# Patient Record
Sex: MALE | Race: WHITE | Employment: OTHER | URBAN - METROPOLITAN AREA
[De-identification: names, ages, dates, MRNs, and addresses within clinical notes are randomized per-mention and may not be internally consistent; named-entity substitution may affect disease eponyms.]

---

## 2017-02-07 ENCOUNTER — OFFICE VISIT (OUTPATIENT)
Dept: FAMILY MEDICINE | Facility: CLINIC | Age: 62
End: 2017-02-07
Payer: COMMERCIAL

## 2017-02-07 VITALS
DIASTOLIC BLOOD PRESSURE: 98 MMHG | RESPIRATION RATE: 16 BRPM | HEART RATE: 83 BPM | BODY MASS INDEX: 32.86 KG/M2 | WEIGHT: 229 LBS | SYSTOLIC BLOOD PRESSURE: 134 MMHG | TEMPERATURE: 98.1 F

## 2017-02-07 DIAGNOSIS — R03.0 ELEVATED BLOOD PRESSURE READING WITHOUT DIAGNOSIS OF HYPERTENSION: ICD-10-CM

## 2017-02-07 DIAGNOSIS — J06.9 VIRAL URI: Primary | ICD-10-CM

## 2017-02-07 DIAGNOSIS — R07.0 THROAT PAIN: ICD-10-CM

## 2017-02-07 LAB
DEPRECATED S PYO AG THROAT QL EIA: NORMAL
MICRO REPORT STATUS: NORMAL
SPECIMEN SOURCE: NORMAL

## 2017-02-07 PROCEDURE — 87081 CULTURE SCREEN ONLY: CPT | Performed by: FAMILY MEDICINE

## 2017-02-07 PROCEDURE — 99213 OFFICE O/P EST LOW 20 MIN: CPT | Performed by: FAMILY MEDICINE

## 2017-02-07 PROCEDURE — 87880 STREP A ASSAY W/OPTIC: CPT | Performed by: FAMILY MEDICINE

## 2017-02-07 NOTE — Clinical Note
[facility]  [address] [place] [id number]  Phone: [phone]    February 9, 2017    Phong Cuevas  [address] [place] [id number]              Dear Mr. Cuevas,    The results of your 24 hour throat culture were negative. Please contact your clinic if you have any questions or concerns.                Sincerely,      [facility]

## 2017-02-07 NOTE — MR AVS SNAPSHOT
"              After Visit Summary   2/7/2017    Phong Cuevas    MRN: [id number]           Patient Information     Date Of Birth          1955        Visit Information        Provider Department      2/7/2017 1:40 PM Nelly Montalvo DO [facility]        Today's Diagnoses     Throat pain    -  1       Care Instructions    You likely have a viral respiratory infection, as your Strep Test was negative, but we have collected cultures which will take a few days if it grows anything. Viral respiratory illnesses usually persists for about 7-10 days. With increased congestion, you may benefit from an over the counter nasal spray such as Flonase, which you'd spray 2 sprays in each nostril, once a day.     If you develop fevers or your symptoms worsen, please don't hesitate to contact us.         Follow-ups after your visit        Who to contact     Normal or non-critical lab and imaging results will be communicated to you by [email or web address], letter or phone within 4 business days after the clinic has received the results. If you do not hear from us within 7 days, please contact the clinic through [email or web address] or phone. If you have a critical or abnormal lab result, we will notify you by phone as soon as possible.  Submit refill requests through Zeetl or call your pharmacy and they will forward the refill request to us. Please allow 3 business days for your refill to be completed.          If you need to speak with a  for additional information , please call: [phone]           Additional Information About Your Visit        Zeetl Information     Zeetl lets you send messages to your doctor, view your test results, renew your prescriptions, schedule appointments and more. To sign up, go to [email or web address] . Click on \"Log in\" on the left side of the screen, which will take you to the Welcome page. Then click on \"Sign up Now\" on the right side of the page.     You will be asked to " enter the access code listed below, as well as some personal information. Please follow the directions to create your username and password.     Your access code is: [email or web address]  Expires: 2017  2:11 PM     Your access code will  in 90 days. If you need help or a new code, please call your [place] clinic or [phone].        Care EveryWhere ID     This is your Care EveryWhere ID. This could be used by other organizations to access your [place] medical records  [id number]        Your Vitals Were     Pulse Temperature Respirations             83 98.1  F (36.7  C) (Tympanic) 16          Blood Pressure from Last 3 Encounters:   17 133/90   06/09/15 136/80   14 107/76    Weight from Last 3 Encounters:   17 229 lb (103.874 kg)   06/09/15 220 lb 12.8 oz (100.154 kg)   14 190 lb (86.183 kg)              We Performed the Following     Beta strep group A culture     Strep, Rapid Screen        Primary Care Provider Office Phone # Fax #    Grady Hammer [phone]       [place] [id number] [place] [id number]        Thank you!     Thank you for choosing [facility]  for your care. Our goal is always to provide you with excellent care. Hearing back from our patients is one way we can continue to improve our services. Please take a few minutes to complete the written survey that you may receive in the mail after your visit with us. Thank you!             Your Updated Medication List - Protect others around you: Learn how to safely use, store and throw away your medicines at www.disposemymeds.org.          This list is accurate as of: 17  2:12 PM.  Always use your most recent med list.                   Brand Name Dispense Instructions for use    doxylamine 25 MG Tabs tablet    UNISOM     Take 25 mg by mouth At Bedtime       HYDROcodone-acetaminophen 5-325 MG per tablet    NORCO    20 tablet    Take 1 tablet by mouth every  8 hours as needed for moderate to severe pain (take at bedtime if needed)       IBUPROFEN PO          MELATONIN PO          meloxicam 15 MG tablet    MOBIC    30 tablet    Take 1 tablet (15 mg) by mouth daily

## 2017-02-07 NOTE — PROGRESS NOTES
SUBJECTIVE:                                                    Phong Cuevas is a 61 year old male who presents to clinic today for the following health issues:      ENT Symptoms             Symptoms: cc Present Absent Comment   Fever/Chills   x    Fatigue   x    Muscle Aches   x    Eye Irritation   x    Sneezing   x    Nasal Keyshawn/Drg  x     Sinus Pressure/Pain  x     Loss of smell   x    Dental pain   x    Sore Throat  x     Swollen Glands   x    Ear Pain/Fullness   x    Cough  x     Wheeze   x    Chest Pain   x    Shortness of breath   x    Rash   x    Other  x  Head feels plugged, headache     Symptom duration:  4 days   Symptom severity:  moderate   Treatments tried:  cough drops, musinex   Contacts:  public at races     Pt has been reporting sore throat x 4-5 days that seems to be worsening slightly. It seems to be worse when he coughs. He said his cough has come about for a couple days now and his primary concern is the congestion when he's sleeping.   He spent a lot of time outside this past weekend and reports several people at work are sick with the cold.   He has been afebrile, reports no muscle aches or fatigue, no chest pain, shortness of breath.     He is otherwise feeling well.    Problem list and histories reviewed & adjusted, as indicated.  Additional history: none    Problem list, Medication list, Allergies, and Medical/Social/Surgical histories reviewed in EPIC and updated as appropriate.    ROS:  Constitutional, HEENT, cardiovascular, pulmonary, gi and gu systems are negative, except as otherwise noted.    OBJECTIVE:                                                    /98 mmHg  Pulse 83  Temp(Src) 98.1  F (36.7  C) (Tympanic)  Resp 16  Wt 229 lb (103.874 kg)  Body mass index is 32.86 kg/(m^2).  GENERAL: healthy, alert and no distress,   HEENT:  NC/AT, conjunctiva wnl, TM's wnl arleen pearly gray with good light reflex, oropharynx clear without exudate/erythema, nose congested with clear  rhinorrhea  NECK: no adenopathy, no asymmetry, masses, or scars and thyroid normal to palpation  RESP: lungs clear to auscultation - no rales, rhonchi or wheezes  CV: regular rate and rhythm, normal S1 S2, no S3 or S4, no murmur, click or rub, no peripheral edema and peripheral pulses strong  ABDOMEN: soft, nontender, no hepatosplenomegaly, no masses and bowel sounds normal  MS: no gross musculoskeletal defects noted, no edema    Diagnostic Test Results:  No results found for this or any previous visit (from the past 24 hour(s)).     Rapid strep negative     ASSESSMENT/PLAN:                                                        ICD-10-CM    1. Viral URI J06.9     B97.89    2. Throat pain R07.0 Strep, Rapid Screen     Beta strep group A culture   3. Elevated blood pressure reading without diagnosis of hypertension R03.0        1. Throat pain: likely viral. Rapid strep negative. Unlikely pneumonia as lungs clear to auscultation bilaterally, afebrile, reports no chest pain or shortness of breath.  --cultures pending    2. Elevated BP: First reading 133/90, then second reading 134/98.        -Advised to RTC for Pharmacy check of BP after pt's illness improves    Nelly Montalvo,   [facility]

## 2017-02-07 NOTE — PATIENT INSTRUCTIONS
You likely have a viral respiratory infection, as your Strep Test was negative, but we have collected cultures which will take a few days if it grows anything. Viral respiratory illnesses usually persists for about 7-10 days. With increased congestion, you may benefit from an over the counter nasal spray such as Flonase, which you'd spray 2 sprays in each nostril, once a day.     If you develop fevers or your symptoms worsen, please don't hesitate to contact us.

## 2017-02-07 NOTE — NURSING NOTE
"Chief Complaint   Patient presents with     Throat Pain       Initial /90 mmHg  Pulse 83  Temp(Src) 98.1  F (36.7  C) (Tympanic)  Resp 16  Wt 229 lb (103.874 kg) Estimated body mass index is 32.86 kg/(m^2) as calculated from the following:    Height as of 6/9/15: 5' 10\" (1.778 m).    Weight as of this encounter: 229 lb (103.874 kg).  Medication Reconciliation: complete    "

## 2017-02-09 LAB
BACTERIA SPEC CULT: NORMAL
MICRO REPORT STATUS: NORMAL
SPECIMEN SOURCE: NORMAL

## 2017-03-23 ENCOUNTER — TELEPHONE (OUTPATIENT)
Dept: OTHER | Facility: CLINIC | Age: 62
End: 2017-03-23

## 2017-07-05 ENCOUNTER — OFFICE VISIT (OUTPATIENT)
Dept: FAMILY MEDICINE | Facility: CLINIC | Age: 62
End: 2017-07-05
Payer: COMMERCIAL

## 2017-07-05 VITALS
SYSTOLIC BLOOD PRESSURE: 112 MMHG | WEIGHT: 226 LBS | HEIGHT: 69 IN | DIASTOLIC BLOOD PRESSURE: 76 MMHG | TEMPERATURE: 97 F | HEART RATE: 84 BPM | BODY MASS INDEX: 33.47 KG/M2

## 2017-07-05 DIAGNOSIS — Z12.5 SCREENING FOR PROSTATE CANCER: ICD-10-CM

## 2017-07-05 DIAGNOSIS — R10.32 LLQ ABDOMINAL PAIN: ICD-10-CM

## 2017-07-05 DIAGNOSIS — Z13.6 CARDIOVASCULAR SCREENING; LDL GOAL LESS THAN 160: Primary | ICD-10-CM

## 2017-07-05 LAB
ALBUMIN SERPL-MCNC: 3.8 G/DL (ref 3.4–5)
ALBUMIN UR-MCNC: NEGATIVE MG/DL
ALP SERPL-CCNC: 41 U/L (ref 40–150)
ALT SERPL W P-5'-P-CCNC: 42 U/L (ref 0–70)
AMYLASE SERPL-CCNC: 42 U/L (ref 30–110)
ANION GAP SERPL CALCULATED.3IONS-SCNC: 4 MMOL/L (ref 3–14)
APPEARANCE UR: CLEAR
AST SERPL W P-5'-P-CCNC: 31 U/L (ref 0–45)
BASOPHILS # BLD AUTO: 0 10E9/L (ref 0–0.2)
BASOPHILS NFR BLD AUTO: 0.7 %
BILIRUB SERPL-MCNC: 0.5 MG/DL (ref 0.2–1.3)
BILIRUB UR QL STRIP: NEGATIVE
BUN SERPL-MCNC: 17 MG/DL (ref 7–30)
CALCIUM SERPL-MCNC: 8.5 MG/DL (ref 8.5–10.1)
CHLORIDE SERPL-SCNC: 101 MMOL/L (ref 94–109)
CHOLEST SERPL-MCNC: 241 MG/DL
CO2 SERPL-SCNC: 28 MMOL/L (ref 20–32)
COLOR UR AUTO: YELLOW
CREAT SERPL-MCNC: 0.84 MG/DL (ref 0.66–1.25)
DIFFERENTIAL METHOD BLD: NORMAL
DIFFERENTIAL METHOD BLD: NORMAL
EOSINOPHIL # BLD AUTO: 0.2 10E9/L (ref 0–0.7)
EOSINOPHIL NFR BLD AUTO: 4 %
ERYTHROCYTE [DISTWIDTH] IN BLOOD BY AUTOMATED COUNT: 12.4 % (ref 10–15)
GFR SERPL CREATININE-BSD FRML MDRD: ABNORMAL ML/MIN/1.7M2
GLUCOSE SERPL-MCNC: 110 MG/DL (ref 70–99)
GLUCOSE UR STRIP-MCNC: NEGATIVE MG/DL
HCT VFR BLD AUTO: 47.5 % (ref 40–53)
HDLC SERPL-MCNC: 68 MG/DL
HGB BLD-MCNC: 16 G/DL (ref 13.3–17.7)
HGB UR QL STRIP: NEGATIVE
KETONES UR STRIP-MCNC: NEGATIVE MG/DL
LDLC SERPL CALC-MCNC: 156 MG/DL
LEUKOCYTE ESTERASE UR QL STRIP: NEGATIVE
LIPASE SERPL-CCNC: 201 U/L (ref 73–393)
LYMPHOCYTES # BLD AUTO: 1.9 10E9/L (ref 0.8–5.3)
LYMPHOCYTES NFR BLD AUTO: 41.9 %
MCH RBC QN AUTO: 31.3 PG (ref 26.5–33)
MCHC RBC AUTO-ENTMCNC: 33.7 G/DL (ref 31.5–36.5)
MCV RBC AUTO: 93 FL (ref 78–100)
MONOCYTES # BLD AUTO: 0.5 10E9/L (ref 0–1.3)
MONOCYTES NFR BLD AUTO: 11.3 %
NEUTROPHILS # BLD AUTO: 1.9 10E9/L (ref 1.6–8.3)
NEUTROPHILS NFR BLD AUTO: 42.1 %
NITRATE UR QL: NEGATIVE
NONHDLC SERPL-MCNC: 173 MG/DL
PH UR STRIP: 5.5 PH (ref 5–7)
PLATELET # BLD AUTO: 188 10E9/L (ref 150–450)
POTASSIUM SERPL-SCNC: 4.7 MMOL/L (ref 3.4–5.3)
PROT SERPL-MCNC: 7.6 G/DL (ref 6.8–8.8)
PSA SERPL-ACNC: 1.28 UG/L (ref 0–4)
RBC # BLD AUTO: 5.11 10E12/L (ref 4.4–5.9)
SODIUM SERPL-SCNC: 133 MMOL/L (ref 133–144)
SP GR UR STRIP: 1.01 (ref 1–1.03)
TRIGL SERPL-MCNC: 83 MG/DL
TSH SERPL DL<=0.005 MIU/L-ACNC: 0.87 MU/L (ref 0.4–4)
URN SPEC COLLECT METH UR: NORMAL
UROBILINOGEN UR STRIP-ACNC: 0.2 EU/DL (ref 0.2–1)
WBC # BLD AUTO: 4.6 10E9/L (ref 4–11)

## 2017-07-05 PROCEDURE — 85004 AUTOMATED DIFF WBC COUNT: CPT | Performed by: NURSE PRACTITIONER

## 2017-07-05 PROCEDURE — 99214 OFFICE O/P EST MOD 30 MIN: CPT | Performed by: NURSE PRACTITIONER

## 2017-07-05 PROCEDURE — 82150 ASSAY OF AMYLASE: CPT | Performed by: NURSE PRACTITIONER

## 2017-07-05 PROCEDURE — 36415 COLL VENOUS BLD VENIPUNCTURE: CPT | Performed by: NURSE PRACTITIONER

## 2017-07-05 PROCEDURE — G0103 PSA SCREENING: HCPCS | Performed by: NURSE PRACTITIONER

## 2017-07-05 PROCEDURE — 80061 LIPID PANEL: CPT | Performed by: NURSE PRACTITIONER

## 2017-07-05 PROCEDURE — 83690 ASSAY OF LIPASE: CPT | Performed by: NURSE PRACTITIONER

## 2017-07-05 PROCEDURE — 80050 GENERAL HEALTH PANEL: CPT | Performed by: NURSE PRACTITIONER

## 2017-07-05 PROCEDURE — 81003 URINALYSIS AUTO W/O SCOPE: CPT | Performed by: NURSE PRACTITIONER

## 2017-07-05 ASSESSMENT — ENCOUNTER SYMPTOMS
JOINT SWELLING: 0
PALPITATIONS: 0
DIAPHORESIS: 0
SINUS PRESSURE: 0
HEADACHES: 0
CONSTIPATION: 0
NUMBNESS: 1
EYE DISCHARGE: 0
COUGH: 0
ABDOMINAL PAIN: 1
WHEEZING: 0
DIZZINESS: 0
DIARRHEA: 0
VOMITING: 0
RHINORRHEA: 0
NAUSEA: 0
ARTHRALGIAS: 1
DYSURIA: 0
SHORTNESS OF BREATH: 0
SORE THROAT: 0
FEVER: 0
LIGHT-HEADEDNESS: 0
FATIGUE: 0
FREQUENCY: 0

## 2017-07-05 NOTE — NURSING NOTE
"Chief Complaint   Patient presents with     Abdominal Pain     pt is fasting       Initial /76  Pulse 84  Temp 97  F (36.1  C) (Tympanic)  Ht 5' 9\" (1.753 m)  Wt 226 lb (102.5 kg)  BMI 33.37 kg/m2 Estimated body mass index is 33.37 kg/(m^2) as calculated from the following:    Height as of this encounter: 5' 9\" (1.753 m).    Weight as of this encounter: 226 lb (102.5 kg).  Medication Reconciliation: complete     April ALTON Dsouza      "

## 2017-07-05 NOTE — MR AVS SNAPSHOT
"              After Visit Summary   2017    Phong Cuevas    MRN: [id number]           Patient Information     Date Of Birth          1955        Visit Information        Provider Department      2017 9:00 AM Indigo Alvarado APRN [facility]        Today's Diagnoses     CARDIOVASCULAR SCREENING; LDL GOAL LESS THAN 160    -  1    Screening for prostate cancer        LLQ abdominal pain           Follow-ups after your visit        Who to contact     Normal or non-critical lab and imaging results will be communicated to you by theBenchhart, letter or phone within 4 business days after the clinic has received the results. If you do not hear from us within 7 days, please contact the clinic through Autospritet or phone. If you have a critical or abnormal lab result, we will notify you by phone as soon as possible.  Submit refill requests through SemiLev or call your pharmacy and they will forward the refill request to us. Please allow 3 business days for your refill to be completed.          If you need to speak with a  for additional information , please call: [phone]           Additional Information About Your Visit        theBenchharRenthackr Information     SemiLev lets you send messages to your doctor, view your test results, renew your prescriptions, schedule appointments and more. To sign up, go to [email or web address] . Click on \"Log in\" on the left side of the screen, which will take you to the Welcome page. Then click on \"Sign up Now\" on the right side of the page.     You will be asked to enter the access code listed below, as well as some personal information. Please follow the directions to create your username and password.     Your access code is: [email or web address]  Expires: 10/3/2017  9:29 AM     Your access code will  in 90 days. If you need help or a new code, please call your [facility] or [phone].        Care EveryWhere ID     This is your Care " "EveryWhere ID. This could be used by other organizations to access your [place] medical records  [id number]        Your Vitals Were     Pulse Temperature Height BMI (Body Mass Index)          84 97  F (36.1  C) (Tympanic) 5' 9\" (1.753 m) 33.37 kg/m2         Blood Pressure from Last 3 Encounters:   07/05/17 (!) 122/98   02/07/17 (!) 134/98   06/09/15 136/80    Weight from Last 3 Encounters:   07/05/17 226 lb (102.5 kg)   02/07/17 229 lb (103.9 kg)   06/09/15 220 lb 12.8 oz (100.2 kg)              We Performed the Following     Amylase     CBC with platelets differential     Comprehensive metabolic panel     Lipase     Lipid panel reflex to direct LDL     Prostate spec antigen screen     TSH with free T4 reflex     UA reflex to Microscopic and Culture        Primary Care Provider Office Phone # Fax #    Grady Hammer [phone]       [place] [id number] [place] [address]        Equal Access to Services     BISI VALLES : Hadii briana ku hadasho Soomaali, waaxda luqadaha, qaybta kaalmada adeegyadeni, aj farris . So [facility] [phone].    ATENCIÓN: Si habla español, tiene a fitch disposición servicios gratuitos de asistencia lingüística. [facility] [phone].    We comply with applicable federal civil rights laws and [place] laws. We do not discriminate on the basis of race, color, national origin, age, disability sex, sexual orientation or gender identity.            Thank you!     Thank you for choosing [facility]  for your care. Our goal is always to provide you with excellent care. Hearing back from our patients is one way we can continue to improve our services. Please take a few minutes to complete the written survey that you may receive in the mail after your visit with us. Thank you!             Your Updated Medication List - Protect others around you: Learn how to safely use, store and throw away your medicines at " www.disposemymeds.org.          This list is accurate as of: 7/5/17  9:29 AM.  Always use your most recent med list.                   Brand Name Dispense Instructions for use Diagnosis    doxylamine 25 MG Tabs tablet    UNISOM     Take 25 mg by mouth At Bedtime        IBUPROFEN PO           MELATONIN PO

## 2017-07-05 NOTE — PROGRESS NOTES
SUBJECTIVE:                                                    Phong Cuevas is a 62 year old male who presents to clinic today for the following health issues:      Abdominal Pain      Duration: 2 months, at first thought he pulled a muscle because he has been working out. Pain increases when he sits down.    Description (location/character/radiation): lower left abdomen       Associated flank pain: None    Intensity:  mild    Accompanying signs and symptoms:        Fever/Chills: no        Gas/Bloating: no        Nausea/vomitting: no        Diarrhea: no        Dysuria or Hematuria: no     History (previous similar pain/trauma/previous testing): none    Precipitating or alleviating factors:       Pain worse with eating/BM/urination: none       Pain relieved by BM: no     Therapies tried and outcome: None    LMP:  not applicable    Pain to left lower abdomen. Has been working out and doing a lot of heavy lifting. Has been there a couple of months. Pain will be there and then will be gone. Dull aching pain. No nausea. No change in bowels. No pain with urination. No fevers or chills or sweats. Colonoscopy in 2014-normal. No blood in urine or stool. Wife made come in. Is fasting today. Drinks a 12 pk of beer per week, sometimes more, sometimes less.     Problem list and histories reviewed & adjusted, as indicated.  Additional history: as documented    Current Outpatient Prescriptions   Medication Sig Dispense Refill     doxylamine (UNISOM) 25 MG TABS Take 25 mg by mouth At Bedtime       IBUPROFEN PO        MELATONIN PO        Allergies   Allergen Reactions     Nka [No Known Allergies]        Reviewed and updated as needed this visit by clinical staff  Tobacco  Allergies  Meds  Med Hx  Surg Hx  Fam Hx  Soc Hx      Reviewed and updated as needed this visit by Provider         ROS:  Review of Systems   Constitutional: Negative for diaphoresis, fatigue and fever.   HENT: Negative for congestion, ear pain,  "rhinorrhea, sinus pressure and sore throat.    Eyes: Negative for discharge.   Respiratory: Negative for cough, shortness of breath and wheezing.    Cardiovascular: Negative for chest pain, palpitations and leg swelling.   Gastrointestinal: Positive for abdominal pain (left side that comes and goes). Negative for constipation, diarrhea, nausea and vomiting.   Genitourinary: Negative for dysuria and frequency.   Musculoskeletal: Positive for arthralgias (bilat knees for some time. ). Negative for joint swelling.   Skin: Negative for rash.   Neurological: Positive for numbness (numbness to hands at times that gets better when shakes them out. ). Negative for dizziness, light-headedness and headaches.         OBJECTIVE:     BP (!) 122/98 (BP Location: Right arm, Patient Position: Sitting, Cuff Size: Adult Regular)  Pulse 84  Temp 97  F (36.1  C) (Tympanic)  Ht 5' 9\" (1.753 m)  Wt 226 lb (102.5 kg)  BMI 33.37 kg/m2  Body mass index is 33.37 kg/(m^2).  Physical Exam   Constitutional: He appears well-developed and well-nourished.   HENT:   Right Ear: Tympanic membrane and external ear normal.   Left Ear: Tympanic membrane and external ear normal.   Mouth/Throat: Uvula is midline, oropharynx is clear and moist and mucous membranes are normal.   Neck: Carotid bruit is not present. No thyromegaly present.   Cardiovascular: Normal rate, regular rhythm and normal heart sounds.    Pulmonary/Chest: Effort normal and breath sounds normal.   Abdominal: Soft. Bowel sounds are normal.       Neurological: He is alert.   Skin: Skin is warm and dry.       ASSESSMENT/PLAN:   1. CARDIOVASCULAR SCREENING; LDL GOAL LESS THAN 160  Check fasting labs today   - Lipid panel reflex to direct LDL    2. Screening for prostate cancer  - Prostate spec antigen screen    3. LLQ abdominal pain  Will obtain labs  If labs are negative plan to set up for CT of abdomen/pelvis   - UA reflex to Microscopic and Culture  - CBC with platelets " differential  - Comprehensive metabolic panel  - TSH with free T4 reflex  - Amylase  - Lipase    CULLEN Talbot [facility]

## 2017-07-06 DIAGNOSIS — R10.32 ABDOMINAL PAIN, LEFT LOWER QUADRANT: Primary | ICD-10-CM

## 2017-07-11 ENCOUNTER — MYC MEDICAL ADVICE (OUTPATIENT)
Dept: FAMILY MEDICINE | Facility: CLINIC | Age: 62
End: 2017-07-11

## 2017-07-11 NOTE — TELEPHONE ENCOUNTER
Patient requesting cost of CT.  I'd suggest doing it at [facility] (not hospital based).     Insurance probably covers it though.  Should check with insurance first.

## 2017-07-20 ENCOUNTER — NURSE TRIAGE (OUTPATIENT)
Dept: NURSING | Facility: CLINIC | Age: 62
End: 2017-07-20

## 2017-07-20 NOTE — TELEPHONE ENCOUNTER
Caller looking to schedule [place] at [facility] and looking for estimated cost of CT scan.  Did provide with [facility] priceline number, and scheduling number.    Additional Information    General information question, no triage required and triager able to answer question    Protocols used: INFORMATION ONLY CALL-ADULT-

## 2017-07-24 ENCOUNTER — RADIANT APPOINTMENT (OUTPATIENT)
Dept: CT IMAGING | Facility: CLINIC | Age: 62
End: 2017-07-24
Attending: NURSE PRACTITIONER
Payer: COMMERCIAL

## 2017-07-24 DIAGNOSIS — R10.32 ABDOMINAL PAIN, LEFT LOWER QUADRANT: ICD-10-CM

## 2017-07-24 PROCEDURE — 74177 CT ABD & PELVIS W/CONTRAST: CPT | Mod: TC

## 2017-07-24 RX ORDER — IOPAMIDOL 755 MG/ML
100 INJECTION, SOLUTION INTRAVASCULAR ONCE
Status: COMPLETED | OUTPATIENT
Start: 2017-07-24 | End: 2017-07-24

## 2017-07-24 RX ADMIN — IOPAMIDOL 100 ML: 755 INJECTION, SOLUTION INTRAVASCULAR at 11:14

## 2018-01-11 ENCOUNTER — RADIANT APPOINTMENT (OUTPATIENT)
Dept: GENERAL RADIOLOGY | Facility: CLINIC | Age: 63
End: 2018-01-11
Attending: NURSE PRACTITIONER
Payer: COMMERCIAL

## 2018-01-11 ENCOUNTER — OFFICE VISIT (OUTPATIENT)
Dept: FAMILY MEDICINE | Facility: CLINIC | Age: 63
End: 2018-01-11
Payer: COMMERCIAL

## 2018-01-11 VITALS
TEMPERATURE: 96.8 F | HEIGHT: 70 IN | HEART RATE: 92 BPM | WEIGHT: 224.2 LBS | SYSTOLIC BLOOD PRESSURE: 122 MMHG | DIASTOLIC BLOOD PRESSURE: 82 MMHG | BODY MASS INDEX: 32.1 KG/M2

## 2018-01-11 DIAGNOSIS — G89.29 CHRONIC PAIN OF LEFT KNEE: Primary | ICD-10-CM

## 2018-01-11 DIAGNOSIS — M79.10 MYALGIA: ICD-10-CM

## 2018-01-11 DIAGNOSIS — M25.562 CHRONIC PAIN OF LEFT KNEE: Primary | ICD-10-CM

## 2018-01-11 DIAGNOSIS — M25.562 CHRONIC PAIN OF LEFT KNEE: ICD-10-CM

## 2018-01-11 DIAGNOSIS — G89.29 CHRONIC PAIN OF LEFT KNEE: ICD-10-CM

## 2018-01-11 DIAGNOSIS — E78.5 HYPERLIPIDEMIA LDL GOAL <100: ICD-10-CM

## 2018-01-11 LAB
ALBUMIN SERPL-MCNC: 4.1 G/DL (ref 3.4–5)
ALP SERPL-CCNC: 47 U/L (ref 40–150)
ALT SERPL W P-5'-P-CCNC: 51 U/L (ref 0–70)
ANION GAP SERPL CALCULATED.3IONS-SCNC: 5 MMOL/L (ref 3–14)
AST SERPL W P-5'-P-CCNC: 31 U/L (ref 0–45)
BILIRUB SERPL-MCNC: 0.6 MG/DL (ref 0.2–1.3)
BUN SERPL-MCNC: 22 MG/DL (ref 7–30)
CALCIUM SERPL-MCNC: 8.8 MG/DL (ref 8.5–10.1)
CHLORIDE SERPL-SCNC: 97 MMOL/L (ref 94–109)
CHOLEST SERPL-MCNC: 258 MG/DL
CO2 SERPL-SCNC: 30 MMOL/L (ref 20–32)
CREAT SERPL-MCNC: 0.96 MG/DL (ref 0.66–1.25)
CRP SERPL-MCNC: 4.5 MG/L (ref 0–8)
DEPRECATED CALCIDIOL+CALCIFEROL SERPL-MC: 50 UG/L (ref 20–75)
ERYTHROCYTE [SEDIMENTATION RATE] IN BLOOD BY WESTERGREN METHOD: 4 MM/H (ref 0–20)
FERRITIN SERPL-MCNC: 289 NG/ML (ref 26–388)
GFR SERPL CREATININE-BSD FRML MDRD: 79 ML/MIN/1.7M2
GLUCOSE SERPL-MCNC: 106 MG/DL (ref 70–99)
HDLC SERPL-MCNC: 70 MG/DL
LDLC SERPL CALC-MCNC: 163 MG/DL
NONHDLC SERPL-MCNC: 188 MG/DL
POTASSIUM SERPL-SCNC: 4.4 MMOL/L (ref 3.4–5.3)
PROT SERPL-MCNC: 8.1 G/DL (ref 6.8–8.8)
SODIUM SERPL-SCNC: 132 MMOL/L (ref 133–144)
TRIGL SERPL-MCNC: 125 MG/DL
VIT B12 SERPL-MCNC: 609 PG/ML (ref 193–986)

## 2018-01-11 PROCEDURE — 82607 VITAMIN B-12: CPT | Performed by: NURSE PRACTITIONER

## 2018-01-11 PROCEDURE — 86038 ANTINUCLEAR ANTIBODIES: CPT | Performed by: NURSE PRACTITIONER

## 2018-01-11 PROCEDURE — 36415 COLL VENOUS BLD VENIPUNCTURE: CPT | Performed by: NURSE PRACTITIONER

## 2018-01-11 PROCEDURE — 73562 X-RAY EXAM OF KNEE 3: CPT | Mod: LT

## 2018-01-11 PROCEDURE — 80061 LIPID PANEL: CPT | Performed by: NURSE PRACTITIONER

## 2018-01-11 PROCEDURE — 86431 RHEUMATOID FACTOR QUANT: CPT | Performed by: NURSE PRACTITIONER

## 2018-01-11 PROCEDURE — 82728 ASSAY OF FERRITIN: CPT | Performed by: NURSE PRACTITIONER

## 2018-01-11 PROCEDURE — 86140 C-REACTIVE PROTEIN: CPT | Performed by: NURSE PRACTITIONER

## 2018-01-11 PROCEDURE — 86200 CCP ANTIBODY: CPT | Performed by: NURSE PRACTITIONER

## 2018-01-11 PROCEDURE — 99213 OFFICE O/P EST LOW 20 MIN: CPT | Performed by: NURSE PRACTITIONER

## 2018-01-11 PROCEDURE — 80053 COMPREHEN METABOLIC PANEL: CPT | Performed by: NURSE PRACTITIONER

## 2018-01-11 PROCEDURE — 85652 RBC SED RATE AUTOMATED: CPT | Performed by: NURSE PRACTITIONER

## 2018-01-11 PROCEDURE — 82306 VITAMIN D 25 HYDROXY: CPT | Performed by: NURSE PRACTITIONER

## 2018-01-11 RX ORDER — NAPROXEN 500 MG/1
TABLET ORAL
Qty: 60 TABLET | Refills: 0 | Status: SHIPPED | OUTPATIENT
Start: 2018-01-11 | End: 2018-02-09

## 2018-01-11 ASSESSMENT — ENCOUNTER SYMPTOMS
NUMBNESS: 0
DIARRHEA: 0
SINUS PRESSURE: 0
VOMITING: 0
SHORTNESS OF BREATH: 0
COUGH: 0
DIAPHORESIS: 0
MYALGIAS: 1
FEVER: 0
DIZZINESS: 0
RHINORRHEA: 0
HEADACHES: 0
SORE THROAT: 0
NAUSEA: 0
WHEEZING: 0
FATIGUE: 0
EYE DISCHARGE: 0
ARTHRALGIAS: 1

## 2018-01-11 NOTE — NURSING NOTE
"Chief Complaint   Patient presents with     Knee Pain       Initial /82 (BP Location: Left arm, Patient Position: Sitting, Cuff Size: Adult Large)  Pulse 92  Temp 96.8  F (36  C) (Tympanic)  Ht 5' 9.5\" (1.765 m)  Wt 224 lb 3.2 oz (101.7 kg)  BMI 32.63 kg/m2 Estimated body mass index is 32.63 kg/(m^2) as calculated from the following:    Height as of this encounter: 5' 9.5\" (1.765 m).    Weight as of this encounter: 224 lb 3.2 oz (101.7 kg).  Medication Reconciliation: complete     April ALTON Dsouza      "

## 2018-01-11 NOTE — MR AVS SNAPSHOT
"              After Visit Summary   1/11/2018    Phong Cuevas    MRN: [id number]           Patient Information     Date Of Birth          1955        Visit Information        Provider Department      1/11/2018 8:00 AM Indigo Alvarado APRN [facility]        Today's Diagnoses     Chronic pain of left knee    -  1    Myalgia           Follow-ups after your visit        Future tests that were ordered for you today     Open Future Orders        Priority Expected Expires Ordered    XR Knee Left 3 Views Routine 1/11/2018 1/11/2019 1/11/2018            Who to contact     Normal or non-critical lab and imaging results will be communicated to you by Metal Powder & Processhart, letter or phone within 4 business days after the clinic has received the results. If you do not hear from us within 7 days, please contact the clinic through Metal Powder & Processhart or phone. If you have a critical or abnormal lab result, we will notify you by phone as soon as possible.  Submit refill requests through Buggl or call your pharmacy and they will forward the refill request to us. Please allow 3 business days for your refill to be completed.          If you need to speak with a  for additional information , please call: [phone]           Additional Information About Your Visit        Metal Powder & ProcessharRentMineOnline Information     Buggl gives you secure access to your electronic health record. If you see a primary care provider, you can also send messages to your care team and make appointments. If you have questions, please call your primary care clinic.  If you do not have a primary care provider, please call [phone] and they will assist you.        Care EveryWhere ID     This is your Care EveryWhere ID. This could be used by other organizations to access your [place] medical records  [id number]        Your Vitals Were     Pulse Temperature Height BMI (Body Mass Index)          92 96.8  F (36  C) (Tympanic) 5' 9.5\" (1.765 m) " 32.63 kg/m2         Blood Pressure from Last 3 Encounters:   01/11/18 122/82   07/05/17 112/76   02/07/17 (!) 134/98    Weight from Last 3 Encounters:   01/11/18 224 lb 3.2 oz (101.7 kg)   07/05/17 226 lb (102.5 kg)   02/07/17 229 lb (103.9 kg)              We Performed the Following     Anti Nuclear Jahaira IgG by IFA with Reflex     Comprehensive metabolic panel     CRP inflammation     Cyclic Citrullinated Peptide Antibody IgG     Erythrocyte sedimentation rate auto     Ferritin     Lipid panel reflex to direct LDL Fasting     Rheumatoid factor     Vitamin B12     Vitamin D Deficiency          Today's Medication Changes          These changes are accurate as of: 1/11/18  8:37 AM.  If you have any questions, ask your nurse or doctor.               Start taking these medicines.        Dose/Directions    naproxen 500 MG tablet   Commonly known as:  NAPROSYN   Used for:  Myalgia   Started by:  Indigo Alvarado APRN CNP        Take twice per day with food for 2 weeks then every 12 hours as needed   Quantity:  60 tablet   Refills:  0            Where to get your medicines      These medications were sent to [facility] [address] [address]     Phone:  [phone]     naproxen 500 MG tablet                Primary Care Provider Office Phone # Fax #    Grady Hammer [phone]       [address] [address]        Equal Access to Services     PHYLLIS VALLES AH: Hadii briana maxwello Sojayme, waaxda luqadaha, qaybta kaalmada adeegyada, aj dial. So [facility] [phone].    ATENCIÓN: Si habla español, tiene a fitch disposición servicios gratuitos de asistencia lingüística. Llame al [phone].    We comply with applicable federal civil rights laws and [place] laws. We do not discriminate on the basis of race, color, national origin, age, disability, sex,  sexual orientation, or gender identity.            Thank you!     Thank you for choosing [facility]  for your care. Our goal is always to provide you with excellent care. Hearing back from our patients is one way we can continue to improve our services. Please take a few minutes to complete the written survey that you may receive in the mail after your visit with us. Thank you!             Your Updated Medication List - Protect others around you: Learn how to safely use, store and throw away your medicines at www.disposemymeds.org.          This list is accurate as of: 1/11/18  8:37 AM.  Always use your most recent med list.                   Brand Name Dispense Instructions for use Diagnosis    doxylamine 25 MG Tabs tablet    UNISOM     Take 25 mg by mouth At Bedtime        IBUPROFEN PO           MELATONIN PO           naproxen 500 MG tablet    NAPROSYN    60 tablet    Take twice per day with food for 2 weeks then every 12 hours as needed    Myalgia

## 2018-01-11 NOTE — PROGRESS NOTES
SUBJECTIVE:   Phong Cuevas is a 62 year old male who presents to clinic today for the following health issues:      Musculoskeletal problem/pain      Duration: 4-5 months    Description  Location: left kneee    Intensity:  Severe pain by the end of the day    Accompanying signs and symptoms: swelling, stiffness    History  Previous similar problem: no   Previous evaluation:  none    Precipitating or alleviating factors:  Trauma or overuse: no   Aggravating factors include: kneeling, walking    Therapies tried and outcome: nothing      Needs to discuss labs from 7/5/17. He didn't understand the results.    Problem list and histories reviewed & adjusted, as indicated.  Additional history: as documented    Current Outpatient Prescriptions   Medication Sig Dispense Refill     naproxen (NAPROSYN) 500 MG tablet Take twice per day with food for 2 weeks then every 12 hours as needed 60 tablet 0     doxylamine (UNISOM) 25 MG TABS Take 25 mg by mouth At Bedtime       IBUPROFEN PO        MELATONIN PO        Allergies   Allergen Reactions     Nka [No Known Allergies]        Reviewed and updated as needed this visit by clinical staffTobacco  Allergies  Meds  Med Hx  Surg Hx  Fam Hx  Soc Hx      Reviewed and updated as needed this visit by Provider         Has been having left knee pain. Did fall in shop tripped over tool box about 4-5 months ago. Muscles always feel sore. If goes to stand will have some aching and then once gets going seems be okay. Left knee is swollen but unsure if has been. Knee gets very stiff. Does take ibuprofen and that does help. No previous that is aware of.       ROS:  Review of Systems   Constitutional: Negative for diaphoresis, fatigue and fever.   HENT: Negative for congestion, ear pain, rhinorrhea, sinus pressure and sore throat.    Eyes: Negative for discharge.   Respiratory: Negative for cough, shortness of breath and wheezing.    Cardiovascular: Negative for chest pain.  "  Gastrointestinal: Negative for diarrhea, nausea and vomiting.   Musculoskeletal: Positive for arthralgias (left knee) and myalgias (all over).   Neurological: Negative for dizziness, numbness and headaches.         OBJECTIVE:     /82 (BP Location: Left arm, Patient Position: Sitting, Cuff Size: Adult Large)  Pulse 92  Temp 96.8  F (36  C) (Tympanic)  Ht 5' 9.5\" (1.765 m)  Wt 224 lb 3.2 oz (101.7 kg)  BMI 32.63 kg/m2  Body mass index is 32.63 kg/(m^2).  Physical Exam   Constitutional: He appears well-developed and well-nourished.   Cardiovascular: Normal rate, regular rhythm and normal heart sounds.    Pulmonary/Chest: Effort normal and breath sounds normal.   Musculoskeletal:        Left knee: He exhibits swelling and effusion. He exhibits normal range of motion, no ecchymosis, no deformity, no erythema and no bony tenderness. No tenderness found. No medial joint line, no lateral joint line, no MCL, no LCL and no patellar tendon tenderness noted.   Neurological: He is alert.   Skin: Skin is warm and dry.       ASSESSMENT/PLAN:   1. Chronic pain of left knee  Full plan will depend on outcome of imaging   - XR Knee Left 3 Views; Future  - naproxen (NAPROSYN) 500 MG tablet; Take twice per day with food for 2 weeks then every 12 hours as needed  Dispense: 60 tablet; Refill: 0    2. Myalgia  Encouraged to remain active  Will check labs today  - Vitamin D Deficiency  - Vitamin B12  - Ferritin  - Comprehensive metabolic panel  - naproxen (NAPROSYN) 500 MG tablet; Take twice per day with food for 2 weeks then every 12 hours as needed  Dispense: 60 tablet; Refill: 0  - Anti Nuclear Jahaira IgG by IFA with Reflex  - Cyclic Citrullinated Peptide Antibody IgG  - Rheumatoid factor  - CRP inflammation  - Erythrocyte sedimentation rate auto    3. Hyperlipidemia LDL goal <100  Previous labs reviewed  Plan to recheck today  May need low dose statin  - Lipid panel reflex to direct LDL Fasting      Indigo Alvarado, APRN " [facility]

## 2018-01-12 DIAGNOSIS — E78.5 HYPERLIPIDEMIA LDL GOAL <100: Primary | ICD-10-CM

## 2018-01-12 LAB
ANA SER QL IF: NEGATIVE
CCP AB SER IA-ACNC: <1 U/ML
RHEUMATOID FACT SER NEPH-ACNC: <20 IU/ML (ref 0–20)

## 2018-01-12 RX ORDER — SIMVASTATIN 20 MG
20 TABLET ORAL AT BEDTIME
Qty: 90 TABLET | Refills: 0 | Status: SHIPPED | OUTPATIENT
Start: 2018-01-12 | End: 2018-04-05

## 2018-01-12 NOTE — PROGRESS NOTES
Normal/Negative    Please call or email with any additional questions or concerns  CULLEN Dutton CNP

## 2018-02-09 DIAGNOSIS — M79.10 MYALGIA: ICD-10-CM

## 2018-02-09 DIAGNOSIS — G89.29 CHRONIC PAIN OF LEFT KNEE: ICD-10-CM

## 2018-02-09 DIAGNOSIS — M25.562 CHRONIC PAIN OF LEFT KNEE: ICD-10-CM

## 2018-02-09 NOTE — TELEPHONE ENCOUNTER
"Requested Prescriptions   Pending Prescriptions Disp Refills     naproxen (NAPROSYN) 500 MG tablet 60 tablet 0    Last Written Prescription Date:  01/11/2018  #60 x 0  Last filled - not provided  Last office visit: 1/11/2018 with prescribing provider:  NOHEMY Alvarado  Future Office Visit:  none   Sig: Take twice per day with food for 2 weeks then every 12 hours as needed    NSAID Medications Passed    2/9/2018 12:39 PM       Passed - Blood pressure under 140/90    BP Readings from Last 3 Encounters:   01/11/18 122/82   07/05/17 112/76   02/07/17 (!) 134/98                Passed - Normal ALT on file in past 12 months    Recent Labs   Lab Test  01/11/18   0841   ALT  51            Passed - Normal AST on file in past 12 months    Recent Labs   Lab Test  01/11/18   0841   AST  31            Passed - Recent or future visit with authorizing provider's specialty    Patient had office visit in the last year or has a visit in the next 30 days with authorizing provider.  See \"Patient Info\" tab in inbasket, or \"Choose Columns\" in Meds & Orders section of the refill encounter.            Passed - Patient is age 6-64 years       Passed - Normal CBC on file in past 12 months    Recent Labs   Lab Test  07/05/17   0932   WBC  4.6   RBC  5.11   HGB  16.0   HCT  47.5   PLT  188            Passed - Normal serum creatinine on file in past 12 months    Recent Labs   Lab Test  01/11/18   0841   CR  0.96               "

## 2018-02-12 RX ORDER — NAPROXEN 500 MG/1
TABLET ORAL
Qty: 60 TABLET | Refills: 0 | Status: SHIPPED | OUTPATIENT
Start: 2018-02-12 | End: 2018-04-05

## 2018-04-05 DIAGNOSIS — M79.10 MYALGIA: ICD-10-CM

## 2018-04-05 DIAGNOSIS — E78.5 HYPERLIPIDEMIA LDL GOAL <100: ICD-10-CM

## 2018-04-05 DIAGNOSIS — M25.562 CHRONIC PAIN OF LEFT KNEE: ICD-10-CM

## 2018-04-05 DIAGNOSIS — G89.29 CHRONIC PAIN OF LEFT KNEE: ICD-10-CM

## 2018-04-06 RX ORDER — NAPROXEN 500 MG/1
TABLET ORAL
Qty: 60 TABLET | Refills: 0 | Status: SHIPPED | OUTPATIENT
Start: 2018-04-06 | End: 2018-09-07

## 2018-04-06 RX ORDER — SIMVASTATIN 20 MG
20 TABLET ORAL AT BEDTIME
Qty: 90 TABLET | Refills: 0 | Status: SHIPPED | OUTPATIENT
Start: 2018-04-06 | End: 2018-07-24

## 2018-04-06 NOTE — TELEPHONE ENCOUNTER
"Requested Prescriptions   Pending Prescriptions Disp Refills     naproxen (NAPROSYN) 500 MG tablet 60 tablet 0    Last Written Prescription Date:  02/12/2018 #60 x 0  Last filled - not provided  Last office visit: 1/11/2018 [facility]   Future Office Visit:  None   Sig: Take twice per day with food for 2 weeks then every 12 hours as needed    NSAID Medications Passed    4/5/2018  1:38 PM       Passed - Blood pressure under 140/90 in past 12 months    BP Readings from Last 3 Encounters:   01/11/18 122/82   07/05/17 112/76   02/07/17 (!) 134/98                Passed - Normal ALT on file in past 12 months    Recent Labs   Lab Test  01/11/18   0841   ALT  51            Passed - Normal AST on file in past 12 months    Recent Labs   Lab Test  01/11/18   0841   AST  31            Passed - Recent (12 mo) or future (30 days) visit within the authorizing provider's specialty    Patient had office visit in the last 12 months or has a visit in the next 30 days with authorizing provider or within the authorizing provider's specialty.  See \"Patient Info\" tab in inbasket, or \"Choose Columns\" in Meds & Orders section of the refill encounter.           Passed - Patient is age 6-64 years       Passed - Normal CBC on file in past 12 months    Recent Labs   Lab Test  07/05/17   0932   WBC  4.6   RBC  5.11   HGB  16.0   HCT  47.5   PLT  188            Passed - Normal serum creatinine on file in past 12 months    Recent Labs   Lab Test  01/11/18   0841   CR  0.96             simvastatin (ZOCOR) 20 MG tablet 90 tablet 0    Last Written Prescription Date:  01/12/2018 #90 x 0  Last filled - not provided  Last office visit: 1/11/2018 [facility]   Future Office Visit:  None   Sig: Take 1 tablet (20 mg) by mouth At Bedtime    Statins Protocol Passed    4/5/2018  1:38 PM       Passed - LDL on file in past 12 months    Recent Labs   Lab Test  01/11/18   0841   LDL  163*            Passed - No abnormal creatine kinase in past 12 months    No " "lab results found.            Passed - Recent (12 mo) or future (30 days) visit within the authorizing provider's specialty    Patient had office visit in the last 12 months or has a visit in the next 30 days with authorizing provider or within the authorizing provider's specialty.  See \"Patient Info\" tab in inbasket, or \"Choose Columns\" in Meds & Orders section of the refill encounter.           Passed - Patient is age 18 or older          "

## 2018-07-24 DIAGNOSIS — E78.5 HYPERLIPIDEMIA LDL GOAL <100: ICD-10-CM

## 2018-07-24 NOTE — TELEPHONE ENCOUNTER
"Requested Prescriptions   Pending Prescriptions Disp Refills     simvastatin (ZOCOR) 20 MG tablet 90 tablet 0    Last Written Prescription Date:  04/06/2018 #90 x 0  Last filled - not provided  Last office visit: 1/11/2018 NOHEMY Alvarado   Future Office Visit:  None   Sig: Take 1 tablet (20 mg) by mouth At Bedtime    Statins Protocol Passed    7/24/2018  2:03 PM       Passed - LDL on file in past 12 months    Recent Labs   Lab Test  01/11/18   0841   LDL  163*            Passed - No abnormal creatine kinase in past 12 months    No lab results found.            Passed - Recent (12 mo) or future (30 days) visit within the authorizing provider's specialty    Patient had office visit in the last 12 months or has a visit in the next 30 days with authorizing provider or within the authorizing provider's specialty.  See \"Patient Info\" tab in inbasket, or \"Choose Columns\" in Meds & Orders section of the refill encounter.           Passed - Patient is age 18 or older          "

## 2018-07-25 RX ORDER — SIMVASTATIN 20 MG
20 TABLET ORAL AT BEDTIME
Qty: 90 TABLET | Refills: 1 | Status: SHIPPED | OUTPATIENT
Start: 2018-07-25 | End: 2019-01-24

## 2018-09-07 ENCOUNTER — OFFICE VISIT (OUTPATIENT)
Dept: FAMILY MEDICINE | Facility: CLINIC | Age: 63
End: 2018-09-07
Payer: COMMERCIAL

## 2018-09-07 VITALS
DIASTOLIC BLOOD PRESSURE: 80 MMHG | WEIGHT: 225.6 LBS | SYSTOLIC BLOOD PRESSURE: 130 MMHG | TEMPERATURE: 97.3 F | BODY MASS INDEX: 32.84 KG/M2 | RESPIRATION RATE: 12 BRPM | HEART RATE: 72 BPM

## 2018-09-07 DIAGNOSIS — S86.912A MUSCLE STRAIN OF LEFT LOWER LEG, INITIAL ENCOUNTER: Primary | ICD-10-CM

## 2018-09-07 PROCEDURE — 99213 OFFICE O/P EST LOW 20 MIN: CPT | Performed by: NURSE PRACTITIONER

## 2018-09-07 RX ORDER — NAPROXEN 500 MG/1
TABLET ORAL
Qty: 40 TABLET | Refills: 0 | Status: SHIPPED | OUTPATIENT
Start: 2018-09-07 | End: 2019-01-24

## 2018-09-07 ASSESSMENT — ENCOUNTER SYMPTOMS
SHORTNESS OF BREATH: 0
DIAPHORESIS: 0
SORE THROAT: 0
WHEEZING: 0
HEADACHES: 0
NAUSEA: 0
FATIGUE: 0
NUMBNESS: 0
ARTHRALGIAS: 1
COUGH: 0
VOMITING: 0
EYE DISCHARGE: 0
SINUS PRESSURE: 0
DIARRHEA: 0
FEVER: 0
RHINORRHEA: 0

## 2018-09-07 ASSESSMENT — PAIN SCALES - GENERAL: PAINLEVEL: MILD PAIN (2)

## 2018-09-07 NOTE — MR AVS SNAPSHOT
After Visit Summary   9/7/2018    Phong Cuevas    MRN: [id number]           Patient Information     Date Of Birth          1955        Visit Information        Provider Department      9/7/2018 8:00 AM Indigo Alvarado APRN [facility]        Today's Diagnoses     Muscle strain of left lower leg, initial encounter    -  1    Myalgia        Chronic pain of left knee          Care Instructions    Notify if no improvement over the next 2 weeks and then will set up for PHYSICAL THERAPY .           Follow-ups after your visit        Who to contact     Normal or non-critical lab and imaging results will be communicated to you by [email or web address], letter or phone within 4 business days after the clinic has received the results. If you do not hear from us within 7 days, please contact the clinic through [employer] or phone. If you have a critical or abnormal lab result, we will notify you by phone as soon as possible.  Submit refill requests through [employer] or call your pharmacy and they will forward the refill request to us. Please allow 3 business days for your refill to be completed.          If you need to speak with a  for additional information , please call: [phone]           Additional Information About Your Visit        [employer] Information     [employer] gives you secure access to your electronic health record. If you see a primary care provider, you can also send messages to your care team and make appointments. If you have questions, please call your primary care clinic.  If you do not have a primary care provider, please call [phone] and they will assist you.        Care EveryWhere ID     This is your Care EveryWhere ID. This could be used by other organizations to access your [place] medical records  [id number]        Your Vitals Were     Pulse Temperature Respirations BMI (Body Mass Index)          72 97.3  F (36.3  C) (Tympanic) 12 32.84 kg/m2          Blood Pressure from Last 3 Encounters:   09/07/18 130/80   01/11/18 122/82   07/05/17 112/76    Weight from Last 3 Encounters:   09/07/18 225 lb 9.6 oz (102.3 kg)   01/11/18 224 lb 3.2 oz (101.7 kg)   07/05/17 226 lb (102.5 kg)              Today, you had the following     No orders found for display         Where to get your medicines      These medications were sent to [facility] [place] [facility] [address]  [address] [address]     Phone:  [phone]     naproxen 500 MG tablet          Primary Care Provider Office Phone # Fax #    [facility] [phone] [address] [address]        Equal Access to Services     PHYLLIS VALLES : Hadii briana maxewllo Sojayme, waaxda luqadaha, qaybta kaalmada adedarrellyada, aj dial. So [facility] [phone].    ATENCIÓN: Si habla español, tiene a fitch disposición servicios gratuitos de asistencia lingüística. [facility] [phone].    We comply with applicable federal civil rights laws and [place] laws. We do not discriminate on the basis of race, color, national origin, age, disability, sex, sexual orientation, or gender identity.            Thank you!     Thank you for choosing [facility]  for your care. Our goal is always to provide you with excellent care. Hearing back from our patients is one way we can continue to improve our services. Please take a few minutes to complete the written survey that you may receive in the mail after your visit with us. Thank you!             Your Updated Medication List - Protect others around you: Learn how to safely use, store and throw away your medicines at www.disposemymeds.org.          This list is accurate as of 9/7/18  8:29 AM.  Always use your most recent med list.                   Brand Name Dispense Instructions for use Diagnosis    doxylamine 25 MG Tabs tablet    UNISOM     Take 25 mg by  mouth At Bedtime        IBUPROFEN PO           MELATONIN PO           naproxen 500 MG tablet    NAPROSYN    40 tablet    Take twice per day with food for 2 weeks then every 12 hours as needed    Muscle strain of left lower leg, initial encounter       simvastatin 20 MG tablet    ZOCOR    90 tablet    Take 1 tablet (20 mg) by mouth At Bedtime    Hyperlipidemia LDL goal <100

## 2018-09-07 NOTE — PROGRESS NOTES
SUBJECTIVE:   Phong Cuevas is a 63 year old male who presents to clinic today for the following health issues:      Musculoskeletal problem/pain      Duration: 1 month    Description  Location: shooting left leg starting in buttocks    Intensity:  Severe-sleeping is difficult    Accompanying signs and symptoms: radiation of pain to left foot    History  Previous similar problem: no   Previous evaluation:  none    Precipitating or alleviating factors:  Trauma or overuse: no   Aggravating factors include: certain positions, sitting, laughing, moving wrong    Therapies tried and outcome:Naproxen helps him get through the day      Problem list and histories reviewed & adjusted, as indicated.  Additional history: as documented    Current Outpatient Prescriptions   Medication Sig Dispense Refill     doxylamine (UNISOM) 25 MG TABS Take 25 mg by mouth At Bedtime       MELATONIN PO        naproxen (NAPROSYN) 500 MG tablet Take twice per day with food for 2 weeks then every 12 hours as needed 40 tablet 0     simvastatin (ZOCOR) 20 MG tablet Take 1 tablet (20 mg) by mouth At Bedtime 90 tablet 1     IBUPROFEN PO        Allergies   Allergen Reactions     Nka [No Known Allergies]        Reviewed and updated as needed this visit by clinical staff  Tobacco  Allergies  Meds  Med Hx  Surg Hx  Fam Hx  Soc Hx      Reviewed and updated as needed this visit by Provider           Left pain to back to back of leg. If sits wrong will get a shooting pain. Seems like is a muscle cramp. Is not able to lay on that side at night. No numbness or tingling. If is carrying some thing and puts a lot of weight on it, it will hurt. No injury that is aware of. Thought is was getting better, but now it is back. Has been taking 2 aleve in the AM and that does help some. No pain to back.     ROS:  Review of Systems   Constitutional: Negative for diaphoresis, fatigue and fever.   HENT: Negative for congestion, ear pain, rhinorrhea, sinus pressure  and sore throat.    Eyes: Negative for discharge.   Respiratory: Negative for cough, shortness of breath and wheezing.    Cardiovascular: Negative for chest pain.   Gastrointestinal: Negative for diarrhea, nausea and vomiting.   Musculoskeletal: Positive for arthralgias (back of left leg).   Neurological: Negative for numbness and headaches.         OBJECTIVE:     /80 (BP Location: Right arm, Patient Position: Sitting, Cuff Size: Adult Large)  Pulse 72  Temp 97.3  F (36.3  C) (Tympanic)  Resp 12  Wt 225 lb 9.6 oz (102.3 kg)  BMI 32.84 kg/m2  Body mass index is 32.84 kg/(m^2).  Physical Exam   Constitutional: He appears well-developed and well-nourished.   Cardiovascular: Normal rate, regular rhythm and normal heart sounds.    Pulmonary/Chest: Effort normal and breath sounds normal.   Musculoskeletal:        Lumbar back: He exhibits normal range of motion, no tenderness, no bony tenderness, no pain and no spasm.        Legs:  Neurological: He is alert.   Skin: Skin is warm and dry.       ASSESSMENT/PLAN:   1. Muscle strain of left lower leg, initial encounter  He did on use of medication.  No additional over-the-counter aspirin, Aleve, naproxen, Advil, ibuprofen.  Alternate ice and heat.  Gentle stretching.  Notify if no improvement over the next 2 weeks and will refer to physical therapy.  - naproxen (NAPROSYN) 500 MG tablet; Take twice per day with food for 2 weeks then every 12 hours as needed  Dispense: 40 tablet; Refill: 0        CULLEN Talbot CNP  [facility]

## 2019-01-24 ENCOUNTER — OFFICE VISIT (OUTPATIENT)
Dept: FAMILY MEDICINE | Facility: CLINIC | Age: 64
End: 2019-01-24
Payer: COMMERCIAL

## 2019-01-24 VITALS
WEIGHT: 233.6 LBS | BODY MASS INDEX: 34.6 KG/M2 | SYSTOLIC BLOOD PRESSURE: 110 MMHG | HEIGHT: 69 IN | HEART RATE: 72 BPM | DIASTOLIC BLOOD PRESSURE: 72 MMHG | TEMPERATURE: 96.8 F | RESPIRATION RATE: 20 BRPM

## 2019-01-24 DIAGNOSIS — Z12.5 SCREENING FOR PROSTATE CANCER: ICD-10-CM

## 2019-01-24 DIAGNOSIS — E78.5 HYPERLIPIDEMIA LDL GOAL <100: ICD-10-CM

## 2019-01-24 DIAGNOSIS — Z11.59 NEED FOR HEPATITIS C SCREENING TEST: Primary | ICD-10-CM

## 2019-01-24 DIAGNOSIS — R73.09 ABNORMAL GLUCOSE: ICD-10-CM

## 2019-01-24 LAB
ALBUMIN SERPL-MCNC: 4.2 G/DL (ref 3.4–5)
ALP SERPL-CCNC: 55 U/L (ref 40–150)
ALT SERPL W P-5'-P-CCNC: 42 U/L (ref 0–70)
ANION GAP SERPL CALCULATED.3IONS-SCNC: 1 MMOL/L (ref 3–14)
AST SERPL W P-5'-P-CCNC: 27 U/L (ref 0–45)
BILIRUB SERPL-MCNC: 0.7 MG/DL (ref 0.2–1.3)
BUN SERPL-MCNC: 28 MG/DL (ref 7–30)
CALCIUM SERPL-MCNC: 8.9 MG/DL (ref 8.5–10.1)
CHLORIDE SERPL-SCNC: 99 MMOL/L (ref 94–109)
CHOLEST SERPL-MCNC: 202 MG/DL
CO2 SERPL-SCNC: 31 MMOL/L (ref 20–32)
CREAT SERPL-MCNC: 0.99 MG/DL (ref 0.66–1.25)
GFR SERPL CREATININE-BSD FRML MDRD: 80 ML/MIN/{1.73_M2}
GLUCOSE SERPL-MCNC: 108 MG/DL (ref 70–99)
HCV AB SERPL QL IA: NONREACTIVE
HDLC SERPL-MCNC: 54 MG/DL
LDLC SERPL CALC-MCNC: 124 MG/DL
NONHDLC SERPL-MCNC: 148 MG/DL
POTASSIUM SERPL-SCNC: 4.6 MMOL/L (ref 3.4–5.3)
PROT SERPL-MCNC: 8.1 G/DL (ref 6.8–8.8)
PSA SERPL-ACNC: 1.27 UG/L (ref 0–4)
SODIUM SERPL-SCNC: 131 MMOL/L (ref 133–144)
TRIGL SERPL-MCNC: 120 MG/DL

## 2019-01-24 PROCEDURE — 36415 COLL VENOUS BLD VENIPUNCTURE: CPT | Performed by: NURSE PRACTITIONER

## 2019-01-24 PROCEDURE — 80061 LIPID PANEL: CPT | Performed by: NURSE PRACTITIONER

## 2019-01-24 PROCEDURE — 86803 HEPATITIS C AB TEST: CPT | Performed by: NURSE PRACTITIONER

## 2019-01-24 PROCEDURE — G0103 PSA SCREENING: HCPCS | Performed by: NURSE PRACTITIONER

## 2019-01-24 PROCEDURE — 80053 COMPREHEN METABOLIC PANEL: CPT | Performed by: NURSE PRACTITIONER

## 2019-01-24 PROCEDURE — 99214 OFFICE O/P EST MOD 30 MIN: CPT | Performed by: NURSE PRACTITIONER

## 2019-01-24 RX ORDER — SIMVASTATIN 20 MG
20 TABLET ORAL AT BEDTIME
Qty: 90 TABLET | Refills: 3 | Status: SHIPPED | OUTPATIENT
Start: 2019-01-24 | End: 2019-01-24

## 2019-01-24 RX ORDER — SIMVASTATIN 20 MG
20 TABLET ORAL AT BEDTIME
Qty: 90 TABLET | Refills: 3 | Status: SHIPPED | OUTPATIENT
Start: 2019-01-24 | End: 2020-04-01

## 2019-01-24 RX ORDER — SIMVASTATIN 40 MG
40 TABLET ORAL AT BEDTIME
Qty: 90 TABLET | Refills: 3 | Status: SHIPPED | OUTPATIENT
Start: 2019-01-24 | End: 2019-01-24

## 2019-01-24 ASSESSMENT — PATIENT HEALTH QUESTIONNAIRE - PHQ9
5. POOR APPETITE OR OVEREATING: MORE THAN HALF THE DAYS
SUM OF ALL RESPONSES TO PHQ QUESTIONS 1-9: 2

## 2019-01-24 ASSESSMENT — ENCOUNTER SYMPTOMS
RHINORRHEA: 0
SINUS PRESSURE: 0
FATIGUE: 0
ABDOMINAL PAIN: 0
JOINT SWELLING: 0
COUGH: 0
DYSPHORIC MOOD: 0
HEADACHES: 0
WHEEZING: 0
CONSTIPATION: 0
NERVOUS/ANXIOUS: 0
NUMBNESS: 0
MYALGIAS: 1
SHORTNESS OF BREATH: 0
ARTHRALGIAS: 1
PALPITATIONS: 0
SORE THROAT: 0
DIZZINESS: 0
DYSURIA: 0
SLEEP DISTURBANCE: 0
DIARRHEA: 0
FREQUENCY: 0
LIGHT-HEADEDNESS: 0

## 2019-01-24 ASSESSMENT — ANXIETY QUESTIONNAIRES
3. WORRYING TOO MUCH ABOUT DIFFERENT THINGS: SEVERAL DAYS
7. FEELING AFRAID AS IF SOMETHING AWFUL MIGHT HAPPEN: NOT AT ALL
GAD7 TOTAL SCORE: 7
2. NOT BEING ABLE TO STOP OR CONTROL WORRYING: SEVERAL DAYS
6. BECOMING EASILY ANNOYED OR IRRITABLE: SEVERAL DAYS
1. FEELING NERVOUS, ANXIOUS, OR ON EDGE: NOT AT ALL
5. BEING SO RESTLESS THAT IT IS HARD TO SIT STILL: MORE THAN HALF THE DAYS

## 2019-01-24 ASSESSMENT — PAIN SCALES - GENERAL: PAINLEVEL: NO PAIN (0)

## 2019-01-24 ASSESSMENT — MIFFLIN-ST. JEOR: SCORE: 1841.01

## 2019-01-24 NOTE — PROGRESS NOTES
SUBJECTIVE:   Phong Cuevas is a 63 year old male who presents to clinic today for the following health issues:    Chief Complaint   Patient presents with     Recheck Medication     pt is fasting       Hyperlipidemia Follow-Up      Rate your low fat/cholesterol diet?: good    Taking statin?  Yes, no muscle aches from statin    Other lipid medications/supplements?:  none      Amount of exercise or physical activity: 6-7 days/week for an average of greater than 60 minutes    Problems taking medications regularly: Yes,  problems remembering to take    Medication side effects: none    Diet: low fat/cholesterol        Problem list and histories reviewed & adjusted, as indicated.  Additional history: as documented    Current Outpatient Medications   Medication Sig Dispense Refill     doxylamine (UNISOM) 25 MG TABS Take 25 mg by mouth At Bedtime       simvastatin (ZOCOR) 20 MG tablet Take 1 tablet (20 mg) by mouth At Bedtime 90 tablet 3     Allergies   Allergen Reactions     Nka [No Known Allergies]        Reviewed and updated as needed this visit by clinical staff  Tobacco  Allergies  Meds  Med Hx  Surg Hx  Fam Hx  Soc Hx      Reviewed and updated as needed this visit by Provider        Here today for medication check.  Is fasting for labs.  Has been taking simvastatin.  Has muscle and joint aches but they are not increased from previous.  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  Forgets to take simvastatin 2-3 times per week.  Needs to have refills today.    Last PSA: Never   Last Colonoscopy: 2014-normal, no family history of colon cancer   Last tetanus vaccine: 6/15  Last influenza vaccine: Declines   Last shingles vaccine: Plans to get at pharmacy   Last pneumonia vaccine: N/A  Hep C screen (born [birth year]): Will check today   HIV screen: Declines wanting to have done   AAA screen (age 65-78 with smoking hx): N/A  IVD (HTN, Hyperlipid, Smoking): N/A  Lung CA screening (55-80, 30 pk smoking  "hx):N/A    ROS:  Review of Systems   Constitutional: Negative for fatigue.   HENT: Negative for congestion, ear pain, rhinorrhea, sinus pressure and sore throat.    Respiratory: Negative for cough, shortness of breath and wheezing.    Cardiovascular: Negative for chest pain, palpitations and leg swelling.   Gastrointestinal: Negative for abdominal pain, constipation and diarrhea.   Genitourinary: Negative for dysuria and frequency.   Musculoskeletal: Positive for arthralgias and myalgias. Negative for joint swelling.   Skin: Negative for rash.   Neurological: Negative for dizziness, light-headedness, numbness and headaches.   Psychiatric/Behavioral: Negative for dysphoric mood and sleep disturbance. The patient is not nervous/anxious.          OBJECTIVE:     /72 (BP Location: Left arm, Patient Position: Sitting, Cuff Size: Adult Large)   Pulse 72   Temp 96.8  F (36  C) (Tympanic)   Resp 20   Ht 1.746 m (5' 8.75\")   Wt 106 kg (233 lb 9.6 oz)   BMI 34.75 kg/m    Body mass index is 34.75 kg/m .  Physical Exam   Constitutional: He appears well-developed and well-nourished.   HENT:   Right Ear: Tympanic membrane and external ear normal.   Left Ear: Tympanic membrane and external ear normal.   Mouth/Throat: Uvula is midline, oropharynx is clear and moist and mucous membranes are normal.   Neck: Carotid bruit is not present. No thyromegaly present.   Cardiovascular: Normal rate, regular rhythm and normal heart sounds.   Pulmonary/Chest: Effort normal and breath sounds normal.   Abdominal: Soft. Bowel sounds are normal.   Musculoskeletal: He exhibits no edema.   Neurological: He is alert.   Skin: Skin is warm and dry.   Psychiatric: He has a normal mood and affect.       ASSESSMENT/PLAN:   1. Hyperlipidemia LDL goal <100  Risks and potential complications of hyperlipemia were reviewed with the patient  The patient understands that her hyperlipidemia is under poor control  Lifestyle modification was encouraged "   We reviewed the importance of medication compliance regular follow up  Encouraged to call or return:  With any chest pain or discomfort  Any muscle or joint aches  Any shortness of breath or swelling to legs  Any new or unexplained symptoms   Plan to follow up in 12 months   - Comprehensive metabolic panel  - Lipid panel reflex to direct LDL Fasting  - simvastatin (ZOCOR) 20 MG tablet; Take 1 tablet (20 mg) by mouth At Bedtime  Dispense: 90 tablet; Refill: 3    2. Need for hepatitis C screening test  Will check today   - Hepatitis C Screen Reflex to HCV RNA Quant and Genotype    3. Screening for prostate cancer  Discussion held with Cory  Will check PSA  - Prostate spec antigen screen        CULLEN Talbot [facility]

## 2019-01-25 ASSESSMENT — ANXIETY QUESTIONNAIRES: GAD7 TOTAL SCORE: 7

## 2019-03-21 ENCOUNTER — ANCILLARY PROCEDURE (OUTPATIENT)
Dept: GENERAL RADIOLOGY | Facility: CLINIC | Age: 64
End: 2019-03-21
Attending: NURSE PRACTITIONER
Payer: COMMERCIAL

## 2019-03-21 ENCOUNTER — OFFICE VISIT (OUTPATIENT)
Dept: FAMILY MEDICINE | Facility: CLINIC | Age: 64
End: 2019-03-21
Payer: COMMERCIAL

## 2019-03-21 VITALS
HEART RATE: 126 BPM | TEMPERATURE: 102.5 F | HEIGHT: 69 IN | WEIGHT: 233 LBS | OXYGEN SATURATION: 93 % | DIASTOLIC BLOOD PRESSURE: 80 MMHG | RESPIRATION RATE: 20 BRPM | BODY MASS INDEX: 34.51 KG/M2 | SYSTOLIC BLOOD PRESSURE: 130 MMHG

## 2019-03-21 DIAGNOSIS — R05.9 COUGH: ICD-10-CM

## 2019-03-21 DIAGNOSIS — R50.9 FEVER AND CHILLS: Primary | ICD-10-CM

## 2019-03-21 DIAGNOSIS — R50.9 FEVER AND CHILLS: ICD-10-CM

## 2019-03-21 LAB
FLUAV+FLUBV AG SPEC QL: NEGATIVE
FLUAV+FLUBV AG SPEC QL: NEGATIVE
SPECIMEN SOURCE: NORMAL

## 2019-03-21 PROCEDURE — 71046 X-RAY EXAM CHEST 2 VIEWS: CPT

## 2019-03-21 PROCEDURE — 87804 INFLUENZA ASSAY W/OPTIC: CPT | Performed by: NURSE PRACTITIONER

## 2019-03-21 PROCEDURE — 99213 OFFICE O/P EST LOW 20 MIN: CPT | Performed by: NURSE PRACTITIONER

## 2019-03-21 RX ORDER — CODEINE PHOSPHATE/GUAIFENESIN 10-100MG/5
LIQUID (ML) ORAL
Qty: 180 ML | Refills: 1 | Status: SHIPPED | OUTPATIENT
Start: 2019-03-21 | End: 2020-04-02

## 2019-03-21 RX ORDER — AZITHROMYCIN 250 MG/1
TABLET, FILM COATED ORAL
Qty: 6 TABLET | Refills: 0 | Status: SHIPPED | OUTPATIENT
Start: 2019-03-21 | End: 2020-04-02

## 2019-03-21 ASSESSMENT — MIFFLIN-ST. JEOR: SCORE: 1833.29

## 2019-03-21 NOTE — PROGRESS NOTES
"  SUBJECTIVE:   Phong Cuevas is a 64 year old male who presents to clinic today for the following health issues:      ENT Symptoms             Symptoms: cc Present Absent Comment   Fever/Chills x x  Night sweats. Low grade fever yesterday.  102 today   Fatigue  x     Muscle Aches   x Not more than normal   Eye Irritation       Sneezing       Nasal Keyshawn/Drg  x     Sinus Pressure/Pain  x     Loss of smell       Dental pain       Sore Throat   x    Swollen Glands       Ear Pain/Fullness   x    Cough x x  Not productive   Wheeze   x    Chest Pain   x    Shortness of breath   x    Rash       Other  x  Watery Diarrhea - started Monday;  A little better today;              and headache     Symptom duration:  4 days;     Symptom severity:  moderate to severe   Treatments tried:  nyquil   Contacts:  unknown     Didn't get flu shot this year        Problem list and histories reviewed & adjusted, as indicated.  Additional history: as documented    Reviewed and updated as needed this visit by clinical staff  Tobacco  Allergies  Meds       Reviewed and updated as needed this visit by Provider         ROS:  Constitutional, HEENT, cardiovascular, pulmonary, gi and gu systems are negative, except as otherwise noted.    OBJECTIVE:     /80 (BP Location: Right arm)   Pulse 126   Temp 102.5  F (39.2  C) (Tympanic)   Resp 20   Ht 1.746 m (5' 8.75\")   Wt 105.7 kg (233 lb)   SpO2 93%   BMI 34.66 kg/m    Body mass index is 34.66 kg/m .  GENERAL: ill appearing.  HENT: ear canals and TM's normal, nose and mouth without ulcers or lesions  NECK: no adenopathy, no asymmetry, masses, or scars and thyroid normal to palpation  RESP: lungs clear to auscultation - no rales, rhonchi or wheezes  CV: tachycardia, normal S1 S2, no S3 or S4, no murmur, click or rub, peripheral pulses strong and no peripheral edema    Diagnostic Test Results:  Results for orders placed or performed in visit on 03/21/19 (from the past 24 hour(s)) "   Influenza A/B antigen   Result Value Ref Range    Influenza A/B Agn Specimen Nasal     Influenza A Negative NEG^Negative    Influenza B Negative NEG^Negative     CXR negative.    ASSESSMENT/PLAN:       ICD-10-CM    1. Fever and chills R50.9 Influenza A/B antigen     XR Chest 2 Views     azithromycin (ZITHROMAX) 250 MG tablet     guaiFENesin-codeine (GUAIFENESIN AC) 100-10 MG/5ML syrup   2. Cough R05 Influenza A/B antigen     XR Chest 2 Views     azithromycin (ZITHROMAX) 250 MG tablet     guaiFENesin-codeine (GUAIFENESIN AC) 100-10 MG/5ML syrup     Given fever and symptoms, opted to treat with antibiotics.  If radiology agrees with negative CXR, then we could stop the antibiotics if he is feeling better by tomorrow.  Follow up Monday if no improvement.    The risks, benefits and treatment options of prescribed medications or other treatments have been discussed with the patient. The patient verbalized their understanding and should call or follow up if no improvement or if they develop further problems.    [place] [facility] [place]

## 2019-03-21 NOTE — PATIENT INSTRUCTIONS
Thank you for choosing [facility].  You may be receiving an email and/or telephone survey request from [facility] [employer] regarding your visit today.  Please take a few minutes to respond to the survey to let us know how we are doing.      If you have questions or concerns, please contact us via [employer] or you can contact your care team at [phone].    Our Clinic hours are:  Monday 6:40 am  to 7:00 pm  Tuesday -Friday 6:40 am to 5:00 pm    The [place] outpatient lab hours are:  Monday - Friday 6:10 am to 4:45 pm  Saturdays 7:00 am to 11:00 am  Appointments are required, call [phone]    If you have clinical questions after hours or would like to schedule an appointment,  call the clinic at [phone].

## 2019-03-22 ENCOUNTER — TELEPHONE (OUTPATIENT)
Dept: FAMILY MEDICINE | Facility: CLINIC | Age: 64
End: 2019-03-22

## 2019-03-22 NOTE — TELEPHONE ENCOUNTER
Patient informed of message below from provider.  Advised if new or worsening symptoms to seek emergency care.   Patient verbalizes understanding, agrees to plan of care, and has no further questions.

## 2019-03-22 NOTE — TELEPHONE ENCOUNTER
"Phong Cuevas is a 64 year old male who calls with a rash.    NURSING ASSESSMENT:   The rash began  1 day ago.   Patient's rash is located on abdomen, groin, torso and chest.  Rash is described as hives and raised, with a color of red with No drainage, some \"blotchy\" areas, some solid red and raised areas.   Rash is not itchy, hot or painful.   Temp: 99.5  Has taken 2 doses of the Azithromycin, one last night and again this morning. took 1 dose of the Cough medication and reported it didn't help at all so stopped taking it.   Patient reports is feeling better.  Associated symptoms: Recent cold symptoms: YES    Allergies:   Allergies   Allergen Reactions     Nka [No Known Allergies]        Patient informed of the following home remedies  Benedryl OTC    NURSING PLAN: Routed to provider Yes    RECOMMENDED DISPOSITION:    Stop taking Azithromycin now.    Will comply with recommendation: Yes  If further questions/concerns or if symptoms do not improve, worsen or new symptoms develop, call your PCP or [facility] as soon as possible.    Guideline used:  Telephone Triage Protocols for Nurses, Fifth Edition, Heather Salazar RN    "

## 2019-03-22 NOTE — TELEPHONE ENCOUNTER
Reason for call:  Patient reporting a symptom    Symptom or request: blotchy rash on sides and chest. Pt wonders if its because he has been sweating a lot while sleeping or if it due to the new medication from 3/21    Duration (how long have symptoms been present): today    Have you been treated for this before? No    Additional comments:     Phone Number patient can be reached at:  Home number on file [phone] (home)    Best Time:  any    Can we leave a detailed message on this number:  YES    Call taken on 3/22/2019 at 11:51 AM by Heather Moran

## 2019-03-25 ENCOUNTER — TELEPHONE (OUTPATIENT)
Dept: FAMILY MEDICINE | Facility: CLINIC | Age: 64
End: 2019-03-25

## 2019-03-25 DIAGNOSIS — J40 BRONCHITIS: Primary | ICD-10-CM

## 2019-03-25 RX ORDER — CODEINE PHOSPHATE AND GUAIFENESIN 10; 100 MG/5ML; MG/5ML
1-2 SOLUTION ORAL EVERY 4 HOURS PRN
Qty: 120 ML | Refills: 0 | Status: SHIPPED | OUTPATIENT
Start: 2019-03-25 | End: 2020-04-02

## 2019-03-25 NOTE — TELEPHONE ENCOUNTER
Reason for call:  Patient reporting a symptom    Symptom or request: Pt was put on antibiotics for a fever and cough, by VALENTINA Pozo 3/21 and then 3/22 he developed a rash from the antibiotics and was told to stop taking med.  He continues to have a bad cough and wants to know if he should start a new antibiotic?  Please call patient and advise.    [employer]    Duration (how long have symptoms been present): ongoing    Have you been treated for this before? Yes    Additional comments:     Phone Number patient can be reached at:  Home number on file [phone] (home)    Best Time:  any    Can we leave a detailed message on this number:  YES    Call taken on 3/25/2019 at 8:38 AM by Susan Henriquez

## 2019-03-25 NOTE — TELEPHONE ENCOUNTER
He stopped the zpack due to rash 3/22. Fatigue sat his temp was 102, Sunday he was at 100. Today normal temp. He still has cough and difficult sleep. He has been ill for one week. Still has occasional diarrhea.  Please send in new antibiotic. He is coughing quite a bit on the phone. Mucinex and ibuprofen are helping. Sounds like he had hives form zithromax, should we add it to allergy list? Emilee Borges RN

## 2019-03-25 NOTE — TELEPHONE ENCOUNTER
This is likely viral. I reviewed Shanda's notes. The plan was to stop antibiotics if the x-rays were negative which they were. Maybe some cough syrup? If symptoms persist I will recommend him being seen again. I will fax some cough syrup to the pharmacy.

## 2019-11-03 ENCOUNTER — HEALTH MAINTENANCE LETTER (OUTPATIENT)
Age: 64
End: 2019-11-03

## 2020-03-31 DIAGNOSIS — E78.5 HYPERLIPIDEMIA LDL GOAL <100: ICD-10-CM

## 2020-04-01 RX ORDER — SIMVASTATIN 20 MG
20 TABLET ORAL AT BEDTIME
Qty: 30 TABLET | Refills: 0 | Status: SHIPPED | OUTPATIENT
Start: 2020-04-01 | End: 2020-04-02

## 2020-04-02 ENCOUNTER — VIRTUAL VISIT (OUTPATIENT)
Dept: FAMILY MEDICINE | Facility: CLINIC | Age: 65
End: 2020-04-02
Payer: COMMERCIAL

## 2020-04-02 ENCOUNTER — TELEPHONE (OUTPATIENT)
Dept: FAMILY MEDICINE | Facility: CLINIC | Age: 65
End: 2020-04-02

## 2020-04-02 DIAGNOSIS — R73.09 ABNORMAL GLUCOSE: ICD-10-CM

## 2020-04-02 DIAGNOSIS — E78.5 HYPERLIPIDEMIA LDL GOAL <100: ICD-10-CM

## 2020-04-02 DIAGNOSIS — R15.9 INCONTINENCE OF FECES, UNSPECIFIED FECAL INCONTINENCE TYPE: Primary | ICD-10-CM

## 2020-04-02 PROCEDURE — 99214 OFFICE O/P EST MOD 30 MIN: CPT | Mod: TEL | Performed by: NURSE PRACTITIONER

## 2020-04-02 RX ORDER — SIMVASTATIN 20 MG
20 TABLET ORAL AT BEDTIME
Qty: 90 TABLET | Refills: 1 | Status: SHIPPED | OUTPATIENT
Start: 2020-04-02 | End: 2020-05-04

## 2020-04-02 ASSESSMENT — ENCOUNTER SYMPTOMS
DIARRHEA: 0
POLYPHAGIA: 0
SHORTNESS OF BREATH: 0
BLOOD IN STOOL: 0
CONSTIPATION: 0
RECTAL PAIN: 0
POLYDIPSIA: 0

## 2020-04-02 NOTE — PROGRESS NOTES
"Subjective     Phong Cuevas is a 65 year old male who is being evaluated via a billable telephone visit.      The patient has been notified of following:     \"This telephone visit will be conducted via a call between you and your physician/provider. We have found that certain health care needs can be provided without the need for a physical exam.  This service lets us provide the care you need with a short phone conversation.  If a prescription is necessary we can send it directly to your pharmacy.  If lab work is needed we can place an order for that and you can then stop by our lab to have the test done at a later time.    If during the course of the call the physician/provider feels a telephone visit is not appropriate, you will not be charged for this service.\"     Patient has given verbal consent for Telephone visit?  Yes    Phong Cuevas complains of   Chief Complaint   Patient presents with     Recheck Medication     Medication Followup of simvastatin 20 mg    Taking Medication as prescribed: yes    Side Effects:  None    Medication Helping Symptoms:  yes       ALLERGIES  Nka [no known allergies]       Current Outpatient Medications   Medication Sig Dispense Refill     doxylamine (UNISOM) 25 MG TABS Take 25 mg by mouth At Bedtime       simvastatin (ZOCOR) 20 MG tablet Take 1 tablet (20 mg) by mouth At Bedtime (Needs a telephone visit) 90 tablet 1     Allergies   Allergen Reactions     Nka [No Known Allergies]        Reviewed and updated as needed this visit by Provider         Phone call visit today for recheck of high cholesterol.  Has been taking simvastatin for some time and tolerating it well.  No side effects that is aware of.  Last lipids were controlled in January 2019.  Needs to have refill.  Plans to come in for physical with fasting lab work this fall when Covid-19 pandemic is declining.    With last labs in January 2019 did have slightly elevated blood sugar.  Reports, has not changed diet over " the last year.  Tries not to drink pop but does drink red bull.  Does not eat a lot of sweets.  Likes rice and chicken so eats a fair amount of that.  Does not feel overly hungry, thirsty.  No urinary frequency.    In the a.m. will have a bowel movement and feels like empties completely.  A short time later will have some moisture to rectal area, when goes to the bathroom and wipes notices that some stool is on tissue.  Will happen every day. Some days are worse than others. Does drink a lot of water. Did have colonoscopy in 2014 that was normal. Repeat in 2024. Does dry to eat a good amount of fiber per day. No family history of colon cancer or inflammatory bowel disease. This has been going for the last 5 mo. No blood in stool.       Review of Systems   Respiratory: Negative for shortness of breath.    Cardiovascular: Negative for chest pain.   Gastrointestinal: Negative for blood in stool, constipation, diarrhea and rectal pain.        Stool leakage      Endocrine: Negative for polydipsia, polyphagia and polyuria.                  Assessment/Plan:  1. Hyperlipidemia LDL goal <100  Well-controlled on current.  We will plan to refill.  We will plan to check fasting labs later this fall during physical  - simvastatin (ZOCOR) 20 MG tablet; Take 1 tablet (20 mg) by mouth At Bedtime (Needs a telephone visit)  Dispense: 90 tablet; Refill: 1    2. Incontinence of feces, unspecified fecal incontinence type  Order placed, plans to call per self and set up  - GASTROENTEROLOGY ADULT REF CONSULT ONLY    3. Abnormal glucose  Will plan to recheck fasting Leukos this fall and A1c.    No follow-ups on file.      Phone call duration:  11 minutes    Indigo SAUCEDO

## 2020-04-02 NOTE — TELEPHONE ENCOUNTER
Reason for call:  Other   Patient called regarding (reason for call): call back  Additional comments: Patient states that there was a note on his prescription to call Indigo Alvarado back.     Phone number to reach patient:  Cell number on file:    Telephone Information:   Mobile [phone]       Best Time:  anytijme    Can we leave a detailed message on this number?  YES    Travel screening: Not Applicable

## 2020-04-02 NOTE — TELEPHONE ENCOUNTER
Patient advised he needs a telephone visit as it has been over a year since last seen. Scheduled him with Indigo Alvarado for this morning.    Ninoska Dixon RN

## 2020-05-01 ENCOUNTER — TELEPHONE (OUTPATIENT)
Dept: FAMILY MEDICINE | Facility: CLINIC | Age: 65
End: 2020-05-01

## 2020-05-01 NOTE — TELEPHONE ENCOUNTER
Pt due for Medicare Wellness visit. If interested and has video capability please assist in scheduling video Medicare Wellness visit.   Jaquelin Dsouza CMA

## 2020-05-04 ENCOUNTER — VIRTUAL VISIT (OUTPATIENT)
Dept: FAMILY MEDICINE | Facility: CLINIC | Age: 65
End: 2020-05-04
Payer: COMMERCIAL

## 2020-05-04 DIAGNOSIS — Z12.5 SCREENING FOR PROSTATE CANCER: ICD-10-CM

## 2020-05-04 DIAGNOSIS — Z00.00 MEDICARE ANNUAL WELLNESS VISIT, SUBSEQUENT: ICD-10-CM

## 2020-05-04 DIAGNOSIS — E78.5 HYPERLIPIDEMIA LDL GOAL <100: ICD-10-CM

## 2020-05-04 DIAGNOSIS — R15.9 INCONTINENCE OF FECES, UNSPECIFIED FECAL INCONTINENCE TYPE: ICD-10-CM

## 2020-05-04 DIAGNOSIS — R73.09 ABNORMAL GLUCOSE: ICD-10-CM

## 2020-05-04 DIAGNOSIS — Z71.89 ADVANCED DIRECTIVES, COUNSELING/DISCUSSION: Primary | ICD-10-CM

## 2020-05-04 PROCEDURE — 99214 OFFICE O/P EST MOD 30 MIN: CPT | Mod: 95 | Performed by: NURSE PRACTITIONER

## 2020-05-04 RX ORDER — SIMVASTATIN 20 MG
20 TABLET ORAL AT BEDTIME
Qty: 90 TABLET | Refills: 3 | Status: SHIPPED | OUTPATIENT
Start: 2020-05-04 | End: 2021-07-06

## 2020-05-04 ASSESSMENT — ENCOUNTER SYMPTOMS
VOMITING: 0
CHEST TIGHTNESS: 0
FEVER: 0
PALPITATIONS: 0
SHORTNESS OF BREATH: 0
LIGHT-HEADEDNESS: 0
DIZZINESS: 0
DYSURIA: 0
COUGH: 0
ROS GI COMMENTS: FECAL INCONTINENCE
DIARRHEA: 0
SLEEP DISTURBANCE: 0

## 2020-05-04 NOTE — PROGRESS NOTES
"Phong Cuevas is a 65 year old male who is being evaluated via a billable video visit.      The patient has been notified of following:     \"This video visit will be conducted via a call between you and your physician/provider. We have found that certain health care needs can be provided without the need for an in-person physical exam.  This service lets us provide the care you need with a video conversation.  If a prescription is necessary we can send it directly to your pharmacy.  If lab work is needed we can place an order for that and you can then stop by our lab to have the test done at a later time.    Video visits are billed at different rates depending on your insurance coverage.  Please reach out to your insurance provider with any questions.    If during the course of the call the physician/provider feels a video visit is not appropriate, you will not be charged for this service.\"    Patient has given verbal consent for Video visit? Yes    How would you like to obtain your AVS? [employer] [place]    Patient would like the video invitation sent by: Text to cell phone: [phone]    Will anyone else be joining your video visit? No      Subjective     Phong Cuevas is a 65 year old male who presents to clinic today for the following health issues:    HPI  Annual Wellness Visit    Are you in the first 12 months of your Medicare Part B coverage?  No    Physical Health:    In general, how would you rate your overall physical health? excellent    Outside of work, how many days during the week do you exercise?none    Outside of work, approximately how many minutes a day do you exercise?not applicable    If you drink alcohol do you typically have >3 drinks per day or >7 drinks per week? No    Do you usually eat at least 4 servings of fruit and vegetables a day, include whole grains & fiber and avoid regularly eating high fat or \"junk\" foods? Yes    Do you have any problems taking medications regularly? No    Do you have " any side effects from medications? none    Needs assistance for the following daily activities: no assistance needed    Which of the following safety concerns are present in your home?  none identified     Hearing impairment: No    In the past 6 months, have you been bothered by leaking of urine? no    There were no vitals taken for this visit.  Weight: Unable to obtain due to video visit  Height: Unable to obtain due to video visit  BMI: Unable to obtain due to video visit  Blood Pressure: Unable to obtain due to video visit    Mental Health:    In general, how would you rate your overall mental or emotional health? excellent  PHQ-2 Score:      Do you feel safe in your environment? Yes    Have you ever done Advance Care Planning? (For example, a Health Directive, POLST, or a discussion with a medical provider or your loved ones about your wishes)? No, advance care planning information given to patient to review.  Advanced care planning was discussed at today's visit.    Fall risk:  Fallen 2 or more times in the past year?: No  Any fall with injury in the past year?: No    Cognitive Screening:     Do you have sleep apnea, excessive snoring or daytime drowsiness?: yes    Current providers sharing in care for this patient include:   Patient Care Team:  Indigo Alvarado APRN CNP as PCP - General (Nurse Practitioner - Family)  Indigo Alvarado APRN CNP as Assigned PCP      Medicare wellness exam completed via video due to COVID-19 outbreak.  Is continuing to work.     Fecal incontinence continues.  Thought was because was drinking a lot of water so decreased water intake.  Has previously been referred to GI but never made appointment.  Plans to follow-up with them.    Needs refill of choesterol med.  Has been taking it and tolerating it well.  No side effects that is aware of.    Last PSA: 2019, no family history of prostate cancer.   Last Colonoscopy: Feb 2014. Repeat in 10 years. No family history of  "colon cancer   Last eye exam: Had lasik eye surgery about 6-7 years, plans to make appointment    Last dental exam: Yearly   Last tetanus vaccine: 2015  Last influenza vaccine: Declines   Last shingles vaccine: Zoster, 2005  Last pneumonia vaccine: Never   Hep C screen (born [birth year]): 2019-negative   HIV screen: Declines   AAA screen (age 65-78 with smoking hx): N/A  IVD (HTN, Hyperlipid, Smoking): N/A  Lung CA screening (55-80, 30 pk smoking hx): N/A       Video Start Time: 11:11 AM    Current Outpatient Medications   Medication Sig Dispense Refill     doxylamine (UNISOM) 25 MG TABS Take 25 mg by mouth At Bedtime       Multiple Vitamins-Minerals (MULTIVITAMIN ADULT PO)        simvastatin (ZOCOR) 20 MG tablet Take 1 tablet (20 mg) by mouth At Bedtime (Needs a telephone visit) 90 tablet 1     Allergies   Allergen Reactions     Nka [No Known Allergies]        Reviewed and updated as needed this visit by Provider         Review of Systems   Constitutional: Negative for fever.   Respiratory: Negative for cough, chest tightness and shortness of breath.    Cardiovascular: Negative for chest pain and palpitations.   Gastrointestinal: Negative for diarrhea and vomiting.        Fecal incontinence    Genitourinary: Negative for dysuria.   Skin: Negative for rash.   Neurological: Negative for dizziness and light-headedness.   Psychiatric/Behavioral: Negative for sleep disturbance.           Objective    There were no vitals taken for this visit.  Estimated body mass index is 34.66 kg/m  as calculated from the following:    Height as of 3/21/19: 1.746 m (5' 8.75\").    Weight as of 3/21/19: 105.7 kg (233 lb).  Physical Exam  Constitutional:       General: He is not in acute distress.     Appearance: Normal appearance. He is not toxic-appearing.   HENT:      Nose: No congestion.   Eyes:      General: Lids are normal.   Pulmonary:      Effort: Pulmonary effort is normal. No tachypnea, bradypnea or respiratory distress.   Skin:   "   Coloration: Skin is not ashen, cyanotic, jaundiced or pale.   Neurological:      Mental Status: He is alert.   Psychiatric:         Mood and Affect: Mood normal.         Speech: Speech normal.         Behavior: Behavior normal. Behavior is cooperative.              Assessment & Plan   1. Advanced directives, counseling/discussion  Will send healthcare directive and POLST forms to complete  - HONORING CHOICES REFERRAL    2. Hyperlipidemia LDL goal <100  Stable  We will set up for fasting lab appointment.  Tolerating simvastatin currently.  Refills given.  - Lipid panel reflex to direct LDL Fasting; Future  - Comprehensive metabolic panel; Future  - simvastatin (ZOCOR) 20 MG tablet; Take 1 tablet (20 mg) by mouth At Bedtime  Dispense: 90 tablet; Refill: 3    3. Abnormal glucose  - Hemoglobin A1c; Future    4. Screening for prostate cancer  Shared decision making regarding use of PSA testing.  Not currently having any symptoms, no decreased urine stream no difficulty emptying bladder, no nocturia.  - Prostate spec antigen screen; Future    5. Medicare annual wellness visit, subsequent  Screening guidelines reviewed.   - Comprehensive metabolic panel; Future    6. Incontinence of feces, unspecified fecal incontinence type    Encouraged to follow-up with GI as previously recommended.      No follow-ups on file.    CULLEN Talbot CNP  [facility]      Video-Visit Details    Type of service:  Video Visit    Video End Time: 1130    Originating Location (pt. Location): Home    Distant [facility]     Platform used for Video Visit: Doximity    No follow-ups on file.       CULLEN Talbot CNP

## 2020-05-04 NOTE — PATIENT INSTRUCTIONS
Go to the pharmacy for shingles and pneumonia vaccine when able.     We will set you up for a fasting lab appointment.    We will send you with a healthcare directive and POLST papers in the mail.

## 2020-05-08 DIAGNOSIS — Z00.00 MEDICARE ANNUAL WELLNESS VISIT, SUBSEQUENT: ICD-10-CM

## 2020-05-08 DIAGNOSIS — R73.09 ABNORMAL GLUCOSE: ICD-10-CM

## 2020-05-08 DIAGNOSIS — E78.5 HYPERLIPIDEMIA LDL GOAL <100: ICD-10-CM

## 2020-05-08 DIAGNOSIS — Z12.5 SCREENING FOR PROSTATE CANCER: ICD-10-CM

## 2020-05-08 LAB
ALBUMIN SERPL-MCNC: 3.9 G/DL (ref 3.4–5)
ALP SERPL-CCNC: 59 U/L (ref 40–150)
ALT SERPL W P-5'-P-CCNC: 39 U/L (ref 0–70)
ANION GAP SERPL CALCULATED.3IONS-SCNC: 5 MMOL/L (ref 3–14)
AST SERPL W P-5'-P-CCNC: 24 U/L (ref 0–45)
BILIRUB SERPL-MCNC: 0.6 MG/DL (ref 0.2–1.3)
BUN SERPL-MCNC: 25 MG/DL (ref 7–30)
CALCIUM SERPL-MCNC: 8.9 MG/DL (ref 8.5–10.1)
CHLORIDE SERPL-SCNC: 100 MMOL/L (ref 94–109)
CHOLEST SERPL-MCNC: 210 MG/DL
CO2 SERPL-SCNC: 30 MMOL/L (ref 20–32)
CREAT SERPL-MCNC: 0.87 MG/DL (ref 0.66–1.25)
GFR SERPL CREATININE-BSD FRML MDRD: >90 ML/MIN/{1.73_M2}
GLUCOSE SERPL-MCNC: 95 MG/DL (ref 70–99)
HBA1C MFR BLD: 5.6 % (ref 0–5.6)
HDLC SERPL-MCNC: 55 MG/DL
LDLC SERPL CALC-MCNC: 128 MG/DL
NONHDLC SERPL-MCNC: 155 MG/DL
POTASSIUM SERPL-SCNC: 4.5 MMOL/L (ref 3.4–5.3)
PROT SERPL-MCNC: 7.7 G/DL (ref 6.8–8.8)
PSA SERPL-ACNC: 1.71 UG/L (ref 0–4)
SODIUM SERPL-SCNC: 135 MMOL/L (ref 133–144)
TRIGL SERPL-MCNC: 137 MG/DL

## 2020-05-08 PROCEDURE — 36415 COLL VENOUS BLD VENIPUNCTURE: CPT | Performed by: NURSE PRACTITIONER

## 2020-05-08 PROCEDURE — 80053 COMPREHEN METABOLIC PANEL: CPT | Performed by: NURSE PRACTITIONER

## 2020-05-08 PROCEDURE — G0103 PSA SCREENING: HCPCS | Performed by: NURSE PRACTITIONER

## 2020-05-08 PROCEDURE — 83036 HEMOGLOBIN GLYCOSYLATED A1C: CPT | Performed by: NURSE PRACTITIONER

## 2020-05-08 PROCEDURE — 80061 LIPID PANEL: CPT | Performed by: NURSE PRACTITIONER

## 2020-06-17 ENCOUNTER — TRANSFERRED RECORDS (OUTPATIENT)
Dept: HEALTH INFORMATION MANAGEMENT | Facility: CLINIC | Age: 65
End: 2020-06-17

## 2020-07-07 ENCOUNTER — TRANSFERRED RECORDS (OUTPATIENT)
Dept: HEALTH INFORMATION MANAGEMENT | Facility: CLINIC | Age: 65
End: 2020-07-07

## 2020-08-21 ENCOUNTER — OFFICE VISIT (OUTPATIENT)
Dept: FAMILY MEDICINE | Facility: CLINIC | Age: 65
End: 2020-08-21
Payer: COMMERCIAL

## 2020-08-21 VITALS
SYSTOLIC BLOOD PRESSURE: 138 MMHG | HEART RATE: 87 BPM | DIASTOLIC BLOOD PRESSURE: 84 MMHG | WEIGHT: 243.8 LBS | HEIGHT: 69 IN | TEMPERATURE: 97.5 F | BODY MASS INDEX: 36.11 KG/M2

## 2020-08-21 DIAGNOSIS — H25.9 AGE-RELATED CATARACT OF BOTH EYES, UNSPECIFIED AGE-RELATED CATARACT TYPE: ICD-10-CM

## 2020-08-21 DIAGNOSIS — Z01.818 PREOP GENERAL PHYSICAL EXAM: Primary | ICD-10-CM

## 2020-08-21 PROCEDURE — 99214 OFFICE O/P EST MOD 30 MIN: CPT | Performed by: NURSE PRACTITIONER

## 2020-08-21 ASSESSMENT — ENCOUNTER SYMPTOMS
ARTHRALGIAS: 0
FREQUENCY: 0
SINUS PRESSURE: 0
EYE DISCHARGE: 0
MYALGIAS: 0
CONFUSION: 0
FATIGUE: 0
DIZZINESS: 0
SHORTNESS OF BREATH: 0
DIAPHORESIS: 0
SORE THROAT: 0
NUMBNESS: 0
FEVER: 0
VOMITING: 0
RHINORRHEA: 0
DYSURIA: 0
CHEST TIGHTNESS: 0
LIGHT-HEADEDNESS: 0
PALPITATIONS: 0
COUGH: 0
BRUISES/BLEEDS EASILY: 0
HEADACHES: 0
NAUSEA: 0
BLOOD IN STOOL: 0
WHEEZING: 0
DIARRHEA: 0

## 2020-08-21 ASSESSMENT — MIFFLIN-ST. JEOR: SCORE: 1877.28

## 2020-08-21 NOTE — NURSING NOTE
"Initial /84 (BP Location: Left arm, Patient Position: Chair, Cuff Size: Adult Large)   Pulse 87   Temp 97.5  F (36.4  C) (Tympanic)   Ht 1.746 m (5' 8.75\")   Wt 110.6 kg (243 lb 12.8 oz)   BMI 36.27 kg/m   Estimated body mass index is 36.27 kg/m  as calculated from the following:    Height as of this encounter: 1.746 m (5' 8.75\").    Weight as of this encounter: 110.6 kg (243 lb 12.8 oz). .    Melany Angeles CMA ([facility])    "

## 2020-08-21 NOTE — PROGRESS NOTES
[facility]  [address]  [place] [address]  Phone: [phone]  Primary Provider: Indigo Alvarado      PREOPERATIVE EVALUATION:  Today's date: 8/21/2020    Phong Cuevas is a 65 year old male who presents for a preoperative evaluation.    Surgical Information:  Surgery Details 8/21/2020   Surgery/Procedure: Cataract surgery   Surgery Location: [facility]   Surgeon: Dr. Urbina   Surgery Date: 08/29/2020   Time of Surgery: 0800   Where patient plans to recover: At home with family   Additional recovery plan details: N/A     Fax number for surgical facility:   Type of Anesthesia Anticipated: to be determined    Subjective     HPI related to upcoming procedure: Bilat cataracts   Preop Questions 8/21/2020   Have you ever had a heart attack or stroke? No   Have you ever had surgery on your heart or blood vessels, such as a stent placement, a coronary artery bypass, or surgery on an artery in your head, neck, heart, or legs? No   Do you have chest pain with activity? No   Do you have a history of  heart failure? No   Do you currently have a cold, bronchitis or symptoms of other infection? No   Do you have a cough, shortness of breath, or wheezing? No   Do you or anyone in your family have previous history of blood clots? No   Do you or does anyone in your family have a serious bleeding problem such as prolonged bleeding following surgeries or cuts? No   Have you ever had problems with anemia or been told to take iron pills? No   Have you had any abnormal blood loss such as black, tarry or bloody stools? No   Have you ever had a blood transfusion? No   Are you willing to have a blood transfusion if it is medically needed before, during, or after your surgery? Yes   Have you or any of your relatives ever had problems with anesthesia? No   Do you have sleep apnea, excessive snoring or daytime drowsiness? No   Do you have any artifical heart valves or other implanted  medical devices like a pacemaker, defibrillator, or continuous glucose monitor? No   Do you have artificial joints? No   Are you allergic to latex? No     Patient does not have a Health Care Directive or Living Will: Patient states has Advance Directive and will bring in a copy to clinic.    [place] ([place] reviewed:  not reviewed-will not need pain medications   See problem list for active medical problems.  Problems all longstanding and stable, except as noted/documented.  See ROS for pertinent symptoms related to these conditions.      Is going to be having cataract surgery, thinks left will be first. Then will be having right eye. Thinks it will be a total of 31 days between surgeries.     Review of Systems   Constitutional: Negative for diaphoresis, fatigue and fever.   HENT: Negative for congestion, ear pain, postnasal drip, rhinorrhea, sinus pressure and sore throat.    Eyes: Negative for discharge.   Respiratory: Negative for cough, chest tightness, shortness of breath and wheezing.    Cardiovascular: Negative for chest pain and palpitations.   Gastrointestinal: Negative for blood in stool, diarrhea, nausea and vomiting.   Genitourinary: Negative for dysuria, frequency and urgency.   Musculoskeletal: Negative for arthralgias and myalgias.   Skin: Negative for rash.   Neurological: Negative for dizziness, light-headedness, numbness and headaches.   Hematological: Does not bruise/bleed easily.   Psychiatric/Behavioral: Negative for confusion.     .ros    Patient Active Problem List    Diagnosis Date Noted     Abnormal glucose 01/24/2019     Priority: Medium     Hyperlipidemia LDL goal <100 01/11/2018     Priority: Medium      History reviewed. No pertinent past medical history.  Past Surgical History:   Procedure Laterality Date     COLONOSCOPY  2/20/2014    Procedure: COLONOSCOPY;  Colonoscopy  ;  Surgeon: Murali Gill MD;  Location: [facility]     Current Outpatient Medications   Medication  "Sig Dispense Refill     doxylamine (UNISOM) 25 MG TABS Take 25 mg by mouth At Bedtime       Multiple Vitamins-Minerals (MULTIVITAMIN ADULT PO)        simvastatin (ZOCOR) 20 MG tablet Take 1 tablet (20 mg) by mouth At Bedtime 90 tablet 3       Allergies   Allergen Reactions     Nka [No Known Allergies]         Social History     Tobacco Use     Smoking status: Never Smoker     Smokeless tobacco: Never Used   Substance Use Topics     Alcohol use: Yes     Comment: 12 pack per week     Family History   Problem Relation Age of Onset     Cancer Father         oral cancer     History   Drug Use No            Objective   /84 (BP Location: Left arm, Patient Position: Chair, Cuff Size: Adult Large)   Pulse 87   Temp 97.5  F (36.4  C) (Tympanic)   Ht 1.746 m (5' 8.75\")   Wt 110.6 kg (243 lb 12.8 oz)   BMI 36.27 kg/m    Physical Exam  Constitutional:       Appearance: He is well-developed.   HENT:      Right Ear: Tympanic membrane and external ear normal. No middle ear effusion. Tympanic membrane is not erythematous.      Left Ear: Tympanic membrane and external ear normal.  No middle ear effusion. Tympanic membrane is not erythematous.      Mouth/Throat:      Pharynx: Uvula midline.   Eyes:      Pupils: Pupils are equal, round, and reactive to light.   Neck:      Thyroid: No thyromegaly.      Vascular: No carotid bruit.   Cardiovascular:      Rate and Rhythm: Normal rate and regular rhythm.      Pulses:           Femoral pulses are 2+ on the right side and 2+ on the left side.     Heart sounds: Normal heart sounds.   Pulmonary:      Effort: Pulmonary effort is normal.      Breath sounds: Normal breath sounds.   Abdominal:      General: Bowel sounds are normal. There is no distension.      Palpations: Abdomen is soft.      Tenderness: There is no abdominal tenderness.   Musculoskeletal: Normal range of motion.   Skin:     General: Skin is warm and dry.   Neurological:      Mental Status: He is alert.         Recent " Labs   Lab Test 05/08/20  1005 01/24/19  0825    131*   POTASSIUM 4.5 4.6   CR 0.87 0.99   A1C 5.6  --         PRE-OP Diagnostics:  No labs were ordered during this visit.  No EKG required for low risk surgery (cataract, skin procedure, breast biopsy, etc).         Assessment & Plan   The proposed surgical procedure is considered LOW risk.    REVISED CARDIAC RISK INDEX  The patient has the following serious cardiovascular risks for perioperative complications:  No serious cardiac risks = 0 points    INTERPRETATION: 0 points: Class I (very low risk - 0.4% complication rate)       Phong was seen today for pre-op exam.    Diagnoses and all orders for this visit:    Preop general physical exam    Age-related cataract of both eyes, unspecified age-related cataract type        The patient has the following additional risks and recommendations for perioperative complications:     - No identified additional risk factors other than previously addressed     MEDICATION INSTRUCTIONS:  Patient is to take all scheduled medications on the day of surgery    RECOMMENDATION:  APPROVAL GIVEN to proceed with proposed procedure, without further diagnostic evaluation.    No follow-ups on file.    Signed Electronically by: CULLEN Talbot CNP    Copy of this evaluation report is provided to requesting physician.    Preop [facility] Preop Guidelines    Revised Cardiac Risk Index

## 2020-08-21 NOTE — PATIENT INSTRUCTIONS

## 2020-11-16 ENCOUNTER — HEALTH MAINTENANCE LETTER (OUTPATIENT)
Age: 65
End: 2020-11-16

## 2021-05-19 NOTE — PROGRESS NOTES
ASSESSMENT & PLAN    Phong was seen today for pain and pain.    Diagnoses and all orders for this visit:    Chronic pain of both knees  -     XR Knee Bilateral 3 Views; Future    Primary osteoarthritis of left knee  -     Large Joint Injection/Arthocentesis: L knee joint    Other orders  -     Cancel: Large Joint Injection/Arthocentesis      This issue is chronic and Worsening.  Left knee OA.  Steroid injection done today.      See Patient Instructions  Patient Instructions   Discussed nature of degenerative arthrosis of the knee.   Discussed symptom treatment with over-the-counter medications, ice or heat, topical treatments, and rest if needed.   Discussed use of compression or bracing for comfort.  This could include trial of a medial  brace, to physical therapy  Discussed potential benefits of rehabilitation, to maintain or improve function at the knee.  Contact clinic if interested in physical therapy.  Discussed benefits of exercise and remaining active.  Discussed injection therapy.  This includes steroid injection (done today), also consider Visco supplement injection in the future.  Also briefly discussed future consideration of referral to orthopedic surgery for further evaluation and discussion of additional treatment options.  See the link below for more information if interested.    https://orthoinfo.aaos.org/en/treatment/total-knee-replacement/    Follow up is as needed.    If you have any further questions for your physician or physician s care team you can call [phone] and use option 3 to leave a voice message. Calls received during business hours will be returned same day.        Injection Discharge Instructions      You may shower, however avoid swimming, tub baths or hot tubs for 24 hours following your procedure    You may have a mild to moderate increase in pain for several days following the injection.    It may take up to 14 days for the steroid medication to start working  although you may feel the effect as early as a few days after the procedure.    You may use ice packs for 10-15 minutes, 3 to 4 times a day at the injection site for comfort    You may use anti-inflammatory medications (such as Ibuprofen or Aleve or Advil) if you are able to take safely, or acetaminophen (Tylenol) for pain control if necessary    If you were fasting, you may resume your normal diet and medications after the procedure    If you have diabetes, check your blood sugar more frequently than usual as your blood sugar may be higher than normal for 10-14 days following a steroid injection. Contact your doctor who manages your diabetes if your blood sugar is higher than usual      If you experience any of the following, call the clinic @ [phone]  - Fever over 100 degree F  - Swelling, bleeding, redness, drainage, warmth at the injection site  - New or worsening pain          Von Thorne [facility] [place]    -----  Chief Complaint   Patient presents with     Left Knee - Pain     Right Knee - Pain       SUBJECTIVE  Phong Cuevas is a/an 66 year old male who is seen as a self referral for evaluation of bilateral knee pain.     The patient is seen by themselves.  The patient is Right handed    Onset: 3+ years(s) ago. Reports insidious onset without acute precipitating event.  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     **  Has left shoe insert due to LLD.  Pain diffuse. Notes knee noise.      REVIEW OF SYSTEMS:  Review of  "Systems   All other systems reviewed and are negative.        OBJECTIVE:  /84   Pulse 92   Ht 1.803 m (5' 11\")   Wt 104.3 kg (230 lb)   BMI 32.08 kg/m     General: healthy, alert and in no distress  HEENT: no scleral icterus or conjunctival erythema  Skin: no suspicious lesions or rash. No jaundice.  CV: distal perfusion intact   Resp: normal respiratory effort without conversational dyspnea   Psych: normal mood and affect  Gait: ambulates independently, some pain with position changes  Neuro: Normal light sensory exam of lower extremity       Bilateral Knee exam    Inspection:   Mild left effusion   no ecchymosis    ROM: grossly symmetric     Patellar Motion:      Mild crepitus noted in the patellofemoral joint    Tender:      medial joint line mild left    Non Tender:       remainder of knee area    Special Tests:      neg (-) Forest       neg (-) Lachman       neg (-) anterior drawer       neg (-) posterior drawer       neg (-) varus       neg (-) valgus       no pain with forced extension          RADIOLOGY:  I independently ordered, visualized and reviewed these images with the patient  Left knee DJD.      Recent Results (from the past 24 hour(s))   XR Knee Bilateral 3 Views    Narrative    KNEE BILATERAL THREE VIEWS  May 21, 2021 8:40 AM     HISTORY: Chronic pain of left knee. Chronic pain of right knee.    COMPARISON: Left knee 1/11/2018.      Impression    IMPRESSION:   1. Right: Chronic mild scattered elongated heterotopic ossification  within the leg. Unremarkable knee. No fracture identified.  2. Left: Medial compartment osteoarthritis with fairly prominent joint  space narrowing. There appears to be a loose body posteriorly.  Patellar subchondral radiolucency, presumably related to overlying  chondromalacia. There is slight lateral patellar subluxation and  tilting.    ANNA CHASE MD           Large Joint Injection/Arthocentesis: L knee joint    Date/Time: 5/21/2021 9:13 AM  Performed by: " Von Thorne DO  Authorized by: Von Thorne,      Indications:  Pain and osteoarthritis  Needle Size:  25 G  Guidance: landmark guided    Approach:  Anteromedial  Location:  Knee      Medications:  2 mL lidocaine 1 %; 40 mg triamcinolone 40 MG/ML  Outcome:  Tolerated well, no immediate complications  Procedure discussed: discussed risks, benefits, and alternatives    Consent Given by:  Patient  Timeout: timeout called immediately prior to procedure    Prep: patient was prepped and draped in usual sterile fashion

## 2021-05-21 ENCOUNTER — OFFICE VISIT (OUTPATIENT)
Dept: ORTHOPEDICS | Facility: CLINIC | Age: 66
End: 2021-05-21
Payer: COMMERCIAL

## 2021-05-21 ENCOUNTER — ANCILLARY PROCEDURE (OUTPATIENT)
Dept: GENERAL RADIOLOGY | Facility: CLINIC | Age: 66
End: 2021-05-21
Attending: PEDIATRICS
Payer: COMMERCIAL

## 2021-05-21 VITALS
WEIGHT: 230 LBS | HEIGHT: 71 IN | SYSTOLIC BLOOD PRESSURE: 132 MMHG | BODY MASS INDEX: 32.2 KG/M2 | HEART RATE: 92 BPM | DIASTOLIC BLOOD PRESSURE: 84 MMHG

## 2021-05-21 DIAGNOSIS — M25.561 CHRONIC PAIN OF BOTH KNEES: Primary | ICD-10-CM

## 2021-05-21 DIAGNOSIS — M25.561 CHRONIC PAIN OF RIGHT KNEE: ICD-10-CM

## 2021-05-21 DIAGNOSIS — M25.562 CHRONIC PAIN OF LEFT KNEE: ICD-10-CM

## 2021-05-21 DIAGNOSIS — G89.29 CHRONIC PAIN OF RIGHT KNEE: ICD-10-CM

## 2021-05-21 DIAGNOSIS — M25.562 CHRONIC PAIN OF BOTH KNEES: Primary | ICD-10-CM

## 2021-05-21 DIAGNOSIS — G89.29 CHRONIC PAIN OF LEFT KNEE: ICD-10-CM

## 2021-05-21 DIAGNOSIS — M17.12 PRIMARY OSTEOARTHRITIS OF LEFT KNEE: ICD-10-CM

## 2021-05-21 DIAGNOSIS — G89.29 CHRONIC PAIN OF BOTH KNEES: Primary | ICD-10-CM

## 2021-05-21 PROCEDURE — 99203 OFFICE O/P NEW LOW 30 MIN: CPT | Mod: 25 | Performed by: PEDIATRICS

## 2021-05-21 PROCEDURE — 20610 DRAIN/INJ JOINT/BURSA W/O US: CPT | Mod: LT | Performed by: PEDIATRICS

## 2021-05-21 PROCEDURE — 73562 X-RAY EXAM OF KNEE 3: CPT | Mod: RT | Performed by: RADIOLOGY

## 2021-05-21 RX ORDER — LIDOCAINE HYDROCHLORIDE 10 MG/ML
2 INJECTION, SOLUTION INFILTRATION; PERINEURAL
Status: DISCONTINUED | OUTPATIENT
Start: 2021-05-21 | End: 2022-03-24

## 2021-05-21 RX ORDER — TRIAMCINOLONE ACETONIDE 40 MG/ML
40 INJECTION, SUSPENSION INTRA-ARTICULAR; INTRAMUSCULAR
Status: DISCONTINUED | OUTPATIENT
Start: 2021-05-21 | End: 2022-03-24

## 2021-05-21 RX ADMIN — TRIAMCINOLONE ACETONIDE 40 MG: 40 INJECTION, SUSPENSION INTRA-ARTICULAR; INTRAMUSCULAR at 09:13

## 2021-05-21 RX ADMIN — LIDOCAINE HYDROCHLORIDE 2 ML: 10 INJECTION, SOLUTION INFILTRATION; PERINEURAL at 09:13

## 2021-05-21 ASSESSMENT — MIFFLIN-ST. JEOR: SCORE: 1845.4

## 2021-05-21 ASSESSMENT — PAIN SCALES - GENERAL: PAINLEVEL: MILD PAIN (3)

## 2021-05-21 NOTE — PATIENT INSTRUCTIONS
Discussed nature of degenerative arthrosis of the knee.   Discussed symptom treatment with over-the-counter medications, ice or heat, topical treatments, and rest if needed.   Discussed use of compression or bracing for comfort.  This could include trial of a medial  brace, to physical therapy  Discussed potential benefits of rehabilitation, to maintain or improve function at the knee.  Contact clinic if interested in physical therapy.  Discussed benefits of exercise and remaining active.  Discussed injection therapy.  This includes steroid injection (done today), also consider Visco supplement injection in the future.  Also briefly discussed future consideration of referral to orthopedic surgery for further evaluation and discussion of additional treatment options.  See the link below for more information if interested.    https://orthoinfo.aaos.org/en/treatment/total-knee-replacement/    Follow up is as needed.    If you have any further questions for your physician or physician s care team you can call [phone] and use option 3 to leave a voice message. Calls received during business hours will be returned same day.        Injection Discharge Instructions      You may shower, however avoid swimming, tub baths or hot tubs for 24 hours following your procedure    You may have a mild to moderate increase in pain for several days following the injection.    It may take up to 14 days for the steroid medication to start working although you may feel the effect as early as a few days after the procedure.    You may use ice packs for 10-15 minutes, 3 to 4 times a day at the injection site for comfort    You may use anti-inflammatory medications (such as Ibuprofen or Aleve or Advil) if you are able to take safely, or acetaminophen (Tylenol) for pain control if necessary    If you were fasting, you may resume your normal diet and medications after the procedure    If you have diabetes, check your blood sugar  more frequently than usual as your blood sugar may be higher than normal for 10-14 days following a steroid injection. Contact your doctor who manages your diabetes if your blood sugar is higher than usual      If you experience any of the following, call the clinic @ [phone]  - Fever over 100 degree F  - Swelling, bleeding, redness, drainage, warmth at the injection site  - New or worsening pain

## 2021-05-21 NOTE — LETTER
5/21/2021         RE: Phong Cuevas  [address]  [place] [address]        Dear Colleague,    Thank you for referring your patient, Phong Cuevas, to the [facility] [place]. Please see a copy of my visit note below.    ASSESSMENT & PLAN    Phong was seen today for pain and pain.    Diagnoses and all orders for this visit:    Chronic pain of both knees  -     XR Knee Bilateral 3 Views; Future    Primary osteoarthritis of left knee  -     Large Joint Injection/Arthocentesis: L knee joint    Other orders  -     Cancel: Large Joint Injection/Arthocentesis      This issue is chronic and Worsening.  Left knee OA.  Steroid injection done today.      See Patient Instructions  Patient Instructions   Discussed nature of degenerative arthrosis of the knee.   Discussed symptom treatment with over-the-counter medications, ice or heat, topical treatments, and rest if needed.   Discussed use of compression or bracing for comfort.  This could include trial of a medial  brace, to physical therapy  Discussed potential benefits of rehabilitation, to maintain or improve function at the knee.  Contact clinic if interested in physical therapy.  Discussed benefits of exercise and remaining active.  Discussed injection therapy.  This includes steroid injection (done today), also consider Visco supplement injection in the future.  Also briefly discussed future consideration of referral to orthopedic surgery for further evaluation and discussion of additional treatment options.  See the link below for more information if interested.    https://orthoinfo.aaos.org/en/treatment/total-knee-replacement/    Follow up is as needed.    If you have any further questions for your physician or physician s care team you can call [phone] and use option 3 to leave a voice message. Calls received during business hours will be returned same day.        Injection Discharge  Instructions      You may shower, however avoid swimming, tub baths or hot tubs for 24 hours following your procedure    You may have a mild to moderate increase in pain for several days following the injection.    It may take up to 14 days for the steroid medication to start working although you may feel the effect as early as a few days after the procedure.    You may use ice packs for 10-15 minutes, 3 to 4 times a day at the injection site for comfort    You may use anti-inflammatory medications (such as Ibuprofen or Aleve or Advil) if you are able to take safely, or acetaminophen (Tylenol) for pain control if necessary    If you were fasting, you may resume your normal diet and medications after the procedure    If you have diabetes, check your blood sugar more frequently than usual as your blood sugar may be higher than normal for 10-14 days following a steroid injection. Contact your doctor who manages your diabetes if your blood sugar is higher than usual      If you experience any of the following, call the clinic @ [phone]  - Fever over 100 degree F  - Swelling, bleeding, redness, drainage, warmth at the injection site  - New or worsening pain          [facility] [place]    -----  Chief Complaint   Patient presents with     Left Knee - Pain     Right Knee - Pain       SUBJECTIVE  Phong Cuevas is a/an 66 year old male who is seen as a self referral for evaluation of bilateral knee pain.     The patient is seen by themselves.  The patient is Right handed    Onset: 3+ years(s) ago. Reports insidious onset without acute precipitating event.  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     **  Has left shoe insert due to LLD.  Pain diffuse. Notes knee noise.      REVIEW OF SYSTEMS:  Review of Systems   All other systems reviewed and are negative.        OBJECTIVE:  /84   Pulse 92   Ht 1.803 m (5' 11\")   Wt 104.3 kg (230 lb)   BMI 32.08 kg/m     General: healthy, alert and in no distress  HEENT: no scleral icterus or conjunctival erythema  Skin: no suspicious lesions or rash. No jaundice.  CV: distal perfusion intact   Resp: normal respiratory effort without conversational dyspnea   Psych: normal mood and affect  Gait: ambulates independently, some pain with position changes  Neuro: Normal light sensory exam of lower extremity       Bilateral Knee exam    Inspection:   Mild left effusion   no ecchymosis    ROM: grossly symmetric     Patellar Motion:      Mild crepitus noted in the patellofemoral joint    Tender:      medial joint line mild left    Non Tender:       remainder of knee area    Special Tests:      neg (-) Forest       neg (-) Lachman       neg (-) anterior drawer       neg (-) posterior drawer       neg (-) varus       neg (-) valgus       no pain with forced extension          RADIOLOGY:  I independently ordered, visualized and reviewed these images with the patient  Left knee DJD.      Recent Results (from the past 24 hour(s))   XR Knee Bilateral 3 Views    Narrative    KNEE BILATERAL THREE VIEWS  May 21, 2021 8:40 AM     HISTORY: Chronic pain of left knee. Chronic pain of right knee.    COMPARISON: Left knee 1/11/2018.      Impression    IMPRESSION:   1. Right: Chronic mild scattered elongated heterotopic ossification  within the leg. Unremarkable knee. No fracture identified.  2. Left: Medial compartment osteoarthritis with fairly prominent joint  space narrowing. There appears to be a loose " body posteriorly.  Patellar subchondral radiolucency, presumably related to overlying  chondromalacia. There is slight lateral patellar subluxation and  tilting.    ANNA CHASE MD           Large Joint Injection/Arthocentesis: L knee joint    Date/Time: 5/21/2021 9:13 AM  Performed by: Von Thorne DO  Authorized by: Von Thorne DO     Indications:  Pain and osteoarthritis  Needle Size:  25 G  Guidance: landmark guided    Approach:  Anteromedial  Location:  Knee      Medications:  2 mL lidocaine 1 %; 40 mg triamcinolone 40 MG/ML  Outcome:  Tolerated well, no immediate complications  Procedure discussed: discussed risks, benefits, and alternatives    Consent Given by:  Patient  Timeout: timeout called immediately prior to procedure    Prep: patient was prepped and draped in usual sterile fashion                                 Again, thank you for allowing me to participate in the care of your patient.        Sincerely,        Von Thorne DO

## 2021-06-11 ENCOUNTER — MYC MEDICAL ADVICE (OUTPATIENT)
Dept: ORTHOPEDICS | Facility: CLINIC | Age: 66
End: 2021-06-11
Payer: COMMERCIAL

## 2021-06-11 DIAGNOSIS — M17.12 PRIMARY OSTEOARTHRITIS OF LEFT KNEE: Primary | ICD-10-CM

## 2021-06-11 NOTE — TELEPHONE ENCOUNTER
Patient scheduled for appointment on TBD for discussion of viscosupplementation injection vs steroid injection of left knee.      Steroid  injection last completed 5/21/21.       Prior authorization referral for Durolane injection pended.     Please advise.    Rosie Martinez ATC

## 2021-06-14 NOTE — TELEPHONE ENCOUNTER
PA approved for Durolane for left knee 6/11/21 - 12/31/21.    Notified patient through [email or web address].    Char Rivas MBA, ATC

## 2021-06-28 ENCOUNTER — OFFICE VISIT (OUTPATIENT)
Dept: ORTHOPEDICS | Facility: CLINIC | Age: 66
End: 2021-06-28
Payer: COMMERCIAL

## 2021-06-28 DIAGNOSIS — M17.12 PRIMARY OSTEOARTHRITIS OF LEFT KNEE: Primary | ICD-10-CM

## 2021-06-28 PROCEDURE — 20611 DRAIN/INJ JOINT/BURSA W/US: CPT | Mod: LT | Performed by: FAMILY MEDICINE

## 2021-06-28 NOTE — PATIENT INSTRUCTIONS
[facility] [place] [facility] Injection Discharge Instructions    Procedure: Left knee hyaluronic acid injection       You may shower, however avoid swimming, tub baths or hot tubs for 24 hours following your procedure    You may have a mild to moderate increase in pain for several days following the injection.    It may take up to 30 days for the medication to start working although you may feel the effect as early as a few days after the procedure.    You may use ice packs for 10-15 minutes, 3 to 4 times a day at the injection site for comfort    You may use anti-inflammatory medications (such as Ibuprofen or Aleve or Advil) or Tylenol for pain control if necessary    If you were fasting, you may resume your normal diet and medications after the procedure      If you experience any of the following, call [facility] @ [phone] or [phone]  -Fever over 100 degree F  -Swelling, bleeding, redness, drainage, warmth at the injection site  - New or worsening pain      It was great seeing you today!    Kar Polk

## 2021-06-28 NOTE — LETTER
6/28/2021         RE: Phong Cuevas  [address]  [place] [facility] [address]        Dear Colleague,    Thank you for referring your patient, Phong Cuevas, to the [facility] [place]. Please see a copy of my visit note below.    Large Joint Injection/Arthocentesis: L knee joint    Date/Time: 6/28/2021 7:51 AM  Performed by: Kar Polk MD  Authorized by: Kar Polk MD     Indications:  Pain and osteoarthritis  Needle Size:  21 G  Guidance: ultrasound    Approach:  Superolateral  Location:  Knee      Medications:  60 mg sodium hyaluronate 60 MG/3ML  Outcome:  Tolerated well, no immediate complications  Procedure discussed: discussed risks, benefits, and alternatives    Consent Given by:  Patient  Timeout: timeout called immediately prior to procedure    Prep: patient was prepped and draped in usual sterile fashion     Referred by Dr. Thorne     Patient tolerated hyaluronic acid injection in left kneetoday.  Aftercare instructions given to patient.  Plan to follow-up as needed for repeat injections in 6 months.      Kar Polk MD [facility]                Again, thank you for allowing me to participate in the care of your patient.        Sincerely,        Kar Polk MD

## 2021-06-28 NOTE — PROGRESS NOTES
Large Joint Injection/Arthocentesis: L knee joint    Date/Time: 6/28/2021 7:51 AM  Performed by: Kar Polk MD  Authorized by: Kar Polk MD     Indications:  Pain and osteoarthritis  Needle Size:  21 G  Guidance: ultrasound    Approach:  Superolateral  Location:  Knee      Medications:  60 mg sodium hyaluronate 60 MG/3ML  Outcome:  Tolerated well, no immediate complications  Procedure discussed: discussed risks, benefits, and alternatives    Consent Given by:  Patient  Timeout: timeout called immediately prior to procedure    Prep: patient was prepped and draped in usual sterile fashion     Referred by Dr. Thorne     Patient tolerated hyaluronic acid injection in left kneetoday.  Aftercare instructions given to patient.  Plan to follow-up as needed for repeat injections in 6 months.      Kar Polk MD [facility]

## 2021-07-05 ENCOUNTER — TELEPHONE (OUTPATIENT)
Dept: FAMILY MEDICINE | Facility: CLINIC | Age: 66
End: 2021-07-05

## 2021-07-05 DIAGNOSIS — R73.09 ABNORMAL GLUCOSE: Primary | ICD-10-CM

## 2021-07-05 DIAGNOSIS — E78.5 HYPERLIPIDEMIA LDL GOAL <100: ICD-10-CM

## 2021-07-05 DIAGNOSIS — Z12.5 SCREENING FOR PROSTATE CANCER: ICD-10-CM

## 2021-07-05 NOTE — TELEPHONE ENCOUNTER
Orders placed.  Please call patient and schedule a fasting lab appt and an annual physical a few days later with Indigo to review labs    Shanda Magallanes PA-C

## 2021-07-05 NOTE — TELEPHONE ENCOUNTER
Patient had pre-op with PCP Indigo Roche 8/21/20.  Cataracts.    Assume due to be seen again this August.    No annual or lab only visit yet scheduled.    Last fasting labs were 5/8/2020.      Routed to PCP to place future orders for desired labs, team can call to schedule lab and annual visit after orders in .    María Ugarte RN  [facility]

## 2021-07-05 NOTE — TELEPHONE ENCOUNTER
Reason for Call:  Other     Detailed comments: Patient said that he is due for his yearly labs. Please place orders.    Phone Number Patient can be reached at: Home number on file [phone] (home)    Best Time:     Can we leave a detailed message on this number? YES    Call taken on 7/5/2021 at 10:33 AM by Marcia Wallis

## 2021-07-06 RX ORDER — SIMVASTATIN 20 MG
20 TABLET ORAL AT BEDTIME
Qty: 90 TABLET | Refills: 0 | Status: SHIPPED | OUTPATIENT
Start: 2021-07-06 | End: 2021-07-15

## 2021-07-14 NOTE — PROGRESS NOTES
"SUBJECTIVE:   Phong Cuevas is a 66 year old male who presents for Preventive Visit.    Chief Complaint   Patient presents with     Physical     pt is fasting         Patient has been advised of split billing requirements and indicates understanding: Yes     Are you in the first 12 months of your Medicare coverage?  No    Healthy Habits:    In general, how would you rate your overall health?  Excellent    Frequency of exercise:  1 day/week    Duration of exercise:  45-60 minutes    Do you usually eat at least 4 servings of fruit and vegetables a day, include whole grains    & fiber and avoid regularly eating high fat or \"junk\" foods?  Yes    Taking medications regularly:  Yes    Barriers to taking medications:  None    Medication side effects:  None    Ability to successfully perform activities of daily living:  No assistance needed    Home Safety:  No safety concerns identified    Hearing Impairment:  No hearing concerns    In the past 6 months, have you been bothered by leaking of urine?  No    In general, how would you rate your overall mental or emotional health?  Excellent      PHQ-2 Total Score:    Additional concerns today:  No    Do you feel safe in your environment? Yes    Have you ever done Advance Care Planning? (For example, a Health Directive, POLST, or a discussion with a medical provider or your loved ones about your wishes): Yes, patient states has an Advance Care Planning document and will bring a copy to the clinic.       Fall risk  Fallen 2 or more times in the past year?: No  Any fall with injury in the past year?: No    Cognitive Screening   1) Repeat 3 items (Leader, Season, Table)    2) Clock draw: NORMAL  3) 3 item recall: Recalls 1 object   Results: NORMAL clock, 1-2 items recalled: COGNITIVE IMPAIRMENT LESS LIKELY    Mini-CogTM Copyright [employer]. Licensed by the author for use in [facility]; reprinted with permission (unique@.[facility]). All rights reserved.      Do you have " sleep apnea, excessive snoring or daytime drowsiness?: no    Reviewed and updated as needed this visit by clinical staff  Tobacco  Allergies  Meds   Med Hx  Surg Hx  Fam Hx  Soc Hx        Reviewed and updated as needed this visit by Provider                Social History     Tobacco Use     Smoking status: Never Smoker     Smokeless tobacco: Never Used   Substance Use Topics     Alcohol use: Yes     Comment: 12 pack per week     If you drink alcohol do you typically have >3 drinks per day or >7 drinks per week? Yes    [employer] Flowsheet 7/15/2021   Have you ever felt you should Cut down on your drinking or drug use? 1   Have people Annoyed you by criticizing your drinking or drug use? 0   Have you ever felt bad or Guilty about your drinking or drug use? 0   Have you ever had a drink or used drugs first thing in the morning to steady your nerves or to get rid of a hangover? (Eye opener) 0   [employer] SCORE 1     Alcohol Use 7/15/2021   Prescreen: >3 drinks/day or >7 drinks/week? Yes         Current providers sharing in care for this patient include:   Patient Care Team:  Indigo Alvarado APRN CNP as PCP - General (Nurse Practitioner - Family)  Indigo Alvarado APRN CNP as Assigned PCP  Von Thorne DO as Assigned Musculoskeletal Provider    The following health maintenance items are reviewed in Epic and correct as of today:  Health Maintenance Due   Topic Date Due     COVID-19 Vaccine (1) Never done     Pneumococcal Vaccine: 65+ Years (1 of 1 - PPSV23) Never done     FALL RISK ASSESSMENT  05/04/2021     Current Outpatient Medications   Medication Sig Dispense Refill     doxylamine (UNISOM) 25 MG TABS Take 25 mg by mouth At Bedtime       Multiple Vitamins-Minerals (MULTIVITAMIN ADULT PO)        simvastatin (ZOCOR) 20 MG tablet Take 1 tablet (20 mg) by mouth At Bedtime Hold Rx, will notify when needs to have refilled 90 tablet 3     Allergies   Allergen Reactions     Nka [No Known  Allergies]        Any new diagnosis of family breast, ovarian, or bowel cancer? No    FHS-7: No flowsheet data found.      Pertinent mammograms are reviewed under the imaging tab.    Here today for physical.  Is fasting today for labs.  Is occasionally getting some phlegm in throat.  Is worse at night when lays down.  No heartburn.  No increased shortness of breath.  Fevers or chills.  Has been taking simvastatin and tolerating it well.  No side effects that is aware of.    Thinks may have had COVID-19.  Never got checked.  Had fever up around 101.  Had cough and body aches.  Has not had COVID-19 vaccination.  Would like to have antibodies checked here today.    Last PSA: 2020, no family history of prostate cancer.   Last Colonoscopy: 7/2020. Repeat in 10 years. No family history of colon cancer   Last eye exam: Had lasik eye surgery about 6-7 years, plans to make appointment    Last dental exam: Yearly   Last tetanus vaccine: 2015  Last influenza vaccine: Declines   Last shingles vaccine: Zoster, 2005. Plans to get at the pharmacy.   Last pneumonia vaccine: Never   Last COVID vaccine: Declines   Hep C screen (born [birth year]): 2019-negative   HIV screen: Declines   AAA screen (age 65-78 with smoking hx): N/A  IVD (HTN, Hyperlipid, Smoking): N/A  Lung CA screening (55-80, 30 pk smoking hx): N/A        Review of Systems   Constitutional: Negative for chills and fever.   HENT: Negative for congestion, ear pain, hearing loss and sore throat.         Phlegm in throat   Eyes: Negative for pain and visual disturbance.   Respiratory: Negative for cough and shortness of breath.    Cardiovascular: Negative for chest pain, palpitations and peripheral edema.   Gastrointestinal: Negative for abdominal pain, constipation, diarrhea, heartburn, hematochezia and nausea.   Genitourinary: Negative for discharge, dysuria, frequency, genital sores, hematuria, impotence and urgency.   Musculoskeletal: Negative for arthralgias, joint  "swelling and myalgias.   Skin: Negative for rash.   Neurological: Negative for dizziness, weakness, headaches and paresthesias.   Psychiatric/Behavioral: Negative for mood changes. The patient is not nervous/anxious.          OBJECTIVE:   /88 (BP Location: Right arm, Patient Position: Sitting, Cuff Size: Adult Large)   Pulse 80   Temp 97.3  F (36.3  C) (Tympanic)   Resp 20   Ht 1.746 m (5' 8.75\")   Wt 107 kg (235 lb 12.8 oz)   BMI 35.08 kg/m   Estimated body mass index is 35.08 kg/m  as calculated from the following:    Height as of this encounter: 1.746 m (5' 8.75\").    Weight as of this encounter: 107 kg (235 lb 12.8 oz).  Physical Exam  Constitutional:       Appearance: He is well-developed.   HENT:      Right Ear: Tympanic membrane and external ear normal. No middle ear effusion. Tympanic membrane is not erythematous.      Left Ear: Tympanic membrane and external ear normal.  No middle ear effusion. Tympanic membrane is not erythematous.      Mouth/Throat:      Pharynx: Uvula midline.   Eyes:      Pupils: Pupils are equal, round, and reactive to light.   Neck:      Thyroid: No thyromegaly.      Vascular: No carotid bruit.   Cardiovascular:      Rate and Rhythm: Normal rate and regular rhythm.      Pulses:           Femoral pulses are 2+ on the right side and 2+ on the left side.     Heart sounds: Normal heart sounds.   Pulmonary:      Effort: Pulmonary effort is normal.      Breath sounds: Normal breath sounds.   Abdominal:      General: Bowel sounds are normal. There is no distension.      Palpations: Abdomen is soft.      Tenderness: There is no abdominal tenderness.   Musculoskeletal:         General: Normal range of motion.   Skin:     General: Skin is warm and dry.   Neurological:      Mental Status: He is alert.           ASSESSMENT / PLAN:   1. Encounter for Medicare annual wellness exam  Screening guidelines reviewed.   - Comprehensive metabolic panel; Future  - REVIEW OF HEALTH MAINTENANCE " "PROTOCOL ORDERS  - Comprehensive metabolic panel    2. Hyperlipidemia LDL goal <100  Most recent lipids reviewed.  We will recheck here today.  Plan to continue simvastatin-refill sent.  Follow-up in 1 year or sooner if needed.  - Lipid panel reflex to direct LDL Fasting; Future  - simvastatin (ZOCOR) 20 MG tablet; Take 1 tablet (20 mg) by mouth At Bedtime Hold Rx, will notify when needs to have refilled  Dispense: 90 tablet; Refill: 3  - Lipid panel reflex to direct LDL Fasting    3. Morbid obesity (H)  Encouraged to remain active for at least 30 to 40 minutes 3-4 times per week.    4. Screening for viral disease  We will check here today  - SARS-CoV-2 Nucleocapsid Total Ab; Future  - SARS-CoV-2 Nucleocapsid Total Ab    5. Abnormal glucose  We will recheck  - Comprehensive metabolic panel; Future  - Hemoglobin A1c; Future  - Hemoglobin A1c  - Comprehensive metabolic panel    6. Screening for prostate cancer  Shared decision making regarding use of PSA testing.  Not currently having any symptoms, no decreased urine stream no difficulty emptying bladder, no nocturia.  - Prostate Specific Antigen Screen; Future  - Prostate Specific Antigen Screen    7. Gastroesophageal reflux disease with esophagitis without hemorrhage  Encouraged to start over-the-counter Pepcid/famotidine orally daily.  5.      Patient has been advised of split billing requirements and indicates understanding: Yes  COUNSELING:  Reviewed preventive health counseling, as reflected in patient instructions       Regular exercise       Fall risk prevention       Immunizations    Encouraged to go to pharmacy for shingles vaccine.           Hepatitis C screening       HIV screening for high risk patient       Colon cancer screening       Prostate cancer screening    Estimated body mass index is 35.08 kg/m  as calculated from the following:    Height as of this encounter: 1.746 m (5' 8.75\").    Weight as of this encounter: 107 kg (235 lb 12.8 oz).    Weight " management plan: Discussed healthy diet and exercise guidelines    He reports that he has never smoked. He has never used smokeless tobacco.      Appropriate preventive services were discussed with this patient, including applicable screening as appropriate for cardiovascular disease, diabetes, osteopenia/osteoporosis, and glaucoma.  As appropriate for age/gender, discussed screening for colorectal cancer, prostate cancer, breast cancer, and cervical cancer. Checklist reviewing preventive services available has been given to the patient.    Reviewed patients plan of care and provided an AVS. The Basic Care Plan (routine screening as documented in Health Maintenance) for Phong meets the Care Plan requirement. This Care Plan has been established and reviewed with the Patient.    Counseling Resources:  ATP IV Guidelines  Pooled Cohorts Equation Calculator  Breast Cancer Risk Calculator  Breast Cancer: Medication to Reduce Risk  FRAX Risk Assessment  ICSI Preventive Guidelines  Dietary Guidelines for Americans, 2010  USDA's MyPlate  ASA Prophylaxis  Lung CA Screening    CULLEN Talbot CNP  [facility]    Identified Health Risks:

## 2021-07-14 NOTE — PATIENT INSTRUCTIONS
Please go to pharmacy for shingles vaccine.       Patient Education   Personalized Prevention Plan  You are due for the preventive services outlined below.  Your care team is available to assist you in scheduling these services.  If you have already completed any of these items, please share that information with your care team to update in your medical record.  Health Maintenance Due   Topic Date Due     ANNUAL REVIEW OF HM ORDERS  Never done     COVID-19 Vaccine (1) Never done     Zoster (Shingles) Vaccine (1 of 2) Never done     Pneumococcal Vaccine (1 of 1 - PPSV23) Never done     PHQ-2  01/01/2021     Annual Wellness Visit  05/04/2021     FALL RISK ASSESSMENT  05/04/2021

## 2021-07-15 ENCOUNTER — OFFICE VISIT (OUTPATIENT)
Dept: FAMILY MEDICINE | Facility: CLINIC | Age: 66
End: 2021-07-15
Payer: COMMERCIAL

## 2021-07-15 VITALS
SYSTOLIC BLOOD PRESSURE: 130 MMHG | RESPIRATION RATE: 20 BRPM | DIASTOLIC BLOOD PRESSURE: 88 MMHG | BODY MASS INDEX: 34.93 KG/M2 | TEMPERATURE: 97.3 F | HEIGHT: 69 IN | HEART RATE: 80 BPM | WEIGHT: 235.8 LBS

## 2021-07-15 DIAGNOSIS — K21.00 GASTROESOPHAGEAL REFLUX DISEASE WITH ESOPHAGITIS WITHOUT HEMORRHAGE: ICD-10-CM

## 2021-07-15 DIAGNOSIS — Z11.59 SCREENING FOR VIRAL DISEASE: ICD-10-CM

## 2021-07-15 DIAGNOSIS — R73.09 ABNORMAL GLUCOSE: ICD-10-CM

## 2021-07-15 DIAGNOSIS — E66.01 MORBID OBESITY (H): ICD-10-CM

## 2021-07-15 DIAGNOSIS — Z00.00 ENCOUNTER FOR MEDICARE ANNUAL WELLNESS EXAM: Primary | ICD-10-CM

## 2021-07-15 DIAGNOSIS — E78.5 HYPERLIPIDEMIA LDL GOAL <100: ICD-10-CM

## 2021-07-15 DIAGNOSIS — Z12.5 SCREENING FOR PROSTATE CANCER: ICD-10-CM

## 2021-07-15 LAB
ALBUMIN SERPL-MCNC: 3.9 G/DL (ref 3.4–5)
ALP SERPL-CCNC: 54 U/L (ref 40–150)
ALT SERPL W P-5'-P-CCNC: 51 U/L (ref 0–70)
ANION GAP SERPL CALCULATED.3IONS-SCNC: 3 MMOL/L (ref 3–14)
AST SERPL W P-5'-P-CCNC: 24 U/L (ref 0–45)
BILIRUB SERPL-MCNC: 0.6 MG/DL (ref 0.2–1.3)
BUN SERPL-MCNC: 22 MG/DL (ref 7–30)
CALCIUM SERPL-MCNC: 9 MG/DL (ref 8.5–10.1)
CHLORIDE BLD-SCNC: 101 MMOL/L (ref 94–109)
CHOLEST SERPL-MCNC: 198 MG/DL
CO2 SERPL-SCNC: 31 MMOL/L (ref 20–32)
CREAT SERPL-MCNC: 1.07 MG/DL (ref 0.66–1.25)
FASTING STATUS PATIENT QL REPORTED: YES
GFR SERPL CREATININE-BSD FRML MDRD: 72 ML/MIN/1.73M2
GLUCOSE BLD-MCNC: 99 MG/DL (ref 70–99)
HBA1C MFR BLD: 5.5 % (ref 0–5.6)
HDLC SERPL-MCNC: 68 MG/DL
LDLC SERPL CALC-MCNC: 107 MG/DL
NONHDLC SERPL-MCNC: 130 MG/DL
POTASSIUM BLD-SCNC: 5 MMOL/L (ref 3.4–5.3)
PROT SERPL-MCNC: 7.3 G/DL (ref 6.8–8.8)
PSA SERPL-MCNC: 1.67 UG/L
SODIUM SERPL-SCNC: 135 MMOL/L (ref 133–144)
TRIGL SERPL-MCNC: 114 MG/DL

## 2021-07-15 PROCEDURE — 36415 COLL VENOUS BLD VENIPUNCTURE: CPT | Performed by: NURSE PRACTITIONER

## 2021-07-15 PROCEDURE — 80053 COMPREHEN METABOLIC PANEL: CPT | Performed by: NURSE PRACTITIONER

## 2021-07-15 PROCEDURE — 80061 LIPID PANEL: CPT | Performed by: NURSE PRACTITIONER

## 2021-07-15 PROCEDURE — 99214 OFFICE O/P EST MOD 30 MIN: CPT | Mod: 25 | Performed by: NURSE PRACTITIONER

## 2021-07-15 PROCEDURE — G0438 PPPS, INITIAL VISIT: HCPCS | Performed by: NURSE PRACTITIONER

## 2021-07-15 PROCEDURE — 86769 SARS-COV-2 COVID-19 ANTIBODY: CPT | Performed by: NURSE PRACTITIONER

## 2021-07-15 PROCEDURE — 83036 HEMOGLOBIN GLYCOSYLATED A1C: CPT | Performed by: NURSE PRACTITIONER

## 2021-07-15 PROCEDURE — G0103 PSA SCREENING: HCPCS | Performed by: NURSE PRACTITIONER

## 2021-07-15 RX ORDER — SIMVASTATIN 20 MG
20 TABLET ORAL AT BEDTIME
Qty: 90 TABLET | Refills: 3 | Status: SHIPPED | OUTPATIENT
Start: 2021-07-15 | End: 2022-03-17

## 2021-07-15 ASSESSMENT — ENCOUNTER SYMPTOMS
DYSURIA: 0
JOINT SWELLING: 0
PARESTHESIAS: 0
EYE PAIN: 0
SHORTNESS OF BREATH: 0
WEAKNESS: 0
ABDOMINAL PAIN: 0
CONSTIPATION: 0
COUGH: 0
SORE THROAT: 0
HEMATOCHEZIA: 0
NAUSEA: 0
DIARRHEA: 0
PALPITATIONS: 0
NERVOUS/ANXIOUS: 0
DIZZINESS: 0
HEARTBURN: 0
ARTHRALGIAS: 0
FREQUENCY: 0
HEADACHES: 0
HEMATURIA: 0
FEVER: 0
CHILLS: 0
MYALGIAS: 0

## 2021-07-15 ASSESSMENT — MIFFLIN-ST. JEOR: SCORE: 1835.99

## 2021-07-15 ASSESSMENT — PAIN SCALES - GENERAL: PAINLEVEL: NO PAIN (0)

## 2021-07-15 ASSESSMENT — ACTIVITIES OF DAILY LIVING (ADL): CURRENT_FUNCTION: NO ASSISTANCE NEEDED

## 2021-07-17 LAB — SARS-COV-2 AB SERPL QL IA: NEGATIVE

## 2021-09-18 ENCOUNTER — HEALTH MAINTENANCE LETTER (OUTPATIENT)
Age: 66
End: 2021-09-18

## 2021-10-26 NOTE — PROGRESS NOTES
Sports Medicine Clinic Visit      Assessment:  1. Primary osteoarthritis of left knee        Plan:  Discussed the assessment with the patient.  Lack of lasting benefit from treatment to date.  Visco did provide some relief, but short-lived.  Reviewed options again.  See last visit note.  He is agreeable to trial of some physical therapy, referral placed.  We discussed potential for  bracing trial as well, he can consider.  Reviewed options with injection therapy.  He desires to try repeat steroid injection, hoping for greater relief this time, done today.  If interested in referral to orthopedic surgery in the future, may simply contact the clinic.  He indicated early spring may be a time at which he would consider having a visit with Ortho surgery.  Follow up: Is otherwise as needed.    Questions answered. Discussed signs and symptoms that may indicate more serious issues; the patient was instructed to seek appropriate care if noted. Rich indicates understanding of these issues and agrees with the plan.        See Patient Instructions  Patient Instructions   Repeat left knee steroid injection done today.  Also add physical therapy referral, may do at your convenience.  Additional considerations not yet tried is on motor bracing.  If interested, trial is typically done through physical therapy.  Otherwise, if desiring referral to orthopedic surgeon in the future, may simply contact clinic (phone call or MyChart is fine), and we would be glad to place a referral for further discussion of options for the arthritis.    If you have any further questions for your physician or physician s care team you can call [phone] and use option 3 to leave a voice message. Calls received during business hours will be returned same day.        Injection Discharge Instructions      You may shower, however avoid swimming, tub baths or hot tubs for 24 hours following your procedure    You may have a mild to moderate increase  in pain for several days following the injection.    It may take up to 14 days for the steroid medication to start working although you may feel the effect as early as a few days after the procedure.    You may use ice packs for 10-15 minutes, 3 to 4 times a day at the injection site for comfort    You may use anti-inflammatory medications (such as Ibuprofen or Aleve or Advil) if you are able to take safely, or acetaminophen (Tylenol) for pain control if necessary    If you were fasting, you may resume your normal diet and medications after the procedure    If you have diabetes, check your blood sugar more frequently than usual as your blood sugar may be higher than normal for 10-14 days following a steroid injection. Contact your doctor who manages your diabetes if your blood sugar is higher than usual      If you experience any of the following, call the clinic @ [phone]  - Fever over 100 degree F  - Swelling, bleeding, redness, drainage, warmth at the injection site  - New or worsening pain            [facility]      ==========================================      PCP: Indigo Alvarado Faith is a 66 year old male who is seen in f/u up for left knee pain and left leg weakness. Since last visit on 5/21/2021 patient had a visco gel injection with Dr. Kar Polk on 6/28/2021. He notes about a month of relief from this injection. He notes that he has the best ROM early in the morning and he gets more stiff as the day goes on. Pain is deep in the knee joint and gets up to about 9/10 but he notes that his inability to get off the floor or get out of a squat position is his main concern today. Has been doing ibuprofen but causes stomach issues. Naproxen helps minimally. He notes that he likes to bike but cannot due to symptoms currently. He would like to discuss other treatment options today.    [employer] and builds race  motors.      Review of Systems  All other systems reviewed and are negative unless noted above.    No past medical history on file.  Past Surgical History:   Procedure Laterality Date     COLONOSCOPY  2/20/2014    Procedure: COLONOSCOPY;  Colonoscopy  ;  Surgeon: Murali Gill MD;  Location: [facility]     Family History   Problem Relation Age of Onset     Cancer Father         oral cancer     Social History     Socioeconomic History     Marital status: Single     Spouse name: Not on file     Number of children: Not on file     Years of education: Not on file     Highest education level: Not on file   Occupational History     Not on file   Tobacco Use     Smoking status: Never Smoker     Smokeless tobacco: Never Used   Vaping Use     Vaping Use: Never used   Substance and Sexual Activity     Alcohol use: Yes     Comment: 12 pack per week     Drug use: No     Sexual activity: Yes     Partners: Female     Birth control/protection: None     Comment: partner is post-menopausal   Other Topics Concern     Parent/sibling w/ CABG, MI or angioplasty before 65F 55M? No   Social History Narrative     Not on file     Social Determinants of Health     Financial Resource Strain:      Difficulty of Paying Living Expenses:    Food Insecurity:      Worried About Running Out of Food in the Last Year:      Ran Out of Food in the Last Year:    Transportation Needs:      Lack of Transportation (Medical):      Lack of Transportation (Non-Medical):    Physical Activity:      Days of Exercise per Week:      Minutes of Exercise per Session:    Stress:      Feeling of Stress :    Social Connections:      Frequency of Communication with Friends and Family:      Frequency of Social Gatherings with Friends and Family:      Attends [employer] Services:      Active Member of Clubs or Organizations:      Attends Club or Organization Meetings:      Marital Status:    Intimate Partner Violence:      Fear of Current or Ex-Partner:      Emotionally  Abused:      Physically Abused:      Sexually Abused:          Objective  BP (!) 138/96   Wt 107 kg (235 lb 12.8 oz)   BMI 35.08 kg/m      GENERAL APPEARANCE: alert and no distress   GAIT: antalgic  SKIN: no suspicious lesions or rashes  NEURO: Normal strength and tone, mentation intact and speech normal  PSYCH:  mentation appears normal and affect normal/bright  HEENT: no scleral icterus  CV: distal perfusion intact  RESP: nonlabored breathing      Exam  Left knee:  + effusion  Joint line tenderness medially      Radiology  Previous x-rays reviewed:    Results for orders placed or performed in visit on 05/21/21   XR Knee Bilateral 3 Views    Narrative    KNEE BILATERAL THREE VIEWS  May 21, 2021 8:40 AM     HISTORY: Chronic pain of left knee. Chronic pain of right knee.    COMPARISON: Left knee 1/11/2018.      Impression    IMPRESSION:   1. Right: Chronic mild scattered elongated heterotopic ossification  within the leg. Unremarkable knee. No fracture identified.  2. Left: Medial compartment osteoarthritis with fairly prominent joint  space narrowing. There appears to be a loose body posteriorly.  Patellar subchondral radiolucency, presumably related to overlying  chondromalacia. There is slight lateral patellar subluxation and  tilting.    ANNA CHASE MD                     Large Joint Injection/Arthocentesis    Date/Time: 10/30/2021 11:10 AM  Performed by: Von Thorne DO  Authorized by: Von Thorne DO     Indications:  Pain  Needle Size:  25 G  Guidance: landmark guided    Approach:  Anteromedial  Location:  Knee      Medications:  40 mg triamcinolone 40 MG/ML; 2 mL lidocaine 1 %  Outcome:  Tolerated well, no immediate complications  Procedure discussed: discussed risks, benefits, and alternatives    Consent Given by:  Patient        Note: Time spent in one-on-one evaluation and discussion with the patient regarding the nature of problem, course, prior treatments, and therapeutic options,  along with review of medical record (including outside sources/documentation), and completing documentation: 12 minutes, not including injection.        This note consists of symbols derived from keyboarding, dictation and/or voice recognition software. As a result, there may be errors in the script that have gone undetected. Please consider this when interpreting information found in this chart.

## 2021-10-30 ENCOUNTER — OFFICE VISIT (OUTPATIENT)
Dept: ORTHOPEDICS | Facility: CLINIC | Age: 66
End: 2021-10-30
Payer: COMMERCIAL

## 2021-10-30 VITALS — WEIGHT: 235.8 LBS | DIASTOLIC BLOOD PRESSURE: 96 MMHG | BODY MASS INDEX: 35.08 KG/M2 | SYSTOLIC BLOOD PRESSURE: 138 MMHG

## 2021-10-30 DIAGNOSIS — M17.12 PRIMARY OSTEOARTHRITIS OF LEFT KNEE: Primary | ICD-10-CM

## 2021-10-30 PROCEDURE — 20610 DRAIN/INJ JOINT/BURSA W/O US: CPT | Performed by: PEDIATRICS

## 2021-10-30 PROCEDURE — 99213 OFFICE O/P EST LOW 20 MIN: CPT | Mod: 25 | Performed by: PEDIATRICS

## 2021-10-30 RX ORDER — LIDOCAINE HYDROCHLORIDE 10 MG/ML
2 INJECTION, SOLUTION INFILTRATION; PERINEURAL
Status: DISCONTINUED | OUTPATIENT
Start: 2021-10-30 | End: 2022-03-24

## 2021-10-30 RX ORDER — TRIAMCINOLONE ACETONIDE 40 MG/ML
40 INJECTION, SUSPENSION INTRA-ARTICULAR; INTRAMUSCULAR
Status: DISCONTINUED | OUTPATIENT
Start: 2021-10-30 | End: 2022-03-24

## 2021-10-30 RX ADMIN — LIDOCAINE HYDROCHLORIDE 2 ML: 10 INJECTION, SOLUTION INFILTRATION; PERINEURAL at 11:10

## 2021-10-30 RX ADMIN — TRIAMCINOLONE ACETONIDE 40 MG: 40 INJECTION, SUSPENSION INTRA-ARTICULAR; INTRAMUSCULAR at 11:10

## 2021-10-30 ASSESSMENT — PAIN SCALES - GENERAL: PAINLEVEL: MODERATE PAIN (5)

## 2021-10-30 NOTE — PATIENT INSTRUCTIONS
Repeat left knee steroid injection done today.  Also add physical therapy referral, may do at your convenience.  Additional considerations not yet tried is on motor bracing.  If interested, trial is typically done through physical therapy.  Otherwise, if desiring referral to orthopedic surgeon in the future, may simply contact clinic (phone call or Lexie is fine), and we would be glad to place a referral for further discussion of options for the arthritis.    If you have any further questions for your physician or physician s care team you can call [phone] and use option 3 to leave a voice message. Calls received during business hours will be returned same day.        Injection Discharge Instructions      You may shower, however avoid swimming, tub baths or hot tubs for 24 hours following your procedure    You may have a mild to moderate increase in pain for several days following the injection.    It may take up to 14 days for the steroid medication to start working although you may feel the effect as early as a few days after the procedure.    You may use ice packs for 10-15 minutes, 3 to 4 times a day at the injection site for comfort    You may use anti-inflammatory medications (such as Ibuprofen or Aleve or Advil) if you are able to take safely, or acetaminophen (Tylenol) for pain control if necessary    If you were fasting, you may resume your normal diet and medications after the procedure    If you have diabetes, check your blood sugar more frequently than usual as your blood sugar may be higher than normal for 10-14 days following a steroid injection. Contact your doctor who manages your diabetes if your blood sugar is higher than usual      If you experience any of the following, call the clinic @ [phone]  - Fever over 100 degree F  - Swelling, bleeding, redness, drainage, warmth at the injection site  - New or worsening pain

## 2021-10-30 NOTE — LETTER
10/30/2021         RE: Phong Cuevas  [address]  [facility] [address]        Dear Colleague,    Thank you for referring your patient, Phong Cuevas, to the [facility]. Please see a copy of my visit note below.    Sports Medicine Clinic Visit      Assessment:  1. Primary osteoarthritis of left knee        Plan:  Discussed the assessment with the patient.  Lack of lasting benefit from treatment to date.  Visco did provide some relief, but short-lived.  Reviewed options again.  See last visit note.  He is agreeable to trial of some physical therapy, referral placed.  We discussed potential for  bracing trial as well, he can consider.  Reviewed options with injection therapy.  He desires to try repeat steroid injection, hoping for greater relief this time, done today.  If interested in referral to orthopedic surgery in the future, may simply contact the clinic.  He indicated early spring may be a time at which he would consider having a visit with Ortho surgery.  Follow up: Is otherwise as needed.    Questions answered. Discussed signs and symptoms that may indicate more serious issues; the patient was instructed to seek appropriate care if noted. Rich indicates understanding of these issues and agrees with the plan.        See Patient Instructions  Patient Instructions   Repeat left knee steroid injection done today.  Also add physical therapy referral, may do at your convenience.  Additional considerations not yet tried is on motor bracing.  If interested, trial is typically done through physical therapy.  Otherwise, if desiring referral to orthopedic surgeon in the future, may simply contact clinic (phone call or MyChart is fine), and we would be glad to place a referral for further discussion of options for the arthritis.    If you have any further questions for your physician or physician s care team you can call [phone] and use option 3 to  leave a voice message. Calls received during business hours will be returned same day.        Injection Discharge Instructions      You may shower, however avoid swimming, tub baths or hot tubs for 24 hours following your procedure    You may have a mild to moderate increase in pain for several days following the injection.    It may take up to 14 days for the steroid medication to start working although you may feel the effect as early as a few days after the procedure.    You may use ice packs for 10-15 minutes, 3 to 4 times a day at the injection site for comfort    You may use anti-inflammatory medications (such as Ibuprofen or Aleve or Advil) if you are able to take safely, or acetaminophen (Tylenol) for pain control if necessary    If you were fasting, you may resume your normal diet and medications after the procedure    If you have diabetes, check your blood sugar more frequently than usual as your blood sugar may be higher than normal for 10-14 days following a steroid injection. Contact your doctor who manages your diabetes if your blood sugar is higher than usual      If you experience any of the following, call the clinic @ [phone]  - Fever over 100 degree F  - Swelling, bleeding, redness, drainage, warmth at the injection site  - New or worsening pain            [facility]      ==========================================      PCP: Indigo Alvarado Faith is a 66 year old male who is seen in f/u up for left knee pain and left leg weakness. Since last visit on 5/21/2021 patient had a visco gel injection with Dr. Kar Polk on 6/28/2021. He notes about a month of relief from this injection. He notes that he has the best ROM early in the morning and he gets more stiff as the day goes on. Pain is deep in the knee joint and gets up to about 9/10 but he notes that his inability to get off the floor or get out of a squat  position is his main concern today. Has been doing ibuprofen but causes stomach issues. Naproxen helps minimally. He notes that he likes to bike but cannot due to symptoms currently. He would like to discuss other treatment options today.    [employer] and builds race motors.      Review of Systems  All other systems reviewed and are negative unless noted above.    No past medical history on file.  Past Surgical History:   Procedure Laterality Date     COLONOSCOPY  2/20/2014    Procedure: COLONOSCOPY;  Colonoscopy  ;  Surgeon: Murali Gill MD;  Location: [facility]     Family History   Problem Relation Age of Onset     Cancer Father         oral cancer     Social History     Socioeconomic History     Marital status: Single     Spouse name: Not on file     Number of children: Not on file     Years of education: Not on file     Highest education level: Not on file   Occupational History     Not on file   Tobacco Use     Smoking status: Never Smoker     Smokeless tobacco: Never Used   Vaping Use     Vaping Use: Never used   Substance and Sexual Activity     Alcohol use: Yes     Comment: 12 pack per week     Drug use: No     Sexual activity: Yes     Partners: Female     Birth control/protection: None     Comment: partner is post-menopausal   Other Topics Concern     Parent/sibling w/ CABG, MI or angioplasty before 65F 55M? No   Social History Narrative     Not on file     Social Determinants of Health     Financial Resource Strain:      Difficulty of Paying Living Expenses:    Food Insecurity:      Worried About Running Out of Food in the Last Year:      Ran Out of Food in the Last Year:    Transportation Needs:      Lack of Transportation (Medical):      Lack of Transportation (Non-Medical):    Physical Activity:      Days of Exercise per Week:      Minutes of Exercise per Session:    Stress:      Feeling of Stress :    Social Connections:      Frequency of Communication with Friends and Family:      Frequency of Social  Gatherings with Friends and Family:      Attends [employer]ond[employer] Services:      Active Member of Clubs or Organizations:      Attends Club or Organization Meetings:      Marital Status:    Intimate Partner Violence:      Fear of Current or Ex-Partner:      Emotionally Abused:      Physically Abused:      Sexually Abused:          Objective  BP (!) 138/96   Wt 107 kg (235 lb 12.8 oz)   BMI 35.08 kg/m      GENERAL APPEARANCE: alert and no distress   GAIT: antalgic  SKIN: no suspicious lesions or rashes  NEURO: Normal strength and tone, mentation intact and speech normal  PSYCH:  mentation appears normal and affect normal/bright  HEENT: no scleral icterus  CV: distal perfusion intact  RESP: nonlabored breathing      Exam  Left knee:  + effusion  Joint line tenderness medially      Radiology  Previous x-rays reviewed:    Results for orders placed or performed in visit on 05/21/21   XR Knee Bilateral 3 Views    Narrative    KNEE BILATERAL THREE VIEWS  May 21, 2021 8:40 AM     HISTORY: Chronic pain of left knee. Chronic pain of right knee.    COMPARISON: Left knee 1/11/2018.      Impression    IMPRESSION:   1. Right: Chronic mild scattered elongated heterotopic ossification  within the leg. Unremarkable knee. No fracture identified.  2. Left: Medial compartment osteoarthritis with fairly prominent joint  space narrowing. There appears to be a loose body posteriorly.  Patellar subchondral radiolucency, presumably related to overlying  chondromalacia. There is slight lateral patellar subluxation and  tilting.    ANNA CHASE MD                     Large Joint Injection/Arthocentesis    Date/Time: 10/30/2021 11:10 AM  Performed by: Von Thorne DO  Authorized by: Von Thorne DO     Indications:  Pain  Needle Size:  25 G  Guidance: landmark guided    Approach:  Anteromedial  Location:  Knee      Medications:  40 mg triamcinolone 40 MG/ML; 2 mL lidocaine 1 %  Outcome:  Tolerated well, no immediate  complications  Procedure discussed: discussed risks, benefits, and alternatives    Consent Given by:  Patient        Note: Time spent in one-on-one evaluation and discussion with the patient regarding the nature of problem, course, prior treatments, and therapeutic options, along with review of medical record (including outside sources/documentation), and completing documentation: 12 minutes, not including injection.        This note consists of symbols derived from keyboarding, dictation and/or voice recognition software. As a result, there may be errors in the script that have gone undetected. Please consider this when interpreting information found in this chart.      Again, thank you for allowing me to participate in the care of your patient.        Sincerely,        Von Thorne, DO

## 2021-11-10 ENCOUNTER — HOSPITAL ENCOUNTER (OUTPATIENT)
Dept: PHYSICAL THERAPY | Facility: CLINIC | Age: 66
Setting detail: THERAPIES SERIES
End: 2021-11-10
Attending: PEDIATRICS
Payer: COMMERCIAL

## 2021-11-10 DIAGNOSIS — M17.12 PRIMARY OSTEOARTHRITIS OF LEFT KNEE: ICD-10-CM

## 2021-11-10 PROCEDURE — 97110 THERAPEUTIC EXERCISES: CPT | Mod: GP | Performed by: PHYSICAL THERAPIST

## 2021-11-10 PROCEDURE — 97161 PT EVAL LOW COMPLEX 20 MIN: CPT | Mod: GP | Performed by: PHYSICAL THERAPIST

## 2021-11-10 PROCEDURE — 97140 MANUAL THERAPY 1/> REGIONS: CPT | Mod: GP | Performed by: PHYSICAL THERAPIST

## 2021-11-10 NOTE — PROGRESS NOTES
11/10/21 0700   General Information   Type of Visit Initial OP Ortho PT Evaluation   Start of Care Date 11/10/21   Referring Physician Von Thorne   Patient/Family Goals Statement decrease knee pain, prolong TKA   Orders Evaluate and Treat   Date of Order 10/30/21   Certification Required? Yes   Medical Diagnosis primary OA of left knee   Surgical/Medical history reviewed Yes   Precautions/Limitations no known precautions/limitations   General Information Comments PMH: none    Body Part(s)   Body Part(s) Knee   Presentation and Etiology   Pertinent history of current problem (include personal factors and/or comorbidities that impact the POC) Has had knee pain for years. Has had a few steroid injections into the left knee. Most of the injections dont' do anything for helping the knee pain. Pain has been ongoing for 10 years. Sitting there is no pain. Stiff when first going to stand. Not taking any joint supplements. Stairs he does one at a time. Pain across the front of the knee, not as much in the back. Complains of a lot of stiffness.    Impairments A. Pain;B. Decreased WB tolerance;C. Swelling;D. Decreased ROM;E. Decreased flexibility;F. Decreased strength and endurance;H. Impaired gait   Functional Limitations perform activities of daily living   Symptom Location left knee    How/Where did it occur From Degenerative Joint Disease   Onset date of current episode/exacerbation 11/10/11   Chronicity Chronic   Pain rating (0-10 point scale) Best (/10);Worst (/10)   Best (/10) 5   Worst (/10) 10   Pain quality B. Dull;C. Aching   Frequency of pain/symptoms A. Constant   Pain/symptoms are: The same all the time   Pain/symptoms exacerbated by B. Walking;A. Sitting;I. Bending   Pain/symptoms eased by C. Rest;A. Sitting   Progression of symptoms since onset: Unchanged   Current / Previous Interventions   Diagnostic Tests: X-ray   X-ray Results Results   X-ray results see chart, OA of left knee   Current Level of  "Function   Patient role/employment history A. 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 No   Have you fallen and had an injury in the past year? No   Is patient a fall risk? No   Abuse Screen (yes response referral indicated)   Feels Unsafe at Home or Work/School no   Feels Threatened by Someone no   Does Anyone Try to Keep You From Having Contact with Others or Doing Things Outside Your Home? no   Physical Signs of Abuse Present no   Functional Scales   Other Scales  didn't fill out all of the questions on LEFS   Knee Objective Findings   Gait/Locomotion antalgic gait favoring the left LE, decreased step length on the right, lands more mid foot on initial contact with left    Balance/Proprioception (Single Leg Stance) 3 seconds on L    Foot Position In Standing left foot externally rotated as standing and walking    Knee ROM Comment 4-107 left, 0-129 right   Knee/Hip Strength Comments hip flexion: R 5/5, L 4/5, knee flexion R 5/5, L 4+/5, knee extension R 5/5 L 4+/5, ankle DF 5/5 B    Knee Flexibility Comments hamstrin/90: 45 degrees from 0,  calf flexibility: 5 degrees DF with knee extended    Step Test Height 6\"    Step Test Height Control Comment mild discomfort   Palpation ttp along medial joint line    Accessory Motion/Joint Mobility tibiofemoral joint: AP and PA mod hypomobility,  patellarfemoral: mod hypomobility into medial and superior glide, severe hypomobility into inferior glide    Planned Therapy Interventions   Planned Therapy Interventions balance training;gait training;manual therapy;joint mobilization;neuromuscular re-education;ROM;strengthening;stretching;transfer training   Clinical Impression   Criteria for Skilled Therapeutic Interventions Met yes, treatment indicated   PT Diagnosis decreased left knee ROM    Influenced by the following impairments " decreased left knee ROM and strength, pain, joint hypomobility    Functional limitations due to impairments walking, bending, stairs    Clinical Presentation Stable/Uncomplicated   Clinical Presentation Rationale clinical decision making and chart review    Clinical Decision Making (Complexity) Low complexity   Therapy Frequency 1 time/week   Predicted Duration of Therapy Intervention (days/wks) 8 weeks   Risk & Benefits of therapy have been explained Yes   Patient, Family & other staff in agreement with plan of care Yes   Clinical Impression Comments Cory Cuevas is a 66 year old male who presents to therapy with chronic knee pain.    Education Assessment   Preferred Learning Style Listening;Demonstration;Pictures/video   Barriers to Learning No barriers   ORTHO GOALS   PT Ortho Eval Goals 1;2;3   Ortho Goal 1   Goal Identifier 1   Goal Description Patient will improve left knee ROM to 0-115 in order to improve ease of mobility with walking and squatting.    Target Date 12/08/21   Ortho Goal 2   Goal Identifier 2   Goal Description Patient will be able to ascend/descend flight of stairs with reciproal pattern and knee pain staying below a 3/10 pain.    Target Date 12/08/21   Ortho Goal 3   Goal Identifier 3   Goal Description Patient will be able to sit for 1-2 hours in car and get out with minimal complaints of stiffness.    Target Date 01/05/22   Total Evaluation Time   PT Regine Low Complexity Minutes (63423) 19   Therapy Certification   Certification date from 11/10/21   Certification date to 01/05/22   Medical Diagnosis primary OA of left knee       Nikki Long  PT, DPT       11/10/2021   [address]   [address]  [place] [address]  [email or web address]  Voicemail: [phone]

## 2021-11-10 NOTE — PROGRESS NOTES
[facility]          OUTPATIENT PHYSICAL THERAPY ORTHOPEDIC EVALUATION  PLAN OF TREATMENT FOR OUTPATIENT REHABILITATION  (COMPLETE FOR INITIAL CLAIMS ONLY)  Patient's Last Name, First Name, M.I.  YOB: 1955  Phong Cuevas    Provider s Name:  [facility]   Medical Record No.  [id number]   Start of Care Date:  11/10/21   Onset Date:  11/10/11   Type:     _X__PT   ___OT   ___SLP Medical Diagnosis:  primary OA of left knee     PT Diagnosis:  decreased left knee ROM    Visits from SOC:  1      _________________________________________________________________________________  Plan of Treatment/Functional Goals:  balance training,gait training,manual therapy,joint mobilization,neuromuscular re-education,ROM,strengthening,stretching,transfer training           Goals  Goal Identifier: 1  Goal Description: (P) Patient will improve left knee ROM to 0-115 in order to improve ease of mobility with walking and squatting.   Target Date: (P) 12/08/21    Goal Identifier: 2  Goal Description: (P) Patient will be able to ascend/descend flight of stairs with reciproal pattern and knee pain staying below a 3/10 pain.   Target Date: (P) 12/08/21    Goal Identifier: 3  Goal Description: (P) Patient will be able to sit for 1-2 hours in car and get out with minimal complaints of stiffness.   Target Date: (P) 01/05/22       Therapy Frequency:  1 time/week  Predicted Duration of Therapy Intervention:  8 weeks    Nikki Long, PT                 I CERTIFY THE NEED FOR THESE SERVICES FURNISHED UNDER        THIS PLAN OF TREATMENT AND WHILE UNDER MY CARE     (Physician co-signature of this document indicates review and certification of the therapy plan).                       Certification Date From:  11/10/21   Certification Date To:  01/05/22    Referring Provider:  Von Thorne    Initial Assessment        See Epic Evaluation Start of Care Date: 11/10/21

## 2021-12-13 ENCOUNTER — HOSPITAL ENCOUNTER (OUTPATIENT)
Dept: PHYSICAL THERAPY | Facility: CLINIC | Age: 66
Setting detail: THERAPIES SERIES
End: 2021-12-13
Attending: PEDIATRICS
Payer: COMMERCIAL

## 2021-12-13 PROCEDURE — 97110 THERAPEUTIC EXERCISES: CPT | Mod: GP | Performed by: PHYSICAL THERAPIST

## 2021-12-13 NOTE — PROGRESS NOTES
[facility] Rehabilitation [facility]    Outpatient Physical Therapy Discharge Note  Patient: Phong Cuevas  : 1955    Beginning/End Dates of Reporting Period:  11/10/21 to 21    Referring Provider: Von Aguayo Diagnosis: decreased left knee ROM      Client Self Report: Tried the exercises at home for 2 weeks and didn't notice any changes in the knee pain. Feeling very frustrated and just wants to proceed with a joint replacement surgery. Has been dealing with knee pain for too long.     Objective Measurements:  Objective Measure: knee ROM L  Details: 4-107      Goals:  Goal Identifier 1   Goal Description Patient will improve left knee ROM to 0-115 in order to improve ease of mobility with walking and squatting.    Target Date 21   Date Met   (not met )   Progress (detail required for progress note):       Goal Identifier 2   Goal Description Patient will be able to ascend/descend flight of stairs with reciproal pattern and knee pain staying below a 3/10 pain.    Target Date 21   Date Met   (not met, still very painful )   Progress (detail required for progress note):       Goal Identifier 3   Goal Description Patient will be able to sit for 1-2 hours in car and get out with minimal complaints of stiffness.    Target Date 22   Date Met   (not met, still painful all day )   Progress (detail required for progress note):           Plan:  Discharge from therapy.    Discharge:    Reason for Discharge: Patient chooses to discontinue therapy.  Patient not seeing improvements and wants to proceed with joint replacement surgery     Equipment Issued: theraband    Discharge Plan: Patient to continue home program.      Nikki Long  PT, DPT       2021   [address]   [address]  [place] [address]  [email or web address]  Voicemail: [phone]

## 2021-12-29 ENCOUNTER — TELEPHONE (OUTPATIENT)
Dept: ORTHOPEDICS | Facility: CLINIC | Age: 66
End: 2021-12-29
Payer: COMMERCIAL

## 2021-12-29 DIAGNOSIS — M17.12 PRIMARY OSTEOARTHRITIS OF LEFT KNEE: Primary | ICD-10-CM

## 2021-12-29 NOTE — TELEPHONE ENCOUNTER
Patient LVM requesting a call back to discuss next steps for his knee issue(s).    States he has tried injections and physical therapy and there has been no relief.   Would like a referral to surgeon or next steps to be taken?    # [phone]

## 2022-01-03 ENCOUNTER — TELEPHONE (OUTPATIENT)
Dept: ORTHOPEDICS | Facility: CLINIC | Age: 67
End: 2022-01-03

## 2022-01-03 ENCOUNTER — OFFICE VISIT (OUTPATIENT)
Dept: ORTHOPEDICS | Facility: CLINIC | Age: 67
End: 2022-01-03
Payer: COMMERCIAL

## 2022-01-03 ENCOUNTER — ANCILLARY PROCEDURE (OUTPATIENT)
Dept: GENERAL RADIOLOGY | Facility: CLINIC | Age: 67
End: 2022-01-03
Attending: PHYSICIAN ASSISTANT
Payer: COMMERCIAL

## 2022-01-03 VITALS
BODY MASS INDEX: 35.84 KG/M2 | DIASTOLIC BLOOD PRESSURE: 92 MMHG | WEIGHT: 242 LBS | HEIGHT: 69 IN | SYSTOLIC BLOOD PRESSURE: 154 MMHG

## 2022-01-03 DIAGNOSIS — M17.12 PRIMARY OSTEOARTHRITIS OF LEFT KNEE: Primary | ICD-10-CM

## 2022-01-03 DIAGNOSIS — M25.562 CHRONIC PAIN OF LEFT KNEE: ICD-10-CM

## 2022-01-03 DIAGNOSIS — M17.12 PRIMARY OSTEOARTHRITIS OF LEFT KNEE: ICD-10-CM

## 2022-01-03 DIAGNOSIS — G89.29 CHRONIC PAIN OF LEFT KNEE: ICD-10-CM

## 2022-01-03 PROCEDURE — 73562 X-RAY EXAM OF KNEE 3: CPT | Mod: LT | Performed by: RADIOLOGY

## 2022-01-03 PROCEDURE — 99203 OFFICE O/P NEW LOW 30 MIN: CPT | Performed by: ORTHOPAEDIC SURGERY

## 2022-01-03 ASSESSMENT — PAIN SCALES - GENERAL: PAINLEVEL: MILD PAIN (2)

## 2022-01-03 ASSESSMENT — MIFFLIN-ST. JEOR: SCORE: 1864.11

## 2022-01-03 NOTE — TELEPHONE ENCOUNTER
Called patient and advised there is a hold on scheduling admission patients at the hospital and as soon as we get the ok to schedule we will call patient. Patient verbalized understanding.

## 2022-01-03 NOTE — LETTER
1/3/2022         RE: Phong Cuevas  [address] [facility] [address]        Dear Colleague,    Thank you for referring your patient, Phong Cuevas, to the [facility]. Please see a copy of my visit note below.    CHIEF COMPLAINT:   Chief Complaint   Patient presents with     Left Knee - Pain     Left knee osteoarthritis. Has tried, NSAIDS, cortisone, gel-shots, Physical Therapy. Nothing helps. Last cortisone on 10/30/21, maybe a couple weeks of relief.     Knee Pain     Works as  for [employer]. Here for a tka consult.       Phong Cuevas is seen today in the [facility] for evaluation of left knee pain at the request of Dr. Von Thorne         HISTORY OF PRESENT ILLNESS    Phong Cuevas is a 66 year old male seen for evaluation of ongoing left knee pain with no known injury.   Pain has been present for 5 years. he locates pain throughout the knee with stiffness. Pain aggravated with walking, prolonged sitting then standing, in/out of car. There is stiffness if he sits too long. Treatment has been NSAIDS, Physical Therapy, cortisone injections (most recently 10/30/2021), hyaluronic acid 6/28/2021, without much relief. Last cortisone injection provided only a couple weeks relief, if that.    Ok at rest, night.     He notes left leg is shorter than the right, wonders if that has been putting more stress on the left leg and causing the arthritis to be more severe.      Present symptoms: pain diffuse, pain dull/achy , mild pain, stiffness.    Pain severity: 2/10  Frequency of symptoms: are constant  Exacerbating Factors: weight bearing, stairs, [id number], prolonged sitting, prolonged standing  Relieving Factors: none  Night Pain: occasional  Pain while at rest: occasional   Numbness or tingling: No   Patient has tried:     NSAIDS: Yes      Physical Therapy: Yes      Activity modification: Yes      Bracing: No       Injections: Yes  Cortisone 10/30/2021, Hyaluronic acid (Durolane) 6/28/2021.     Ice: No      Assistive device:  No    Other PMH:  has no past medical history of Arthritis, Cancer (H), Cerebral infarction (H), Congestive heart failure (H), COPD (chronic obstructive pulmonary disease) (H), Depressive disorder, Diabetes (H), Heart disease, History of blood transfusion, Hypertension, Malignant hyperthermia, PONV (postoperative nausea and vomiting), Thyroid disease, or Uncomplicated asthma.  Patient Active Problem List   Diagnosis     Hyperlipidemia LDL goal <100     Abnormal glucose     Morbid obesity (H)     Gastroesophageal reflux disease with esophagitis without hemorrhage       Surgical Hx:  has a past surgical history that includes Colonoscopy (2/20/2014).    Medications:   Current Outpatient Medications:      doxylamine (UNISOM) 25 MG TABS, Take 25 mg by mouth At Bedtime, Disp: , Rfl:      Multiple Vitamins-Minerals (MULTIVITAMIN ADULT PO), , Disp: , Rfl:      simvastatin (ZOCOR) 20 MG tablet, Take 1 tablet (20 mg) by mouth At Bedtime Hold Rx, will notify when needs to have refilled, Disp: 90 tablet, Rfl: 3    Current Facility-Administered Medications:      lidocaine 1 % injection 2 mL, 2 mL, , , Von Thorne, DO, 2 mL at 10/30/21 1110     lidocaine 1 % injection 2 mL, 2 mL, , , ZoltanenhVon malloy, DO, 2 mL at 05/21/21 0913     sodium hyaluronate (DUROLANE) injection 60 mg, 60 mg, , , Kar Polk MD, 60 mg at 06/28/21 0751     triamcinolone (KENALOG-40) injection 40 mg, 40 mg, , , Von Thorne, DO, 40 mg at 10/30/21 1110     triamcinolone (KENALOG-40) injection 40 mg, 40 mg, , , ZoltnaenhVon malloy, DO, 40 mg at 05/21/21 0913    Allergies:   Allergies   Allergen Reactions     Nka [No Known Allergies]        Social Hx:  (walking on concrete or sitting).   reports that he has never smoked. He has never used smokeless tobacco. He reports current alcohol use. He reports that  "he does not use drugs.    Family Hx: family history includes Cancer in his father.    REVIEW OF SYSTEMS: 10 point ROS neg other than the symptoms noted above in the HPI and PMH. Notables include  CONSTITUTIONAL:NEGATIVE for fever, chills, change in weight  INTEGUMENTARY/SKIN: NEGATIVE for worrisome rashes, moles or lesions  MUSCULOSKELETAL:See HPI above  NEURO: NEGATIVE for weakness, dizziness or paresthesias    PHYSICAL EXAM:  BP (!) 154/92   Ht 1.746 m (5' 8.75\")   Wt 109.8 kg (242 lb)   BMI 36.00 kg/m     GENERAL APPEARANCE: healthy, alert, no distress  SKIN: no suspicious lesions or rashes  NEURO: Normal strength and tone, mentation intact and speech normal  PSYCH:  mentation appears normal and affect normal, not anxious  RESPIRATORY: No increased work of breathing.  HANDS: no clubbing, nail pitting  LYMPH: no palpable popliteal lymphadenopathy.    BILATERAL LOWER EXTREMITIES:  Gait: antalgic favoring left. Leans to the left.  Alignment: varus  No gross deformities or masses.  No Quad atrophy, strength weak on the left.  Intact sensation deep peroneal nerve, superficial peroneal nerve, med/lat tibial nerve, sural nerve, saphenous nerve  Intact EHL, EDL, TA, FHL, GS, quadriceps hamstrings and hip flexors  Toes warm and well perfused, brisk capillary refill. Palpable 2+ dp pulses.  Bilateral calf soft and nttp or squeeze.  DTRs: achilles 2+, patella 2+.  Edema: 1+, Pitting, bilateral.  Bilateral varicose veins, left more than right.    LEFT KNEE EXAM:    Skin: intact, no ecchymosis or erythema  Squat: 50+% limited by pain.     ROM: 5 extension to 95 flexion  Tight hamstrings on straight leg raise.  Effusion: trace  Tender: medial joint line, anteromedial knee, pes, popliteal fossa (with fullness)  nontender to palpation lateral joint line.    MCL: stable, and non-painful at both 0 and 30 degrees knee flexion  Varus stress: stable, and non-painful at both 0 and 30 degrees knee flexion  Lachmans: neg, firm " endpoint  Posterior Drawer stable  Patellofemoral joint:                Apprehension: negative              Crepitations: moderate   Grind: positive.    RIGHT KNEE EXAM:    Skin: intact, no ecchymosis or erythema  ROM: 2 extension to 125 flexion  Tight hamstrings on straight leg raise.  Effusion: none  Tender: minimal medial joint line; otherwise nontender to palpation llat joint line, anterior or posterior knee  McMurrays: negative    MCL: stable, and non-painful at both 0 and 30 degrees knee flexion  Varus stress: stable, and non-painful at both 0 and 30 degrees knee flexion  Lachmans: neg, firm endpoint  Posterior Drawer stable  Patellofemoral joint:                Apprehension: negative              Crepitations: mild   Grind: positive.    X-RAY: 3 views left knee 1/3/2022   There is a small focus of subchondral lucency in the weightbearing surface of the medial femoral condyle, new since the  prior study and consistent with degenerative cystic change or possible nondisplaced subchondral stress fracture. No other changes. Tricompartmental osteoarthrosis, particularly in the medial compartment which is bone on bone, again noted. No effusion or calcified intra-articular body.      3 views bilateral knee from 5/21/2021 were also reviewed in clinic today. On my review, no obvious fractures or dislocations.   1. Right: Chronic mild scattered elongated heterotopic ossification within the leg. Unremarkable knee. No fracture identified.  2. Left: Medial compartment osteoarthritis with fairly prominent joint space narrowing. There appears to be a loose body posteriorly. Patellar subchondral radiolucency, presumably related to overlying chondromalacia. There is slight lateral patellar subluxation and tilting.         ASSESSMENT/PLAN: Phong Cuevas is a 66 year old male with chronic left knee pain, primary osteoarthritis.    ** Risks of surgery include, but not limited to: bleeding (possibly requiring transfusion),  "infection, pain, scar, damage to adjacent structures (e.g. Nerves, blood vessels, bone, cartilage), temporary or permanent nerve damage, recurrence of symptoms, implant dislocation, instability, implant failure, implant infection, unequal limb lengths, stiffness, need for further surgery, blood clots, pulmonary embolism, risks of anesthesia, and death.  * the knee will not feel \"normal\" as it won't be \"normal\" after knee replacement. It may feel \"clunky\" due to the nature of the hard metal and plastic implant.  * the expectation is for improved pain, not necessarily complete pain relief.    ** understanding these risks, the patient would like to proceed with surgery: LEFT total knee arthroplasty.    * will arrange LEFT total knee arthroplasty at a mutual convenience in the near future  * discussed will plan hospitalization overnight,  daily Physical Therapy starting either the day of or day after surgery, with discharge to home or short term rehabilitation facility, depending on postoperative recovery and progress during hospitalization.  * will plan postoperative deep vein thrombosis prophylaxis x4 weeks. Additionally, graduated compression stockings x4 weeks, and SCDs while in the hospital.  * postoperative pain control will be oral medications upon discharge from hospital  * postoperative Physical Therapy to start upon discharge from the hospital.  * patient will need preoperative H+P prior to surgery from PCP.  * will see patient 2 weeks postoperative, sooner as needed, for wound check, suture removal, and xrays. Will obtain AP, lateral and sunrise views of the knee at that time.    * patient encouraged to call in interim if any new questions or concerns arise.    * recommend no elective dental procedures for 4-6 months after surgery. Any emergency dental procedures, mouth infections, abscesses, etc must be taken care of immediately with preventive antibiotics.   * Patient will need longterm prophylactic " antibiotics use with dental procedures or other invasive procedures (2 g amoxicilin or 450mg ([id number]) clindamycin) 1 hour prior to dental procedures for a minimum of 2 years postoperative.            * we had a thorough discussion that given recent bed shortages due to recent Covid-19 surge, surgeries requiring an overnight stay have been postponed the past several months, so those patients previously canceled/postponed are being rescheduled now and pushing new scheduled patients until Spring, assuming things improve. Our schedulers will be in touch with them regarding scheduling in the future.    Given delays on surgeries currently due to critical bed shortages, he will need to return ~2 weeks prior to surgery for:    Baseline KOOS Jr (not completed today)    Preop information packet/scrub/wipes (not provided today)    Preop covid pcr test order (to be obtained 4 days prior to surgery) (not ordered today)            Herson Sanchez M.D., M.S.  Dept. of Orthopaedic Surgery  [facility]          Again, thank you for allowing me to participate in the care of your patient.        Sincerely,        Herosn Sanchez MD

## 2022-01-17 ENCOUNTER — TRANSFERRED RECORDS (OUTPATIENT)
Dept: HEALTH INFORMATION MANAGEMENT | Facility: CLINIC | Age: 67
End: 2022-01-17
Payer: COMMERCIAL

## 2022-02-15 NOTE — TELEPHONE ENCOUNTER
Spoke to patient, advise able to schedule surgeries. Patient is up at [place] with no calendar. Patient will call back when ready to schedule.

## 2022-02-23 DIAGNOSIS — Z11.59 ENCOUNTER FOR SCREENING FOR OTHER VIRAL DISEASES: Primary | ICD-10-CM

## 2022-03-17 ENCOUNTER — OFFICE VISIT (OUTPATIENT)
Dept: FAMILY MEDICINE | Facility: CLINIC | Age: 67
End: 2022-03-17
Payer: COMMERCIAL

## 2022-03-17 VITALS
DIASTOLIC BLOOD PRESSURE: 84 MMHG | SYSTOLIC BLOOD PRESSURE: 132 MMHG | OXYGEN SATURATION: 98 % | HEART RATE: 88 BPM | BODY MASS INDEX: 36.44 KG/M2 | WEIGHT: 245 LBS | RESPIRATION RATE: 20 BRPM | TEMPERATURE: 96.8 F

## 2022-03-17 DIAGNOSIS — E78.5 HYPERLIPIDEMIA LDL GOAL <100: ICD-10-CM

## 2022-03-17 DIAGNOSIS — M17.12 PRIMARY OSTEOARTHRITIS OF LEFT KNEE: ICD-10-CM

## 2022-03-17 DIAGNOSIS — E66.01 MORBID OBESITY (H): ICD-10-CM

## 2022-03-17 DIAGNOSIS — Z01.818 PREOP GENERAL PHYSICAL EXAM: Primary | ICD-10-CM

## 2022-03-17 LAB
ANION GAP SERPL CALCULATED.3IONS-SCNC: 5 MMOL/L (ref 3–14)
BUN SERPL-MCNC: 20 MG/DL (ref 7–30)
CALCIUM SERPL-MCNC: 8.5 MG/DL (ref 8.5–10.1)
CHLORIDE BLD-SCNC: 102 MMOL/L (ref 94–109)
CHOLEST SERPL-MCNC: 194 MG/DL
CO2 SERPL-SCNC: 29 MMOL/L (ref 20–32)
CREAT SERPL-MCNC: 0.93 MG/DL (ref 0.66–1.25)
ERYTHROCYTE [DISTWIDTH] IN BLOOD BY AUTOMATED COUNT: 12.3 % (ref 10–15)
FASTING STATUS PATIENT QL REPORTED: YES
GFR SERPL CREATININE-BSD FRML MDRD: 90 ML/MIN/1.73M2
GLUCOSE BLD-MCNC: 108 MG/DL (ref 70–99)
HCT VFR BLD AUTO: 45.6 % (ref 40–53)
HDLC SERPL-MCNC: 62 MG/DL
HGB BLD-MCNC: 15.4 G/DL (ref 13.3–17.7)
LDLC SERPL CALC-MCNC: 103 MG/DL
MCH RBC QN AUTO: 31.7 PG (ref 26.5–33)
MCHC RBC AUTO-ENTMCNC: 33.8 G/DL (ref 31.5–36.5)
MCV RBC AUTO: 94 FL (ref 78–100)
NONHDLC SERPL-MCNC: 132 MG/DL
PLATELET # BLD AUTO: 204 10E3/UL (ref 150–450)
POTASSIUM BLD-SCNC: 4 MMOL/L (ref 3.4–5.3)
RBC # BLD AUTO: 4.86 10E6/UL (ref 4.4–5.9)
SODIUM SERPL-SCNC: 136 MMOL/L (ref 133–144)
TRIGL SERPL-MCNC: 147 MG/DL
WBC # BLD AUTO: 5.9 10E3/UL (ref 4–11)

## 2022-03-17 PROCEDURE — 85027 COMPLETE CBC AUTOMATED: CPT | Performed by: NURSE PRACTITIONER

## 2022-03-17 PROCEDURE — 80061 LIPID PANEL: CPT | Performed by: NURSE PRACTITIONER

## 2022-03-17 PROCEDURE — 99214 OFFICE O/P EST MOD 30 MIN: CPT | Performed by: NURSE PRACTITIONER

## 2022-03-17 PROCEDURE — 80048 BASIC METABOLIC PNL TOTAL CA: CPT | Performed by: NURSE PRACTITIONER

## 2022-03-17 PROCEDURE — 93000 ELECTROCARDIOGRAM COMPLETE: CPT | Performed by: NURSE PRACTITIONER

## 2022-03-17 PROCEDURE — 36415 COLL VENOUS BLD VENIPUNCTURE: CPT | Performed by: NURSE PRACTITIONER

## 2022-03-17 RX ORDER — SIMVASTATIN 20 MG
20 TABLET ORAL AT BEDTIME
Qty: 90 TABLET | Refills: 3 | Status: SHIPPED | OUTPATIENT
Start: 2022-03-17 | End: 2023-03-16

## 2022-03-17 ASSESSMENT — ENCOUNTER SYMPTOMS
RHINORRHEA: 0
HEADACHES: 0
FATIGUE: 0
NAUSEA: 0
SHORTNESS OF BREATH: 0
COUGH: 0
BRUISES/BLEEDS EASILY: 0
MYALGIAS: 1
DYSURIA: 0
CHEST TIGHTNESS: 0
EYE DISCHARGE: 0
SINUS PRESSURE: 0
DIAPHORESIS: 0
VOMITING: 0
WHEEZING: 0
DIARRHEA: 0
BLOOD IN STOOL: 0
DIZZINESS: 0
CONFUSION: 0
SORE THROAT: 0
ARTHRALGIAS: 1
PALPITATIONS: 0
FREQUENCY: 0
LIGHT-HEADEDNESS: 0
FEVER: 0
NUMBNESS: 0

## 2022-03-17 ASSESSMENT — PAIN SCALES - GENERAL: PAINLEVEL: NO PAIN (0)

## 2022-03-17 NOTE — PATIENT INSTRUCTIONS
Do not take any Unisom the night prior to your surgery.    No over the counter ibuprofen, Advil, naproxen, aleve. Ok to take Tylenol or acetaminophen.     Preparing for Your Surgery  Getting started  A nurse will call you to review your health history and instructions. They will give you an arrival time based on your scheduled surgery time. Please be ready to share:    Your doctor's clinic name and phone number    Your medical, surgical and anesthesia history    A list of allergies and sensitivities    A list of medicines, including herbal treatments and over-the-counter drugs    Whether the patient has a legal guardian (ask how to send us the papers in advance)  Please tell us if you're pregnant--or if there's any chance you might be pregnant. Some surgeries may injure a fetus (unborn baby), so they require a pregnancy test. Surgeries that are safe for a fetus don't always need a test, and you can choose whether to have one.   If you have a child who's having surgery, please ask for a copy of Preparing for Your Child's Surgery.    Preparing for surgery    Within 30 days of surgery: Have a pre-op exam (sometimes called an H&P, or History and Physical). This can be done at a clinic or pre-operative center.  ? If you're having a , you may not need this exam. Talk to your care team.    At your pre-op exam, talk to your care team about all medicines you take. If you need to stop any medicines before surgery, ask when to start taking them again.  ? We do this for your safety. Many medicines can make you bleed too much during surgery. Some change how well surgery (anesthesia) drugs work.    Call your insurance company to let them know you're having surgery. (If you don't have insurance, call [phone].)    Call your clinic if there's any change in your health. This includes signs of a cold or flu (sore throat, runny nose, cough, rash, fever). It also includes a scrape or scratch near the surgery site.    If  you have questions on the day of surgery, call your hospital or surgery center.  COVID testing  You may need to be tested for COVID-19 before having surgery. If so, your surgical team will give you instructions for scheduling this test, separate from your preoperative history and physical.  Eating and drinking guidelines  For your safety: Unless your surgeon tells you otherwise, follow the guidelines below.    Eat and drink as usual until 8 hours before surgery. After that, no food or milk.    Drink clear liquids until 2 hours before surgery. These are liquids you can see through, like water, Gatorade and Propel Water. You may also have black coffee and tea (no cream or milk).    Nothing by mouth within 2 hours of surgery. This includes gum, candy and breath mints.    If you drink alcohol: Stop drinking it the night before surgery.    If your care team tells you to take medicine on the morning of surgery, it's okay to take it with a sip of water.  Preventing infection    Shower or bathe the night before and morning of your surgery. Follow the instructions your clinic gave you. (If no instructions, use regular soap.)    Don't shave or clip hair near your surgery site. We'll remove the hair if needed.    Don't smoke or vape the morning of surgery. You may chew nicotine gum up to 2 hours before surgery. A nicotine patch is okay.  ? Note: Some surgeries require you to completely quit smoking and nicotine. Check with your surgeon.    Your care team will make every effort to keep you safe from infection. We will:  ? Clean our hands often with soap and water (or an alcohol-based hand rub).  ? Clean the skin at your surgery site with a special soap that kills germs.  ? Give you a special gown to keep you warm. (Cold raises the risk of infection.)  ? Wear special hair covers, masks, gowns and gloves during surgery.  ? Give antibiotic medicine, if prescribed. Not all surgeries need antibiotics.  What to bring on the day of  surgery    Photo ID and insurance card    Copy of your health care directive, if you have one    Glasses and hearing aides (bring cases)  ? You can't wear contacts during surgery    Inhaler and eye drops, if you use them (tell us about these when you arrive)    CPAP machine or breathing device, if you use them    A few personal items, if spending the night    If you have . . .  ? A pacemaker, ICD (cardiac defibrillator) or other implant: Bring the ID card.  ? An implanted stimulator: Bring the remote control.  ? A legal guardian: Bring a copy of the certified (court-stamped) guardianship papers.  Please remove any jewelry, including body piercings. Leave jewelry and other valuables at home.  If you're going home the day of surgery    You must have a responsible adult drive you home. They should stay with you overnight as well.    If you don't have someone to stay with you, and you aren't safe to go home alone, we may keep you overnight. Insurance often won't pay for this.  After surgery  If it's hard to control your pain or you need more pain medicine, please call your surgeon's office.  Questions?   If you have any questions for your care team, list them here: _________________________________________________________________________________________________________________________________________________________________________ ____________________________________ ____________________________________ ____________________________________  For informational purposes only. Not to replace the advice of your health care provider. Copyright   2003, 2019 [facility]. All rights reserved. Clinically reviewed by Kennedi Reyna MD. [employer] [id number] - REV 07/21.

## 2022-03-17 NOTE — PROGRESS NOTES
[facility] [place]  [address]  [place] [address]  Phone: [phone]  Primary Provider: Indigo Rayo  Pre-op Performing Provider: INDIGO RAYO    PREOPERATIVE EVALUATION:  Today's date: 3/17/2022    Phong Cuevas is a 67 year old male who presents for a preoperative evaluation.    Surgical Information:  Surgery/Procedure: Total knee arthroplasty  Surgery Location: [facility]  Surgeon: MATTHEW White  Surgery Date: 3/24/22  Time of Surgery: 2:50 pm  Where patient plans to recover: At home with family  Fax number for surgical facility: Note does not need to be faxed, will be available electronically in Epic.    Type of Anesthesia Anticipated: to be determined    Assessment & Plan     The proposed surgical procedure is considered INTERMEDIATE risk.    Preop general physical exam  Okay for surgery  - EKG 12-lead complete w/read - Clinics  - Basic metabolic panel; Future  - CBC with platelets; Future  - CBC with platelets  - Basic metabolic panel    Morbid obesity (H)  Anticipate ability to increase activity once knee replacement completed.    Hyperlipidemia LDL goal <100  Is fasting today-we will check fasting lipids.  Tolerating dose of simvastatin well.  Refills given.  Plan to follow-up in 1 year.  - Lipid panel reflex to direct LDL Fasting; Future  - Lipid panel reflex to direct LDL Fasting  - simvastatin (ZOCOR) 20 MG tablet; Take 1 tablet (20 mg) by mouth At Bedtime    Primary osteoarthritis of left knee  Okay for surgery.         Risks and Recommendations:  The patient has the following additional risks and recommendations for perioperative complications:   - Consult Hospitalist / IM to assist with post-op medical management    Medication Instructions:  Patient is to take all scheduled medications on the day of surgery  To hold over-the-counter Unisom 24 hours prior to procedure    RECOMMENDATION:  APPROVAL GIVEN to proceed with proposed  procedure, without further diagnostic evaluation.    Review of external notes as documented above     25 minutes spent on the date of the encounter doing chart review, history and exam, documentation and further activities per the note      Subjective     HPI related to upcoming procedure: Is going to be having left knee replacement. Has appointment on Monday scheduled for pre-op COVID test.  Occasionally taking over-the-counter NSAIDs.  Takes Unisom nightly.    Would like to have lipids checked here today to avoid medication refill appointment this summer.  Has been taking simvastatin daily and tolerating it well.  No side effects that is aware of.      Preop Questions 3/17/2022   1. Have you ever had a heart attack or stroke? No   2. Have you ever had surgery on your heart or blood vessels, such as a stent placement, a coronary artery bypass, or surgery on an artery in your head, neck, heart, or legs? No   3. Do you have chest pain with activity? No   4. Do you have a history of  heart failure? No   5. Do you currently have a cold, bronchitis or symptoms of other infection? No   6. Do you have a cough, shortness of breath, or wheezing? No   7. Do you or anyone in your family have previous history of blood clots? No   8. Do you or does anyone in your family have a serious bleeding problem such as prolonged bleeding following surgeries or cuts? No   9. Have you ever had problems with anemia or been told to take iron pills? No   10. Have you had any abnormal blood loss such as black, tarry or bloody stools? No   11. Have you ever had a blood transfusion? No   12. Are you willing to have a blood transfusion if it is medically needed before, during, or after your surgery? Yes   13. Have you or any of your relatives ever had problems with anesthesia? No   14. Do you have sleep apnea, excessive snoring or daytime drowsiness? No   15. Do you have any artifical heart valves or other implanted medical devices like a  pacemaker, defibrillator, or continuous glucose monitor? No   16. Do you have artificial joints? No   17. Are you allergic to latex? No       Health Care Directive:  Patient does not have a Health Care Directive or Living Will: Discussed advance care planning with patient; information given to patient to review.    Preoperative Review of :   reviewed - no record of controlled substances prescribed.    Status of Chronic Conditions:  HYPERLIPIDEMIA - Patient has a long history of significant Hyperlipidemia requiring medication for treatment with recent good control. Patient reports no problems or side effects with the medication.       Review of Systems   Constitutional: Negative for diaphoresis, fatigue and fever.   HENT: Negative for congestion, ear pain, postnasal drip, rhinorrhea, sinus pressure and sore throat.    Eyes: Negative for discharge.   Respiratory: Negative for cough, chest tightness, shortness of breath and wheezing.    Cardiovascular: Negative for chest pain and palpitations.   Gastrointestinal: Negative for blood in stool, diarrhea, nausea and vomiting.   Genitourinary: Negative for dysuria, frequency and urgency.   Musculoskeletal: Positive for arthralgias (Left knee pain) and myalgias.   Skin: Negative for rash.   Neurological: Negative for dizziness, light-headedness, numbness and headaches.   Hematological: Does not bruise/bleed easily.   Psychiatric/Behavioral: Negative for confusion.       Patient Active Problem List    Diagnosis Date Noted     Morbid obesity (H) 07/15/2021     Priority: Medium     Gastroesophageal reflux disease with esophagitis without hemorrhage 07/15/2021     Priority: Medium     Abnormal glucose 01/24/2019     Priority: Medium     Hyperlipidemia LDL goal <100 01/11/2018     Priority: Medium      No past medical history on file.  Past Surgical History:   Procedure Laterality Date     COLONOSCOPY  2/20/2014    Procedure: COLONOSCOPY;  Colonoscopy  ;  Surgeon: Bridget  Murali CHAND MD;  Location: [facility]     Current Outpatient Medications   Medication Sig Dispense Refill     doxylamine (UNISOM) 25 MG TABS Take 25 mg by mouth At Bedtime       Multiple Vitamins-Minerals (MULTIVITAMIN ADULT PO)        simvastatin (ZOCOR) 20 MG tablet Take 1 tablet (20 mg) by mouth At Bedtime Hold Rx, will notify when needs to have refilled 90 tablet 3       Allergies   Allergen Reactions     Nka [No Known Allergies]         Social History     Tobacco Use     Smoking status: Never Smoker     Smokeless tobacco: Never Used   Substance Use Topics     Alcohol use: Yes     Comment: 12 pack per week     History   Drug Use No         Objective     Wt 111.1 kg (245 lb)   BMI 36.44 kg/m      Physical Exam  Constitutional:       Appearance: He is well-developed. He is morbidly obese.   HENT:      Right Ear: Tympanic membrane and external ear normal. No middle ear effusion. Tympanic membrane is not erythematous.      Left Ear: Tympanic membrane and external ear normal.  No middle ear effusion. Tympanic membrane is not erythematous.      Mouth/Throat:      Pharynx: Uvula midline.   Eyes:      Pupils: Pupils are equal, round, and reactive to light.   Neck:      Thyroid: No thyromegaly.      Vascular: No carotid bruit.   Cardiovascular:      Rate and Rhythm: Normal rate and regular rhythm.      Pulses:           Femoral pulses are 2+ on the right side and 2+ on the left side.     Heart sounds: Normal heart sounds.   Pulmonary:      Effort: Pulmonary effort is normal.      Breath sounds: Normal breath sounds.   Abdominal:      General: Bowel sounds are normal. There is no distension.      Palpations: Abdomen is soft.      Tenderness: There is no abdominal tenderness.   Musculoskeletal:         General: Normal range of motion.   Skin:     General: Skin is warm and dry.   Neurological:      Mental Status: He is alert.       Recent Labs   Lab Test 03/17/22  0954 07/15/21  0909    135   POTASSIUM 4.0 5.0   CHLORIDE 102  101   CO2 29 31   ANIONGAP 5 3   * 99   BUN 20 22   CR 0.93 1.07   DELANO 8.5 9.0       Lab Results   Component Value Date    WBC 5.9 03/17/2022    WBC 4.6 07/05/2017     Lab Results   Component Value Date    RBC 4.86 03/17/2022    RBC 5.11 07/05/2017     Lab Results   Component Value Date    HGB 15.4 03/17/2022    HGB 16.0 07/05/2017     Lab Results   Component Value Date    HCT 45.6 03/17/2022    HCT 47.5 07/05/2017     No components found for: MCT  Lab Results   Component Value Date    MCV 94 03/17/2022    MCV 93 07/05/2017     Lab Results   Component Value Date    MCH 31.7 03/17/2022    MCH 31.3 07/05/2017     Lab Results   Component Value Date    MCHC 33.8 03/17/2022    MCHC 33.7 07/05/2017     Lab Results   Component Value Date    RDW 12.3 03/17/2022    RDW 12.4 07/05/2017     Lab Results   Component Value Date     03/17/2022     07/05/2017       Diagnostics:     EKG: appears normal, NSR, normal axis, normal intervals, no acute ST/T changes c/w ischemia, no LVH by voltage criteria, unchanged from previous tracings    Revised Cardiac Risk Index (RCRI):  The patient has the following serious cardiovascular risks for perioperative complications:   - No serious cardiac risks = 0 points     RCRI Interpretation: 0 points: Class I (very low risk - 0.4% complication rate)           Signed Electronically by: CULLEN Talbot CNP  Copy of this evaluation report is provided to requesting physician.

## 2022-03-17 NOTE — H&P (VIEW-ONLY)
[facility] [place]  [id number] [address] [facility]  [place] [address]  Phone: [phone]  Primary Provider: Indigo Rayo  Pre-op Performing Provider: INDIGO RAYO    PREOPERATIVE EVALUATION:  Today's date: 3/17/2022    Phong Cuevas is a 67 year old male who presents for a preoperative evaluation.    Surgical Information:  Surgery/Procedure: Total knee arthroplasty  Surgery Location: [facility]  Surgeon: MATTHEW White  Surgery Date: 3/24/22  Time of Surgery: 2:50 pm  Where patient plans to recover: At home with family  Fax number for surgical facility: Note does not need to be faxed, will be available electronically in Epic.    Type of Anesthesia Anticipated: to be determined    Assessment & Plan     The proposed surgical procedure is considered INTERMEDIATE risk.    Preop general physical exam  Okay for surgery  - EKG 12-lead complete w/read - Clinics  - Basic metabolic panel; Future  - CBC with platelets; Future  - CBC with platelets  - Basic metabolic panel    Morbid obesity (H)  Anticipate ability to increase activity once knee replacement completed.    Hyperlipidemia LDL goal <100  Is fasting today-we will check fasting lipids.  Tolerating dose of simvastatin well.  Refills given.  Plan to follow-up in 1 year.  - Lipid panel reflex to direct LDL Fasting; Future  - Lipid panel reflex to direct LDL Fasting  - simvastatin (ZOCOR) 20 MG tablet; Take 1 tablet (20 mg) by mouth At Bedtime    Primary osteoarthritis of left knee  Okay for surgery.         Risks and Recommendations:  The patient has the following additional risks and recommendations for perioperative complications:   - Consult Hospitalist / IM to assist with post-op medical management    Medication Instructions:  Patient is to take all scheduled medications on the day of surgery  To hold over-the-counter Unisom 24 hours prior to procedure    RECOMMENDATION:  APPROVAL GIVEN to proceed with proposed  procedure, without further diagnostic evaluation.    Review of external notes as documented above     25 minutes spent on the date of the encounter doing chart review, history and exam, documentation and further activities per the note      Subjective     HPI related to upcoming procedure: Is going to be having left knee replacement. Has appointment on Monday scheduled for pre-op COVID test.  Occasionally taking over-the-counter NSAIDs.  Takes Unisom nightly.    Would like to have lipids checked here today to avoid medication refill appointment this summer.  Has been taking simvastatin daily and tolerating it well.  No side effects that is aware of.      Preop Questions 3/17/2022   1. Have you ever had a heart attack or stroke? No   2. Have you ever had surgery on your heart or blood vessels, such as a stent placement, a coronary artery bypass, or surgery on an artery in your head, neck, heart, or legs? No   3. Do you have chest pain with activity? No   4. Do you have a history of  heart failure? No   5. Do you currently have a cold, bronchitis or symptoms of other infection? No   6. Do you have a cough, shortness of breath, or wheezing? No   7. Do you or anyone in your family have previous history of blood clots? No   8. Do you or does anyone in your family have a serious bleeding problem such as prolonged bleeding following surgeries or cuts? No   9. Have you ever had problems with anemia or been told to take iron pills? No   10. Have you had any abnormal blood loss such as black, tarry or bloody stools? No   11. Have you ever had a blood transfusion? No   12. Are you willing to have a blood transfusion if it is medically needed before, during, or after your surgery? Yes   13. Have you or any of your relatives ever had problems with anesthesia? No   14. Do you have sleep apnea, excessive snoring or daytime drowsiness? No   15. Do you have any artifical heart valves or other implanted medical devices like a  pacemaker, defibrillator, or continuous glucose monitor? No   16. Do you have artificial joints? No   17. Are you allergic to latex? No       Health Care Directive:  Patient does not have a Health Care Directive or Living Will: Discussed advance care planning with patient; information given to patient to review.    Preoperative Review of :   reviewed - no record of controlled substances prescribed.    Status of Chronic Conditions:  HYPERLIPIDEMIA - Patient has a long history of significant Hyperlipidemia requiring medication for treatment with recent good control. Patient reports no problems or side effects with the medication.       Review of Systems   Constitutional: Negative for diaphoresis, fatigue and fever.   HENT: Negative for congestion, ear pain, postnasal drip, rhinorrhea, sinus pressure and sore throat.    Eyes: Negative for discharge.   Respiratory: Negative for cough, chest tightness, shortness of breath and wheezing.    Cardiovascular: Negative for chest pain and palpitations.   Gastrointestinal: Negative for blood in stool, diarrhea, nausea and vomiting.   Genitourinary: Negative for dysuria, frequency and urgency.   Musculoskeletal: Positive for arthralgias (Left knee pain) and myalgias.   Skin: Negative for rash.   Neurological: Negative for dizziness, light-headedness, numbness and headaches.   Hematological: Does not bruise/bleed easily.   Psychiatric/Behavioral: Negative for confusion.       Patient Active Problem List    Diagnosis Date Noted     Morbid obesity (H) 07/15/2021     Priority: Medium     Gastroesophageal reflux disease with esophagitis without hemorrhage 07/15/2021     Priority: Medium     Abnormal glucose 01/24/2019     Priority: Medium     Hyperlipidemia LDL goal <100 01/11/2018     Priority: Medium      No past medical history on file.  Past Surgical History:   Procedure Laterality Date     COLONOSCOPY  2/20/2014    Procedure: COLONOSCOPY;  Colonoscopy  ;  Surgeon: Bridget  Murali CHAND MD;  Location: [facility]     Current Outpatient Medications   Medication Sig Dispense Refill     doxylamine (UNISOM) 25 MG TABS Take 25 mg by mouth At Bedtime       Multiple Vitamins-Minerals (MULTIVITAMIN ADULT PO)        simvastatin (ZOCOR) 20 MG tablet Take 1 tablet (20 mg) by mouth At Bedtime Hold Rx, will notify when needs to have refilled 90 tablet 3       Allergies   Allergen Reactions     Nka [No Known Allergies]         Social History     Tobacco Use     Smoking status: Never Smoker     Smokeless tobacco: Never Used   Substance Use Topics     Alcohol use: Yes     Comment: 12 pack per week     History   Drug Use No         Objective     Wt 111.1 kg (245 lb)   BMI 36.44 kg/m      Physical Exam  Constitutional:       Appearance: He is well-developed. He is morbidly obese.   HENT:      Right Ear: Tympanic membrane and external ear normal. No middle ear effusion. Tympanic membrane is not erythematous.      Left Ear: Tympanic membrane and external ear normal.  No middle ear effusion. Tympanic membrane is not erythematous.      Mouth/Throat:      Pharynx: Uvula midline.   Eyes:      Pupils: Pupils are equal, round, and reactive to light.   Neck:      Thyroid: No thyromegaly.      Vascular: No carotid bruit.   Cardiovascular:      Rate and Rhythm: Normal rate and regular rhythm.      Pulses:           Femoral pulses are 2+ on the right side and 2+ on the left side.     Heart sounds: Normal heart sounds.   Pulmonary:      Effort: Pulmonary effort is normal.      Breath sounds: Normal breath sounds.   Abdominal:      General: Bowel sounds are normal. There is no distension.      Palpations: Abdomen is soft.      Tenderness: There is no abdominal tenderness.   Musculoskeletal:         General: Normal range of motion.   Skin:     General: Skin is warm and dry.   Neurological:      Mental Status: He is alert.       Recent Labs   Lab Test 03/17/22  0954 07/15/21  0909    135   POTASSIUM 4.0 5.0   CHLORIDE 102  101   CO2 29 31   ANIONGAP 5 3   * 99   BUN 20 22   CR 0.93 1.07   DELANO 8.5 9.0       Lab Results   Component Value Date    WBC 5.9 03/17/2022    WBC 4.6 07/05/2017     Lab Results   Component Value Date    RBC 4.86 03/17/2022    RBC 5.11 07/05/2017     Lab Results   Component Value Date    HGB 15.4 03/17/2022    HGB 16.0 07/05/2017     Lab Results   Component Value Date    HCT 45.6 03/17/2022    HCT 47.5 07/05/2017     No components found for: MCT  Lab Results   Component Value Date    MCV 94 03/17/2022    MCV 93 07/05/2017     Lab Results   Component Value Date    MCH 31.7 03/17/2022    MCH 31.3 07/05/2017     Lab Results   Component Value Date    MCHC 33.8 03/17/2022    MCHC 33.7 07/05/2017     Lab Results   Component Value Date    RDW 12.3 03/17/2022    RDW 12.4 07/05/2017     Lab Results   Component Value Date     03/17/2022     07/05/2017       Diagnostics:     EKG: appears normal, NSR, normal axis, normal intervals, no acute ST/T changes c/w ischemia, no LVH by voltage criteria, unchanged from previous tracings    Revised Cardiac Risk Index (RCRI):  The patient has the following serious cardiovascular risks for perioperative complications:   - No serious cardiac risks = 0 points     RCRI Interpretation: 0 points: Class I (very low risk - 0.4% complication rate)           Signed Electronically by: CULLEN Talbot CNP  Copy of this evaluation report is provided to requesting physician.

## 2022-03-21 ENCOUNTER — LAB (OUTPATIENT)
Dept: LAB | Facility: CLINIC | Age: 67
End: 2022-03-21
Attending: ORTHOPAEDIC SURGERY
Payer: COMMERCIAL

## 2022-03-21 DIAGNOSIS — Z11.59 ENCOUNTER FOR SCREENING FOR OTHER VIRAL DISEASES: ICD-10-CM

## 2022-03-21 PROCEDURE — U0005 INFEC AGEN DETEC AMPLI PROBE: HCPCS

## 2022-03-21 PROCEDURE — U0003 INFECTIOUS AGENT DETECTION BY NUCLEIC ACID (DNA OR RNA); SEVERE ACUTE RESPIRATORY SYNDROME CORONAVIRUS 2 (SARS-COV-2) (CORONAVIRUS DISEASE [COVID-19]), AMPLIFIED PROBE TECHNIQUE, MAKING USE OF HIGH THROUGHPUT TECHNOLOGIES AS DESCRIBED BY CMS-2020-01-R: HCPCS

## 2022-03-22 LAB — SARS-COV-2 RNA RESP QL NAA+PROBE: NEGATIVE

## 2022-03-23 ENCOUNTER — ANESTHESIA EVENT (OUTPATIENT)
Dept: SURGERY | Facility: CLINIC | Age: 67
End: 2022-03-23
Payer: COMMERCIAL

## 2022-03-23 NOTE — ANESTHESIA PREPROCEDURE EVALUATION
Anesthesia Pre-Procedure Evaluation    Patient: Phong Cuevas   MRN: [id number] : 1955        Procedure : Procedure(s):  TOTAL Knee Arthroplasty          No past medical history on file.   Past Surgical History:   Procedure Laterality Date     COLONOSCOPY  2014    Procedure: COLONOSCOPY;  Colonoscopy  ;  Surgeon: Murali Gill MD;  Location: [facility]      Allergies   Allergen Reactions     Nka [No Known Allergies]       Social History     Tobacco Use     Smoking status: Never Smoker     Smokeless tobacco: Never Used   Substance Use Topics     Alcohol use: Yes      Wt Readings from Last 1 Encounters:   22 111.1 kg (245 lb)        Anesthesia Evaluation            ROS/MED HX  ENT/Pulmonary:       Neurologic:       Cardiovascular:     (+) Dyslipidemia -----Previous cardiac testing   Echo: Date: Results:    Stress Test: Date: Results:    ECG Reviewed: Date: 3/17/22 Results:  Sinus Rhythm   WITHIN NORMAL LIMITS  Cath: Date: Results:      METS/Exercise Tolerance:     Hematologic:       Musculoskeletal:       GI/Hepatic:     (+) GERD,     Renal/Genitourinary:       Endo:     (+) Obesity,     Psychiatric/Substance Use:       Infectious Disease:       Malignancy:       Other:            Physical Exam    Airway        Mallampati: II   TM distance: > 3 FB   Neck ROM: full   Mouth opening: > 3 cm    Respiratory Devices and Support         Dental  no notable dental history         Cardiovascular   cardiovascular exam normal          Pulmonary   pulmonary exam normal                OUTSIDE LABS:  CBC:   Lab Results   Component Value Date    WBC 5.9 2022    WBC 4.6 2017    HGB 15.4 2022    HGB 16.0 2017    HCT 45.6 2022    HCT 47.5 2017     2022     2017     BMP:   Lab Results   Component Value Date     2022     07/15/2021    POTASSIUM 4.0 2022    POTASSIUM 5.0 07/15/2021    CHLORIDE 102 2022    CHLORIDE 101 07/15/2021     CO2 29 03/17/2022    CO2 31 07/15/2021    BUN 20 03/17/2022    BUN 22 07/15/2021    CR 0.93 03/17/2022    CR 1.07 07/15/2021     (H) 03/17/2022    GLC 99 07/15/2021     COAGS: No results found for: PTT, INR, FIBR  POC: No results found for: BGM, HCG, HCGS  HEPATIC:   Lab Results   Component Value Date    ALBUMIN 3.9 07/15/2021    PROTTOTAL 7.3 07/15/2021    ALT 51 07/15/2021    AST 24 07/15/2021    ALKPHOS 54 07/15/2021    BILITOTAL 0.6 07/15/2021     OTHER:   Lab Results   Component Value Date    A1C 5.5 07/15/2021    DELANO 8.5 03/17/2022    LIPASE 201 07/05/2017    AMYLASE 42 07/05/2017    TSH 0.87 07/05/2017    CRP 4.5 01/11/2018    SED 4 01/11/2018       Anesthesia Plan    ASA Status:  3   NPO Status:  NPO Appropriate    Anesthesia Type: Spinal.      Maintenance: Balanced.        Consents    Anesthesia Plan(s) and associated risks, benefits, and realistic alternatives discussed. Questions answered and patient/representative(s) expressed understanding.    - Discussed:     - Discussed with:  Patient         Postoperative Care            Comments:                Fabien Almeida CRNA, APRN CRNA

## 2022-03-24 ENCOUNTER — APPOINTMENT (OUTPATIENT)
Dept: GENERAL RADIOLOGY | Facility: CLINIC | Age: 67
End: 2022-03-24
Attending: ORTHOPAEDIC SURGERY
Payer: COMMERCIAL

## 2022-03-24 ENCOUNTER — ANESTHESIA (OUTPATIENT)
Dept: SURGERY | Facility: CLINIC | Age: 67
End: 2022-03-24
Payer: COMMERCIAL

## 2022-03-24 ENCOUNTER — HOSPITAL ENCOUNTER (OUTPATIENT)
Facility: CLINIC | Age: 67
Discharge: HOME | End: 2022-03-25
Attending: ORTHOPAEDIC SURGERY | Admitting: ORTHOPAEDIC SURGERY
Payer: COMMERCIAL

## 2022-03-24 DIAGNOSIS — Z96.652 STATUS POST LEFT KNEE REPLACEMENT: Primary | ICD-10-CM

## 2022-03-24 PROBLEM — Z96.659 STATUS POST TOTAL KNEE REPLACEMENT: Status: ACTIVE | Noted: 2022-03-24

## 2022-03-24 PROBLEM — G47.9 SLEEP DISTURBANCE: Status: ACTIVE | Noted: 2022-03-24

## 2022-03-24 PROCEDURE — 250N000011 HC RX IP 250 OP 636: Performed by: NURSE ANESTHETIST, CERTIFIED REGISTERED

## 2022-03-24 PROCEDURE — 250N000025 HC SEVOFLURANE, PER MIN: Performed by: ORTHOPAEDIC SURGERY

## 2022-03-24 PROCEDURE — 250N000011 HC RX IP 250 OP 636: Performed by: PHYSICIAN ASSISTANT

## 2022-03-24 PROCEDURE — 250N000011 HC RX IP 250 OP 636: Performed by: ORTHOPAEDIC SURGERY

## 2022-03-24 PROCEDURE — 710N000010 HC RECOVERY PHASE 1, LEVEL 2, PER MIN: Performed by: ORTHOPAEDIC SURGERY

## 2022-03-24 PROCEDURE — 250N000009 HC RX 250: Performed by: NURSE ANESTHETIST, CERTIFIED REGISTERED

## 2022-03-24 PROCEDURE — 250N000013 HC RX MED GY IP 250 OP 250 PS 637: Performed by: ORTHOPAEDIC SURGERY

## 2022-03-24 PROCEDURE — 370N000017 HC ANESTHESIA TECHNICAL FEE, PER MIN: Performed by: ORTHOPAEDIC SURGERY

## 2022-03-24 PROCEDURE — 250N000013 HC RX MED GY IP 250 OP 250 PS 637: Performed by: NURSE ANESTHETIST, CERTIFIED REGISTERED

## 2022-03-24 PROCEDURE — 258N000003 HC RX IP 258 OP 636: Performed by: ORTHOPAEDIC SURGERY

## 2022-03-24 PROCEDURE — 272N000001 HC OR GENERAL SUPPLY STERILE: Performed by: ORTHOPAEDIC SURGERY

## 2022-03-24 PROCEDURE — 258N000003 HC RX IP 258 OP 636: Performed by: NURSE ANESTHETIST, CERTIFIED REGISTERED

## 2022-03-24 PROCEDURE — 250N000009 HC RX 250: Performed by: ORTHOPAEDIC SURGERY

## 2022-03-24 PROCEDURE — C1776 JOINT DEVICE (IMPLANTABLE): HCPCS | Performed by: ORTHOPAEDIC SURGERY

## 2022-03-24 PROCEDURE — 360N000077 HC SURGERY LEVEL 4, PER MIN: Performed by: ORTHOPAEDIC SURGERY

## 2022-03-24 PROCEDURE — 278N000051 HC OR IMPLANT GENERAL: Performed by: ORTHOPAEDIC SURGERY

## 2022-03-24 PROCEDURE — 999N000065 XR KNEE PORT LEFT 1/2 VIEWS: Mod: LT

## 2022-03-24 PROCEDURE — 250N000013 HC RX MED GY IP 250 OP 250 PS 637: Performed by: PHYSICIAN ASSISTANT

## 2022-03-24 PROCEDURE — 271N000001 HC OR GENERAL SUPPLY NON-STERILE: Performed by: ORTHOPAEDIC SURGERY

## 2022-03-24 PROCEDURE — 999N000141 HC STATISTIC PRE-PROCEDURE NURSING ASSESSMENT: Performed by: ORTHOPAEDIC SURGERY

## 2022-03-24 DEVICE — IMPLANTABLE DEVICE: Type: IMPLANTABLE DEVICE | Site: KNEE | Status: FUNCTIONAL

## 2022-03-24 DEVICE — IMP TIB BASE JJ ATTUNE FX BR SYS CEM SZ6 150670006: Type: IMPLANTABLE DEVICE | Site: KNEE | Status: FUNCTIONAL

## 2022-03-24 DEVICE — BONE CEMENT RADIOPAQUE SIMPLEX HV FULL DOSE 6194-1-001: Type: IMPLANTABLE DEVICE | Site: KNEE | Status: FUNCTIONAL

## 2022-03-24 RX ORDER — ACETAMINOPHEN 325 MG/1
975 TABLET ORAL ONCE
Status: COMPLETED | OUTPATIENT
Start: 2022-03-24 | End: 2022-03-24

## 2022-03-24 RX ORDER — BISACODYL 10 MG
10 SUPPOSITORY, RECTAL RECTAL DAILY PRN
Status: DISCONTINUED | OUTPATIENT
Start: 2022-03-24 | End: 2022-03-25 | Stop reason: HOSPADM

## 2022-03-24 RX ORDER — POLYETHYLENE GLYCOL 3350 17 G/17G
17 POWDER, FOR SOLUTION ORAL DAILY
Status: DISCONTINUED | OUTPATIENT
Start: 2022-03-25 | End: 2022-03-25 | Stop reason: HOSPADM

## 2022-03-24 RX ORDER — OXYCODONE HYDROCHLORIDE 5 MG/1
5-10 TABLET ORAL
Qty: 40 TABLET | Refills: 0 | Status: SHIPPED | OUTPATIENT
Start: 2022-03-24 | End: 2023-03-16

## 2022-03-24 RX ORDER — ACETAMINOPHEN 325 MG/1
650 TABLET ORAL EVERY 4 HOURS PRN
Status: DISCONTINUED | OUTPATIENT
Start: 2022-03-27 | End: 2022-03-25 | Stop reason: HOSPADM

## 2022-03-24 RX ORDER — OXYCODONE HYDROCHLORIDE 5 MG/1
10 TABLET ORAL EVERY 4 HOURS PRN
Status: DISCONTINUED | OUTPATIENT
Start: 2022-03-24 | End: 2022-03-25 | Stop reason: HOSPADM

## 2022-03-24 RX ORDER — PROPOFOL 10 MG/ML
INJECTION, EMULSION INTRAVENOUS PRN
Status: DISCONTINUED | OUTPATIENT
Start: 2022-03-24 | End: 2022-03-24

## 2022-03-24 RX ORDER — SIMVASTATIN 20 MG
20 TABLET ORAL AT BEDTIME
Status: DISCONTINUED | OUTPATIENT
Start: 2022-03-24 | End: 2022-03-25 | Stop reason: HOSPADM

## 2022-03-24 RX ORDER — ONDANSETRON 2 MG/ML
INJECTION INTRAMUSCULAR; INTRAVENOUS PRN
Status: DISCONTINUED | OUTPATIENT
Start: 2022-03-24 | End: 2022-03-24

## 2022-03-24 RX ORDER — SODIUM CHLORIDE, SODIUM LACTATE, POTASSIUM CHLORIDE, CALCIUM CHLORIDE 600; 310; 30; 20 MG/100ML; MG/100ML; MG/100ML; MG/100ML
INJECTION, SOLUTION INTRAVENOUS CONTINUOUS
Status: DISCONTINUED | OUTPATIENT
Start: 2022-03-24 | End: 2022-03-24 | Stop reason: HOSPADM

## 2022-03-24 RX ORDER — MEPERIDINE HYDROCHLORIDE 50 MG/ML
INJECTION INTRAMUSCULAR; INTRAVENOUS; SUBCUTANEOUS PRN
Status: DISCONTINUED | OUTPATIENT
Start: 2022-03-24 | End: 2022-03-24

## 2022-03-24 RX ORDER — LIDOCAINE 40 MG/G
CREAM TOPICAL
Status: DISCONTINUED | OUTPATIENT
Start: 2022-03-24 | End: 2022-03-24 | Stop reason: HOSPADM

## 2022-03-24 RX ORDER — HYDROMORPHONE HCL IN WATER/PF 6 MG/30 ML
0.4 PATIENT CONTROLLED ANALGESIA SYRINGE INTRAVENOUS
Status: DISCONTINUED | OUTPATIENT
Start: 2022-03-24 | End: 2022-03-25 | Stop reason: HOSPADM

## 2022-03-24 RX ORDER — NALOXONE HYDROCHLORIDE 0.4 MG/ML
0.2 INJECTION, SOLUTION INTRAMUSCULAR; INTRAVENOUS; SUBCUTANEOUS
Status: DISCONTINUED | OUTPATIENT
Start: 2022-03-24 | End: 2022-03-25 | Stop reason: HOSPADM

## 2022-03-24 RX ORDER — MAGNESIUM SULFATE HEPTAHYDRATE 40 MG/ML
2 INJECTION, SOLUTION INTRAVENOUS ONCE
Status: COMPLETED | OUTPATIENT
Start: 2022-03-24 | End: 2022-03-24

## 2022-03-24 RX ORDER — LIDOCAINE 40 MG/G
CREAM TOPICAL
Status: DISCONTINUED | OUTPATIENT
Start: 2022-03-24 | End: 2022-03-25 | Stop reason: HOSPADM

## 2022-03-24 RX ORDER — FENTANYL CITRATE 50 UG/ML
INJECTION, SOLUTION INTRAMUSCULAR; INTRAVENOUS PRN
Status: DISCONTINUED | OUTPATIENT
Start: 2022-03-24 | End: 2022-03-24

## 2022-03-24 RX ORDER — KETOROLAC TROMETHAMINE 15 MG/ML
15 INJECTION, SOLUTION INTRAMUSCULAR; INTRAVENOUS EVERY 6 HOURS
Status: DISCONTINUED | OUTPATIENT
Start: 2022-03-24 | End: 2022-03-25 | Stop reason: HOSPADM

## 2022-03-24 RX ORDER — CEFAZOLIN SODIUM/WATER 2 G/20 ML
2 SYRINGE (ML) INTRAVENOUS SEE ADMIN INSTRUCTIONS
Status: DISCONTINUED | OUTPATIENT
Start: 2022-03-24 | End: 2022-03-24 | Stop reason: HOSPADM

## 2022-03-24 RX ORDER — HYDROXYZINE HYDROCHLORIDE 25 MG/1
25-50 TABLET, FILM COATED ORAL EVERY 6 HOURS PRN
Qty: 40 TABLET | Refills: 0 | Status: SHIPPED | OUTPATIENT
Start: 2022-03-24 | End: 2023-03-16

## 2022-03-24 RX ORDER — SODIUM CHLORIDE, SODIUM LACTATE, POTASSIUM CHLORIDE, CALCIUM CHLORIDE 600; 310; 30; 20 MG/100ML; MG/100ML; MG/100ML; MG/100ML
INJECTION, SOLUTION INTRAVENOUS CONTINUOUS
Status: DISCONTINUED | OUTPATIENT
Start: 2022-03-24 | End: 2022-03-25 | Stop reason: HOSPADM

## 2022-03-24 RX ORDER — LIDOCAINE HYDROCHLORIDE 10 MG/ML
INJECTION, SOLUTION INFILTRATION; PERINEURAL PRN
Status: DISCONTINUED | OUTPATIENT
Start: 2022-03-24 | End: 2022-03-24

## 2022-03-24 RX ORDER — AMOXICILLIN 250 MG
1-2 CAPSULE ORAL DAILY PRN
Qty: 15 TABLET | Refills: 0 | Status: SHIPPED | OUTPATIENT
Start: 2022-03-24 | End: 2024-04-18

## 2022-03-24 RX ORDER — CHOLECALCIFEROL (VITAMIN D3) 25 MCG
1 CAPSULE ORAL DAILY
COMMUNITY

## 2022-03-24 RX ORDER — NALOXONE HYDROCHLORIDE 0.4 MG/ML
0.4 INJECTION, SOLUTION INTRAMUSCULAR; INTRAVENOUS; SUBCUTANEOUS
Status: DISCONTINUED | OUTPATIENT
Start: 2022-03-24 | End: 2022-03-25 | Stop reason: HOSPADM

## 2022-03-24 RX ORDER — OXYCODONE HYDROCHLORIDE 5 MG/1
5 TABLET ORAL EVERY 4 HOURS PRN
Status: DISCONTINUED | OUTPATIENT
Start: 2022-03-24 | End: 2022-03-24 | Stop reason: HOSPADM

## 2022-03-24 RX ORDER — AMOXICILLIN 250 MG
1 CAPSULE ORAL 2 TIMES DAILY
Status: DISCONTINUED | OUTPATIENT
Start: 2022-03-24 | End: 2022-03-25 | Stop reason: HOSPADM

## 2022-03-24 RX ORDER — HYDROMORPHONE HCL IN WATER/PF 6 MG/30 ML
0.4 PATIENT CONTROLLED ANALGESIA SYRINGE INTRAVENOUS EVERY 5 MIN PRN
Status: DISCONTINUED | OUTPATIENT
Start: 2022-03-24 | End: 2022-03-24 | Stop reason: HOSPADM

## 2022-03-24 RX ORDER — CEFAZOLIN SODIUM 2 G/100ML
2 INJECTION, SOLUTION INTRAVENOUS EVERY 8 HOURS
Status: COMPLETED | OUTPATIENT
Start: 2022-03-24 | End: 2022-03-25

## 2022-03-24 RX ORDER — TRANEXAMIC ACID 650 MG/1
1950 TABLET ORAL ONCE
Status: COMPLETED | OUTPATIENT
Start: 2022-03-24 | End: 2022-03-24

## 2022-03-24 RX ORDER — ONDANSETRON 4 MG/1
4 TABLET, ORALLY DISINTEGRATING ORAL EVERY 6 HOURS PRN
Status: DISCONTINUED | OUTPATIENT
Start: 2022-03-24 | End: 2022-03-25 | Stop reason: HOSPADM

## 2022-03-24 RX ORDER — CEFAZOLIN SODIUM/WATER 2 G/20 ML
2 SYRINGE (ML) INTRAVENOUS
Status: COMPLETED | OUTPATIENT
Start: 2022-03-24 | End: 2022-03-24

## 2022-03-24 RX ORDER — ONDANSETRON 2 MG/ML
4 INJECTION INTRAMUSCULAR; INTRAVENOUS EVERY 6 HOURS PRN
Status: DISCONTINUED | OUTPATIENT
Start: 2022-03-24 | End: 2022-03-25 | Stop reason: HOSPADM

## 2022-03-24 RX ORDER — DEXAMETHASONE SODIUM PHOSPHATE 4 MG/ML
INJECTION, SOLUTION INTRA-ARTICULAR; INTRALESIONAL; INTRAMUSCULAR; INTRAVENOUS; SOFT TISSUE PRN
Status: DISCONTINUED | OUTPATIENT
Start: 2022-03-24 | End: 2022-03-24

## 2022-03-24 RX ORDER — OXYCODONE HYDROCHLORIDE 5 MG/1
5 TABLET ORAL EVERY 4 HOURS PRN
Status: DISCONTINUED | OUTPATIENT
Start: 2022-03-24 | End: 2022-03-25 | Stop reason: HOSPADM

## 2022-03-24 RX ORDER — ONDANSETRON 2 MG/ML
4 INJECTION INTRAMUSCULAR; INTRAVENOUS EVERY 30 MIN PRN
Status: DISCONTINUED | OUTPATIENT
Start: 2022-03-24 | End: 2022-03-24 | Stop reason: HOSPADM

## 2022-03-24 RX ORDER — KETAMINE HYDROCHLORIDE 10 MG/ML
INJECTION, SOLUTION INTRAMUSCULAR; INTRAVENOUS PRN
Status: DISCONTINUED | OUTPATIENT
Start: 2022-03-24 | End: 2022-03-24

## 2022-03-24 RX ORDER — ONDANSETRON 4 MG/1
4 TABLET, ORALLY DISINTEGRATING ORAL EVERY 30 MIN PRN
Status: DISCONTINUED | OUTPATIENT
Start: 2022-03-24 | End: 2022-03-24 | Stop reason: HOSPADM

## 2022-03-24 RX ORDER — ROPIVACAINE HYDROCHLORIDE 5 MG/ML
INJECTION, SOLUTION EPIDURAL; INFILTRATION; PERINEURAL PRN
Status: DISCONTINUED | OUTPATIENT
Start: 2022-03-24 | End: 2022-03-24

## 2022-03-24 RX ORDER — KETOROLAC TROMETHAMINE 15 MG/ML
15 INJECTION, SOLUTION INTRAMUSCULAR; INTRAVENOUS EVERY 6 HOURS PRN
Status: DISCONTINUED | OUTPATIENT
Start: 2022-03-24 | End: 2022-03-24 | Stop reason: HOSPADM

## 2022-03-24 RX ORDER — IBUPROFEN 600 MG/1
600 TABLET, FILM COATED ORAL EVERY 6 HOURS PRN
Qty: 30 TABLET | Refills: 0
Start: 2022-03-24 | End: 2024-04-29

## 2022-03-24 RX ORDER — PROCHLORPERAZINE MALEATE 5 MG
5 TABLET ORAL EVERY 6 HOURS PRN
Status: DISCONTINUED | OUTPATIENT
Start: 2022-03-24 | End: 2022-03-25 | Stop reason: HOSPADM

## 2022-03-24 RX ORDER — GABAPENTIN 100 MG/1
100 CAPSULE ORAL
Status: COMPLETED | OUTPATIENT
Start: 2022-03-24 | End: 2022-03-24

## 2022-03-24 RX ORDER — ACETAMINOPHEN 325 MG/1
650 TABLET ORAL EVERY 4 HOURS PRN
Qty: 100 TABLET | Refills: 0
Start: 2022-03-24 | End: 2023-03-16

## 2022-03-24 RX ORDER — ACETAMINOPHEN 325 MG/1
975 TABLET ORAL EVERY 8 HOURS
Status: DISCONTINUED | OUTPATIENT
Start: 2022-03-24 | End: 2022-03-25 | Stop reason: HOSPADM

## 2022-03-24 RX ORDER — HYDROMORPHONE HCL IN WATER/PF 6 MG/30 ML
0.2 PATIENT CONTROLLED ANALGESIA SYRINGE INTRAVENOUS
Status: DISCONTINUED | OUTPATIENT
Start: 2022-03-24 | End: 2022-03-25 | Stop reason: HOSPADM

## 2022-03-24 RX ORDER — HYDROXYZINE HYDROCHLORIDE 50 MG/1
50 TABLET, FILM COATED ORAL ONCE
Status: COMPLETED | OUTPATIENT
Start: 2022-03-24 | End: 2022-03-24

## 2022-03-24 RX ORDER — FENTANYL CITRATE 50 UG/ML
50 INJECTION, SOLUTION INTRAMUSCULAR; INTRAVENOUS EVERY 5 MIN PRN
Status: DISCONTINUED | OUTPATIENT
Start: 2022-03-24 | End: 2022-03-24 | Stop reason: HOSPADM

## 2022-03-24 RX ADMIN — PHENYLEPHRINE HYDROCHLORIDE 100 MCG: 10 INJECTION INTRAVENOUS at 15:35

## 2022-03-24 RX ADMIN — SODIUM CHLORIDE, POTASSIUM CHLORIDE, SODIUM LACTATE AND CALCIUM CHLORIDE: 600; 310; 30; 20 INJECTION, SOLUTION INTRAVENOUS at 12:59

## 2022-03-24 RX ADMIN — SENNOSIDES AND DOCUSATE SODIUM 1 TABLET: 50; 8.6 TABLET ORAL at 20:14

## 2022-03-24 RX ADMIN — MIDAZOLAM 2 MG: 1 INJECTION INTRAMUSCULAR; INTRAVENOUS at 14:49

## 2022-03-24 RX ADMIN — PHENYLEPHRINE HYDROCHLORIDE 100 MCG: 10 INJECTION INTRAVENOUS at 15:28

## 2022-03-24 RX ADMIN — Medication 100 MG: at 15:17

## 2022-03-24 RX ADMIN — FENTANYL CITRATE 50 MCG: 50 INJECTION, SOLUTION INTRAMUSCULAR; INTRAVENOUS at 17:06

## 2022-03-24 RX ADMIN — TRANEXAMIC ACID 1950 MG: 650 TABLET ORAL at 12:53

## 2022-03-24 RX ADMIN — FENTANYL CITRATE 50 MCG: 50 INJECTION, SOLUTION INTRAMUSCULAR; INTRAVENOUS at 16:51

## 2022-03-24 RX ADMIN — GABAPENTIN 100 MG: 100 CAPSULE ORAL at 12:53

## 2022-03-24 RX ADMIN — HYDROMORPHONE HYDROCHLORIDE 0.4 MG: 0.2 INJECTION, SOLUTION INTRAMUSCULAR; INTRAVENOUS; SUBCUTANEOUS at 17:19

## 2022-03-24 RX ADMIN — HYDROMORPHONE HYDROCHLORIDE 1 MG: 1 INJECTION, SOLUTION INTRAMUSCULAR; INTRAVENOUS; SUBCUTANEOUS at 16:09

## 2022-03-24 RX ADMIN — HYDROMORPHONE HYDROCHLORIDE 0.4 MG: 0.2 INJECTION, SOLUTION INTRAMUSCULAR; INTRAVENOUS; SUBCUTANEOUS at 18:03

## 2022-03-24 RX ADMIN — PROPOFOL 200 MG: 10 INJECTION, EMULSION INTRAVENOUS at 15:17

## 2022-03-24 RX ADMIN — FENTANYL CITRATE 50 MCG: 50 INJECTION, SOLUTION INTRAMUSCULAR; INTRAVENOUS at 15:00

## 2022-03-24 RX ADMIN — HYDROMORPHONE HYDROCHLORIDE 0.4 MG: 0.2 INJECTION, SOLUTION INTRAMUSCULAR; INTRAVENOUS; SUBCUTANEOUS at 17:33

## 2022-03-24 RX ADMIN — MAGNESIUM SULFATE HEPTAHYDRATE 2 G: 40 INJECTION, SOLUTION INTRAVENOUS at 15:22

## 2022-03-24 RX ADMIN — ONDANSETRON 4 MG: 2 INJECTION INTRAMUSCULAR; INTRAVENOUS at 18:21

## 2022-03-24 RX ADMIN — CEFAZOLIN SODIUM 2 G: 2 INJECTION, SOLUTION INTRAVENOUS at 22:59

## 2022-03-24 RX ADMIN — ONDANSETRON 4 MG: 2 INJECTION INTRAMUSCULAR; INTRAVENOUS at 15:57

## 2022-03-24 RX ADMIN — SUGAMMADEX 200 MG: 100 INJECTION, SOLUTION INTRAVENOUS at 16:33

## 2022-03-24 RX ADMIN — MEPERIDINE HYDROCHLORIDE 25 MG: 50 INJECTION, SOLUTION INTRAMUSCULAR; INTRAVENOUS; SUBCUTANEOUS at 15:43

## 2022-03-24 RX ADMIN — KETAMINE HYDROCHLORIDE 30 MG: 10 INJECTION INTRAMUSCULAR; INTRAVENOUS at 15:17

## 2022-03-24 RX ADMIN — ACETAMINOPHEN 975 MG: 325 TABLET, FILM COATED ORAL at 20:14

## 2022-03-24 RX ADMIN — HYDROXYZINE HYDROCHLORIDE 50 MG: 50 TABLET, FILM COATED ORAL at 17:36

## 2022-03-24 RX ADMIN — FENTANYL CITRATE 50 MCG: 50 INJECTION, SOLUTION INTRAMUSCULAR; INTRAVENOUS at 15:28

## 2022-03-24 RX ADMIN — SODIUM CHLORIDE, POTASSIUM CHLORIDE, SODIUM LACTATE AND CALCIUM CHLORIDE: 600; 310; 30; 20 INJECTION, SOLUTION INTRAVENOUS at 21:58

## 2022-03-24 RX ADMIN — KETAMINE HYDROCHLORIDE 20 MG: 10 INJECTION INTRAMUSCULAR; INTRAVENOUS at 15:27

## 2022-03-24 RX ADMIN — FENTANYL CITRATE 50 MCG: 50 INJECTION, SOLUTION INTRAMUSCULAR; INTRAVENOUS at 15:17

## 2022-03-24 RX ADMIN — ROCURONIUM BROMIDE 50 MG: 50 INJECTION, SOLUTION INTRAVENOUS at 15:25

## 2022-03-24 RX ADMIN — DEXAMETHASONE SODIUM PHOSPHATE 4 MG: 4 INJECTION, SOLUTION INTRA-ARTICULAR; INTRALESIONAL; INTRAMUSCULAR; INTRAVENOUS; SOFT TISSUE at 15:24

## 2022-03-24 RX ADMIN — KETOROLAC TROMETHAMINE 15 MG: 15 INJECTION, SOLUTION INTRAMUSCULAR; INTRAVENOUS at 17:18

## 2022-03-24 RX ADMIN — OXYCODONE HYDROCHLORIDE 10 MG: 5 TABLET ORAL at 21:59

## 2022-03-24 RX ADMIN — ROPIVACAINE HYDROCHLORIDE 20 ML: 5 INJECTION, SOLUTION EPIDURAL; INFILTRATION; PERINEURAL at 17:00

## 2022-03-24 RX ADMIN — FENTANYL CITRATE 50 MCG: 50 INJECTION, SOLUTION INTRAMUSCULAR; INTRAVENOUS at 15:53

## 2022-03-24 RX ADMIN — ACETAMINOPHEN 975 MG: 325 TABLET, FILM COATED ORAL at 12:53

## 2022-03-24 RX ADMIN — Medication 2 G: at 14:50

## 2022-03-24 RX ADMIN — MEPERIDINE HYDROCHLORIDE 25 MG: 50 INJECTION, SOLUTION INTRAMUSCULAR; INTRAVENOUS; SUBCUTANEOUS at 15:53

## 2022-03-24 RX ADMIN — LIDOCAINE HYDROCHLORIDE 50 MG: 10 INJECTION, SOLUTION INFILTRATION; PERINEURAL at 15:17

## 2022-03-24 RX ADMIN — SIMVASTATIN 20 MG: 20 TABLET, FILM COATED ORAL at 21:59

## 2022-03-24 RX ADMIN — KETOROLAC TROMETHAMINE 15 MG: 15 INJECTION, SOLUTION INTRAMUSCULAR; INTRAVENOUS at 22:59

## 2022-03-24 RX ADMIN — ONDANSETRON 4 MG: 4 TABLET, ORALLY DISINTEGRATING ORAL at 20:18

## 2022-03-24 NOTE — PROGRESS NOTES
Writer participated in admission skin assessment.. Unable to assess under surgical dressing. No overt skin concerns.

## 2022-03-24 NOTE — PROCEDURES
03/24/22    PREOPERATIVE DIAGNOSES: Left knee arthritis   POSTOPERATIVE DIAGNOSIS:  Left knee arthritis  PROCEDURE: Left total knee arthroplasty.   SURGEON: Allen White MD   ASSISTANT: Caitlin Palomo PA-C The presence of the PA was necessary for safe progression of the case.   ANESTHESIA: General (spinal failed)  ESTIMATED BLOOD LOSS: 50 mL.   TOURNIQUET TIME: 45 minutes at 250 mmHg.   COMPLICATIONS: None apparent.   DISPOSITION: Stable to PACU.    IMPLANTS USED: DePuy Attune size 7 posterior stabilized femoral component with a size 6 S+ tibial component, size 7 by 7mm posterior stabilized polyethylene and 38 mm patella.     INDICATIONS: Cory is a 67 year old male with a history of progressive left knee pain due to end stage arthritis. Unfortunately, he failed conservative management including therapy and injections. After discussing risks, benefits and surgery, he elected to proceed with a left total knee replacement understanding the risks of infection, damage to vessels and nerves, blood clots, stiffness, ongoing pain, need for revision surgery, need for transfusion.     PROCEDURE: Cory was brought to the preoperative holding area where the left knee was marked. Consent was reviewed. He then was transferred to the operating theater. A spinal anesthetic was attempted but unsuccessful.  Therefore he had a general endotracheal anenesthesia.  He was then placed supine with a bump under the left hip.  A timeout was performed, verifying correct patient, surgery, location. He received preoperative antibiotics as well as transexemic acid. The left lower extremity was then prepped and draped in standard sterile fashion.     We exsanguinated the leg and inflated the tourniquet. We then made a longitudinal incision over the anterior aspect of the knee. Dissection was carried down through skin and subcutaneous tissue. A medial parapatellar arthrotomy was made, exposing end stage medial compartment arthritis arthritis.   Synovial tissue from the suprapatellar pouch excised. The anterior horn of the medial meniscus was released and a medial release was performed. Then, the remainder of the fat pad was excised. We turned our attention to the patella. Using a free hand technique, this was resected down to 12 mm and sized to a 38mm, then punched and a metal protector plate was placed. We then turned our attention to the femur. Preoperatively, there was a 5 degree flexion contracture.  Therefore, using an intramedullary alignment guide, 11 mm of distal femur was resected in 5 degrees of valgus.     We then turned our attention to the tibia.  An extramedullary alignment cut was used to resect 2mm off the low medial side, perpendicular to the mechanical axis.  We then turned our attention back to the distal femur and sized it to a size 7.  The epicondylar axis was established and we placed our 4-in-1 cutting block perpendicular to it, in 3 degrees of external rotation. With this in place, our anterior, posterior and chamfer distal femur cuts were made.  We then used a laminar  to open the joint in order to remove medial and lateral meniscus remnants as well as posterior osteophytes.  The jig was utilized to cut the distal femoral box for the PS component.  A variety of polyethylene were trialed, and we felt a 7mm was best, with range of motion from full extension to 135 of flexion, stability to varus and valgus stress, normal POLO test, and the patella tracked well.  After this, sized our tibial component to a 6.  We then drilled and punched our tibial component, lining it with the medial 1/3 of the tibial tubercle. The knee was thoroughly irrigated using pulse lavage. The final components were then cemented in place. All excess cement was removed and the knee was placed into full extension while the cement was curing. Also, the wound was instilled with dilute Betadine solution. Once the cement had cured, we retrialed our  components with a 7mm poly with findings as above. We then placed the final poly.  The tourniquet was deflated with hemostasis achieved.  The capsular layer was closed with #1 Stratafix, at which point there was 130 degrees of flexion with gravity.  Subcutaneous tissues with 2-0 Vicryl, skin with a 3-0 Monocryl. A sterile dressing was applied and the patient was awoken from anesthesia and transferred to PACU in stable condition.     POSTOPERATIVE PLAN:   1. Weightbearing as tolerated left lower extremity with PT for mobilization.   2. Deep venous thrombosis prophylaxis: Aspirin 325mg daily x 30 days   3. Perioperative antibiotics.   4. Follow up in 2 weeks for wound check.     NOREEN SCHMITT MD

## 2022-03-24 NOTE — ANESTHESIA PROCEDURE NOTES
Adductor canal Procedure Note    Pre-Procedure   Staff -        CRNA: Caroline Grey APRN CRNA       Performed By: CRNA       Location: post-op       Procedure Start/Stop Times: 3/24/2022 5:58 PM and 3/24/2022 5:13 PM       Pre-Anesthestic Checklist: patient identified, IV checked, site marked, risks and benefits discussed, informed consent, monitors and equipment checked, pre-op evaluation, at physician/surgeon's request and post-op pain management  Timeout:       Correct Patient: Yes        Correct Procedure: Yes        Correct Site: Yes        Correct Position: Yes        Correct Laterality: Yes        Site Marked: Yes  Procedure Documentation  Procedure: Adductor canal       Laterality: left       Patient Position: sitting       Patient Prep/Sterile Barriers: sterile gloves, patient draped       Skin prep: Betadine       Needle Gauge: 17.        Needle Length (Inches): 4        Ultrasound guided       1. Ultrasound was used to identify targeted nerve, plexus, vascular marker, or fascial plane and place a needle adjacent to it in real-time.       2. Ultrasound was used to visualize the spread of anesthetic in close proximity to the above referenced structure.    Assessment/Narrative         The placement was negative for: blood aspirated, painful injection and site bleeding      Test dose of 5 mL lidocaine 1.5% w/ 1:200,000 epinephrine at 17:08 CDT.        .      Bolus given via needle. No blood aspirated via catheter.        Secured via.        Insertion/Infusion Method: Single Shot

## 2022-03-24 NOTE — ANESTHESIA CARE TRANSFER NOTE
Patient: Phong Cuevas    Procedure: Procedure(s):  left TOTAL Knee Arthroplasty       Diagnosis: Arthritis of left knee [M17.12]  Diagnosis Additional Information: No value filed.    Anesthesia Type:   Spinal     Note:      Level of Consciousness: awake  Oxygen Supplementation: face mask    Independent Airway: airway patency satisfactory and stable    Vital Signs Stable: post-procedure vital signs reviewed and stable  Report to RN Given: handoff report given  Patient transferred to: PACU    Handoff Report: Identifed the Patient, Identified the Reponsible Provider, Reviewed the pertinent medical history, Discussed the surgical course, Reviewed Intra-OP anesthesia mangement and issues during anesthesia, Set expectations for post-procedure period and Allowed opportunity for questions and acknowledgement of understanding      Vitals:  Vitals Value Taken Time   /118 03/24/22 1712   Temp     Pulse 90 03/24/22 1714   Resp 5 03/24/22 1714   SpO2 93 % 03/24/22 1714   Vitals shown include unvalidated device data.    Electronically Signed By: CULLEN Marcus CRNA  March 24, 2022  5:15 PM

## 2022-03-24 NOTE — ANESTHESIA POSTPROCEDURE EVALUATION
Patient: Phong Cuevas    Procedure: Procedure(s):  left TOTAL Knee Arthroplasty       Anesthesia Type:  Spinal    Note:  Disposition: Outpatient   Postop Pain Control: Uneventful            Sign Out: Well controlled pain   PONV: No   Neuro/Psych: Uneventful            Sign Out: Acceptable/Baseline neuro status   Airway/Respiratory: Uneventful            Sign Out: AIRWAY IN SITU/Resp. Support   CV/Hemodynamics: Uneventful            Sign Out: Acceptable CV status; No obvious hypovolemia; No obvious fluid overload   Other NRE: NONE   DID A NON-ROUTINE EVENT OCCUR? No           Last vitals:  Vitals Value Taken Time   /105 03/24/22 1830   Temp 36.5  C (97.7  F) 03/24/22 1800   Pulse 80 03/24/22 1830   Resp 7 03/24/22 1830   SpO2 96 % 03/24/22 1831   Vitals shown include unvalidated device data.    Electronically Signed By: Fabien Alme[place] CRNA, APRN CRNA  March 24, 2022  6:45 PM

## 2022-03-24 NOTE — INTERVAL H&P NOTE
"I have reviewed the surgical (or preoperative) H&P that is linked to this encounter, and examined the patient. There are no significant changes    Clinical Conditions Present on Arrival:  SECTIONPRESENTONADMISSIONBEGIN@                   # Obesity: Estimated body mass index is 36.49 kg/m  as calculated from the following:    Height as of this encounter: 1.727 m (5' 8\").    Weight as of this encounter: 108.9 kg (240 lb).       "

## 2022-03-24 NOTE — ANESTHESIA PROCEDURE NOTES
Airway       Patient location during procedure: OR       Procedure Start/Stop Times: 3/24/2022 3:17 PM  Staff -        CRNA: Kevin Mcbride APRN CRNA       Performed By: CRNA  Consent for Airway        Urgency: elective  Indications and Patient Condition       Indications for airway management: jorden-procedural       Induction type:intravenous       Mask difficulty assessment: 1 - vent by mask    Final Airway Details       Final airway type: endotracheal airway       Successful airway: ETT - single and Oral  Endotracheal Airway Details        ETT size (mm): 8.0       Cuffed: yes       Successful intubation technique: video laryngoscopy       VL Blade Size: [place] 3       Grade View of Cords: 1       Adjucts: stylet       Position: Right       Measured from: lips       Secured at (cm): 23       Bite block used: None    Post intubation assessment        Placement verified by: capnometry, equal breath sounds and chest rise        Number of attempts at approach: 1       Number of other approaches attempted: 0       Secured with: silk tape       Ease of procedure: easy       Dentition: Intact and Unchanged

## 2022-03-25 ENCOUNTER — APPOINTMENT (OUTPATIENT)
Dept: PHYSICAL THERAPY | Facility: CLINIC | Age: 67
End: 2022-03-25
Attending: ORTHOPAEDIC SURGERY
Payer: COMMERCIAL

## 2022-03-25 VITALS
TEMPERATURE: 98.2 F | DIASTOLIC BLOOD PRESSURE: 80 MMHG | SYSTOLIC BLOOD PRESSURE: 120 MMHG | BODY MASS INDEX: 36.37 KG/M2 | WEIGHT: 240 LBS | RESPIRATION RATE: 18 BRPM | OXYGEN SATURATION: 96 % | HEART RATE: 88 BPM | HEIGHT: 68 IN

## 2022-03-25 PROBLEM — M17.12 ARTHRITIS OF LEFT KNEE: Status: ACTIVE | Noted: 2022-03-25

## 2022-03-25 LAB
HGB BLD-MCNC: 13.6 G/DL (ref 13.3–17.7)
HOLD SPECIMEN: NORMAL

## 2022-03-25 PROCEDURE — 85018 HEMOGLOBIN: CPT | Performed by: ORTHOPAEDIC SURGERY

## 2022-03-25 PROCEDURE — 99213 OFFICE O/P EST LOW 20 MIN: CPT | Performed by: PHYSICIAN ASSISTANT

## 2022-03-25 PROCEDURE — 250N000011 HC RX IP 250 OP 636: Performed by: ORTHOPAEDIC SURGERY

## 2022-03-25 PROCEDURE — 97110 THERAPEUTIC EXERCISES: CPT | Mod: GP | Performed by: PHYSICAL THERAPIST

## 2022-03-25 PROCEDURE — 97116 GAIT TRAINING THERAPY: CPT | Mod: GP | Performed by: PHYSICAL THERAPIST

## 2022-03-25 PROCEDURE — 250N000013 HC RX MED GY IP 250 OP 250 PS 637: Performed by: ORTHOPAEDIC SURGERY

## 2022-03-25 PROCEDURE — 36415 COLL VENOUS BLD VENIPUNCTURE: CPT | Performed by: ORTHOPAEDIC SURGERY

## 2022-03-25 PROCEDURE — 97161 PT EVAL LOW COMPLEX 20 MIN: CPT | Mod: GP | Performed by: PHYSICAL THERAPIST

## 2022-03-25 RX ADMIN — OXYCODONE HYDROCHLORIDE 10 MG: 5 TABLET ORAL at 02:54

## 2022-03-25 RX ADMIN — OXYCODONE HYDROCHLORIDE 10 MG: 5 TABLET ORAL at 12:16

## 2022-03-25 RX ADMIN — ACETAMINOPHEN 975 MG: 325 TABLET, FILM COATED ORAL at 11:04

## 2022-03-25 RX ADMIN — SENNOSIDES AND DOCUSATE SODIUM 1 TABLET: 50; 8.6 TABLET ORAL at 07:57

## 2022-03-25 RX ADMIN — CEFAZOLIN SODIUM 2 G: 2 INJECTION, SOLUTION INTRAVENOUS at 06:30

## 2022-03-25 RX ADMIN — KETOROLAC TROMETHAMINE 15 MG: 15 INJECTION, SOLUTION INTRAMUSCULAR; INTRAVENOUS at 12:12

## 2022-03-25 RX ADMIN — OXYCODONE HYDROCHLORIDE 10 MG: 5 TABLET ORAL at 06:38

## 2022-03-25 RX ADMIN — ASPIRIN 325 MG: 325 TABLET, COATED ORAL at 07:57

## 2022-03-25 RX ADMIN — KETOROLAC TROMETHAMINE 15 MG: 15 INJECTION, SOLUTION INTRAMUSCULAR; INTRAVENOUS at 06:30

## 2022-03-25 RX ADMIN — ACETAMINOPHEN 975 MG: 325 TABLET, FILM COATED ORAL at 02:53

## 2022-03-25 NOTE — PROGRESS NOTES
"[facility] ADMISSION NOTE    Patient admitted to room 2301 at approximately 1850 via cart from surgery. Patient was accompanied by transport tech.     Verbal SBAR report received from javier horowitz RN prior to patient arrival.     Patient trasferred to bed via air cherelle. Patient alert and oriented X 3. Pain is controlled with current analgesics.  Medication(s) being used: narcotic analgesics including fentanyl, dilaudid.  . Admission vital signs: Blood pressure (!) 147/90, pulse 90, temperature 97.6  F (36.4  C), temperature source Oral, resp. rate 12, height 1.727 m (5' 8\"), weight 108.9 kg (240 lb), SpO2 (!) 86 %. Patient was oriented to plan of care, call light, bed controls, tv, telephone, bathroom and visiting hours.     Risk Assessment    The following safety risks were identified during admission: fall. Yellow risk band applied: YES.     Skin Initial Assessment    This writer admitted this patient and completed a full skin assessment and Bharath score in the Adult PCS flowsheet. Appropriate interventions initiated as needed.     Secondary skin check completed by RAFITA Rosales RN.         Education    Patient has a [place] to Observation order: Yes  Observation education completed and documented: Yes      Katrin Brito, FACUNDO    "

## 2022-03-25 NOTE — CONSULTS
Brief CM note:     Dicussed patient needs in IDT rounds.  Patient has plans for outpatient therapy follow-up at discharge.  No other CM needs identified.      Alexandria Solano MSN, RN  Inpatient Care Coordinator  [facility] [phone]  [facility] [phone]

## 2022-03-25 NOTE — PROGRESS NOTES
[facility]      OUTPATIENT PHYSICAL THERAPY EVALUATION  PLAN OF TREATMENT FOR OUTPATIENT REHABILITATION  (COMPLETE FOR INITIAL CLAIMS ONLY)  Patient's Last Name, First Name, M.I.  YOB: 1955  Phong Cuevas                        Provider's Name  [facility] Medical Record No.  [id number]                               Onset Date:  03/24/22   Start of Care Date:  03/25/22      Type:     _X_PT   ___OT   ___SLP Medical Diagnosis:  L TKA                         PT Diagnosis:  L TKA aftercare   Visits from SOC:  1   _________________________________________________________________________________  Plan of Treatment/Functional Goals    Planned Interventions: gait training, home exercise program, patient/family education, transfer training, strengthening, stair training, ROM (range of motion)     Goals: See Physical Therapy Goals on Care Plan in [employer] electronic health record.    Therapy Frequency: One time eval and treatment only  Predicted Duration of Therapy Intervention: 03/28/22  _________________________________________________________________________________    I CERTIFY THE NEED FOR THESE SERVICES FURNISHED UNDER        THIS PLAN OF TREATMENT AND WHILE UNDER MY CARE     (Physician co-signature of this document indicates review and certification of the therapy plan).                Certification date from: 03/25/22, Certification date to: 04/24/22    Referring Physician: Allen White MD            Initial Assessment        See Physical Therapy evaluation dated 03/25/22 in Epic electronic health record.

## 2022-03-25 NOTE — PLAN OF CARE
[facility] DISCHARGE NOTE    Patient discharged to home at 1:32 PM via wheel chair. Accompanied by spouse and staff. Discharge instructions reviewed with patient and spouse, opportunity offered to ask questions. Prescriptions sent to patients preferred pharmacy. All belongings sent with patient.    Chel Baxter, RNGoal Outcome Evaluation:

## 2022-03-25 NOTE — PLAN OF CARE
Patient vital signs are at baseline: Yes  Patient able to ambulate as they were prior to admission or with assist devices provided by therapies during their stay:  Yes, ambulating in halls with stand by assist. He does not have stairs at home  Patient MUST void prior to discharge:  Yes  Patient able to tolerate oral intake:  Yes  Pain has adequate pain control using Oral analgesics:  Yes, patient reports adequate relief of pain with 10 mg oxycodone, routine Tylenol and Toradol IV   Does patient have an identified :  Yes, spouse  Has goal D/C date and time been discussed with patient:  Yes

## 2022-03-25 NOTE — PLAN OF CARE
OT: Per discussion with physical therapy no IP OT needs identified. Will discontinue orders at this time.

## 2022-03-25 NOTE — PROGRESS NOTES
[facility]    Hospital Medicine Progress Note  Date of Service: 03/25/2022    Assessment & Plan   Phong Cuevas is a 67 year old male who presented on 3/24/2022 for scheduled Procedure(s):  left TOTAL Knee Arthroplasty by Allen White MD and is being followed by the hospital medicine service for co-management of acute and/or chronic perioperative medical problems.    Arthritis of left knee  S/p Procedure(s):  left TOTAL Knee Arthroplasty on 3/24/22  1 Day Post-Op   Hemoglobin 15.4 (pre-op) ? 13.6   - pain control, wound cares, physical therapy, occupational therapy and DVT prophylaxis per orthopedic surgery service    Hyperlipidemia LDL goal <100  Managed prior to admission with Zocor 20 mg q hs, continue.     Sleep disturbance  Managed prior to admission with Unisom 25 mg q hs.  - Unisom ordered as prn, hold if patient already somnolent from pain meds       DVT Prophylaxis: as per orthopedic surgery service - Pneumatic Compression Devices and aspirin 325 mg daily x 30 days  Code Status: Full Code    Lines: Peripheral   Mitchell catheter: No     Discussion: Medically, the patient appears to be making appropriate post-op progress.     Disposition: Anticipate discharge today to home. No barriers to discharge from internal medicine standpoint.    Attestation:  I have reviewed today's vital signs, notes, medications, labs and imaging.  Discussed with Dr. Carlos Salcido.    Karma Dotson PA-C  [facility] Hospitalist Service         Interval History   Patient feeling well after surgery. Pain rated 1/10 at rest, 5-7/10 when bearing weight. Denies numbness or tingling.     Tolerating oral intake, denies abdominal pain, nausea, vomiting. No bowel movement since surgery, but passing flatus. Urinating without difficulty, no feelings of incomplete bladder emptying.    Denies chest pain, palpitations, cough, wheeze, shortness of breath, headache, vision changes, dizziness, or other  complaints.    Physical Exam   Temp:  [97.5  F (36.4  C)-98.2  F (36.8  C)] 98.2  F (36.8  C)  Pulse:  [80-93] 88  Resp:  [5-20] 18  BP: (120-160)/() 120/80  SpO2:  [86 %-97 %] 96 %    Weights:   Vitals:    03/24/22 1231   Weight: 108.9 kg (240 lb)    Body mass index is 36.49 kg/m .    General appearance: Awake, alert, and in no apparent distress. Pleasant and conversational, speaking in full sentences.  CV: Regular rhythm & rate, no murmurs. No edema. Peripheral pulses intact.  Respiratory: Moving air well bilaterally, no wheezing, crackles, or rhonchi.  GI: Non-distended, soft, nontender to palpation. No rebound or guarding. Normoactive bowel sounds.  Skin: Warm, dry, no rashes or ecchymoses. No mottling of skin. ACE bandage with underlying Aquacel dressing present on left lower extremity / left knee.   Musculoskeletal / extremities: Moves all extremities equally, no obvious abnormalities. Distal CMS intact.  Neurologic: No focal deficits.    Data   Recent Labs   Lab 03/25/22  0502   HGB 13.6       No results for input(s): GLC, BGM in the last 168 hours.     Unresulted Labs Ordered in the Past 30 Days of this Admission     No orders found for last 31 day(s).           Imaging  Recent Results (from the past 24 hour(s))   XR Knee Port Left 1/2 Views    Narrative    EXAM: XR KNEE PORT LEFT 1/2 VIEWS  LOCATION: [facility]  DATE/TIME: 3/24/2022 5:09 PM    INDICATION: Postop total knee.  COMPARISON: None.      Impression    IMPRESSION: Postoperative changes of left total knee arthroplasty. Components appear well seated. Chronic enthesitis along the superior pole of the patella. Small effusion. Postprocedural air within the joint and surrounding soft tissues.        I reviewed all new labs and imaging results over the last 24 hours. I personally reviewed no images or EKG's today.    Medications     lactated ringers 75 mL/hr at 03/24/22 2158       acetaminophen  975 mg Oral Q8H      aspirin  325 mg Oral Daily     ketorolac  15 mg Intravenous Q6H     polyethylene glycol  17 g Oral Daily     senna-docusate  1 tablet Oral BID     simvastatin  20 mg Oral At Bedtime     sodium chloride (PF)  3 mL Intracatheter Q8H       Karma Dotson PA-C  [facility]

## 2022-03-25 NOTE — PROGRESS NOTES
"[facility] Orthopaedics Progress Note      Post-operative Day: 1 Day Post-Op    Procedure(s):  left TOTAL Knee Arthroplasty  Subjective:    Pt reports that his knee is sore but tolerable, it mainly hurts with movement. He is overall feeling well and is looking forward to hopefully going home later today. He has outpatient physical therapy already scheduled.     Chest pain, SOB:  No  Nausea, vomiting:  No  Lightheadedness, dizziness:  no  Neuro:  Patient denies new onset numbness or paresthesias      Objective:  Blood pressure 120/80, pulse 88, temperature 98.2  F (36.8  C), temperature source Oral, resp. rate 18, height 1.727 m (5' 8\"), weight 108.9 kg (240 lb), SpO2 96 %.    Patient Vitals for the past 24 hrs:   BP Temp Temp src Pulse Resp SpO2 Height Weight   03/25/22 0627 120/80 98.2  F (36.8  C) Oral 88 18 96 % -- --   03/25/22 0120 (!) 150/86 98  F (36.7  C) Oral 88 18 92 % -- --   03/24/22 2300 (!) 160/96 97.5  F (36.4  C) Oral 81 -- 97 % -- --   03/24/22 2100 (!) 141/95 -- -- 81 -- -- -- --   03/24/22 1900 126/82 -- -- 80 -- -- -- --   03/24/22 1850 (!) 147/90 97.6  F (36.4  C) Oral 90 12 (!) 86 % -- --   03/24/22 1815 (!) 149/99 -- -- 85 8 95 % -- --   03/24/22 1800 -- 97.7  F (36.5  C) Oral -- 12 -- -- --   03/24/22 1745 (!) 148/103 -- -- 90 11 92 % -- --   03/24/22 1715 (!) 139/118 -- -- 90 (!) 5 93 % -- --   03/24/22 1700 (!) 148/118 -- -- 90 (!) 7 93 % -- --   03/24/22 1650 (!) 151/99 -- -- 93 8 95 % -- --   03/24/22 1233 (!) 143/97 -- -- -- -- -- -- --   03/24/22 1231 -- 97.7  F (36.5  C) Oral -- 20 -- 1.727 m (5' 8\") 108.9 kg (240 lb)       Wt Readings from Last 4 Encounters:   03/24/22 108.9 kg (240 lb)   03/17/22 111.1 kg (245 lb)   01/03/22 109.8 kg (242 lb)   10/30/21 107 kg (235 lb 12.8 oz)       Gen: A&O x 3. NAD. Appears comfortable, up in bed eating breakfast.   Wound status: Covered, Ace wrap in place.   Circulation, motion and sensation: Dorsiflexion/plantarflexion intact and equal " bilaterally; distal lower extremity sensation is intact and equal bilaterally. Foot and toes are warm and well perfused.    Swelling: mild  Calf tenderness: calves are soft and non-tender bilaterally     Pertinent Labs   Lab Results: personally reviewed.     Recent Labs   Lab Test 03/25/22  0502 03/17/22  0954 07/15/21  0909 05/08/20  1005 01/24/19  0825 01/11/18  0841 07/05/17  0932   HGB 13.6 15.4  --   --   --   --  16.0   HCT  --  45.6  --   --   --   --  47.5   MCV  --  94  --   --   --   --  93   PLT  --  204  --   --   --   --  188   NA  --  136 135 135   < > 132* 133   CRP  --   --   --   --   --  4.5  --     < > = values in this interval not displayed.       Plan:   Continue current cares and rehabilitation.  Anticoagulation protocol: ASA 325mg daily x 30  days  Pain medications:  oxycodone, tylenol and vistaril  Weight bearing status:  WBAT  Disposition:  Plan for discharge to home with outpatient therapy when medically stable and pain is controlled, cleared by therapy. Likely later today.              Report completed by:  Kt Malin PA-C  Date: 3/25/2022  Time: 8:38 AM

## 2022-03-25 NOTE — DISCHARGE SUMMARY
[facility] Orthopedics Discharge Summary                                  [facility]     SALU ANGELES [id number]   Age: 67 year old  PCP: Indigo Alvarado, [phone] 1955     Date of Admission:  3/24/2022  Date of Discharge::  3/25/2022  Discharge Physician:  Kt Malin PA-C    Code status:  Full Code    Admission Information:  Admission Diagnosis:  Arthritis of left knee [M17.12]  Status post total knee replacement [Z96.659]    Post-Operative Day: 1 Day Post-Op     Reason for admission:  The patient was admitted for the following:Procedure(s) (LRB):  left TOTAL Knee Arthroplasty (Left)    Principal Problem:    Arthritis of left knee  Active Problems:    Hyperlipidemia LDL goal <100    Sleep disturbance    Status post total knee replacement      Allergies:  Nka [no known allergies]    Following the procedure noted above the patient was transferred to the post-op floor and started on:    Therapy:  physical therapy  Anticoagulation Plan: ASA 325mg daily  for 30 days  Pain Management: oxycodone, tylenol and vistaril  Weight bearing status: Weight bearing as tolerated     The patient was followed and co-managed by the hospitalist service during the inpatient treatment course  Complications:  None  Consultations:  None     Pertinent Labs   Lab Results: personally reviewed.     Recent Labs   Lab Test 03/25/22  0502 03/17/22  0954 07/15/21  0909 05/08/20  1005 01/24/19  0825 01/11/18  0841 07/05/17  0932   HGB 13.6 15.4  --   --   --   --  16.0   HCT  --  45.6  --   --   --   --  47.5   MCV  --  94  --   --   --   --  93   PLT  --  204  --   --   --   --  188   NA  --  136 135 135   < > 132* 133   CRP  --   --   --   --   --  4.5  --     < > = values in this interval not displayed.          Discharge Information:  Condition at discharge: Stable  Discharge destination:  Discharged to home     Medications at discharge:  Current Discharge Medication List      START taking these  medications    Details   acetaminophen (TYLENOL) 325 MG tablet Take 2 tablets (650 mg) by mouth every 4 hours as needed for other (mild pain)  Qty: 100 tablet, Refills: 0    Associated Diagnoses: Status post left knee replacement      aspirin (ASA) 325 MG EC tablet Take 1 tablet (325 mg) by mouth daily  Qty: 30 tablet, Refills: 0    Associated Diagnoses: Status post left knee replacement      hydrOXYzine (ATARAX) 25 MG tablet Take 1-2 tablets (25-50 mg) by mouth every 6 hours as needed for itching or anxiety (with pain, moderate pain)  Qty: 40 tablet, Refills: 0    Associated Diagnoses: Status post left knee replacement      ibuprofen (ADVIL/MOTRIN) 600 MG tablet Take 1 tablet (600 mg) by mouth every 6 hours as needed (mild)  Qty: 30 tablet, Refills: 0    Associated Diagnoses: Status post left knee replacement      oxyCODONE (ROXICODONE) 5 MG tablet Take 1-2 tablets (5-10 mg) by mouth every 3 hours as needed for pain (Moderate to Severe)  Qty: 40 tablet, Refills: 0    Associated Diagnoses: Status post left knee replacement      senna-docusate (SENOKOT-S/PERICOLACE) 8.6-50 MG tablet Take 1-2 tablets by mouth daily as needed for constipation Take while on oral narcotics to prevent or treat constipation.  Qty: 15 tablet, Refills: 0    Comments: While taking narcotics  Associated Diagnoses: Status post left knee replacement         CONTINUE these medications which have NOT CHANGED    Details   Ascorbic Acid (VITAMIN C PO) Take by mouth daily      B COMPLEX-ZINC PO Take by mouth daily      Cholecalciferol (VITAMIN D-3) 25 MCG (1000 UT) CAPS Take 1 capsule by mouth daily      doxylamine (UNISOM) 25 MG TABS Take 25 mg by mouth At Bedtime      Multiple Vitamins-Minerals (MULTIVITAMIN ADULT PO)       simvastatin (ZOCOR) 20 MG tablet Take 1 tablet (20 mg) by mouth At Bedtime  Qty: 90 tablet, Refills: 3    Comments: Hold Rx, will notify when needs to have refilled  Associated Diagnoses: Hyperlipidemia LDL goal <100                         Follow-Up Care:  Patient should be seen in the office in 14 days by the Orthopedic Surgeon/Physician Assistant.  Call [phone] for appointment or questions.    Kt Malin PA-C

## 2022-03-25 NOTE — PROGRESS NOTES
03/25/22 0858   Quick Adds   Type of Visit Initial PT Evaluation   Living Environment   People in Home spouse   Current Living Arrangements house   Home Accessibility stairs within home;stairs to enter home   Number of Stairs, Main Entrance   (2 platform steps)   Living Environment Comments will not use basement steps   Self-Care   Usual Activity Tolerance good   Current Activity Tolerance good   Equipment Currently Used at Home none   Activity/Exercise/Self-Care Comment has a walker, crutches, shower chair, higher toilet, was stiff by the end of the day prior but still indep and moving around   General Information   Onset of Illness/Injury or Date of Surgery 03/24/22   Referring Physician Allen White MD   Patient/Family Therapy Goals Statement (PT) to return home and do OP PT   Pertinent History of Current Problem (include personal factors and/or comorbidities that impact the POC) L knee arthritis, s/p L TKA   Existing Precautions/Restrictions fall   Weight-Bearing Status - LLE weight-bearing as tolerated   Cognition   Affect/Mental Status (Cognition) WNL   Orientation Status (Cognition) oriented x 4   Pain Assessment   Patient Currently in Pain Yes, see Vital Sign flowsheet   Integumentary/Edema   Integumentary/Edema Comments leg ACE wrapped and incision not visualized   Range of Motion (ROM)   ROM Comment seated knee flexion approx 80 degrees   Strength (Manual Muscle Testing)   Strength Comments good L quad set, can do SLR about 2 inches off the bed but painful   Bed Mobility   Comment, (Bed Mobility) superivsion sit<>supine with rail   Transfers   Comment, (Transfers) SBA sit<>stand with FWW   Gait/Stairs (Locomotion)   Distance in Feet (Required for LE Total Joints) 200   Comment, (Gait/Stairs) SBA with  ft, step through gait pattern, decreased heel strike and toe off, increased UE WB, decreased L LE WB   Clinical Impression   Criteria for Skilled Therapeutic Intervention Yes, treatment  indicated   PT Diagnosis (PT) L TKA aftercare   Influenced by the following impairments pain, decreased L quad contraction   Functional limitations due to impairments impaired gait and transfers   Clinical Presentation (PT Evaluation Complexity) Stable/Uncomplicated   Clinical Presentation Rationale clinical judgement   Clinical Decision Making (Complexity) low complexity   Planned Therapy Interventions (PT) gait training;home exercise program;patient/family education;transfer training;strengthening;stair training;ROM (range of motion)   Anticipated Equipment Needs at Discharge (PT)   (has a FWW)   Risk & Benefits of therapy have been explained evaluation/treatment results reviewed;care plan/treatment goals reviewed;risks/benefits reviewed;current/potential barriers reviewed;participants voiced agreement with care plan;patient;spouse/significant other   PT Discharge Planning   PT Discharge Recommendation (DC Rec) home with outpatient physical therapy   PT Rationale for DC Rec moving SBA with walker, wife at home can help if needed, has AE for home   PT Brief overview of current status did stairs and ambulation SBA with walker, walked 200 ft   Plan of Care Review   Plan of Care Reviewed With patient;significant other   Therapy Certification   Start of care date 03/25/22   Certification date from 03/25/22   Certification date to 04/24/22   Medical Diagnosis L TKA    Total Evaluation Time   Total Evaluation Time (Minutes) 8   Physical Therapy Goals   PT Frequency One time eval and treatment only   PT Predicated Duration/Target Date for Goal Attainment 03/28/22   PT Goals Gait;Stairs   PT: Gait Supervision/stand-by assist;Rolling walker;150 feet;Goal Met   PT: Stairs Minimal assist;2 stairs;Assistive device;Goal Met

## 2022-03-30 ENCOUNTER — HOSPITAL ENCOUNTER (OUTPATIENT)
Dept: PHYSICAL THERAPY | Facility: CLINIC | Age: 67
Setting detail: THERAPIES SERIES
Discharge: HOME | End: 2022-03-30
Attending: ORTHOPAEDIC SURGERY
Payer: COMMERCIAL

## 2022-03-30 ENCOUNTER — TRANSCRIBE ORDERS (OUTPATIENT)
Dept: OTHER | Age: 67
End: 2022-03-30
Payer: COMMERCIAL

## 2022-03-30 PROCEDURE — 97110 THERAPEUTIC EXERCISES: CPT | Mod: GP | Performed by: PHYSICAL THERAPIST

## 2022-03-30 PROCEDURE — 97161 PT EVAL LOW COMPLEX 20 MIN: CPT | Mod: GP | Performed by: PHYSICAL THERAPIST

## 2022-03-30 NOTE — PROGRESS NOTES
Phong Cuevas   PHYSICAL THERAPY EVALUATION    03/30/22 0800   General Information   Type of Visit Initial OP Ortho PT Evaluation   Start of Care Date 03/30/22   Referring Physician DR White   Patient/Family Goals Statement decrease pain ,be able to walk, work   Orders Evaluate and Treat   Date of Order 03/24/22   Certification Required? Yes   Medical Diagnosis L TKA   Body Part(s)   Body Part(s) Knee   Presentation and Etiology   Pertinent history of current problem (include personal factors and/or comorbidities that impact the POC) L TKA on 3/24/22. Pain 1-8/10. Sleeping is terible.    Onset date of current episode/exacerbation 03/24/22   Prior Level of Function   Functional Level Prior Comment long range shooting, golf - long ago, works, yardwork   Current Level of Function   Patient role/employment history A. Employed   Employment Comments self employed, [employer], [employer], on feet a lot, driving to locations   Fall Risk Screen   Fall screen completed by PT   Have you fallen 2 or more times in the past year? No   Have you fallen and had an injury in the past year? No   Is patient a fall risk? No   Abuse Screen (yes response referral indicated)   Feels Unsafe at Home or Work/School no   Feels Threatened by Someone no   Does Anyone Try to Keep You From Having Contact with Others or Doing Things Outside Your Home? no   Physical Signs of Abuse Present no   Knee Objective Findings   Side (if bilateral, select both right and left) Left   Integumentary  mod severe swelling/edema L thigh, knee lower leg to foot, bruising/discoloration medial knee   Gait/Locomotion using 2 crutches, WBAT   Knee/Hip Strength Comments able to do indep SLR with 15* quad lag, ant knee painful   Left Knee Extension AROM -8*   Left Knee Extension PROM -5*   Left Knee Flexion AROM 65* supine   Left Knee Flexion PROM 90* on step   Left Quad Set Strength fair +   Planned Therapy Interventions   Planned Therapy  Interventions stretching;strengthening;ROM;gait training;balance training   Clinical Impression   Criteria for Skilled Therapeutic Interventions Met yes, treatment indicated   PT Diagnosis decreased L knee ROM and strength , dependent gait   Functional limitations due to impairments walking, stairs, work duties, house/yardwork   Clinical Presentation Stable/Uncomplicated   Clinical Decision Making (Complexity) Low complexity   Therapy Frequency 2 times/Week   Predicted Duration of Therapy Intervention (days/wks) 4wks, then 1x/wk x4wk   Risk & Benefits of therapy have been explained Yes   Patient, Family & other staff in agreement with plan of care Yes   Education Assessment   Preferred Learning Style Listening   Barriers to Learning No barriers   ORTHO GOALS   PT Ortho Eval Goals 1;2;3   Ortho Goal 1   Goal Identifier 1   Goal Description pt will be ilana to walk community distances w/o device in 6wk   Target Date 05/11/22   Ortho Goal 2   Goal Identifier 2   Goal Description pt willbe able to do reciprocol stairsin 8wk   Target Date 05/25/22   Ortho Goal 3   Goal Identifier 3   Goal Description pt will be ilana to stand 60min for light work duties in 8wk   Target Date 05/25/22   Total Evaluation Time   PT Eval, Low Complexity Minutes (65070) 10   Therapy Certification   Certification date from 03/30/22   Certification date to 05/25/22   Medical Diagnosis L TKA   M [facility]  Kris Hoenk  PT  M [facility]  [address] [place] [address]  [email or web address]   Office: [phone]  Voicemail: [phone]

## 2022-03-30 NOTE — PROGRESS NOTES
[facility]    OUTPATIENT PHYSICAL THERAPY ORTHOPEDIC EVALUATION  PLAN OF TREATMENT FOR OUTPATIENT REHABILITATION  (COMPLETE FOR INITIAL CLAIMS ONLY)  Patient's Last Name, First Name, M.I.  YOB: 1955  Phong Cuevas    Provider s Name:  [facility]   Medical Record No.  [id number]   Start of Care Date:  03/30/22   Onset Date:  03/24/22   Type:     _X__PT   ___OT   ___SLP Medical Diagnosis:  L TKA     PT Diagnosis:  decreased L knee ROM and strength , dependent gait   Visits from SOC:  1      _________________________________________________________________________________  Plan of Treatment/Functional Goals:  stretching, strengthening, ROM, gait training, balance training           Goals  Goal Identifier: 1  Goal Description: pt will be ilana to walk community distances w/o device in 6wk  Target Date: 05/11/22    Goal Identifier: 2  Goal Description: pt willbe able to do reciprocol stairsin 8wk  Target Date: 05/25/22    Goal Identifier: 3  Goal Description: pt will be ilana to stand 60min for light work duties in 8wk  Target Date: 05/25/22       Therapy Frequency:  2 times/Week  Predicted Duration of Therapy Intervention:  4wks, then 1x/wk x4wk    Kris Hoenk, PT                 I CERTIFY THE NEED FOR THESE SERVICES FURNISHED UNDER        THIS PLAN OF TREATMENT AND WHILE UNDER MY CARE .    Physician Signature               Date    X_____________________________________________________                         Certification Date From:  03/30/22   Certification Date To:  05/25/22    Referring Provider:  DR Christopher BATES    Initial Assessment        See Epic Evaluation Start of Care Date: 03/30/22

## 2022-04-05 ENCOUNTER — HOSPITAL ENCOUNTER (OUTPATIENT)
Dept: PHYSICAL THERAPY | Facility: CLINIC | Age: 67
Setting detail: THERAPIES SERIES
Discharge: HOME | End: 2022-04-05
Attending: ORTHOPAEDIC SURGERY
Payer: COMMERCIAL

## 2022-04-05 PROCEDURE — 97110 THERAPEUTIC EXERCISES: CPT | Mod: GP | Performed by: PHYSICAL THERAPIST

## 2022-04-07 ENCOUNTER — TRANSFERRED RECORDS (OUTPATIENT)
Dept: HEALTH INFORMATION MANAGEMENT | Facility: CLINIC | Age: 67
End: 2022-04-07
Payer: COMMERCIAL

## 2022-04-07 ENCOUNTER — HOSPITAL ENCOUNTER (OUTPATIENT)
Dept: PHYSICAL THERAPY | Facility: CLINIC | Age: 67
Setting detail: THERAPIES SERIES
Discharge: HOME | End: 2022-04-07
Attending: ORTHOPAEDIC SURGERY
Payer: COMMERCIAL

## 2022-04-07 PROCEDURE — 97110 THERAPEUTIC EXERCISES: CPT | Mod: GP | Performed by: PHYSICAL THERAPIST

## 2022-04-08 NOTE — ADDENDUM NOTE
Addendum  created 04/08/22 1320 by Fabien Almeida APRN CRNA    Clinical Note Signed, Intraprocedure Blocks edited

## 2022-04-12 ENCOUNTER — HOSPITAL ENCOUNTER (OUTPATIENT)
Dept: PHYSICAL THERAPY | Facility: CLINIC | Age: 67
Setting detail: THERAPIES SERIES
Discharge: HOME | End: 2022-04-12
Attending: ORTHOPAEDIC SURGERY | Admitting: ORTHOPAEDIC SURGERY
Payer: COMMERCIAL

## 2022-04-12 PROCEDURE — 97110 THERAPEUTIC EXERCISES: CPT | Mod: GP | Performed by: PHYSICAL THERAPIST

## 2022-04-14 ENCOUNTER — HOSPITAL ENCOUNTER (OUTPATIENT)
Dept: PHYSICAL THERAPY | Facility: CLINIC | Age: 67
Setting detail: THERAPIES SERIES
Discharge: HOME | End: 2022-04-14
Attending: NURSE PRACTITIONER | Admitting: PEDIATRICS
Payer: COMMERCIAL

## 2022-04-14 PROCEDURE — 97110 THERAPEUTIC EXERCISES: CPT | Mod: GP | Performed by: PHYSICAL THERAPIST

## 2022-04-20 ENCOUNTER — HOSPITAL ENCOUNTER (OUTPATIENT)
Dept: PHYSICAL THERAPY | Facility: CLINIC | Age: 67
Setting detail: THERAPIES SERIES
Discharge: HOME | End: 2022-04-20
Attending: ORTHOPAEDIC SURGERY
Payer: COMMERCIAL

## 2022-04-20 PROCEDURE — 97110 THERAPEUTIC EXERCISES: CPT | Mod: GP | Performed by: PHYSICAL THERAPIST

## 2022-05-05 ENCOUNTER — TRANSFERRED RECORDS (OUTPATIENT)
Dept: HEALTH INFORMATION MANAGEMENT | Facility: CLINIC | Age: 67
End: 2022-05-05
Payer: COMMERCIAL

## 2022-05-18 NOTE — PROGRESS NOTES
PHYSICAL THERAPY DISCHARGE  04/20/22 0800   Signing Clinician's Name / Credentials   Signing clinician's name / credentials Kris Hoenk, PT   Session Number   Session Number 6  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   Treatment Detail recumb bike 4min full revol fwd, KF on step x10, mini squat x15, LAQ 3# x25, SL hip abd x20, SLR x20,  bridge x15, sit to stand from mat x15   Plan   Homework PTRx   Home program ex listed above   Plan for next session progress HEP and mobility/strength as able.  Pt cx next visit, then saw MD for 6wks check up, was told he could cont with HEP, no further contacts to clinic have been made, will discharge this episode of care   Total Session Time   Timed Code Treatment Minutes 25   Total Treatment Time (sum of timed and untimed services) 25   AMBULATORY CLINICS ONLY-MEDICAL AND TREATMENT DIAGNOSIS   Medical Diagnosis L TKA   PT Diagnosis decreased L knee ROM and strength ,  connie maier   [facility]  Kris Hoenk PT  [facility]  [address] [place] [address]  [email or web address]   Office: [phone]  Voicemail: [phone]

## 2022-09-04 DIAGNOSIS — E78.5 HYPERLIPIDEMIA LDL GOAL <100: ICD-10-CM

## 2022-09-07 RX ORDER — SIMVASTATIN 20 MG
20 TABLET ORAL AT BEDTIME
Qty: 90 TABLET | Refills: 3 | OUTPATIENT
Start: 2022-09-07

## 2022-09-07 NOTE — TELEPHONE ENCOUNTER
90 day supply with 3 refill sent 3/17/22 to [facility]. Refill request too soon. Refused.     Yael Mayer, RN, BSN, PHN  [facility]: [place]

## 2022-09-12 ENCOUNTER — TELEPHONE (OUTPATIENT)
Dept: FAMILY MEDICINE | Facility: CLINIC | Age: 67
End: 2022-09-12

## 2022-11-19 ENCOUNTER — HEALTH MAINTENANCE LETTER (OUTPATIENT)
Age: 67
End: 2022-11-19

## 2023-03-07 ENCOUNTER — TRANSFERRED RECORDS (OUTPATIENT)
Dept: HEALTH INFORMATION MANAGEMENT | Facility: CLINIC | Age: 68
End: 2023-03-07
Payer: COMMERCIAL

## 2023-03-13 NOTE — PATIENT INSTRUCTIONS
Please make an appointment to see the dentist.     Please bring in a copy of your health care directive.     Plan to check blood pressure out in the community and notify if is consistently (50% of time) above 130/90.      Patient Education   Personalized Prevention Plan  You are due for the preventive services outlined below.  Your care team is available to assist you in scheduling these services.  If you have already completed any of these items, please share that information with your care team to update in your medical record.  Health Maintenance Due   Topic Date Due    COVID-19 Vaccine (1) Never done    Pneumococcal Vaccine (1 - PCV) Never done    ANNUAL REVIEW OF HM ORDERS  07/15/2022    FALL RISK ASSESSMENT  07/15/2022    Flu Vaccine (1) Never done    PHQ-2 (once per calendar year)  01/01/2023     Preventive Health Recommendations  See your health care provider every year to  Review health changes.   Discuss preventive care.    Review your medicines if your doctor has prescribed any.  Talk with your health care provider about whether you should have a test to screen for prostate cancer (PSA).  Every 3 years, have a diabetes test (fasting glucose). If you are at risk for diabetes, you should have this test more often.  Every 5 years, have a cholesterol test. Have this test more often if you are at risk for high cholesterol or heart disease.   Every 10 years, have a colonoscopy. Or, have a yearly FIT test (stool test). These exams will check for colon cancer.  Talk to with your health care provider about screening for Abdominal Aortic Aneurysm if you have a family history of AAA or have a history of smoking.    Shots:   Get a flu shot each year.   Get a tetanus shot every 10 years.   Talk to your doctor about your pneumonia vaccines. There are now two you should receive - Pneumovax (PPSV 23) and Prevnar (PCV 13).  Talk to your pharmacist about a shingles vaccine.   Talk to your doctor about the hepatitis B  vaccine.    Nutrition:   Eat at least 5 servings of fruits and vegetables each day.   Eat whole-grain bread, whole-wheat pasta and brown rice instead of white grains and rice.   Get adequate Calcium and Vitamin D.     Lifestyle  Exercise for at least 150 minutes a week (30 minutes a day, 5 days a week). This will help you control your weight and prevent disease.   Limit alcohol to one drink per day.   No smoking.   Wear sunscreen to prevent skin cancer.   See your dentist every six months for an exam and cleaning.   See your eye doctor every 1 to 2 years to screen for conditions such as glaucoma, macular degeneration and cataracts.    Personalized Prevention Plan  You are due for the preventive services outlined below.  Your care team is available to assist you in scheduling these services.  If you have already completed any of these items, please share that information with your care team to update in your medical record.  Health Maintenance   Topic Date Due    COVID-19 Vaccine (1) Never done    Pneumococcal Vaccine: 65+ Years (1 - PCV) Never done    ANNUAL REVIEW OF HM ORDERS  07/15/2022    FALL RISK ASSESSMENT  07/15/2022    INFLUENZA VACCINE (1) Never done    PHQ-2 (once per calendar year)  01/01/2023    MEDICARE ANNUAL WELLNESS VISIT  03/16/2024    DTAP/TDAP/TD IMMUNIZATION (3 - Td or Tdap) 06/09/2025    ADVANCE CARE PLANNING  07/15/2026    LIPID  03/17/2027    COLORECTAL CANCER SCREENING  07/07/2030    HEPATITIS C SCREENING  Completed    ZOSTER IMMUNIZATION  Completed    AORTIC ANEURYSM SCREENING (SYSTEM ASSIGNED)  Completed    IPV IMMUNIZATION  Aged Out    MENINGITIS IMMUNIZATION  Aged Out

## 2023-03-13 NOTE — PROGRESS NOTES
"SUBJECTIVE:   Cory is a 68 year old who presents for Preventive Visit.  Patient has been advised of split billing requirements and indicates understanding: Yes  Are you in the first 12 months of your Medicare coverage?  No    Healthy Habits:     In general, how would you rate your overall health?  Good    Frequency of exercise:  2-3 days/week    Duration of exercise:  Other    Do you usually eat at least 4 servings of fruit and vegetables a day, include whole grains    & fiber and avoid regularly eating high fat or \"junk\" foods?  Yes    Taking medications regularly:  Yes    Medication side effects:  Not applicable    Ability to successfully perform activities of daily living:  No assistance needed    Home Safety:  No safety concerns identified    Hearing Impairment:  No hearing concerns    In the past 6 months, have you been bothered by leaking of urine?  No    In general, how would you rate your overall mental or emotional health?  Good      PHQ-2 Total Score: 0    Additional concerns today:  No    Patient would still like to discuss the following concern(s):  1. Refill medication      Have you ever done Advance Care Planning? (For example, a Health Directive, POLST, or a discussion with a medical provider or your loved ones about your wishes): Yes, patient states has an Advance Care Planning document and will bring a copy to the clinic.       Fall risk  Fallen 2 or more times in the past year?: No  Any fall with injury in the past year?: No    Cognitive Screening   1) Repeat 3 items (Leader, Season, Table)    2) Clock draw: ABNORMAL refer to OT  3) 3 item recall: Recalls 1 object   Results: ABNORMAL clock, 1-2 items recalled: PROBABLE COGNITIVE IMPAIRMENT, **INFORM PROVIDER**    Mini-CogTM Copyright [employer]. Licensed by the author for use in [facility]; reprinted with permission (unique@.[facility]). All rights reserved.      Do you have sleep apnea, excessive snoring or daytime drowsiness?: " yes    Reviewed and updated as needed this visit by clinical staff   Tobacco  Allergies  Meds              Reviewed and updated as needed this visit by Provider                 Social History     Tobacco Use     Smoking status: Never     Smokeless tobacco: Never   Substance Use Topics     Alcohol use: Yes         Alcohol Use 3/16/2023   Prescreen: >3 drinks/day or >7 drinks/week? Yes   AUDIT SCORE  7       Current providers sharing in care for this patient include:   Patient Care Team:  Indigo Alvarado APRN CNP as PCP - General (Nurse Practitioner - Family)  Indigo Alvarado APRN CNP as Assigned PCP  Herson Sanchez MD as Assigned Musculoskeletal Provider    The following health maintenance items are reviewed in Epic and correct as of today:  Health Maintenance   Topic Date Due     COVID-19 Vaccine (1) Never done     Pneumococcal Vaccine: 65+ Years (1 - PCV) Never done     ANNUAL REVIEW OF HM ORDERS  07/15/2022     INFLUENZA VACCINE (1) Never done     MEDICARE ANNUAL WELLNESS VISIT  03/16/2024     FALL RISK ASSESSMENT  03/16/2024     DTAP/TDAP/TD IMMUNIZATION (3 - Td or Tdap) 06/09/2025     LIPID  03/17/2027     ADVANCE CARE PLANNING  03/16/2028     COLORECTAL CANCER SCREENING  07/07/2030     HEPATITIS C SCREENING  Completed     PHQ-2 (once per calendar year)  Completed     ZOSTER IMMUNIZATION  Completed     AORTIC ANEURYSM SCREENING (SYSTEM ASSIGNED)  Completed     IPV IMMUNIZATION  Aged Out     MENINGITIS IMMUNIZATION  Aged Out     Current Outpatient Medications   Medication Sig Dispense Refill     Ascorbic Acid (VITAMIN C PO) Take 500 mg by mouth daily        B COMPLEX-ZINC PO Take 1 tablet by mouth daily        Cholecalciferol (VITAMIN D-3) 25 MCG (1000 UT) CAPS Take 1 capsule by mouth daily       doxylamine (UNISOM) 25 MG TABS Take 25 mg by mouth At Bedtime       ibuprofen (ADVIL/MOTRIN) 600 MG tablet Take 1 tablet (600 mg) by mouth every 6 hours as needed (mild) 30 tablet 0     lisinopril  (ZESTRIL) 10 MG tablet Take 1 tablet (10 mg) by mouth daily 90 tablet 3     Multiple Vitamins-Minerals (MULTIVITAMIN ADULT PO) Take 1 tablet by mouth daily        senna-docusate (SENOKOT-S/PERICOLACE) 8.6-50 MG tablet Take 1-2 tablets by mouth daily as needed for constipation Take while on oral narcotics to prevent or treat constipation. 15 tablet 0     simvastatin (ZOCOR) 20 MG tablet Take 1 tablet (20 mg) by mouth At Bedtime 90 tablet 3     No Known Allergies      Here today for physical. Is fasting today for labs.  Taking care of 93-year-old mother and has home.  She is on hospice.    Feels like has always had difficulty with memory.  All of life has needed to leave self notes.  Can recall things from 30 years ago but has a harder time with short-term memory.  Has not noticed any recent changes.  Others have not mention noticing any changes with memory    Checks blood pressure at home.  Gets similar to what we got here today.  No symptoms of high blood pressure.  No chest pain, palpitations or shortness of breath.  No headaches or vision changes.    Last PSA: 2021, no family history of prostate cancer.   Last Colonoscopy: 7/2020. Repeat in 10 years. No family history of colon cancer   Last eye exam: Was there recently.   Last dental exam: Yearly   Last tetanus vaccine: 2015  Last influenza vaccine: Declines   Last shingles vaccine: Has had both doses   Last pneumonia vaccine: Declines   Last COVID vaccine: Declines   Last COVID booster: Declines   Hep C screen (born [birth year]): 2019-negative   HIV screen: Declines   AAA screen (age 65-78 with smoking hx): N/A  IVD (HTN, Hyperlipid, Smoking): N/A  Lung CA screening (55-80, 30 pk smoking hx): N/A    Review of Systems   Constitutional: Negative for chills and fever.   HENT: Negative for congestion, ear pain, hearing loss and sore throat.    Eyes: Negative for pain and visual disturbance.   Respiratory: Negative for cough and shortness of breath.    Cardiovascular:  "Negative for chest pain, palpitations and peripheral edema.   Gastrointestinal: Positive for heartburn. Negative for abdominal pain, constipation, diarrhea, hematochezia and nausea.   Genitourinary: Negative for dysuria, frequency, genital sores, hematuria, impotence, penile discharge and urgency.   Musculoskeletal: Negative for arthralgias, joint swelling and myalgias.   Skin: Negative for rash.   Neurological: Positive for weakness. Negative for dizziness, headaches and paresthesias.   Psychiatric/Behavioral: Negative for mood changes. The patient is not nervous/anxious.          OBJECTIVE:   BP (!) 138/92   Pulse 96   Temp 97.6  F (36.4  C) (Temporal)   Resp 16   Ht 1.77 m (5' 9.69\")   Wt 106.1 kg (233 lb 12.8 oz)   SpO2 95%   BMI 33.85 kg/m   Estimated body mass index is 33.85 kg/m  as calculated from the following:    Height as of this encounter: 1.77 m (5' 9.69\").    Weight as of this encounter: 106.1 kg (233 lb 12.8 oz).  Physical Exam  Constitutional:       Appearance: He is well-developed.   HENT:      Right Ear: Tympanic membrane and external ear normal. No middle ear effusion. Tympanic membrane is not erythematous.      Left Ear: Tympanic membrane and external ear normal.  No middle ear effusion. Tympanic membrane is not erythematous.      Mouth/Throat:      Pharynx: Uvula midline.   Eyes:      Pupils: Pupils are equal, round, and reactive to light.   Neck:      Thyroid: No thyromegaly.      Vascular: No carotid bruit.   Cardiovascular:      Rate and Rhythm: Normal rate and regular rhythm.      Pulses:           Femoral pulses are 2+ on the right side and 2+ on the left side.     Heart sounds: Normal heart sounds.   Pulmonary:      Effort: Pulmonary effort is normal.      Breath sounds: Normal breath sounds.   Abdominal:      General: Bowel sounds are normal. There is no distension.      Palpations: Abdomen is soft.      Tenderness: There is no abdominal tenderness.   Musculoskeletal:         " General: Normal range of motion.   Skin:     General: Skin is warm and dry.   Neurological:      Mental Status: He is alert.         ASSESSMENT / PLAN:   Encounter for Medicare annual wellness exam  Screening guidelines reviewed.   - Hemoglobin A1c; Future  - Lipid panel reflex to direct LDL Fasting; Future  - Comprehensive metabolic panel; Future    Hyperlipidemia LDL goal <100  Most recent lipids reviewed.  Tolerating simvastatin well.  We will recheck labs here today.  Refill sent.  Follow-up in 1 year  - simvastatin (ZOCOR) 20 MG tablet; Take 1 tablet (20 mg) by mouth At Bedtime    Advance care planning  Encouraged to bring in copy of healthcare directive.    Morbid obesity (H)  Educated regarding need to be active for 30 to 40 minutes 3-4 times per week, decrease portions.  Could improve blood pressure and cholesterol    Memory change  Referral placed for occupational therapy evaluation  - Occupational Therapy Referral; Future    Screening for prostate cancer  Shared decision making regarding use of PSA testing.  Not currently having any symptoms, no decreased urine stream no difficulty emptying bladder, no nocturia.  - Prostate Specific Antigen Screen; Future    Benign essential hypertension  Home blood pressures and most recent office visit blood pressures reviewed.  We will plan to start on lisinopril.  Educated on use and possible side effects.  Encouraged to decrease alcohol intake.  Increase activity.  Lower sodium diet.  We will plan for repeat labs and blood pressure check with CMA in 1 month.  - lisinopril (ZESTRIL) 10 MG tablet; Take 1 tablet (10 mg) by mouth daily  - Albumin Random Urine Quantitative with Creat Ratio; Future        COUNSELING:  Reviewed preventive health counseling, as reflected in patient instructions       Regular exercise       Healthy diet/nutrition       Vision screening       Hearing screening       Immunizations    Declined: Covid-19, Influenza and Pneumococcal due to  "Conscientious objector               Aspirin prophylaxis        Alcohol Use        Hepatitis C screening       HIV screening for high risk patient       Colon cancer screening       Prostate cancer screening      BMI:   Estimated body mass index is 33.85 kg/m  as calculated from the following:    Height as of this encounter: 1.77 m (5' 9.69\").    Weight as of this encounter: 106.1 kg (233 lb 12.8 oz).   Weight management plan: Discussed healthy diet and exercise guidelines      He reports that he has never smoked. He has never used smokeless tobacco.      Appropriate preventive services were discussed with this patient, including applicable screening as appropriate for cardiovascular disease, diabetes, osteopenia/osteoporosis, and glaucoma.  As appropriate for age/gender, discussed screening for colorectal cancer, prostate cancer, breast cancer, and cervical cancer. Checklist reviewing preventive services available has been given to the patient.    Reviewed patients plan of care and provided an AVS. The Basic Care Plan (routine screening as documented in Health Maintenance) for Phong meets the Care Plan requirement. This Care Plan has been established and reviewed with the Patient.    CULLEN Talbot [facility]    Identified Health Risks:  "

## 2023-03-16 ENCOUNTER — OFFICE VISIT (OUTPATIENT)
Dept: FAMILY MEDICINE | Facility: CLINIC | Age: 68
End: 2023-03-16
Payer: COMMERCIAL

## 2023-03-16 VITALS
DIASTOLIC BLOOD PRESSURE: 92 MMHG | SYSTOLIC BLOOD PRESSURE: 138 MMHG | HEIGHT: 70 IN | TEMPERATURE: 97.6 F | BODY MASS INDEX: 33.47 KG/M2 | RESPIRATION RATE: 16 BRPM | WEIGHT: 233.8 LBS | HEART RATE: 96 BPM | OXYGEN SATURATION: 95 %

## 2023-03-16 DIAGNOSIS — E66.01 MORBID OBESITY (H): ICD-10-CM

## 2023-03-16 DIAGNOSIS — Z71.89 ADVANCE CARE PLANNING: ICD-10-CM

## 2023-03-16 DIAGNOSIS — R41.3 MEMORY CHANGE: ICD-10-CM

## 2023-03-16 DIAGNOSIS — I10 BENIGN ESSENTIAL HYPERTENSION: ICD-10-CM

## 2023-03-16 DIAGNOSIS — Z12.5 SCREENING FOR PROSTATE CANCER: ICD-10-CM

## 2023-03-16 DIAGNOSIS — Z00.00 ENCOUNTER FOR MEDICARE ANNUAL WELLNESS EXAM: Primary | ICD-10-CM

## 2023-03-16 DIAGNOSIS — E78.5 HYPERLIPIDEMIA LDL GOAL <100: ICD-10-CM

## 2023-03-16 LAB
ALBUMIN SERPL-MCNC: 3.8 G/DL (ref 3.4–5)
ALP SERPL-CCNC: 50 U/L (ref 40–150)
ALT SERPL W P-5'-P-CCNC: 36 U/L (ref 0–70)
ANION GAP SERPL CALCULATED.3IONS-SCNC: 4 MMOL/L (ref 3–14)
AST SERPL W P-5'-P-CCNC: 23 U/L (ref 0–45)
BILIRUB SERPL-MCNC: 0.6 MG/DL (ref 0.2–1.3)
BUN SERPL-MCNC: 18 MG/DL (ref 7–30)
CALCIUM SERPL-MCNC: 9.3 MG/DL (ref 8.5–10.1)
CHLORIDE BLD-SCNC: 101 MMOL/L (ref 94–109)
CHOLEST SERPL-MCNC: 195 MG/DL
CO2 SERPL-SCNC: 29 MMOL/L (ref 20–32)
CREAT SERPL-MCNC: 0.82 MG/DL (ref 0.66–1.25)
FASTING STATUS PATIENT QL REPORTED: YES
GFR SERPL CREATININE-BSD FRML MDRD: >90 ML/MIN/1.73M2
GLUCOSE BLD-MCNC: 96 MG/DL (ref 70–99)
HBA1C MFR BLD: 5.6 % (ref 0–5.6)
HDLC SERPL-MCNC: 76 MG/DL
LDLC SERPL CALC-MCNC: 91 MG/DL
NONHDLC SERPL-MCNC: 119 MG/DL
POTASSIUM BLD-SCNC: 4.4 MMOL/L (ref 3.4–5.3)
PROT SERPL-MCNC: 7.5 G/DL (ref 6.8–8.8)
PSA SERPL-MCNC: 2.54 UG/L (ref 0–4)
SODIUM SERPL-SCNC: 134 MMOL/L (ref 133–144)
TRIGL SERPL-MCNC: 138 MG/DL

## 2023-03-16 PROCEDURE — G0439 PPPS, SUBSEQ VISIT: HCPCS | Performed by: NURSE PRACTITIONER

## 2023-03-16 PROCEDURE — 80061 LIPID PANEL: CPT | Performed by: NURSE PRACTITIONER

## 2023-03-16 PROCEDURE — G0103 PSA SCREENING: HCPCS | Performed by: NURSE PRACTITIONER

## 2023-03-16 PROCEDURE — 99214 OFFICE O/P EST MOD 30 MIN: CPT | Mod: 25 | Performed by: NURSE PRACTITIONER

## 2023-03-16 PROCEDURE — 36415 COLL VENOUS BLD VENIPUNCTURE: CPT | Performed by: NURSE PRACTITIONER

## 2023-03-16 PROCEDURE — 83036 HEMOGLOBIN GLYCOSYLATED A1C: CPT | Performed by: NURSE PRACTITIONER

## 2023-03-16 PROCEDURE — 80053 COMPREHEN METABOLIC PANEL: CPT | Performed by: NURSE PRACTITIONER

## 2023-03-16 RX ORDER — SIMVASTATIN 20 MG
20 TABLET ORAL AT BEDTIME
Qty: 90 TABLET | Refills: 3 | Status: SHIPPED | OUTPATIENT
Start: 2023-03-16 | End: 2024-03-25

## 2023-03-16 RX ORDER — LISINOPRIL 10 MG/1
10 TABLET ORAL DAILY
Qty: 90 TABLET | Refills: 3 | Status: SHIPPED | OUTPATIENT
Start: 2023-03-16 | End: 2024-03-25

## 2023-03-16 ASSESSMENT — ENCOUNTER SYMPTOMS
HEMATURIA: 0
ABDOMINAL PAIN: 0
DIARRHEA: 0
HEARTBURN: 1
FREQUENCY: 0
JOINT SWELLING: 0
EYE PAIN: 0
ARTHRALGIAS: 0
HEMATOCHEZIA: 0
DIZZINESS: 0
SHORTNESS OF BREATH: 0
PALPITATIONS: 0
CONSTIPATION: 0
SORE THROAT: 0
COUGH: 0
PARESTHESIAS: 0
CHILLS: 0
HEADACHES: 0
MYALGIAS: 0
DYSURIA: 0
NAUSEA: 0
NERVOUS/ANXIOUS: 0
WEAKNESS: 1
FEVER: 0

## 2023-03-16 ASSESSMENT — ACTIVITIES OF DAILY LIVING (ADL): CURRENT_FUNCTION: NO ASSISTANCE NEEDED

## 2023-04-06 ENCOUNTER — HOSPITAL ENCOUNTER (OUTPATIENT)
Dept: OCCUPATIONAL THERAPY | Facility: CLINIC | Age: 68
Setting detail: THERAPIES SERIES
Discharge: HOME | End: 2023-04-06
Attending: NURSE PRACTITIONER
Payer: COMMERCIAL

## 2023-04-06 DIAGNOSIS — R41.3 MEMORY CHANGE: ICD-10-CM

## 2023-04-06 PROCEDURE — 97535 SELF CARE MNGMENT TRAINING: CPT | Mod: GO

## 2023-04-06 PROCEDURE — 97165 OT EVAL LOW COMPLEX 30 MIN: CPT | Mod: GO

## 2023-04-08 NOTE — PROGRESS NOTES
[facility]    OUTPATIENT OCCUPATIONAL THERAPY  EVALUATION  PLAN OF TREATMENT FOR OUTPATIENT REHABILITATION  (COMPLETE FOR INITIAL CLAIMS ONLY)  Patient's Last Name, First Name, M.I.  YOB: 1955  Phong Cuevas                        Provider's Name  [facility] Medical Record No.  [id number]                               Onset Date:     03/16/23 (Order date)   Start of Care Date:     04/06/23   Type:     ___PT   _X_OT   ___SLP Medical Diagnosis:     Memory change                          OT Diagnosis:     Deficit in short term memory Visits from SOC:  1   _________________________________________________________________________________  Plan of Treatment/Functional Goals:  Self care/Home management     Goals  Goal Identifier: Assessment Findings  Goal Description: Pt will demonstrate understanding of COGNISTAT findings and recommendations made by therapist.  Target Date: 04/06/23  Date Met: 04/06/23  Goal Identifier: Memory strategies  Goal Description: Pt will demonstrate understanding of memory strategies to utilize in everyday tasks.  Target Date: 04/06/23  Date Met: 04/06/23        Therapy Frequency: 1 time eval/treat     Predicted Duration of Therapy Intervention (days/wks): 1 time eval/treat  SILVINO Encarnacion          I CERTIFY THE NEED FOR THESE SERVICES FURNISHED UNDER        THIS PLAN OF TREATMENT AND WHILE UNDER MY CARE     (Physician co-signature of this document indicates review and certification of the therapy plan).              Certification date from: 04/06/23, Certification date to: 04/06/23             Referring Physician: CULLEN Cary CNP     Initial Assessment        See Epic Evaluation      Start Of Care Date: 04/06/23 04/06/23 1000   Quick Adds   Quick Adds Certification   Type of Visit Initial Outpatient Occupational  Therapy Evaluation       Present No   General Information   Start Of Care Date 04/06/23   Referring Physician CULLEN Cary CNP   Orders Evaluate and treat as indicated   Orders Date 03/16/23   Medical Diagnosis Memory change   Onset of Illness/Injury or Date of Surgery 03/16/23  (Order date)   Precautions/Limitations No known precautions/limitations   Surgical/Medical History Reviewed Yes   Comments/Observations Pt feels like things are going well and is not sure the meaning behind the appointment today. Pt reports he told his doctor he would come to this appointment today due to the abnormal cognitive results from his visit on 3/16/2023 where pt had an abnormal clock drawing and was only able to recall 1-2 items.   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   Patient/family Goals Statement No goal stated. Pt reports he has had concerns with short term memory his whole life.   Pain   Patient currently in pain No   Fall Risk Screen   Fall screen completed by OT   Have you fallen 2 or more times in the past year? No   Have you fallen and had an injury in the past year? No   Abuse Screen (yes response referral indicated)   Feels Unsafe at Home or Work/School no   Feels Threatened by Someone no   Does Anyone Try to Keep You From Having Contact with Others or Doing Things Outside Your Home? no   Physical Signs of Abuse  "Present no   Cognitive Status Examination   Orientation Orientation to person, place and time   Level of Consciousness Alert   Follows Commands and Answers Questions 100% of the time   Cognitive Comment COGNISTAT score: average in all categories expect for memory. See additional details on COGNISTAT write up.  (Pt reports he can rememeber things from the 5th grade. Having difficulty with short term memory is \"nothing new\" for him.)   Sensation   Sensation Comments N/A   Range of Motion (ROM)   ROM Comments N/A   Strength   Strength Comments N/A   Functional Mobility   Functional Mobility Comments Ambulatory without device   Transfer Skill   Level of [place]: Transfers independent   Bathing   Level of [place] - Bathing independent   Upper Body Dressing   Level of [place]: Dress Upper Body independent   Lower Body Dressing   Level of [place]: Dress Lower Body independent   Toileting   Level of [place]: Toilet independent   Grooming   Level of [place]: Grooming independent   Eating/Self-Feeding   Level of [place]: Eating independent   Activity Tolerance   Activity Tolerance Pt reports exercising 1-2 times a week. Typically on the weekends.   Planned Therapy Interventions   Planned Therapy Interventions Self care/Home management   Adult OT Eval Goals   OT Eval Goals (Adult) 1;2    OT Goal 1   Goal Identifier Assessment Findings   Goal Description Pt will demonstrate understanding of COGNISTAT findings and recommendations made by therapist.   Target Date 04/06/23   Date Met 04/06/23    OT Goal 2   Goal Identifier Memory strategies   Goal Description Pt will demonstrate understanding of memory strategies to utilize in everyday tasks.   Target Date 04/06/23   Date Met 04/06/23   Clinical Impression   Criteria for Skilled Therapeutic Interventions Met Yes, treatment indicated   OT Diagnosis Deficit in short term memory   Influenced by the following impairments Short term memory "   Assessment of Occupational Performance 1-3 Performance Deficits   Clinical Decision Making (Complexity) Low complexity   Therapy Frequency 1 time eval/treat   Predicted Duration of Therapy Intervention (days/wks) 1 time eval/treat   Risks and Benefits of Treatment have been explained. Yes   Patient, Family & other staff in agreement with plan of care Yes   Clinical Impression Comments Pt is a 68 year old male who was referred to OT services for a memory concern due to abnormal results on cognitive screen at last visit. No OT needs at this time.   Education Assessment   Barriers To Learning No Barriers   Preferred Learning Style Listening;Reading;Demonstration;Pictures/video   Therapy Certification   Certification date from 23   Certification date to 23   Certification I certify the need for these services furnished under this plan of treatment and while under my care.  (Physician co-signature of this document indicates review and certification of the therapy plan)   Total Evaluation Time   OT Eval, Low Complexity Minutes (63154) 10     Cognistat    SUMMARY OF TEST:    The Cognistat is designed to assess functioning in five major areas:  Language, Constructions, Memory, Calculations and Reasoning.  Attention, Level of Consciousness and Orientation are assessed independently.  Scores are plotted on a profile which illustrates the overall pattern of abilities and disabilities.  Scores can be compared to norms and/or representative profiles for various populations.    DATE OF TESTIN2023    RESULTS OF TESTING:    This patient scores in the average range for Level of Consciousness, Orientation, Attention, Language, Constructions, Calculations, and Reasoning    This patient scores in the mildly impaired for Memory    Test results comments:  Pt failing screen for REP and NAM but able to correctly answer all additional questions in those categories. Design 3 on CONST took the pt over 1 min to complete.  Pt scoring mildly impaired for memory category of needing to remember the 4 words, needing prompts to remember 2/4. Pt stating that he knew he would have a hard time remembering the 4 words when therapist initially asked him to remember them since short term memory has been difficult for him his whole life.     INTERPRETATION OF TEST RESULTS:    Pt demonstrates mild impairment in memory. No safety concerns in regards to memory and no concerns in pt completing I/ADLs.    Factors affecting performance:  No additional problems noted    Recommendations:  Utilize tips to aid memory strategies.     TIME ADMINISTERING TEST: 15    TIME FOR INTERPRETATION AND PREPARATION OF REPORT: 10    TOTAL TIME: 25

## 2023-04-17 ENCOUNTER — LAB (OUTPATIENT)
Dept: LAB | Facility: CLINIC | Age: 68
End: 2023-04-17
Payer: COMMERCIAL

## 2023-04-17 DIAGNOSIS — Z00.00 ENCOUNTER FOR MEDICARE ANNUAL WELLNESS EXAM: ICD-10-CM

## 2023-04-17 DIAGNOSIS — I10 BENIGN ESSENTIAL HYPERTENSION: ICD-10-CM

## 2023-04-17 DIAGNOSIS — Z12.5 SCREENING FOR PROSTATE CANCER: ICD-10-CM

## 2023-04-17 DIAGNOSIS — E66.01 MORBID OBESITY (H): ICD-10-CM

## 2023-04-17 DIAGNOSIS — Z71.89 ADVANCE CARE PLANNING: ICD-10-CM

## 2023-04-17 DIAGNOSIS — E78.5 HYPERLIPIDEMIA LDL GOAL <100: ICD-10-CM

## 2023-04-17 DIAGNOSIS — R41.3 MEMORY CHANGE: ICD-10-CM

## 2023-04-17 PROCEDURE — 82043 UR ALBUMIN QUANTITATIVE: CPT

## 2023-04-17 PROCEDURE — 82570 ASSAY OF URINE CREATININE: CPT

## 2023-04-18 LAB
CREAT UR-MCNC: 99 MG/DL
MICROALBUMIN UR-MCNC: 6 MG/L
MICROALBUMIN/CREAT UR: 6.06 MG/G CR (ref 0–17)

## 2023-04-19 ENCOUNTER — TELEPHONE (OUTPATIENT)
Dept: FAMILY MEDICINE | Facility: CLINIC | Age: 68
End: 2023-04-19
Payer: COMMERCIAL

## 2023-04-19 NOTE — TELEPHONE ENCOUNTER
Patient received a call about a no show on Monday. He did not no show he was there and peed in a cup and received his results yesterday please call and explain.  Katy Reagan   [facility]

## 2023-07-19 ENCOUNTER — OFFICE VISIT (OUTPATIENT)
Dept: FAMILY MEDICINE | Facility: CLINIC | Age: 68
End: 2023-07-19
Payer: COMMERCIAL

## 2023-07-19 VITALS
TEMPERATURE: 97.2 F | RESPIRATION RATE: 18 BRPM | SYSTOLIC BLOOD PRESSURE: 128 MMHG | BODY MASS INDEX: 32.76 KG/M2 | DIASTOLIC BLOOD PRESSURE: 76 MMHG | HEIGHT: 69 IN | HEART RATE: 114 BPM | WEIGHT: 221.2 LBS | OXYGEN SATURATION: 97 %

## 2023-07-19 DIAGNOSIS — R21 RASH: Primary | ICD-10-CM

## 2023-07-19 DIAGNOSIS — B35.1 ONYCHOMYCOSIS: ICD-10-CM

## 2023-07-19 PROCEDURE — 99213 OFFICE O/P EST LOW 20 MIN: CPT | Performed by: PHYSICIAN ASSISTANT

## 2023-07-19 RX ORDER — CICLOPIROX 80 MG/ML
SOLUTION TOPICAL
Qty: 6.6 ML | Refills: 11 | Status: SHIPPED | OUTPATIENT
Start: 2023-07-19 | End: 2024-04-18

## 2023-07-19 RX ORDER — TRIAMCINOLONE ACETONIDE 0.25 MG/G
OINTMENT TOPICAL 2 TIMES DAILY
Qty: 45 G | Refills: 0 | Status: SHIPPED | OUTPATIENT
Start: 2023-07-19 | End: 2024-04-18

## 2023-07-19 ASSESSMENT — PAIN SCALES - GENERAL: PAINLEVEL: NO PAIN (0)

## 2023-07-19 NOTE — PROGRESS NOTES
Assessment & Plan   Problem List Items Addressed This Visit    None  Visit Diagnoses     Rash    -  Primary    Relevant Medications    ciclopirox (PENLAC) 8 % external solution    triamcinolone (KENALOG) 0.025 % external ointment    Onychomycosis        Relevant Medications    ciclopirox (PENLAC) 8 % external solution    triamcinolone (KENALOG) 0.025 % external ointment         It is my impression based on the historical events and the physical exam that this is a patch of eczema and onychomycosis.  I do not believe the patient has underlying  Cellulitis, osteomyelitis, anaphylaxis, angioedema, septic joint, abscess, or necrotizing fasciitis. Will treat with the above, otc moisturizing creams and have him follow up with primary or myself in one month if not improving.     Complete history and physical exam as below. Afebrile with normal vital signs  aside from tachycardia which resolved by the time I examined him.    DDx and Dx discussed with and explained to the pt to their satisfaction.  All questions were answered at this time. Pt expressed understanding of and agreement with this dx, tx, and plan. No further workup warranted and standard medication warnings given. I have given the patient a list of pertinent indications for re-evaluation. Will go to the Emergency Department if symptoms worsen or new concerning symptoms arise. Patient left in no apparent distress.     Prescription drug management     See Patient Instructions    SANDRA Montoya  [facility] ADAMARIS Ray is a 68 year old, presenting for the following health issues:  Derm Problem (Dry skin/)        7/19/2023     7:46 AM   Additional Questions   Roomed by Bailey   Accompanied by Self     History of Present Illness       Reason for visit:  Rash  Symptom onset:  More than a month    He eats 4 or more servings of fruits and vegetables daily.He consumes 0 sweetened beverage(s) daily.He exercises with enough effort to  "increase his heart rate 9 or less minutes per day.  He exercises with enough effort to increase his heart rate 3 or less days per week.   He is taking medications regularly.       Patient has a red area on back of left calf that has been there for a bout 8 months, not really bothersome. Not itchy, no drainage, painful if rubbed or bumped. No new exposures. No fevers, chills. Also has right great toenail discoloration and thickening.      Review of Systems   Constitutional, HEENT, cardiovascular, pulmonary, gi and gu systems are negative, except as otherwise noted.      Objective    /76   Pulse 114   Temp 97.2  F (36.2  C) (Temporal)   Resp 18   Ht 1.74 m (5' 8.5\")   Wt 100.3 kg (221 lb 3.2 oz)   SpO2 97%   BMI 33.14 kg/m    Body mass index is 33.14 kg/m .  Physical Exam  Vitals and nursing note reviewed.   Constitutional:       General: He is not in acute distress.     Appearance: He is not ill-appearing or diaphoretic.   HENT:      Head: Normocephalic and atraumatic.      Mouth/Throat:      Mouth: Mucous membranes are moist.   Eyes:      Conjunctiva/sclera: Conjunctivae normal.   Cardiovascular:      Rate and Rhythm: Normal rate and regular rhythm.      Heart sounds: Normal heart sounds. No murmur heard.     No friction rub. No gallop.   Pulmonary:      Effort: Pulmonary effort is normal. No respiratory distress.      Breath sounds: Normal breath sounds. No stridor. No wheezing, rhonchi or rales.   Skin:     General: Skin is warm and dry.      Comments: LLE: ~8cm patch of erythema and scaling. No warmth, drainage, fluctuance or tenderness. Distal CMS intact. Remainder of limb non-tender. No edema.    RLE: thickened great toenail with yellow-black discoloration.   Neurological:      General: No focal deficit present.      Mental Status: He is alert. Mental status is at baseline.   Psychiatric:         Mood and Affect: Mood normal.         Behavior: Behavior normal.                        "

## 2023-07-19 NOTE — PATIENT INSTRUCTIONS
Crystal Ray,    Thank you for allowing [facility] to manage your care.    I am unsure of the cause of your symptoms, but this looks most consistent with eczema and a fungal nail infection.    If you develop worsening/changing symptoms at any time, please be seen again in clinic or go to the emergency department for evaluation.    I sent your prescriptions to your pharmacy.    Drink 8-10 glasses of fluid daily to stay well-hydrated.    If you have any questions or concerns, please feel free to call us at [phone]    Sincerely,    Vish Higgins PA-C    Did you know?      You can schedule a video visit for follow-up appointments as well as future appointments for certain conditions.  Please see the below link.     [email or web address]    If you have not already done so,  I encourage you to sign up for Mychart ([email or web address]).  This will allow you to review your results, securely communicate with a provider, and schedule virtual visits as well.

## 2023-08-15 ENCOUNTER — OFFICE VISIT (OUTPATIENT)
Dept: FAMILY MEDICINE | Facility: CLINIC | Age: 68
End: 2023-08-15
Payer: COMMERCIAL

## 2023-08-15 VITALS
RESPIRATION RATE: 20 BRPM | SYSTOLIC BLOOD PRESSURE: 126 MMHG | BODY MASS INDEX: 31.81 KG/M2 | HEIGHT: 70 IN | TEMPERATURE: 97 F | HEART RATE: 96 BPM | OXYGEN SATURATION: 96 % | DIASTOLIC BLOOD PRESSURE: 90 MMHG | WEIGHT: 222.2 LBS

## 2023-08-15 DIAGNOSIS — L98.9 SKIN LESION: Primary | ICD-10-CM

## 2023-08-15 DIAGNOSIS — G89.29 CHRONIC BILATERAL BACK PAIN, UNSPECIFIED BACK LOCATION: ICD-10-CM

## 2023-08-15 DIAGNOSIS — M40.00 KYPHOSIS (ACQUIRED) (POSTURAL): ICD-10-CM

## 2023-08-15 DIAGNOSIS — M54.2 NECK PAIN: ICD-10-CM

## 2023-08-15 DIAGNOSIS — M54.9 CHRONIC BILATERAL BACK PAIN, UNSPECIFIED BACK LOCATION: ICD-10-CM

## 2023-08-15 PROCEDURE — 99214 OFFICE O/P EST MOD 30 MIN: CPT | Performed by: PHYSICIAN ASSISTANT

## 2023-08-15 ASSESSMENT — ENCOUNTER SYMPTOMS
WEAKNESS: 1
ARTHRALGIAS: 1
NECK PAIN: 1
WOUND: 1
BACK PAIN: 1
FEVER: 0

## 2023-08-15 ASSESSMENT — PAIN SCALES - GENERAL: PAINLEVEL: SEVERE PAIN (6)

## 2023-08-15 NOTE — PROGRESS NOTES
Assessment & Plan   Patient is a 60-year-old male who presents to clinic with multiple concerns.    Skin lesion  Patient notes skin lesion of the posterior shoulder which he admits to picking at.  The area is not itchy or painful.  No history of skin cancer.  Unable to evaluate area due to manipulation and scabbing.  Recommended avoiding manipulation of this area with reevaluation of lesion in 2 weeks.    Neck pain  Chronic bilateral back pain, unspecified back location  Kyphosis (acquired) (postural)  Patient notes chronic neck pain, back pain, shoulder pain, and stiffness in his legs.  Symptoms have improved with recent stretching.  On exam, patient does have significantly kyphotic posture as well as rounded forward shoulder position.  Positioning and kyphosis are likely contributing to symptoms.  Recommended continuing stretching as this has helped significantly.  Additionally, recommended attending physical therapy and referral placed.  Discussed expected course of recovery and return precautions.  - Physical Therapy Referral; Future       See Patient Instructions    Marybeth Abad PA-C  [facility] ADAMARIS Ray is a 68 year old, presenting for the following health issues:  Musculoskeletal Problem (Patient is experiencing Rt Shoulder pain)      8/15/2023     7:47 AM   Additional Questions   Roomed by Melony PEREZ MA   Accompanied by No one         8/15/2023     7:47 AM   Patient Reported Additional Medications   Patient reports taking the following new medications Naproxen, Ibuprofen       Musculoskeletal Problem  Associated symptoms include arthralgias, neck pain and weakness. Pertinent negatives include no fever.        Pain History:  When did you first notice your pain? 3 weeks of pain that radiates from right low back to right shoulder and neck. Patient notes pain is more severe when sitting. He started stretching and this has helped. He notes decreased strength on the right  shoulder. Patient notes that if he sits or drives for 3-4 hours, he has severe stiffness and pain in the bilateral legs and can't walk easily. Patient notes significant weight loss related to dietary changes.  For exercise he uses the recumbent bike.  He notes he used to ride mountain bike more often.  Have you seen anyone else for your pain? No  How has your pain affected your ability to work? Patient is self employed so yes it has but nothing that has stopped him from working  Where in your body do you have pain? Back Pain  Onset/Duration: 3 weeks  Description:   Location of pain: low back right  Character of pain: sharp, stabbing, burning, cramping, and intermittent  Pain radiation: radiates into the right buttocks and up to the neck  New numbness or weakness in legs, not attributed to pain: YES always after sitting  Intensity: Currently 3/10, At its worst 7/10  Progression of Symptoms: worsening and constant  History:   Specific cause: sitting or rest. It gets better with moving around  Pain interferes with job: YES  History of back problems: no prior back problems  Any previous MRI or X-rays: None  Sees a specialist for back pain: No  Alleviating factors:   Improved by: walking and standing    Precipitating factors:  Worsened by: Sitting , but when laying its okay  Therapies tried and outcome: acetaminophen (Tylenol), NSAIDs, standing, and stretch    Accompanying Signs & Symptoms:  Risk of Fracture: None  Risk of Cauda Equina: None  Risk of Infection: None  Risk of Cancer: None  Risk of Ankylosing Spondylitis: Onset at age <35, male, AND morning back stiffness No    Patient notes lesion on the right shoulder that he has been picking at. The area is not itchy or painful. No personal or family history of skin cancer.        Review of Systems   Constitutional:  Negative for fever.   Musculoskeletal:  Positive for arthralgias, back pain and neck pain.   Skin:  Positive for wound.   Neurological:  Positive for  "weakness.            Objective    BP (!) 126/90   Pulse 96   Temp 97  F (36.1  C) (Temporal)   Resp 20   Ht 1.778 m (5' 10\")   Wt 100.8 kg (222 lb 3.2 oz)   SpO2 96%   BMI 31.88 kg/m    Body mass index is 31.88 kg/m .  Physical Exam  Vitals and nursing note reviewed.   Constitutional:       General: He is not in acute distress.     Appearance: Normal appearance.   HENT:      Head: Normocephalic and atraumatic.   Eyes:      Extraocular Movements: Extraocular movements intact.      Pupils: Pupils are equal, round, and reactive to light.   Cardiovascular:      Rate and Rhythm: Normal rate.   Pulmonary:      Effort: Pulmonary effort is normal.   Musculoskeletal:         General: Normal range of motion.      Cervical back: Normal range of motion.      Comments: No tenderness to palpation of cervical, thoracic, or lumbar spine.  Cautious gait.  Kyphosis.      Neck: range of motion limited in extension, left lateral and right lateral bending.  ROM otherwise WNL.    Shoulder: ROM limited due to forward posture/rounded shoulders and kyphosis.   Skin:     General: Skin is warm and dry.      Comments: 1 cm scab formation on posterior aspect of right shoulder    Neurological:      General: No focal deficit present.      Mental Status: He is alert.   Psychiatric:         Mood and Affect: Mood normal.         Behavior: Behavior normal.                              "

## 2023-08-15 NOTE — PATIENT INSTRUCTIONS
For further evaluation of your skin lesion, please avoid picking the area and we will look at it again in 2 weeks to see how it is changed or if it has healed.      Your neck pain, back pain, and shoulder pain is likely due to posture and muscle changes related to your daily activities.  For treatment I would like you to start stretching more often.  Please use a doorway to stretch overhead and with your arms at 90 degrees.  Make sure you are doing neck range of motion daily.  When you are working try to sit up straight with your neck up and shoulders back and down.  I have placed a referral to physical therapy to help you with stretching and strength.  If this does not improve your symptoms over the next 4 to 6 weeks, please reach out for next steps.    Additionally reach out with questions or concerns.

## 2023-08-29 ENCOUNTER — THERAPY VISIT (OUTPATIENT)
Dept: PHYSICAL THERAPY | Facility: CLINIC | Age: 68
End: 2023-08-29
Attending: PHYSICIAN ASSISTANT
Payer: COMMERCIAL

## 2023-08-29 DIAGNOSIS — G89.29 CHRONIC BILATERAL BACK PAIN, UNSPECIFIED BACK LOCATION: ICD-10-CM

## 2023-08-29 DIAGNOSIS — M54.2 NECK PAIN: ICD-10-CM

## 2023-08-29 DIAGNOSIS — M40.00 KYPHOSIS (ACQUIRED) (POSTURAL): ICD-10-CM

## 2023-08-29 DIAGNOSIS — M54.9 CHRONIC BILATERAL BACK PAIN, UNSPECIFIED BACK LOCATION: ICD-10-CM

## 2023-08-29 PROCEDURE — 97140 MANUAL THERAPY 1/> REGIONS: CPT | Mod: GP | Performed by: PHYSICAL THERAPIST

## 2023-08-29 PROCEDURE — 97161 PT EVAL LOW COMPLEX 20 MIN: CPT | Mod: GP | Performed by: PHYSICAL THERAPIST

## 2023-08-29 PROCEDURE — 97110 THERAPEUTIC EXERCISES: CPT | Mod: GP | Performed by: PHYSICAL THERAPIST

## 2023-08-29 NOTE — PROGRESS NOTES
PHYSICAL THERAPY EVALUATION  Type of Visit: Evaluation    See electronic medical record for Abuse and Falls Screening details.    Subjective Pt notes pain throughout the full body. He notes when sitting for long periods of time being unable to ambulate due to weakness. Pt notes with work, he is bent forward.  R shoulder Sx and down the UE and into the neck when sitting. Needs to take a break to reduce the Sx by ambulating.       Presenting condition or subjective complaint: everything hurts  Date of onset: 08/15/23    Relevant medical history:     Dates & types of surgery: TKA in 2020    Prior diagnostic imaging/testing results:       Prior therapy history for the same diagnosis, illness or injury: No      Prior Level of Function      Living Environment  Social support: With a significant other or spouse   Type of home: House   Stairs to enter the home: Yes       Ramp: No   Stairs inside the home: No       Help at home: None  Equipment owned:       Employment: Yes machinest  Hobbies/Interests: racing    Patient goals for therapy: walk without hurting    Pain assessment: Pain present     Objective   SHOULDER EVALUATION  PAIN: Pain is Exacerbated By: sitting at work with dismantling metals, 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  POSTURE:  increased kyphosis and rounded shoulders, fwd head posture  GAIT: slouched upper body posture, fwd head posture, minimal arm swing, R toeing-out, R LE longer than L  ROM:  flex- 75* and abd-85*, ER in netural- 80*, IR- reaches back pocket;  L rot- 68* and R- 58* ; SB and rot to the L with flexion, no lumbar extension motion  STRENGTH:  ER- 4-/5, flex- 3-/5, abd- 3-/5  FLEXIBILITY:  restricted in the joint on the R with limited scapular upward rotation  SPECIAL TESTS:  + LLD with R longer than L;  + Neer's;  + empty can; - spurlings  JOINT MOBILITY:  limited thoracic mobility, restricted in R rotation at lower cervical, limited mobility with upward rotation and limited shoulder  joint    Assessment & Plan   CLINICAL IMPRESSIONS  Medical Diagnosis: Neck pain (M54.2)    Chronic bilateral back pain, unspecified back location (M54.9, G89.29)    Kyphosis (acquired) (postural) (M40.00)    Treatment Diagnosis: thoracic pain with R shoulder pain   Impression/Assessment: Patient is a 68 year old male with pain in the R shoulder and poor posture complaints.  The following significant findings have been identified: Pain, Decreased ROM/flexibility, Decreased joint mobility, Decreased strength, Impaired muscle performance, Decreased activity tolerance, and Impaired posture. These impairments interfere with their ability to perform work tasks, recreational activities, and driving  as compared to previous level of function.     Clinical Decision Making (Complexity):  Clinical Presentation: Evolving/Changing  Clinical Presentation Rationale: based on medical and personal factors listed in PT evaluation: radiating Sx into the R UE and on and off in the LEs  Clinical Decision Making (Complexity): Low complexity    PLAN OF CARE  Treatment Interventions:  Modalities: Mechanical Traction  Interventions: Gait Training, Manual Therapy, Neuromuscular Re-education, Therapeutic Exercise    Long Term Goals     PT Goal 1  Goal Identifier: STG  Goal Description: Pt will have 115* of shoulder flexion and abduction in 4 weeks to be able to reach overhead.  Target Date: 09/26/23  PT Goal 2  Goal Identifier: LTG  Goal Description: Pt will 65* R rotation of the head to improve his mobility and reduce aggravated Sx in 10 weeks.  Target Date: 11/07/23  PT Goal 3  Goal Identifier: LTG  Goal Description: Pt will be independent in home strengthening program for self management of Sx in 10 weeks.  Target Date: 11/07/23      Frequency of Treatment: 1x/wk  Duration of Treatment: 10 weeks    Education Assessment:   Learner/Method: Patient;Demonstration;No Barriers to Learning    Risks and benefits of evaluation/treatment have been  explained.   Patient/Family/caregiver agrees with Plan of Care.     Evaluation Time:     PT Eval, Low Complexity Minutes (87881): 20     Signing Clinician: DELLA Hernandez [facility]                                                                                   OUTPATIENT PHYSICAL THERAPY      PLAN OF TREATMENT FOR OUTPATIENT REHABILITATION   Patient's Last Name, First Name, Phong Schultz YOB: 1955   Provider's Name   [facility]   Medical Record No.  [id number]     Onset Date: 08/15/23  Start of Care Date: 08/29/23     Medical Diagnosis:  Neck pain (M54.2)    Chronic bilateral back pain, unspecified back location (M54.9, G89.29)    Kyphosis (acquired) (postural) (M40.00)      PT Treatment Diagnosis:  thoracic pain with R shoulder pain Plan of Treatment  Frequency/Duration: 1x/wk/ 10 weeks    Certification date from 08/29/23 to 11/07/23         See note for plan of treatment details and functional goals     Melany Obrien PT                         I CERTIFY THE NEED FOR THESE SERVICES FURNISHED UNDER        THIS PLAN OF TREATMENT AND WHILE UNDER MY CARE     (Physician attestation of this document indicates review and certification of the therapy plan).                Referring Provider:  Marybeth Abad      Initial Assessment  See Epic Evaluation- Start of Care Date: 08/29/23

## 2023-09-05 ENCOUNTER — TELEPHONE (OUTPATIENT)
Dept: FAMILY MEDICINE | Facility: CLINIC | Age: 68
End: 2023-09-05
Payer: COMMERCIAL

## 2023-09-05 NOTE — TELEPHONE ENCOUNTER
Reason for Call:  Appointment Request    Patient requesting this type of appt:  Office visit    Requested provider:  Marybeth Abad    Reason patient unable to be scheduled: Not within requested timeframe    When does patient want to be seen/preferred time: 1-2 days    Comments: Unwell and not eating. Leaving to [place] first thing next week.     [facility] or would you prefer to receive a phone call?:   Patient would prefer a phone call   Okay to leave a detailed message?: Yes at Cell number on file:    Telephone Information:   Mobile [phone]       Call taken on 9/5/2023 at 9:09 AM by CARLEY ROY

## 2023-09-06 ENCOUNTER — HOSPITAL ENCOUNTER (EMERGENCY)
Facility: CLINIC | Age: 68
Discharge: LEFT BEFORE DISCHARGE | End: 2023-09-06
Admitting: EMERGENCY MEDICINE
Payer: COMMERCIAL

## 2023-09-06 VITALS
RESPIRATION RATE: 18 BRPM | HEART RATE: 88 BPM | WEIGHT: 205 LBS | BODY MASS INDEX: 29.35 KG/M2 | TEMPERATURE: 97.4 F | DIASTOLIC BLOOD PRESSURE: 89 MMHG | SYSTOLIC BLOOD PRESSURE: 139 MMHG | HEIGHT: 70 IN | OXYGEN SATURATION: 97 %

## 2023-09-06 PROCEDURE — 99281 EMR DPT VST MAYX REQ PHY/QHP: CPT | Performed by: EMERGENCY MEDICINE

## 2023-09-06 NOTE — ED TRIAGE NOTES
"Pt here with decreased appetite and weight loss the last several weeks. Pt is very fatigued \"and I have no energy for anything and I just lay around\". No pain, no change in urinary function. Had diarrhea several weeks ago, none of that now. No chest pain. No shortness of breath.      Triage Assessment       Row Name 09/06/23 6981       Triage Assessment (Adult)    Airway WDL WDL       Respiratory WDL    Respiratory WDL WDL       Skin Circulation/Temperature WDL    Skin Circulation/Temperature WDL WDL       Cardiac WDL    Cardiac WDL WDL       Peripheral/Neurovascular WDL    Peripheral Neurovascular WDL WDL       Cognitive/Neuro/Behavioral WDL    Cognitive/Neuro/Behavioral WDL WDL                    "

## 2023-09-08 ENCOUNTER — OFFICE VISIT (OUTPATIENT)
Dept: FAMILY MEDICINE | Facility: CLINIC | Age: 68
End: 2023-09-08
Payer: COMMERCIAL

## 2023-09-08 ENCOUNTER — ANCILLARY PROCEDURE (OUTPATIENT)
Dept: GENERAL RADIOLOGY | Facility: CLINIC | Age: 68
End: 2023-09-08
Attending: PHYSICIAN ASSISTANT
Payer: COMMERCIAL

## 2023-09-08 VITALS
OXYGEN SATURATION: 96 % | RESPIRATION RATE: 16 BRPM | DIASTOLIC BLOOD PRESSURE: 72 MMHG | HEIGHT: 70 IN | WEIGHT: 217.2 LBS | TEMPERATURE: 96.8 F | SYSTOLIC BLOOD PRESSURE: 130 MMHG | HEART RATE: 75 BPM | BODY MASS INDEX: 31.09 KG/M2

## 2023-09-08 DIAGNOSIS — R53.83 OTHER FATIGUE: ICD-10-CM

## 2023-09-08 DIAGNOSIS — R05.2 SUBACUTE COUGH: ICD-10-CM

## 2023-09-08 DIAGNOSIS — R53.81 MALAISE: Primary | ICD-10-CM

## 2023-09-08 LAB
ALBUMIN SERPL BCG-MCNC: 4.3 G/DL (ref 3.5–5.2)
ALP SERPL-CCNC: 39 U/L (ref 40–129)
ALT SERPL W P-5'-P-CCNC: 22 U/L (ref 0–70)
ANION GAP SERPL CALCULATED.3IONS-SCNC: 8 MMOL/L (ref 7–15)
AST SERPL W P-5'-P-CCNC: 23 U/L (ref 0–45)
BILIRUB SERPL-MCNC: 0.5 MG/DL
BUN SERPL-MCNC: 17.5 MG/DL (ref 8–23)
CALCIUM SERPL-MCNC: 9.3 MG/DL (ref 8.8–10.2)
CHLORIDE SERPL-SCNC: 99 MMOL/L (ref 98–107)
CREAT SERPL-MCNC: 0.89 MG/DL (ref 0.67–1.17)
DEPRECATED HCO3 PLAS-SCNC: 27 MMOL/L (ref 22–29)
EGFRCR SERPLBLD CKD-EPI 2021: >90 ML/MIN/1.73M2
ERYTHROCYTE [DISTWIDTH] IN BLOOD BY AUTOMATED COUNT: 11.5 % (ref 10–15)
GLUCOSE SERPL-MCNC: 107 MG/DL (ref 70–99)
HCT VFR BLD AUTO: 43.9 % (ref 40–53)
HGB BLD-MCNC: 15.2 G/DL (ref 13.3–17.7)
MCH RBC QN AUTO: 32.1 PG (ref 26.5–33)
MCHC RBC AUTO-ENTMCNC: 34.6 G/DL (ref 31.5–36.5)
MCV RBC AUTO: 93 FL (ref 78–100)
PLATELET # BLD AUTO: 192 10E3/UL (ref 150–450)
POTASSIUM SERPL-SCNC: 4.9 MMOL/L (ref 3.4–5.3)
PROT SERPL-MCNC: 6.9 G/DL (ref 6.4–8.3)
RBC # BLD AUTO: 4.73 10E6/UL (ref 4.4–5.9)
SODIUM SERPL-SCNC: 134 MMOL/L (ref 136–145)
WBC # BLD AUTO: 3.3 10E3/UL (ref 4–11)

## 2023-09-08 PROCEDURE — 71046 X-RAY EXAM CHEST 2 VIEWS: CPT | Mod: TC | Performed by: RADIOLOGY

## 2023-09-08 PROCEDURE — 87635 SARS-COV-2 COVID-19 AMP PRB: CPT | Performed by: PHYSICIAN ASSISTANT

## 2023-09-08 PROCEDURE — 36415 COLL VENOUS BLD VENIPUNCTURE: CPT | Performed by: PHYSICIAN ASSISTANT

## 2023-09-08 PROCEDURE — 99213 OFFICE O/P EST LOW 20 MIN: CPT | Performed by: PHYSICIAN ASSISTANT

## 2023-09-08 PROCEDURE — 80053 COMPREHEN METABOLIC PANEL: CPT | Performed by: PHYSICIAN ASSISTANT

## 2023-09-08 PROCEDURE — 85027 COMPLETE CBC AUTOMATED: CPT | Performed by: PHYSICIAN ASSISTANT

## 2023-09-08 ASSESSMENT — ENCOUNTER SYMPTOMS
HEADACHES: 0
COUGH: 1
NUMBNESS: 0
WEAKNESS: 0
HEMATURIA: 0
ARTHRALGIAS: 0
ANAL BLEEDING: 0
MYALGIAS: 0
NERVOUS/ANXIOUS: 0
DYSURIA: 0
FREQUENCY: 0
PALPITATIONS: 0
UNEXPECTED WEIGHT CHANGE: 1
DIARRHEA: 0
DECREASED CONCENTRATION: 1
SINUS PRESSURE: 0
WHEEZING: 0
SHORTNESS OF BREATH: 0
LIGHT-HEADEDNESS: 0
NAUSEA: 0
SLEEP DISTURBANCE: 0
ABDOMINAL PAIN: 0
HEMATOCHEZIA: 0
CHILLS: 0
FATIGUE: 1
FEVER: 0
SINUS PAIN: 0
SORE THROAT: 0
VOMITING: 0
DIAPHORESIS: 0
PARESTHESIAS: 0
DIZZINESS: 0

## 2023-09-08 NOTE — PATIENT INSTRUCTIONS
For further evaluation of your symptoms we are completing lab work and a chest x-ray.  I will send results and comments via [employer].  Please reach out if you develop any new or worsening symptoms.  Go to the emergency department if you develop any severe symptoms such as chest pain, high fevers, difficulty breathing, persistent wheezing.  Additionally reach out with questions or concerns.

## 2023-09-08 NOTE — PROGRESS NOTES
Assessment & Plan     Other fatigue  Malaise  Subacute cough  Patient is a 68-year-old male who presents to clinic due to generally feeling malaise and fatigue with rare cough ongoing for the last 1.5 weeks.  Patient [facility] 1 [facility].  Vital signs normal.  Physical exam significant for rare dry cough.  No other acute abnormalities.    We will complete chest x-ray given cough to look for any signs of mass, infiltrate, opacities.  Will complete labs to look for electrolyte abnormalities, signs of infection, anemia, and renal/hepatic abnormalities.  Will complete COVID-19 screening.  We will base treatment plan on results.  Patient follow-up for any new or worsening symptoms.    - XR Chest 2 Views; Future  - CBC with platelets; Future  - Comprehensive metabolic panel (BMP + Alb, Alk Phos, ALT, AST, Total. Bili, TP); Future  - Symptomatic COVID-19 Virus (Coronavirus) by PCR; Future  - Symptomatic COVID-19 Virus (Coronavirus) by PCR Nose  - CBC with platelets  - Comprehensive metabolic panel (BMP + Alb, Alk Phos, ALT, AST, Total. Bili, TP)      See Patient Instructions    Marybeth Abad PA-C  [facility]    23 minutes spent on chart review, patient evaluation, and documentation    Malathi Ray is a 68 year old, presenting for the following health issues:  Fatigue        9/8/2023     8:29 AM   Additional Questions   Roomed by [place]       History of Present Illness       Reason for visit:  No drive    He eats 4 or more servings of fruits and vegetables daily.He consumes 0 sweetened beverage(s) daily.He exercises with enough effort to increase his heart rate 9 or less minutes per day.  He exercises with enough effort to increase his heart rate 3 or less days per week.   He is taking medications regularly.       Concern - Fatigue   Onset: a week and 1/2 of fatigue, loss of appetite, malaise, and infrequent dry cough  "  Patient has lost 5 lbs and his clothing is loose. Non-smoker, no history of lung disease.   Description: All he wants to do is sleep   Intensity: moderate  Progression of Symptoms:  improving  Accompanying Signs & Symptoms: Lack of appetite   Previous history of similar problem: None  Precipitating factors:        Worsened by: Nothing   Alleviating factors:        Improved by: Over time  Therapies tried and outcome: None    Patient is going to [place] in 1 week [place] access to medical care.     Review of Systems   Constitutional:  Positive for fatigue and unexpected weight change. Negative for chills, diaphoresis and fever.   HENT:  Negative for congestion, sinus pressure, sinus pain and sore throat.    Respiratory:  Positive for cough. Negative for shortness of breath and wheezing.    Cardiovascular:  Negative for chest pain, palpitations and peripheral edema.   Gastrointestinal:  Negative for abdominal pain, anal bleeding, diarrhea, hematochezia, nausea and vomiting.   Genitourinary:  Negative for dysuria, frequency, hematuria and urgency.   Musculoskeletal:  Negative for arthralgias and myalgias.   Skin:  Negative for rash.   Neurological:  Negative for dizziness, weakness, light-headedness, numbness, headaches and paresthesias.   Psychiatric/Behavioral:  Positive for decreased concentration. Negative for sleep disturbance. The patient is not nervous/anxious.             Objective    /72   Pulse 75   Temp 96.8  F (36  C)   Resp 16   Ht 1.778 m (5' 10\")   Wt 98.5 kg (217 lb 3.2 oz)   SpO2 96%   BMI 31.16 kg/m    Body mass index is 31.16 kg/m .  Physical Exam  Vitals and nursing note reviewed.   Constitutional:       General: He is not in acute distress.     Appearance: Normal appearance.   HENT:      Head: Normocephalic and atraumatic.      Mouth/Throat:      Mouth: Mucous membranes are moist.      Pharynx: Oropharynx is clear.   Eyes:      Extraocular " Movements: Extraocular movements intact.      Pupils: Pupils are equal, round, and reactive to light.   Cardiovascular:      Rate and Rhythm: Normal rate and regular rhythm.      Heart sounds: Normal heart sounds. No murmur heard.  Pulmonary:      Effort: Pulmonary effort is normal. No respiratory distress.      Breath sounds: Normal breath sounds. No wheezing, rhonchi or rales.      Comments: Rare dry cough  Abdominal:      General: Bowel sounds are normal.      Palpations: Abdomen is soft.      Tenderness: There is no abdominal tenderness. There is no guarding or rebound.   Musculoskeletal:         General: Normal range of motion.      Cervical back: Normal range of motion.      Right lower leg: No edema.      Left lower leg: No edema.   Lymphadenopathy:      Cervical: No cervical adenopathy.   Skin:     General: Skin is warm and dry.   Neurological:      General: No focal deficit present.      Mental Status: He is alert.   Psychiatric:         Mood and Affect: Mood normal.         Behavior: Behavior normal.

## 2023-09-09 ENCOUNTER — TELEPHONE (OUTPATIENT)
Dept: NURSING | Facility: CLINIC | Age: 68
End: 2023-09-09
Payer: COMMERCIAL

## 2023-09-09 LAB — SARS-COV-2 RNA RESP QL NAA+PROBE: POSITIVE

## 2023-09-09 NOTE — TELEPHONE ENCOUNTER
Coronavirus (COVID-19) Notification    Caller Name (Patient, parent, daughter/son, grandparent, etc)  patient    Reason for call  Notify of Positive Coronavirus (COVID-19) lab results, assess symptoms,  review [facility] recommendations    Lab Result    Lab test:  2019-nCoV rRt-PCR or SARS-CoV-2 PCR    Oropharyngeal AND/OR nasopharyngeal swabs is POSITIVE for 2019-nCoV RNA/SARS-COV-2 PCR (COVID-19 virus)    { Introduce self then review script  I am calling on behalf of [facility].  We were notified that your Coronavirus test (COVID-19) was POSITIVE for the virus  :pt says he is negative per his mychart  Gather patient reported symptoms   Assessment   Current Symptoms at time of phone call, reported by patient: (if no symptoms, document: No symptoms] N/A   Date of symptom(s) onset (if applicable) N/A     If at time of call, Patients symptoms have worsened, the Patient should contact 911 or have someone drive them to Emergency Dept promptly:    If Patient calling 911, inform 911 personal that you have tested positive for the Coronavirus (COVID-19).  Place mask on and await 911 to arrive.  If Emergency Dept, If possible, please have another adult drive you to the Emergency Dept but you need to wear mask when in contact with other people.      Treatment Options:   Is patient interested in discussing COVID treatment? No.        Review information with Patient    Your result was positive. This means you have COVID-19 (coronavirus).    How can I protect others?    These guidelines are for isolating before returning to work, school or .    If you DO have symptoms  Stay home and away from others   For at least 5 days after your symptoms started, AND  You are fever free for 24 hours (with no medicine that reduces fever), AND  Your other symptoms are better  Wear a mask for 10 full days anytime you are around others    If you DON'T have symptoms  Stay home and away from others for at least 5 days after  your positive test  Wear a mask for 10 full days anytime you are around others    There may be different guidelines for healthcare facilities.  Please check with the specific sites before arriving.    If you have been told by a doctor that you were severely ill with COVID-19 or are immunocompromised, you should isolate for at least 10 days.    You should not go back to work until you meet the guidelines above for ending your home isolation. You don't need to be retested for COVID-19 before going back to work--studies show that you won't spread the virus if it's been at least 10 days since your symptoms started (or 20 days, if you have a weak immune system).    Employers, schools, and daycares: This is an official notice for this person's medical guidelines for returning in-person.  They must meet the above guidelines before going back to work, school or  in person.    You will receive a positive COVID-19 letter via [employer] or the mail soon with additional self-care information.    Would you like me to review some of that information with you now?  No    If you were tested for an upcoming procedure, please contact your provider for next steps.    Chinyere Quezada

## 2023-11-28 NOTE — PROGRESS NOTES
DISCHARGE  Reason for Discharge: Patient has failed to schedule further appointments.    Equipment Issued: none    Discharge Plan: Patient to continue home program.    Referring Provider:  Marybeth Abad     08/29/23 0500   Appointment Info   Signing clinician's name / credentials Melany Obrien, PT, DPT   Total/Authorized Visits 10 planned   Visits Used 1   Medical Diagnosis Neck pain (M54.2)    Chronic bilateral back pain, unspecified back location (M54.9, G89.29)    Kyphosis (acquired) (postural) (M40.00)   PT Tx Diagnosis thoracic pain with R shoulder pain   Quick Adds Certification   Progress Note/Certification   Start of Care Date 08/29/23   Onset of illness/injury or Date of Surgery 08/15/23   Therapy Frequency 1x/wk   Predicted Duration 10 weeks   Certification date from 08/29/23   Certification date to 11/07/23   Progress Note Due Date 11/07/23   Progress Note Completed Date 08/29/23   GOALS   PT Goals 2;3   PT Goal 1   Goal Identifier STG   Goal Description Pt will have 115* of shoulder flexion and abduction in 4 weeks to be able to reach overhead.   Target Date 09/26/23   PT Goal 2   Goal Identifier LTG   Goal Description Pt will 65* R rotation of the head to improve his mobility and reduce aggravated Sx in 10 weeks.   Target Date 11/07/23   PT Goal 3   Goal Identifier LTG   Goal Description Pt will be independent in home strengthening program for self management of Sx in 10 weeks.   Target Date 11/07/23   Subjective Report   Subjective Report kyphotic posture with limited R shoulder ROM   Treatment Interventions (PT)   Interventions Therapeutic Procedure/Exercise;Manual Therapy   Therapeutic Procedure/Exercise   Therapeutic Procedures: strength, endurance, ROM, flexibillity minutes (58507) 15   Ther Proc 1 - Details seated thoracic extension- 10x;  seated rotation- 5x, 5 sec holds, b/l;  scap ret with BTB- 10x;  corner pec stretch- 20 sec holds, 2x;  calf stretch- 30 sec holds;  supine pec stretch and  thoracic extension   Skilled Intervention used to increase ROM and allow greater shoulder motion   Patient Response/Progress no pain to the area, pull to the pecs but no aggravated sx to the area   Manual Therapy   Manual Therapy: Mobilization, MFR, MLD, friction massage minutes (20642) 8   Manual Therapy 1 - Details PA mobs- grade 2-3 in prone position   Skilled Intervention used to increase thoracic extension   Patient Response/Progress tolerable to the area, no pain to the area   Eval/Assessments   PT Eval, Low Complexity Minutes (66850) 20   Education   Learner/Method Patient;Demonstration;No Barriers to Learning   Plan   Home program papers- scap ret, seated rot, seated thoracic ext, calf stretch, pec stretch, supine thoracic ext   Plan for next session PA mobs, increase thoracic extension   Total Session Time   Timed Code Treatment Minutes 23   Total Treatment Time (sum of timed and untimed services) 43

## 2024-02-15 ENCOUNTER — PATIENT OUTREACH (OUTPATIENT)
Dept: CARE COORDINATION | Facility: CLINIC | Age: 69
End: 2024-02-15
Payer: COMMERCIAL

## 2024-02-29 ENCOUNTER — PATIENT OUTREACH (OUTPATIENT)
Dept: CARE COORDINATION | Facility: CLINIC | Age: 69
End: 2024-02-29
Payer: COMMERCIAL

## 2024-03-21 ENCOUNTER — OFFICE VISIT (OUTPATIENT)
Dept: FAMILY MEDICINE | Facility: CLINIC | Age: 69
End: 2024-03-21
Payer: COMMERCIAL

## 2024-03-21 ENCOUNTER — ANCILLARY PROCEDURE (OUTPATIENT)
Dept: GENERAL RADIOLOGY | Facility: CLINIC | Age: 69
End: 2024-03-21
Attending: PHYSICIAN ASSISTANT
Payer: COMMERCIAL

## 2024-03-21 VITALS
BODY MASS INDEX: 31.1 KG/M2 | SYSTOLIC BLOOD PRESSURE: 130 MMHG | HEART RATE: 91 BPM | RESPIRATION RATE: 18 BRPM | DIASTOLIC BLOOD PRESSURE: 78 MMHG | TEMPERATURE: 97.4 F | OXYGEN SATURATION: 97 % | HEIGHT: 68 IN | WEIGHT: 205.2 LBS

## 2024-03-21 DIAGNOSIS — M79.671 RIGHT FOOT PAIN: ICD-10-CM

## 2024-03-21 DIAGNOSIS — M79.671 RIGHT FOOT PAIN: Primary | ICD-10-CM

## 2024-03-21 PROCEDURE — 73630 X-RAY EXAM OF FOOT: CPT | Mod: TC | Performed by: RADIOLOGY

## 2024-03-21 PROCEDURE — 99213 OFFICE O/P EST LOW 20 MIN: CPT | Performed by: PHYSICIAN ASSISTANT

## 2024-03-21 RX ORDER — RESPIRATORY SYNCYTIAL VIRUS VACCINE 120MCG/0.5
0.5 KIT INTRAMUSCULAR ONCE
Qty: 1 EACH | Refills: 0 | Status: CANCELLED | OUTPATIENT
Start: 2024-03-21 | End: 2024-03-21

## 2024-03-21 RX ORDER — PREDNISONE 20 MG/1
40 TABLET ORAL DAILY
Qty: 10 TABLET | Refills: 0 | Status: SHIPPED | OUTPATIENT
Start: 2024-03-21 | End: 2024-03-26

## 2024-03-21 ASSESSMENT — PAIN SCALES - GENERAL: PAINLEVEL: MODERATE PAIN (5)

## 2024-03-21 NOTE — PROGRESS NOTES
"  Assessment & Plan   Problem List Items Addressed This Visit    None  Visit Diagnoses       Right foot pain    -  Primary    Relevant Medications    predniSONE (DELTASONE) 20 MG tablet    Other Relevant Orders    Orthopedic  Referral    XR Foot Right G/E 3 Views (Completed)            Phong Cuevas is a 69 year old male who presents c/o atraumatic right foot swelling and pain. Doubt sprain/strain/fracture/contusion/dislocation/others and XR is negative for fracture, but does show degenerative changes at the 1st MTP. No history of gout and no erythema, warmth apparent today. Could be OA as he has a physical job working with [employer]. Will tx with analgesics otc, RICE/heat, arch supports and orthopedic follow up.     Complete history and physical exam as below. Afebrile with normal vital signs.    DDx and Dx discussed with and explained to the pt to their satisfaction.  All questions were answered at this time. Pt expressed understanding of and agreement with this dx, tx, and plan. No further workup warranted and standard medication warnings given. I have given the patient a list of pertinent indications for re-evaluation. Will go to the Emergency Department if symptoms worsen or new concerning symptoms arise. Patient left in no apparent distress.       Ordering of each unique test  Prescription drug management     BMI  Estimated body mass index is 30.96 kg/m  as calculated from the following:    Height as of this encounter: 1.734 m (5' 8.27\").    Weight as of this encounter: 93.1 kg (205 lb 3.2 oz).     See Patient Instructions      Malathi Ray is a 69 year old, presenting for the following health issues:  Foot Swelling        3/21/2024     8:42 AM   Additional Questions   Roomed by Bandar Black CMA   Accompanied by N/A         3/21/2024     8:42 AM   Patient Reported Additional Medications   Patient reports taking the following new medications No new medications     History of Present " "Illness       Reason for visit:  Right foot swolen    He eats 2-3 servings of fruits and vegetables daily.He consumes 0 sweetened beverage(s) daily.He exercises with enough effort to increase his heart rate 10 to 19 minutes per day.  He exercises with enough effort to increase his heart rate 3 or less days per week.   He is taking medications regularly.     Patient is coming in today with a right foot swelling that has been ongoing for 6-8 months.  There is pain when walking or with lots of movement.  Patient states he feels it at the balls of his foot.  No injury has been noted.  He has tried to change shoes but it did not help.      Review of Systems  Constitutional, msk, derm, neuro systems are negative, except as otherwise noted.      Objective    /78   Pulse 91   Temp 97.4  F (36.3  C) (Temporal)   Resp 18   Ht 1.734 m (5' 8.27\")   Wt 93.1 kg (205 lb 3.2 oz)   SpO2 97%   BMI 30.96 kg/m    Body mass index is 30.96 kg/m .  Physical Exam  Vitals and nursing note reviewed.   Constitutional:       General: He is not in acute distress.     Appearance: Normal appearance. He is not diaphoretic.   HENT:      Head: Normocephalic and atraumatic.      Nose: Nose normal.   Eyes:      Conjunctiva/sclera: Conjunctivae normal.   Pulmonary:      Effort: Pulmonary effort is normal. No respiratory distress.   Musculoskeletal:      Comments: RLE: mild tenderness to MTPs. Mild edema to the foot compared to the left. No erythema, warmth, skin lesions, pain with PROM of the joints or other overlying signs of trauma or infection. Distal CMS intact. Remainder of limb non-tender.    Skin:     General: Skin is dry.      Coloration: Skin is not jaundiced or pale.   Neurological:      General: No focal deficit present.      Mental Status: He is alert. Mental status is at baseline.   Psychiatric:         Mood and Affect: Mood normal.         Behavior: Behavior normal.          Results for orders placed or performed in visit on " 03/21/24   XR Foot Right G/E 3 Views     Status: None    Narrative    EXAM: XR FOOT RIGHT G/E 3 VIEWS  DATE/TIME: 3/21/2024 9:21 AM    INDICATION: Right foot pain  COMPARISON: None available.       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 No acute fracture.       YEYO JUAREZ DO         SYSTEM ID:  [id number]           Signed Electronically by: SANDRA Montoya

## 2024-03-21 NOTE — PATIENT INSTRUCTIONS
Crystal Ray,    Thank you for allowing [facility] to manage your care.    I am unsure of the cause of your symptoms, but we will see what our workup shows.     If you develop worsening/changing symptoms at any time, please be seen in clinic/urgent care.    I ordered some xrays. Please go to our radiology department to get your xrays.    I sent your prescriptions to your pharmacy.    I made a referral to podiatry. They will be calling in approximately 1 week to set up your appointment.  If you do not hear from them, please call the specialty number on your after visit summary.     Please allow 1-2 business days for our office to contact you in regards to your laboratory/radiological studies.  If not done so, I encourage you to login into [employer] ([email or web address] to review your results as well.     Prednisone Discharge Instructions:    Please take the steroid, Prednisone, for the full course as prescribed.  Take Prednisone with food or milk to minimize stomach upset.      Side effects of Prednisone include difficulty sleeping, increased appetite, weight gain, and changes in mood.  If you are diabetic, please monitor your blood sugar regularly while taking this medicine as Prednisone can cause high blood sugar.    For your pain, please use Tylenol 650mg every 6 hours. You may use 400mg of ibuprofen between doses of Tylenol.     Max acetaminophen (Tylenol) 3,000mg/24 hours  Max ibuprofen 2,000mg/24 hours    If you have any questions or concerns, please feel free to call us at [phone]    Sincerely,    Vish Higgins PA-C    Did you know?      You can schedule a video visit for follow-up appointments as well as future appointments for certain conditions.  Please see the below link.     [email or web address]    If you have not already done so,  I encourage you to sign up for [employer] ([email or web address]).  This will allow you to review  your results, securely communicate with a provider, and schedule virtual visits as well.

## 2024-03-25 DIAGNOSIS — I10 BENIGN ESSENTIAL HYPERTENSION: ICD-10-CM

## 2024-03-25 DIAGNOSIS — E78.5 HYPERLIPIDEMIA LDL GOAL <100: ICD-10-CM

## 2024-03-25 RX ORDER — LISINOPRIL 10 MG/1
10 TABLET ORAL DAILY
Qty: 90 TABLET | Refills: 0 | Status: SHIPPED | OUTPATIENT
Start: 2024-03-25 | End: 2024-04-18

## 2024-03-25 RX ORDER — SIMVASTATIN 20 MG
20 TABLET ORAL AT BEDTIME
Qty: 30 TABLET | Refills: 0 | Status: SHIPPED | OUTPATIENT
Start: 2024-03-25 | End: 2024-04-18

## 2024-04-07 NOTE — TELEPHONE ENCOUNTER
5/21/21 Steroid injection  6/28/21 Visco injection  10/30/21 Steroid injection  2 PT visits     Called and spoke with patient.  No relief from most recent injection.  Feels PT has increased his pain.  Would like to know next steps.  Without much relief from either type of injection, offered a referral to ortho surgery. He would like to proceed.   Referral placed  Celia Tran MS ATC          
Patient says he is still having knee pain. Has not noticed improvement with physical therapy and wants to discuss next steps. Would like a call back today. [phone]    Devora  
Improved.

## 2024-04-18 ENCOUNTER — OFFICE VISIT (OUTPATIENT)
Dept: FAMILY MEDICINE | Facility: CLINIC | Age: 69
End: 2024-04-18
Payer: COMMERCIAL

## 2024-04-18 VITALS
HEART RATE: 91 BPM | DIASTOLIC BLOOD PRESSURE: 82 MMHG | OXYGEN SATURATION: 95 % | RESPIRATION RATE: 24 BRPM | HEIGHT: 69 IN | TEMPERATURE: 97.3 F | SYSTOLIC BLOOD PRESSURE: 130 MMHG | WEIGHT: 211 LBS | BODY MASS INDEX: 31.25 KG/M2

## 2024-04-18 DIAGNOSIS — E78.5 HYPERLIPIDEMIA LDL GOAL <100: ICD-10-CM

## 2024-04-18 DIAGNOSIS — E66.01 MORBID OBESITY (H): ICD-10-CM

## 2024-04-18 DIAGNOSIS — R73.09 ABNORMAL GLUCOSE: ICD-10-CM

## 2024-04-18 DIAGNOSIS — I10 BENIGN ESSENTIAL HYPERTENSION: ICD-10-CM

## 2024-04-18 DIAGNOSIS — R09.81 NASAL CONGESTION: ICD-10-CM

## 2024-04-18 DIAGNOSIS — M25.50 ARTHRALGIA, UNSPECIFIED JOINT: ICD-10-CM

## 2024-04-18 DIAGNOSIS — Z00.00 ENCOUNTER FOR MEDICARE ANNUAL WELLNESS EXAM: Primary | ICD-10-CM

## 2024-04-18 LAB — HBA1C MFR BLD: 5.5 % (ref 0–5.6)

## 2024-04-18 PROCEDURE — 80053 COMPREHEN METABOLIC PANEL: CPT | Performed by: NURSE PRACTITIONER

## 2024-04-18 PROCEDURE — 36415 COLL VENOUS BLD VENIPUNCTURE: CPT | Performed by: NURSE PRACTITIONER

## 2024-04-18 PROCEDURE — 82570 ASSAY OF URINE CREATININE: CPT | Performed by: NURSE PRACTITIONER

## 2024-04-18 PROCEDURE — 83036 HEMOGLOBIN GLYCOSYLATED A1C: CPT | Performed by: NURSE PRACTITIONER

## 2024-04-18 PROCEDURE — 82043 UR ALBUMIN QUANTITATIVE: CPT | Performed by: NURSE PRACTITIONER

## 2024-04-18 PROCEDURE — 80061 LIPID PANEL: CPT | Performed by: NURSE PRACTITIONER

## 2024-04-18 PROCEDURE — G0439 PPPS, SUBSEQ VISIT: HCPCS | Performed by: NURSE PRACTITIONER

## 2024-04-18 PROCEDURE — 99214 OFFICE O/P EST MOD 30 MIN: CPT | Mod: 25 | Performed by: NURSE PRACTITIONER

## 2024-04-18 RX ORDER — FLUTICASONE PROPIONATE 50 MCG
1 SPRAY, SUSPENSION (ML) NASAL DAILY
Qty: 16 G | Refills: 11 | Status: SHIPPED | OUTPATIENT
Start: 2024-04-18 | End: 2024-06-13

## 2024-04-18 RX ORDER — SIMVASTATIN 20 MG
20 TABLET ORAL AT BEDTIME
Qty: 90 TABLET | Refills: 3 | Status: SHIPPED | OUTPATIENT
Start: 2024-04-18 | End: 2024-04-20

## 2024-04-18 RX ORDER — MELOXICAM 15 MG/1
15 TABLET ORAL DAILY
Qty: 90 TABLET | Refills: 3 | Status: SHIPPED | OUTPATIENT
Start: 2024-04-18 | End: 2024-05-06

## 2024-04-18 RX ORDER — LISINOPRIL 10 MG/1
10 TABLET ORAL DAILY
Qty: 90 TABLET | Refills: 3 | Status: SHIPPED | OUTPATIENT
Start: 2024-04-18 | End: 2024-06-13

## 2024-04-18 RX ORDER — RESPIRATORY SYNCYTIAL VIRUS VACCINE 120MCG/0.5
0.5 KIT INTRAMUSCULAR ONCE
Qty: 1 EACH | Refills: 0 | Status: CANCELLED | OUTPATIENT
Start: 2024-04-18 | End: 2024-04-18

## 2024-04-18 SDOH — HEALTH STABILITY: PHYSICAL HEALTH: ON AVERAGE, HOW MANY DAYS PER WEEK DO YOU ENGAGE IN MODERATE TO STRENUOUS EXERCISE (LIKE A BRISK WALK)?: 2 DAYS

## 2024-04-18 ASSESSMENT — SOCIAL DETERMINANTS OF HEALTH (SDOH): HOW OFTEN DO YOU GET TOGETHER WITH FRIENDS OR RELATIVES?: THREE TIMES A WEEK

## 2024-04-18 NOTE — PROGRESS NOTES
Preventive Care Visit  [facility] CULLEN Clarke CNP, Family Medicine  Apr 18, 2024      Assessment & Plan     Hyperlipidemia LDL goal <100  Review of lipids reviewed.  Tolerating simvastatin well.  Will recheck lipids here today.  Refill sent.  Follow-up in 1 year  - simvastatin (ZOCOR) 20 MG tablet; Take 1 tablet (20 mg) by mouth at bedtime  - Lipid panel reflex to direct LDL Fasting; Future    Encounter for Medicare annual wellness exam  Screening guidelines reviewed.   Encouraged to complete healthcare directive    Benign essential hypertension  Stable-blood pressure well-controlled today in clinic.  Refill sent.  Will update lab.  Follow-up in 1 year  - lisinopril (ZESTRIL) 10 MG tablet; Take 1 tablet (10 mg) by mouth daily  - Albumin Random Urine Quantitative with Creat Ratio; Future  - Comprehensive metabolic panel; Future    Arthralgia, unspecified joint  Previous imaging reviewed.  Will plan to start her on meloxicam.  Educated on use and possible side effects.  Encouraged not to take with over-the-counter NSAIDs, acetaminophen okay.  Follow-up in 1 year  - meloxicam (MOBIC) 15 MG tablet; Take 1 tablet (15 mg) by mouth daily No additional over the counter ibuprofen, Advil, naproxen, aleve. Ok to take Tylenol or acetaminophen.    Nasal congestion  Prescription sent for Flonase.  Educated on use.  Notify if no improvement  - fluticasone (FLONASE) 50 MCG/ACT nasal spray; Spray 1 spray into both nostrils daily    Abnormal glucose  - Comprehensive metabolic panel; Future  - Hemoglobin A1c; Future    Morbid obesity (H)  Educated regarding need to be active for 30 to 40 minutes 3-4 times per week, decrease portions.  Could improve blood pressure    Review of the result(s) of each unique test - Labs, imaging  Ordering of each unique test  Prescription drug management      BMI  Estimated body mass index is 31.62 kg/m  as calculated from the following:    Height as of this encounter:  "1.74 m (5' 8.5\").    Weight as of this encounter: 95.7 kg (211 lb).     Counseling  Appropriate preventive services were discussed with this patient, including applicable screening as appropriate for fall prevention, nutrition, physical activity, Tobacco-use cessation, weight loss and cognition.  Checklist reviewing preventive services available has been given to the patient.  Reviewed patient's diet, addressing concerns and/or questions.   He is at risk for lack of exercise and has been provided with information to increase physical activity for the benefit of his well-being.   He is at risk for psychosocial distress and has been provided with information to reduce risk.   The patient reports drinking more than one alcoholic drink per day and sometimes engages in binge or excessive drinking. The patient was counseled and given information about possible harmful effects of excessive alcohol intake as well as where to get help for alcohol problems. The patient was provided with written information regarding signs of hearing loss.       Malathi Ray is a 69 year old, presenting for the following:  Medicare Visit        4/18/2024     9:53 AM   Additional Questions   Roomed by Kimmy ELISE         4/18/2024     9:53 AM   Patient Reported Additional Medications   Patient reports taking the following new medications None         Health Care Directive  Patient does not have a Health Care Directive or Living Will: Discussed advance care planning with patient; however, patient declined at this time.    HPI        4/18/2024   General Health   How would you rate your overall physical health? Good   Feel stress (tense, anxious, or unable to sleep) Only a little   (!) STRESS CONCERN      4/18/2024   Nutrition   Diet: Regular (no restrictions)         4/18/2024   Exercise   Days per week of moderate/strenous exercise 2 days   (!) EXERCISE CONCERN      4/18/2024   Social Factors   Frequency of gathering with friends or " relatives Three times a week   Worry food won't last until get money to buy more Patient declined   Food not last or not have enough money for food? Patient declined   Do you have housing?  Patient declined   Are you worried about losing your housing? Patient declined   Lack of transportation? Patient declined   Unable to get utilities (heat,electricity)? Patient declined         4/18/2024   Fall Risk   Fallen 2 or more times in the past year? No   Trouble with walking or balance? No          4/18/2024   Activities of Daily Living- Home Safety   Needs help with the following daily activites None of the above   Safety concerns in the home None of the above         4/18/2024   Dental   Dentist two times every year? Yes         4/18/2024   Hearing Screening   Hearing concerns? (!) I NEED TO ASK PEOPLE TO SPEAK UP OR REPEAT THEMSELVES.         4/18/2024   Driving Risk Screening   Patient/family members have concerns about driving No         4/18/2024   General Alertness/Fatigue Screening   Have you been more tired than usual lately? No         4/18/2024   Urinary Incontinence Screening   Bothered by leaking urine in past 6 months No         4/18/2024   TB Screening   Were you born outside of the [place]? No         Today's PHQ-2 Score:       4/18/2024     9:52 AM   PHQ-2 ( 1999 [employer])   Q1: Little interest or pleasure in doing things 0   Q2: Feeling down, depressed or hopeless 0   PHQ-2 Score 0   Q1: Little interest or pleasure in doing things Not at all   Q2: Feeling down, depressed or hopeless Not at all   PHQ-2 Score 0           4/18/2024   Substance Use   Alcohol more than 3/day or more than 7/wk Yes   How often do you have a drink containing alcohol 4 or more times a week   How many alcohol drinks on typical day 3 or 4   How often do you have 5+ drinks at one occasion Monthly   Audit 2/3 Score 3   How often not able to stop drinking once started Never   How often failed to do what normally expected Never   How often  needed first drink in am after a heavy drinking session Never   How often feeling of guilt or remorse after drinking Never   How often unable to remember what happened the night before Never   Have you or someone else been injured because of your drinking No   Has anyone been concerned or suggested you cut down on drinking No   TOTAL SCORE - AUDIT 7   Do you have a current opioid prescription? No   How severe/bad is pain from 1 to 10? 4/10   Do you use any other substances recreationally? No     Social History     Tobacco Use    Smoking status: Never     Passive exposure: Never    Smokeless tobacco: Never   Vaping Use    Vaping status: Never Used   Substance Use Topics    Alcohol use: Yes    Drug use: No           4/18/2024   AAA Screening   Family history of Abdominal Aortic Aneurysm (AAA)? No   Last PSA:   PSA   Date Value Ref Range Status   05/08/2020 1.71 0 - 4 ug/L Final     Comment:     Assay Method:  Chemiluminescence using Siemens Vista analyzer     Prostate Specific Antigen Screen   Date Value Ref Range Status   03/16/2023 2.54 0.00 - 4.00 ug/L Final     ASCVD Risk   The 10-year ASCVD risk score (Binu MAYORGA, et al., 2019) is: 16.4%    Values used to calculate the score:      Age: 69 years      Sex: Male      Is Non- : No      Diabetic: No      Tobacco smoker: No      Systolic Blood Pressure: 130 mmHg      Is BP treated: Yes      HDL Cholesterol: 76 mg/dL      Total Cholesterol: 195 mg/dL          Reviewed and updated as needed this visit by Provider                  Here today for annual wellness visit and medication check.  Is fasting for labs.  Mom passed away in January, lived with him.  Was on hospice, 89 years old.  Has been taking all medications and tolerating them well.  No negative side effects.    Was recently seen for pain to right great toe.  Advanced osteoarthritis.  Plans to schedule with podiatry.  As ages is having more pain to knees, hips and lower back.   Taking Tylenol which is minimally effective.  When takes ibuprofen helps more.  Wondering about starting a prescription medication to help with joint pain.    Having drainge and phlegm in throat.  Does have nasal congestion.  Takes over-the-counter mucus relief which is effective.  No heartburn.    Last PSA: 2021, no family history of prostate cancer.   Last Colonoscopy: 7/2020. Repeat in 10 years. No family history of colon cancer   Last eye exam: 1 year ago  Last dental exam: every 6 mo  Last tetanus vaccine: 2015  Last influenza vaccine: Declines   Last shingles vaccine: Has had both doses   Last pneumonia vaccine: Declines   Last COVID vaccine: Declines   Last COVID booster: Declines   Hep C screen (born [id number]): 2019-negative   HIV screen: Declines   AAA screen (age 65-78 with smoking hx): N/A  IVD (HTN, Hyperlipid, Smoking): N/A  Lung CA screening (55-80, 30 pk smoking hx): N/A        Current providers sharing in care for this patient include:  Patient Care Team:  Indigo Alvarado APRN CNP as PCP - General (Family Medicine)  Indigo Alvarado APRN CNP as Assigned PCP    The following health maintenance items are reviewed in Epic and correct as of today:  Health Maintenance   Topic Date Due    RSV VACCINE (Pregnancy & 60+) (1 - 1-dose 60+ series) Never done    Pneumococcal Vaccine: 65+ Years (1 of 1 - PCV) Never done    ANNUAL REVIEW OF HM ORDERS  07/15/2022    INFLUENZA VACCINE (1) Never done    COVID-19 Vaccine (1 - 2023-24 season) Never done    MEDICARE ANNUAL WELLNESS VISIT  03/16/2024    LIPID  03/16/2024    FALL RISK ASSESSMENT  04/18/2025    DTAP/TDAP/TD IMMUNIZATION (3 - Td or Tdap) 06/09/2025    GLUCOSE  09/08/2026    ADVANCE CARE PLANNING  04/17/2028    COLORECTAL CANCER SCREENING  07/07/2030    HEPATITIS C SCREENING  Completed    PHQ-2 (once per calendar year)  Completed    ZOSTER IMMUNIZATION  Completed    IPV IMMUNIZATION  Aged Out    HPV IMMUNIZATION  Aged Out    MENINGITIS  "IMMUNIZATION  Aged Out    RSV MONOCLONAL ANTIBODY  Aged Out          Objective    Exam  /82 (BP Location: Left arm, Patient Position: Chair, Cuff Size: Adult Large)   Pulse 91   Temp 97.3  F (36.3  C) (Temporal)   Resp 24   Ht 1.74 m (5' 8.5\")   Wt 95.7 kg (211 lb)   SpO2 95%   BMI 31.62 kg/m     Estimated body mass index is 31.62 kg/m  as calculated from the following:    Height as of this encounter: 1.74 m (5' 8.5\").    Weight as of this encounter: 95.7 kg (211 lb).    Physical Exam  Constitutional:       Appearance: He is well-developed.   HENT:      Right Ear: Tympanic membrane and external ear normal.      Left Ear: Tympanic membrane and external ear normal.      Mouth/Throat:      Mouth: Mucous membranes are moist.      Pharynx: Uvula midline.   Neck:      Thyroid: No thyromegaly.      Vascular: No carotid bruit.   Cardiovascular:      Rate and Rhythm: Normal rate and regular rhythm.      Heart sounds: Normal heart sounds.   Pulmonary:      Effort: Pulmonary effort is normal.      Breath sounds: Normal breath sounds.   Abdominal:      General: Bowel sounds are normal.      Palpations: Abdomen is soft.   Musculoskeletal:      Right lower leg: No edema.      Left lower leg: No edema.   Skin:     General: Skin is warm and dry.   Neurological:      Mental Status: He is alert.   Psychiatric:         Mood and Affect: Mood normal.             4/18/2024   Mini Cog   Clock Draw Score 2 Normal   3 Item Recall 3 objects recalled   Mini Cog Total Score 5              Signed Electronically by: CULLEN Talbot CNP    "

## 2024-04-18 NOTE — PATIENT INSTRUCTIONS
[phone] please call to schedule with the podiatrist.     Please go to the pharmacy for your pneumonia vaccine, Prevnar 20.  This vaccine helps protect against 20 types strains of streptococcal pneumonia that can cause serious infections in adults-pneumonia.  After the vaccine it is common to have some injection site redness, warmth and mild swelling.  This should resolve on its own after 24 to 48 hours.  Applying a cool compress to the site can help to ease the discomfort.  Also, you may feel a bit tired and rundown for 28 to 48 hours after receiving the injection.  If you are over age 65 you only need to get this vaccine once.  If you are under age 65 this vaccine should be repeated in 5 years.    Please go to the pharmacy for your RSV (respiratory syncytial virus)vaccine.  You will need 1 dose of the RSV vaccine, 1 time. The RSV vaccine can prevent lower respiratory tract disease caused by respiratory syncytial virus (RSV). RSV is a common respiratory virus that usually causes mild, cold-like symptoms. RSV can cause illness in people of all ages but may be especially serious for infants and older adults. Adults at highest risk for severe RSV disease include older adults, adults with chronic medical conditions such as heart or lung disease, weakened immune systems, or certain other underlying medical conditions, or who live in nursing homes or long-term care facilities     No additional over the counter ibuprofen, Advil, naproxen, aleve with the meloxicam. Ok to take Tylenol or acetaminophen.       Preventive Care Advice   This is general advice given by our system to help you stay healthy. However, your care team may have specific advice just for you. Please talk to your care team about your preventive care needs.  Nutrition  Eat 5 or more servings of fruits and vegetables each day.  Try wheat bread, brown rice and whole grain pasta (instead of white bread, rice, and pasta).  Get enough calcium and vitamin  D. Check the label on foods and aim for 100% of the RDA (recommended daily allowance).  Lifestyle  Exercise at least 150 minutes each week   (30 minutes a day, 5 days a week).  Do muscle strengthening activities 2 days a week. These help control your weight and prevent disease.  No smoking.  Wear sunscreen to prevent skin cancer.  Have a dental exam and cleaning every 6 months.  Yearly exams  See your health care team every year to talk about:  Any changes in your health.  Any medicines your care team has prescribed.  Preventive care, family planning, and ways to prevent chronic diseases.  Shots (vaccines)   HPV shots (up to age 26), if you've never had them before.  Hepatitis B shots (up to age 59), if you've never had them before.  COVID-19 shot: Get this shot when it's due.  Flu shot: Get a flu shot every year.  Tetanus shot: Get a tetanus shot every 10 years.  Pneumococcal, hepatitis A, and RSV shots: Ask your care team if you need these based on your risk.  Shingles shot (for age 50 and up).  General health tests  Diabetes screening:  Starting at age 35, Get screened for diabetes at least every 3 years.  If you are younger than age 35, ask your care team if you should be screened for diabetes.  Cholesterol test: At age 39, start having a cholesterol test every 5 years, or more often if advised.  Bone density scan (DEXA): At age 50, ask your care team if you should have this scan for osteoporosis (brittle bones).  Hepatitis C: Get tested at least once in your life.  STIs (sexually transmitted infections)  Before age 24: Ask your care team if you should be screened for STIs.  After age 24: Get screened for STIs if you're at risk. You are at risk for STIs (including HIV) if:  You are sexually active with more than one person.  You don't use condoms every time.  You or a partner was diagnosed with a sexually transmitted infection.  If you are at risk for HIV, ask about PrEP medicine to prevent HIV.  Get tested for  HIV at least once in your life, whether you are at risk for HIV or not.  Cancer screening tests  Cervical cancer screening: If you have a cervix, begin getting regular cervical cancer screening tests at age 21. Most people who have regular screenings with normal results can stop after age 65. Talk about this with your provider.  Breast cancer scan (mammogram): If you've ever had breasts, begin having regular mammograms starting at age 40. This is a scan to check for breast cancer.  Colon cancer screening: It is important to start screening for colon cancer at age 45.  Have a colonoscopy test every 10 years (or more often if you're at risk) Or, ask your provider about stool tests like a FIT test every year or Cologuard test every 3 years.  To learn more about your testing options, visit: https://[email or web address]/098052.pdf.  For help making a decision, visit: https://bit.[employer]/ul20561.  Prostate cancer screening test: If you have a prostate and are age 55 to 69, ask your provider if you would benefit from a yearly prostate cancer screening test.  Lung cancer screening: If you are a current or former smoker age 50 to 80, ask your care team if ongoing lung cancer screenings are right for you.  For informational purposes only. Not to replace the advice of your health care provider. Copyright   2023 [facility]. All rights reserved. Clinically reviewed by the [facility]. [employer] [id number] - REV 01/24.    Hearing Loss: Care Instructions  Overview     Hearing loss is a sudden or slow decrease in how well you hear. It can range from slight to profound. Permanent hearing loss can occur with aging. It also can happen when you are exposed long-term to loud noise. Examples include listening to loud music, riding motorcycles, or being around other loud machines.  Hearing loss can affect your work and home life. It can make you feel lonely or depressed. You may feel that you have lost your  independence. But hearing aids and other devices can help you hear better and feel connected to others.  Follow-up care is a key part of your treatment and safety. Be sure to make and go to all appointments, and call your doctor if you are having problems. It's also a good idea to know your test results and keep a list of the medicines you take.  How can you care for yourself at home?  Avoid loud noises whenever possible. This helps keep your hearing from getting worse.  Always wear hearing protection around loud noises.  Wear a hearing aid as directed.  A professional can help you pick a hearing aid that will work best for you.  You can also get hearing aids over the counter for mild to moderate hearing loss.  Have hearing tests as your doctor suggests. They can show whether your hearing has changed. Your hearing aid may need to be adjusted.  Use other devices as needed. These may include:  Telephone amplifiers and hearing aids that can connect to a television, stereo, radio, or microphone.  Devices that use lights or vibrations. These alert you to the doorbell, a ringing telephone, or a baby monitor.  Television closed-captioning. This shows the words at the bottom of the screen. Most new TVs can do this.  TTY (text telephone). This lets you type messages back and forth on the telephone instead of talking or listening. These devices are also called TDD. When messages are typed on the keyboard, they are sent over the phone line to a receiving TTY. The message is shown on a monitor.  Use text messaging, social media, and email if it is hard for you to communicate by telephone.  Try to learn a listening technique called speechreading. It is not lipreading. You pay attention to people's gestures, expressions, posture, and tone of voice. These clues can help you understand what a person is saying. Face the person you are talking to, and have them face you. Make sure the lighting is good. You need to see the other  "person's face clearly.  Think about counseling if you need help to adjust to your hearing loss.  When should you call for help?  Watch closely for changes in your health, and be sure to contact your doctor if:    You think your hearing is getting worse.     You have new symptoms, such as dizziness or nausea.   Where can you learn more?  Go to https://www.[employer]xim[employer].net/patiented  Enter R798 in the search box to learn more about \"Hearing Loss: Care Instructions.\"  Current as of: September 27, 2023               Content Version: 14.0    1514-5308 [employer].   Care instructions adapted under license by your healthcare professional. If you have questions about a medical condition or this instruction, always ask your healthcare professional. [employer] disclaims any warranty or liability for your use of this information.      Learning About Stress  What is stress?     Stress is your body's response to a hard situation. Your body can have a physical, emotional, or mental response. Stress is a fact of life for most people, and it affects everyone differently. What causes stress for you may not be stressful for someone else.  A lot of things can cause stress. You may feel stress when you go on a job interview, take a test, or run a race. This kind of short-term stress is normal and even useful. It can help you if you need to work hard or react quickly. For example, stress can help you finish an important job on time.  Long-term stress is caused by ongoing stressful situations or events. Examples of long-term stress include long-term health problems, ongoing problems at work, or conflicts in your family. Long-term stress can harm your health.  How does stress affect your health?  When you are stressed, your body responds as though you are in danger. It makes hormones that speed up your heart, make you breathe faster, and give you a burst of energy. This is called the fight-or-flight stress " response. If the stress is over quickly, your body goes back to normal and no harm is done.  But if stress happens too often or lasts too long, it can have bad effects. Long-term stress can make you more likely to get sick, and it can make symptoms of some diseases worse. If you tense up when you are stressed, you may develop neck, shoulder, or low back pain. Stress is linked to high blood pressure and heart disease.  Stress also harms your emotional health. It can make you mathew, tense, or depressed. Your relationships may suffer, and you may not do well at work or school.  What can you do to manage stress?  You can try these things to help manage stress:   Do something active. Exercise or activity can help reduce stress. Walking is a great way to get started. Even everyday activities such as housecleaning or yard work can help.  Try yoga or lee chi. These techniques combine exercise and meditation. You may need some training at first to learn them.  Do something you enjoy. For example, listen to music or go to a movie. Practice your hobby or do volunteer work.  Meditate. This can help you relax, because you are not worrying about what happened before or what may happen in the future.  Do guided imagery. Imagine yourself in any setting that helps you feel calm. You can use online videos, books, or a teacher to guide you.  Do breathing exercises. For example:  From a standing position, bend forward from the waist with your knees slightly bent. Let your arms dangle close to the floor.  Breathe in slowly and deeply as you return to a standing position. Roll up slowly and lift your head last.  Hold your breath for just a few seconds in the standing position.  Breathe out slowly and bend forward from the waist.  Let your feelings out. Talk, laugh, cry, and express anger when you need to. Talking with supportive friends or family, a counselor, or a melissa leader about your feelings is a healthy way to relieve stress.  "Avoid discussing your feelings with people who make you feel worse.  Write. It may help to write about things that are bothering you. This helps you find out how much stress you feel and what is causing it. When you know this, you can find better ways to cope.  What can you do to prevent stress?  You might try some of these things to help prevent stress:  Manage your time. This helps you find time to do the things you want and need to do.  Get enough sleep. Your body recovers from the stresses of the day while you are sleeping.  Get support. Your family, friends, and community can make a difference in how you experience stress.  Limit your news feed. Avoid or limit time on social media or news that may make you feel stressed.  Do something active. Exercise or activity can help reduce stress. Walking is a great way to get started.  Where can you learn more?  Go to https://www.Drinks4-you.[employer]tuition/patiented  Enter N032 in the search box to learn more about \"Learning About Stress.\"  Current as of: October 24, 2023               Content Version: 14.0    7852-8342 [employer].   Care instructions adapted under license by your healthcare professional. If you have questions about a medical condition or this instruction, always ask your healthcare professional. [employer] disclaims any warranty or liability for your use of this information.      Learning About Alcohol Use Disorder  What is alcohol use disorder?  Alcohol use disorder means that a person drinks alcohol even though it causes harm to themselves or others. It can range from mild to severe. The more symptoms of this disorder you have, the more severe it may be. People who have it may find it hard to control their use of alcohol.  People who have this disorder may argue with others about how much they're drinking. Their job may be affected because of drinking. They may drink when it's dangerous or illegal, such as when they drive. They also " may have a strong need, or craving, to drink. They may feel like they must drink just to get by. Their drinking may increase their risk of getting hurt or being in a car crash.  Over time, drinking too much alcohol may cause health problems. These may include high blood pressure, liver problems, or problems with digestion.  What are the symptoms?  Maybe you've wondered about your alcohol habits or how to tell if your drinking is becoming a problem.  Here are some of the symptoms of alcohol use disorder. You may have it if you have two or more of the following symptoms:  You drink larger amounts of alcohol than you ever meant to. Or you've been drinking for a longer time than you ever meant to.  You can't cut down or control your use. Or you constantly wish you could cut down.  You spend a lot of time getting or drinking alcohol or recovering from its effects.  You have strong cravings for alcohol.  You can no longer do your main jobs at work, at school, or at home.  You keep drinking alcohol, even though your use hurts your relationships.  You have stopped doing important activities because of your alcohol use.  You drink alcohol in situations where doing so is dangerous.  You keep drinking alcohol even though you know it's causing health problems.  You need more and more alcohol to get the same effect, or you get less effect from the same amount over time. This is called tolerance.  You have uncomfortable symptoms when you stop drinking alcohol or use less. This is called withdrawal.  Alcohol use disorder can range from mild to severe. The more symptoms you have, the more severe the disorder may be.  You might not realize that your drinking is a problem. You might not drink large amounts when you drink. Or you might go for days or weeks between drinking episodes. But even if you don't drink very often, your drinking could still be harmful and put you at risk.  How is alcohol use disorder treated?  Getting help is  "up to you. But you don't have to do it alone. There are many people and kinds of treatments that can help.  Treatment for alcohol use disorder can include:  Group therapy, one or more types of counseling, and alcohol education.  Medicines that help to:  Reduce withdrawal symptoms and help you safely stop drinking.  Reduce cravings for alcohol.  Support groups. These groups include Alcoholics Anonymous and [employer] (Self-Management and Recovery Training).  Some people are able to stop or cut back on drinking with help from a counselor. People who have moderate to severe alcohol use disorder may need medical treatment. They may need to stay in a hospital or treatment center.  You may have a treatment team to help you. This team may include a psychologist or psychiatrist, counselors, doctors, social workers, nurses, and a . A  helps plan and manage your treatment.  Follow-up care is a key part of your treatment and safety. Be sure to make and go to all appointments, and call your doctor if you are having problems. It's also a good idea to know your test results and keep a list of the medicines you take.  Where can you learn more?  Go to https://www.[employer].net/patiented  Enter H758 in the search box to learn more about \"Learning About Alcohol Use Disorder.\"  Current as of: November 15, 2023               Content Version: 14.0    3313-4589 [employer].   Care instructions adapted under license by your healthcare professional. If you have questions about a medical condition or this instruction, always ask your healthcare professional. [employer] disclaims any warranty or liability for your use of this information.      "

## 2024-04-19 LAB
ALBUMIN SERPL BCG-MCNC: 4.6 G/DL (ref 3.5–5.2)
ALP SERPL-CCNC: 53 U/L (ref 40–150)
ALT SERPL W P-5'-P-CCNC: 30 U/L (ref 0–70)
ANION GAP SERPL CALCULATED.3IONS-SCNC: 11 MMOL/L (ref 7–15)
AST SERPL W P-5'-P-CCNC: 27 U/L (ref 0–45)
BILIRUB SERPL-MCNC: 0.7 MG/DL
BUN SERPL-MCNC: 18.8 MG/DL (ref 8–23)
CALCIUM SERPL-MCNC: 9.6 MG/DL (ref 8.8–10.2)
CHLORIDE SERPL-SCNC: 97 MMOL/L (ref 98–107)
CHOLEST SERPL-MCNC: 217 MG/DL
CREAT SERPL-MCNC: 0.95 MG/DL (ref 0.67–1.17)
DEPRECATED HCO3 PLAS-SCNC: 26 MMOL/L (ref 22–29)
EGFRCR SERPLBLD CKD-EPI 2021: 87 ML/MIN/1.73M2
FASTING STATUS PATIENT QL REPORTED: YES
GLUCOSE SERPL-MCNC: 101 MG/DL (ref 70–99)
HDLC SERPL-MCNC: 74 MG/DL
LDLC SERPL CALC-MCNC: 120 MG/DL
NONHDLC SERPL-MCNC: 143 MG/DL
POTASSIUM SERPL-SCNC: 5.3 MMOL/L (ref 3.4–5.3)
PROT SERPL-MCNC: 7.6 G/DL (ref 6.4–8.3)
SODIUM SERPL-SCNC: 134 MMOL/L (ref 135–145)
TRIGL SERPL-MCNC: 117 MG/DL

## 2024-04-20 DIAGNOSIS — E78.5 HYPERLIPIDEMIA LDL GOAL <100: ICD-10-CM

## 2024-04-20 LAB
CREAT UR-MCNC: 73.1 MG/DL
MICROALBUMIN UR-MCNC: <12 MG/L
MICROALBUMIN/CREAT UR: NORMAL MG/G{CREAT}

## 2024-04-20 RX ORDER — SIMVASTATIN 40 MG
40 TABLET ORAL AT BEDTIME
Qty: 90 TABLET | Refills: 3 | Status: SHIPPED | OUTPATIENT
Start: 2024-04-20 | End: 2024-06-13

## 2024-04-22 ENCOUNTER — OFFICE VISIT (OUTPATIENT)
Dept: PODIATRY | Facility: CLINIC | Age: 69
End: 2024-04-22
Attending: PHYSICIAN ASSISTANT
Payer: COMMERCIAL

## 2024-04-22 VITALS
HEIGHT: 69 IN | DIASTOLIC BLOOD PRESSURE: 88 MMHG | HEART RATE: 96 BPM | BODY MASS INDEX: 31.25 KG/M2 | SYSTOLIC BLOOD PRESSURE: 149 MMHG | WEIGHT: 211 LBS

## 2024-04-22 DIAGNOSIS — M79.671 RIGHT FOOT PAIN: ICD-10-CM

## 2024-04-22 DIAGNOSIS — M20.21 HALLUX RIGIDUS, RIGHT FOOT: Primary | ICD-10-CM

## 2024-04-22 PROCEDURE — 99204 OFFICE O/P NEW MOD 45 MIN: CPT | Performed by: PODIATRIST

## 2024-04-22 ASSESSMENT — PAIN SCALES - GENERAL: PAINLEVEL: SEVERE PAIN (6)

## 2024-04-22 NOTE — PROGRESS NOTES
PATIENT HISTORY:  Phong Cuevas is a 69 year old male who presents to clinic in consultation at the request of  Brannon FLORES with a chief complaint of arthritis.  The patient is seen by themselves.  The patient relates the pain is primarily located around the great right toe.  Reports insidious onset without acute precipitating event.  The patient relates that the symptoms have been going on for several month(s).  The patient has previously tried Mobic with little relief.  The patient is currently employed as self-employed .  Any previous notes and studies that pertain to the patient's condition were reviewed.    Pertinent medical, surgical and family history was reviewed in the [facility] chart.    Past Medical History:   Past Medical History:   Diagnosis Date    Arthritis of left knee 3/25/2022       Medications:   Current Outpatient Medications:     Ascorbic Acid (VITAMIN C PO), Take 500 mg by mouth daily, Disp: , Rfl:     Cholecalciferol (VITAMIN D-3) 25 MCG (1000 UT) CAPS, Take 1 capsule by mouth daily, Disp: , Rfl:     doxylamine (UNISOM) 25 MG TABS, Take 25 mg by mouth at bedtime, Disp: , Rfl:     fluticasone (FLONASE) 50 MCG/ACT nasal spray, Spray 1 spray into both nostrils daily, Disp: 16 g, Rfl: 11    ibuprofen (ADVIL/MOTRIN) 600 MG tablet, Take 1 tablet (600 mg) by mouth every 6 hours as needed (mild), Disp: 30 tablet, Rfl: 0    lisinopril (ZESTRIL) 10 MG tablet, Take 1 tablet (10 mg) by mouth daily, Disp: 90 tablet, Rfl: 3    meloxicam (MOBIC) 15 MG tablet, Take 1 tablet (15 mg) by mouth daily No additional over the counter ibuprofen, Advil, naproxen, aleve. Ok to take Tylenol or acetaminophen., Disp: 90 tablet, Rfl: 3    Multiple Vitamins-Minerals (MULTIVITAMIN ADULT PO), Take 1 tablet by mouth daily, Disp: , Rfl:     simvastatin (ZOCOR) 40 MG tablet, Take 1 tablet (40 mg) by mouth at bedtime, Disp: 90 tablet, Rfl: 3     Allergies:  No Known Allergies    Vitals: There were no vitals taken for  this visit.  BMI= There is no height or weight on file to calculate BMI.    LOWER EXTREMITY PHYSICAL EXAM    Dermatologic: Skin is intact to right lower extremity without significant lesions, rash or abrasion.        Vascular: DP & PT pulses are intact & regular on the right.   CFT and skin temperature is normal to the right lower extremity.     Neurologic: Lower extremity sensation is intact to light touch.  No evidence of weakness in the right lower extremity.        Musculoskeletal: Patient is ambulatory without assistive device or brace.  No gross ankle deformity noted.  No foot or ankle joint effusion is noted.  Noted pain with limited range of motion of the first metatarsophalangeal joint on the right foot.    Diagnostics:  Radiographs included three views of the right foot demonstrating severe degenerative changes to the first metatarsophalangeal joint with no cortical erosions or periosteal elevation.  All joint margins appear stable.  There is no apparent fracture or tumor formation noted.  There is no evidence of foreign body.  The images were independently reviewed by myself along with the patient explaining the findings.      ASSESSMENT / PLAN:     ICD-10-CM    1. Hallux rigidus, right foot  M20.21       2. Right foot pain  M79.671 Orthopedic  Referral          The nature of the patient s condition was discussed at length.  I discussed with patient details of procedure(s), possible risks and complications, alternative treatment options and post-operative course detailed below.  Patient is aware that surgery is elective and can be avoided if desired.  Likely surgical procedures include arthrodesis of the first metatarsophalangeal joint  on the right foot.  The patient was educated today about risks associated with surgery.  Risks of surgery include, but are not limited to: infection, painful scar, nerve injury, numbness, stiffness, over-correction, under-correction, non-union, need for repeat  surgery, recurrence of condition, ongoing pain, delayed wound healing, blood clot in the legs or lungs, amputation or other unforeseen side effects from undergoing surgery.       The patient was informed that metal screws and occasional  metal plates are used to stabilize the fractures and or osteotomies.  The hardware typically remains in the foot unless it becomes bothersome to the patient.  If this happens the hardware may need to be removed.  The patient was instructed to not smoke or use nicotine patches before and after the surgery as this could result in poor outcomes due to slower healing potentially.  Risks of DVT/PE were discussed in relation to anticipated level of immobilization, inactivity, injury, surgery, medications and personal risk factors.  Perioperative education was provided regarding signs and symptoms of a blood clot.  The patient was encouraged to be vigilant regarding symptoms and pursue risk reduction measures.  Based on patient history, procedure and post-operative plan, risk outweighs benefit of chemical prophylaxis therefore mechanical prophylaxis was encouraged.  Lower extremity range of motion exercises are encouraged.   Postoperative pain regimens were discussed in great detail the patient.  The risks and benefits of non-narcotic and narcotic pain medications, as well as synergistic agents was also discussed.  The patient will be prescribed Oxycodone for more severe breakthrough pain not relieved by scheduled Tylenol.  The patient was also encouraged to rest, elevate and ice postoperatively to help with swelling and pain control.  The patient was in agreement with this plan.  The patient understands that they would need to remain non weightbearing with use of crutches or a knee scooter post-operatively.The recovery process was discussed including impact to work, walking, shoes and daily activities.  We discussed that the patient should anticipate up to 12 months for maximum recovery  after surgery.  There is moderate risk involved.        After detailed discussed patient wishes to continue with the proposed surgical intervention.  The procedure will be performed under monitored anesthesia care with a popliteal block for anesthesia.  The patient will obtain a preoperative history and physical by the primary care provider.  Consents will be reviewed and signed on the day of surgery.      Phong verbalized agreement with and understanding of the rational for the diagnosis and treatment plan.  All questions were answered to best of my ability and the patient's satisfaction. The patient was advised to contact the clinic with any questions that may arise after the clinic visit.      Disclaimer: This note consists of symbols derived from keyboarding, dictation and/or voice recognition software. As a result, there may be errors in the script that have gone undetected. Please consider this when interpreting information found in this chart.       VALENTINA Sandoval D.P.M., F.PRISCILLA.F.A.S.

## 2024-04-22 NOTE — LETTER
4/22/2024         RE: Phong Cuevas  [address]  [facility] [address]        Dear Colleague,    Thank you for referring your patient, Phong Cuevas, to the [facility] [place]. Please see a copy of my visit note below.    PATIENT HISTORY:  Phong Cuevas is a 69 year old male who presents to clinic in consultation at the request of  Brannon FLORES with a chief complaint of arthritis.  The patient is seen by themselves.  The patient relates the pain is primarily located around the great right toe.  Reports insidious onset without acute precipitating event.  The patient relates that the symptoms have been going on for several month(s).  The patient has previously tried Mobic with little relief.  The patient is currently employed as self-employed .  Any previous notes and studies that pertain to the patient's condition were reviewed.    Pertinent medical, surgical and family history was reviewed in the [facility] chart.    Past Medical History:   Past Medical History:   Diagnosis Date     Arthritis of left knee 3/25/2022       Medications:   Current Outpatient Medications:      Ascorbic Acid (VITAMIN C PO), Take 500 mg by mouth daily, Disp: , Rfl:      Cholecalciferol (VITAMIN D-3) 25 MCG (1000 UT) CAPS, Take 1 capsule by mouth daily, Disp: , Rfl:      doxylamine (UNISOM) 25 MG TABS, Take 25 mg by mouth at bedtime, Disp: , Rfl:      fluticasone (FLONASE) 50 MCG/ACT nasal spray, Spray 1 spray into both nostrils daily, Disp: 16 g, Rfl: 11     ibuprofen (ADVIL/MOTRIN) 600 MG tablet, Take 1 tablet (600 mg) by mouth every 6 hours as needed (mild), Disp: 30 tablet, Rfl: 0     lisinopril (ZESTRIL) 10 MG tablet, Take 1 tablet (10 mg) by mouth daily, Disp: 90 tablet, Rfl: 3     meloxicam (MOBIC) 15 MG tablet, Take 1 tablet (15 mg) by mouth daily No additional over the counter ibuprofen, Advil, naproxen, aleve. Ok to take Tylenol or acetaminophen., Disp: 90 tablet, Rfl: 3      Multiple Vitamins-Minerals (MULTIVITAMIN ADULT PO), Take 1 tablet by mouth daily, Disp: , Rfl:      simvastatin (ZOCOR) 40 MG tablet, Take 1 tablet (40 mg) by mouth at bedtime, Disp: 90 tablet, Rfl: 3     Allergies:  No Known Allergies    Vitals: There were no vitals taken for this visit.  BMI= There is no height or weight on file to calculate BMI.    LOWER EXTREMITY PHYSICAL EXAM    Dermatologic: Skin is intact to right lower extremity without significant lesions, rash or abrasion.        Vascular: DP & PT pulses are intact & regular on the right.   CFT and skin temperature is normal to the right lower extremity.     Neurologic: Lower extremity sensation is intact to light touch.  No evidence of weakness in the right lower extremity.        Musculoskeletal: Patient is ambulatory without assistive device or brace.  No gross ankle deformity noted.  No foot or ankle joint effusion is noted.  Noted pain with limited range of motion of the first metatarsophalangeal joint on the right foot.    Diagnostics:  Radiographs included three views of the right foot demonstrating severe degenerative changes to the first metatarsophalangeal joint with no cortical erosions or periosteal elevation.  All joint margins appear stable.  There is no apparent fracture or tumor formation noted.  There is no evidence of foreign body.  The images were independently reviewed by myself along with the patient explaining the findings.      ASSESSMENT / PLAN:     ICD-10-CM    1. Hallux rigidus, right foot  M20.21       2. Right foot pain  M79.671 Orthopedic  Referral          The nature of the patient s condition was discussed at length.  I discussed with patient details of procedure(s), possible risks and complications, alternative treatment options and post-operative course detailed below.  Patient is aware that surgery is elective and can be avoided if desired.  Likely surgical procedures include arthrodesis of the first  metatarsophalangeal joint  on the right foot.  The patient was educated today about risks associated with surgery.  Risks of surgery include, but are not limited to: infection, painful scar, nerve injury, numbness, stiffness, over-correction, under-correction, non-union, need for repeat surgery, recurrence of condition, ongoing pain, delayed wound healing, blood clot in the legs or lungs, amputation or other unforeseen side effects from undergoing surgery.       The patient was informed that metal screws and occasional  metal plates are used to stabilize the fractures and or osteotomies.  The hardware typically remains in the foot unless it becomes bothersome to the patient.  If this happens the hardware may need to be removed.  The patient was instructed to not smoke or use nicotine patches before and after the surgery as this could result in poor outcomes due to slower healing potentially.  Risks of DVT/PE were discussed in relation to anticipated level of immobilization, inactivity, injury, surgery, medications and personal risk factors.  Perioperative education was provided regarding signs and symptoms of a blood clot.  The patient was encouraged to be vigilant regarding symptoms and pursue risk reduction measures.  Based on patient history, procedure and post-operative plan, risk outweighs benefit of chemical prophylaxis therefore mechanical prophylaxis was encouraged.  Lower extremity range of motion exercises are encouraged.   Postoperative pain regimens were discussed in great detail the patient.  The risks and benefits of non-narcotic and narcotic pain medications, as well as synergistic agents was also discussed.  The patient will be prescribed Oxycodone for more severe breakthrough pain not relieved by scheduled Tylenol.  The patient was also encouraged to rest, elevate and ice postoperatively to help with swelling and pain control.  The patient was in agreement with this plan.  The patient understands  that they would need to remain non weightbearing with use of crutches or a knee scooter post-operatively.The recovery process was discussed including impact to work, walking, shoes and daily activities.  We discussed that the patient should anticipate up to 12 months for maximum recovery after surgery.  There is moderate risk involved.        After detailed discussed patient wishes to continue with the proposed surgical intervention.  The procedure will be performed under monitored anesthesia care with a popliteal block for anesthesia.  The patient will obtain a preoperative history and physical by the primary care provider.  Consents will be reviewed and signed on the day of surgery.      Phong verbalized agreement with and understanding of the rational for the diagnosis and treatment plan.  All questions were answered to best of my ability and the patient's satisfaction. The patient was advised to contact the clinic with any questions that may arise after the clinic visit.      Disclaimer: This note consists of symbols derived from keyboarding, dictation and/or voice recognition software. As a result, there may be errors in the script that have gone undetected. Please consider this when interpreting information found in this chart.       HENRI Mock.P.DESTINI., F.A.C.F.A.S.      Again, thank you for allowing me to participate in the care of your patient.        Sincerely,        Gordon Sandoval DPM

## 2024-04-22 NOTE — PATIENT INSTRUCTIONS
Surgery Scheduling   You have elected to proceed with surgery for arthrodesis of the first metatarsophalangeal joint of the right foot.  Dr. Sandoval performs his surgeries on Tuesdays at [facility].   To schedule your surgery date please call [phone].  If no one answers, please leave a message with a good time for our staff to call you back.    - Please have a date in mind for your surgery, you can feel free to leave that date on the message, and we will schedule and call back to confirm.   - You can expect to receive a call back the same day or on the next business day from Dr. Sandoval s team to assist in the scheduling.   We will assist to schedule the date of your surgery.    The time will be determined a few days ahead of time.    You can expect a call from Same Day Surgery 2-3 days ahead of time with specific instructions for what time to arrive at the hospital as well as any other preparations you should take prior to surgery.  You may need to obtain a pre-operative physical from a primary medical provider.    This must be done within 30 days of your surgery date.  We will also schedule your first post-operative appointment for a bandage and wound check for the Monday following your surgery at the [place] location.  You may be non-weight bearing for a period of up to 6 weeks.  Options for this include: (Please indicate which you would prefer so we can provide you with an order and instructions)  Crutches  Walker  Roll-a-bout knee walker.  If you will need paperwork filled out for your employer you may drop those off at the clinic directly or you may have those faxed to us at [phone].  Please indicate on the form the date you would like the LA to begin if it will not be your surgery date.    The forms are typically filled out for up to 12 weeks, however you may be cleared to return prior to that time depending on your individual healing and job requirements.    GETTING READY FOR YOUR  SURGERY    ONE-THREE WEEKS BEFORE  1. See your Family Doctor or Primary Care Provider for a Preoperative History and Physical.    2. Please see pre-surgical medications below to which medications need to be stopped before surgery and when.    SAME DAY SURGERY PATIENTS  1. You will need a family member of friend to drive you home. If you do not have one the surgery will be cancelled or rescheduled.  2. You will need a responsible adult to stay with you that night after the surgery.       We will ask this person to listen to some instructions before you leave the hospital.    DAY BEFORE SURGERY  1. DO NOT EAT OR DRINK ANYTHING AFTER MIDNIGHT THE NIGHT  BEFORE YOUR SURGERY!   2. DO NOT DRINK ALCOHOL.  3. Do not take over the counter drugs.  4. Some people need to have blood tests at the hospital. If you need blood tests, we will tell you in advance.  5. Take medications as directed by your doctor. You may take these with a small amount of water.  6. Do not chew gum, chew tobacco, or suck on hard candy the day of surgery.  7. Bring your insurance cards, a list of your medicines and co-pays you might need. Leave jewelry and other valuables at home.      PRESURGICAL MEDICATIONS:  Certain prescription, over-the-counter, and herbal medications interfere with healing after an operation. The main concern relates to medications that increase bleeding at the surgical site. Excess blood under the incision results in poor wound healing, excess pain, increased scarring, and a higher risk of infection.    Some medications slow the healing process of bone. Medications can also interfere with the anesthesia drugs that keep you asleep during the operation. It is important to ensure that these medications are out of your system prior to the operation. The list below details a number of medications that are of concern. Pay special attention to how long you should avoid these medications before your operation. Please note that this list  is not complete. You should ask your surgeon or pharmacist if you are uncertain about other medications. Any herbal supplement not listed should be discontinued at least one week prior to surgery.    Ozempic/Trulicity/Mounjaro/Rybelsus: no injections one week prior to surgery.  These medications slow the emptying of contents of your stomach which receive anesthesia without risk of aspiration.    Aspirin: Hold for one week prior to surgery and restart the day after surgery. This over the counter medication promotes bleeding.    Motrin / Ibuprofen / Aleve / Advil / NSAIDS:  Stop one week prior to surgery. These medications affect bleeding and may cause delay in bone healing. Avoid taking these medications for six weeks after bone surgeries. Other procedures may allow you to restart sooner than 6 weeks after surgery.    Coumadin / Plavix: Your primary care provider will manage Coumadin in relation to surgery. Coumadin may result in excessive bleeding and may be adjusted before and after surgery.    Enbriel: Stop two weeks prior to surgery and restart two weeks after surgery. This medication can effect soft tissue healing and increases the risk of infection.    Remicade: Stop 8-12 weeks before surgery and restart two weeks after surgery. This medication can affect soft tissue healing and increases the risk of infection.    Humira: Stop 4 weeks before surgery and restart two weeks after surgery. This medication can affect soft tissue healing and increases the risk of infection.    Methotrexate: Stop one dose prior to surgery. This medication will be restarted when the wound appears to be healing well. Please ask your surgeon about restarting this medication when you are being seen in the office for wound checks.    Kava: Stop at least one day prior to surgery and may restart one day after surgery. Kava may increase the sedative effect of anesthetics that are given during the operation. Kava can also increase bleeding at  the surgical site.    Ephedra (ma shipley): Stop at least one day prior to surgery and may restart one day after surgery.  Ephedra may increase the risk of heart attack and stroke. This medication can also increase bleeding at the surgical site.    [place]'s Wort: Stop at least five days before surgery and may restart one day after surgery. Sina's wort may diminish the effects of several drugs that are given during surgery.    Ginseng: Stop at least one week prior to surgery and may restart one day after surgery.  Ginseng lowers blood sugar and may increase bleeding at the surgical site.    Ginkgo: Stop 36 hours before surgery and may restart one day after surgery. Ginkgo may increase bleeding at the surgical site.    Garlic: Stop at least one week prior to surgery and may restart one day after. Garlic may increase bleeding at the surgery site.    Valerian: Do a slow steady decrease in your daily dose over a period of 2-3 weeks before surgery to decrease the chance of withdrawal symptoms. Valerian may increase the sedative effect of anesthetics given during the operation.    Echinacea: There is no data on stopping echinacea prior to surgery. This medication though can be associated with allergic reactions and suppression of your immune system.    Vitamin E, Omega-3, Flax, Fish Oil, Glucosamine and Chondroitin: Stop 2 weeks prior to surgery and may restart one day after. These herbal medications can increase risk of bleeding at surgical site.

## 2024-04-22 NOTE — NURSING NOTE
"Chief Complaint   Patient presents with    Consult     Arthritis in great right toe       Initial BP (!) 149/88   Pulse 96   Ht 1.74 m (5' 8.5\")   Wt 95.7 kg (211 lb)   BMI 31.62 kg/m   Estimated body mass index is 31.62 kg/m  as calculated from the following:    Height as of this encounter: 1.74 m (5' 8.5\").    Weight as of this encounter: 95.7 kg (211 lb).  Medications and allergies reviewed.      Galina ÁLVAREZ MA    "

## 2024-04-29 ENCOUNTER — OFFICE VISIT (OUTPATIENT)
Dept: FAMILY MEDICINE | Facility: CLINIC | Age: 69
End: 2024-04-29
Payer: COMMERCIAL

## 2024-04-29 ENCOUNTER — ANESTHESIA EVENT (OUTPATIENT)
Dept: SURGERY | Facility: CLINIC | Age: 69
End: 2024-04-29
Payer: COMMERCIAL

## 2024-04-29 VITALS
WEIGHT: 215 LBS | HEIGHT: 69 IN | RESPIRATION RATE: 24 BRPM | BODY MASS INDEX: 31.84 KG/M2 | DIASTOLIC BLOOD PRESSURE: 84 MMHG | HEART RATE: 107 BPM | TEMPERATURE: 97.8 F | SYSTOLIC BLOOD PRESSURE: 138 MMHG | OXYGEN SATURATION: 94 %

## 2024-04-29 DIAGNOSIS — M19.071 OSTEOARTHRITIS OF JOINT OF TOE OF RIGHT FOOT: ICD-10-CM

## 2024-04-29 DIAGNOSIS — Z01.818 PREOP GENERAL PHYSICAL EXAM: Primary | ICD-10-CM

## 2024-04-29 PROCEDURE — 99214 OFFICE O/P EST MOD 30 MIN: CPT | Performed by: NURSE PRACTITIONER

## 2024-04-29 RX ORDER — RESPIRATORY SYNCYTIAL VIRUS VACCINE 120MCG/0.5
0.5 KIT INTRAMUSCULAR ONCE
Qty: 1 EACH | Refills: 0 | Status: CANCELLED | OUTPATIENT
Start: 2024-04-29 | End: 2024-04-29

## 2024-04-29 ASSESSMENT — ENCOUNTER SYMPTOMS
DIZZINESS: 0
LIGHT-HEADEDNESS: 0
DYSURIA: 0
SORE THROAT: 0
SLEEP DISTURBANCE: 0
WHEEZING: 0
RHINORRHEA: 0
DIARRHEA: 0
NUMBNESS: 0
JOINT SWELLING: 0
NERVOUS/ANXIOUS: 0
ARTHRALGIAS: 1
COUGH: 0
CONSTIPATION: 0
FATIGUE: 0
FREQUENCY: 0
DYSPHORIC MOOD: 0
ABDOMINAL PAIN: 0
PALPITATIONS: 0
HEADACHES: 0
SINUS PRESSURE: 0
SHORTNESS OF BREATH: 0

## 2024-04-29 NOTE — ANESTHESIA PREPROCEDURE EVALUATION
Anesthesia Pre-Procedure Evaluation    Patient: Phong Cuevas   MRN: [id number] : 1955        Procedure : Procedure(s):  Fusion of the first metatarsal phalangeal joint, right foot          Past Medical History:   Diagnosis Date    Arthritis of left knee 3/25/2022      Past Surgical History:   Procedure Laterality Date    ARTHROPLASTY KNEE Left 3/24/2022    Procedure: left TOTAL Knee Arthroplasty;  Surgeon: Allen White MD;  Location: [facility]    COLONOSCOPY  2014    Procedure: COLONOSCOPY;  Colonoscopy  ;  Surgeon: Murali Gill MD;  Location: [facility]      No Known Allergies   Social History     Tobacco Use    Smoking status: Never     Passive exposure: Never    Smokeless tobacco: Never   Substance Use Topics    Alcohol use: Yes      Wt Readings from Last 1 Encounters:   24 95.7 kg (211 lb)        Anesthesia Evaluation   Pt has had prior anesthetic. Type: MAC and Regional.    No history of anesthetic complications       ROS/MED HX  ENT/Pulmonary:       Neurologic:  - neg neurologic ROS     Cardiovascular:     (+)  - -   -  - -                                 Previous cardiac testing   Echo: Date: Results:    Stress Test:  Date: Results:    ECG Reviewed:  Date: 3/17/22 Results:  Sinus  Rhythm   WITHIN NORMAL LIMITS  Cath:  Date: Results:      METS/Exercise Tolerance: >4 METS    Hematologic:  - neg hematologic  ROS     Musculoskeletal: Comment: Kyphosis (acquired) (postural)  (+)  arthritis,             GI/Hepatic:     (+) GERD,                   Renal/Genitourinary:  - neg Renal ROS     Endo:     (+)               Obesity,       Psychiatric/Substance Use:  - neg psychiatric ROS     Infectious Disease:  - neg infectious disease ROS     Malignancy:  - neg malignancy ROS     Other:  - neg other ROS          Physical Exam    Airway  airway exam normal      Mallampati: II   TM distance: > 3 FB   Neck ROM: full   Mouth opening: > 3 cm    Respiratory Devices and Support         Dental      "      Cardiovascular   cardiovascular exam normal       Rhythm and rate: regular and normal     Pulmonary   pulmonary exam normal        breath sounds clear to auscultation           OUTSIDE LABS:  CBC:   Lab Results   Component Value Date    WBC 3.3 (L) 09/08/2023    WBC 5.9 03/17/2022    HGB 15.2 09/08/2023    HGB 13.6 03/25/2022    HCT 43.9 09/08/2023    HCT 45.6 03/17/2022     09/08/2023     03/17/2022     BMP:   Lab Results   Component Value Date     (L) 04/18/2024     (L) 09/08/2023    POTASSIUM 5.3 04/18/2024    POTASSIUM 4.9 09/08/2023    CHLORIDE 97 (L) 04/18/2024    CHLORIDE 99 09/08/2023    CO2 26 04/18/2024    CO2 27 09/08/2023    BUN 18.8 04/18/2024    BUN 17.5 09/08/2023    CR 0.95 04/18/2024    CR 0.89 09/08/2023     (H) 04/18/2024     (H) 09/08/2023     COAGS: No results found for: \"PTT\", \"INR\", \"FIBR\"  POC: No results found for: \"BGM\", \"HCG\", \"HCGS\"  HEPATIC:   Lab Results   Component Value Date    ALBUMIN 4.6 04/18/2024    PROTTOTAL 7.6 04/18/2024    ALT 30 04/18/2024    AST 27 04/18/2024    ALKPHOS 53 04/18/2024    BILITOTAL 0.7 04/18/2024     OTHER:   Lab Results   Component Value Date    A1C 5.5 04/18/2024    DELANO 9.6 04/18/2024    LIPASE 201 07/05/2017    AMYLASE 42 07/05/2017    TSH 0.87 07/05/2017    CRP 4.5 01/11/2018    SED 4 01/11/2018       Anesthesia Plan    ASA Status:  2    NPO Status:  NPO Appropriate    Anesthesia Type: General.     - Airway: Native airway   Induction: Intravenous, Propofol.   Maintenance: TIVA.        Consents    Anesthesia Plan(s) and associated risks, benefits, and realistic alternatives discussed. Questions answered and patient/representative(s) expressed understanding.     - Discussed: Risks, Benefits and Alternatives for BOTH SEDATION and the PROCEDURE were discussed     - Discussed with:  Patient      - Extended Intubation/Ventilatory Support Discussed: No.      - Patient is DNR/DNI Status: No     Use of blood products " "discussed: No .     Postoperative Care    Pain management: Oral pain medications, Peripheral nerve block (Single Shot), IV analgesics.   PONV prophylaxis: Background Propofol Infusion, Ondansetron (or other 5HT-3), Dexamethasone or Solumedrol     Comments:               CULLEN Marcus CRNA    I have reviewed the pertinent notes and labs in the chart from the past 30 days and (re)examined the patient.  Any updates or changes from those notes are reflected in this note.     # Hyponatremia: Lowest Na = 134 mmol/L in last 30 days, will monitor as appropriate          # Obesity: Estimated body mass index is 31.62 kg/m  as calculated from the following:    Height as of 4/22/24: 1.74 m (5' 8.5\").    Weight as of 4/22/24: 95.7 kg (211 lb).      "

## 2024-04-29 NOTE — PATIENT INSTRUCTIONS
Preparing for Your Surgery  Getting started  A nurse will call you to review your health history and instructions. They will give you an arrival time based on your scheduled surgery time. Please be ready to share:  Your doctor's clinic name and phone number  Your medical, surgical, and anesthesia history  A list of allergies and sensitivities  A list of medicines, including herbal treatments and over-the-counter drugs  Whether the patient has a legal guardian (ask how to send us the papers in advance)  Please tell us if you're pregnant--or if there's any chance you might be pregnant. Some surgeries may injure a fetus (unborn baby), so they require a pregnancy test. Surgeries that are safe for a fetus don't always need a test, and you can choose whether to have one.   If you have a child who's having surgery, please ask for a copy of Preparing for Your Child's Surgery.    Preparing for surgery  Within 10 to 30 days of surgery: Have a pre-op exam (sometimes called an H&P, or History and Physical). This can be done at a clinic or pre-operative center.  If you're having a , you may not need this exam. Talk to your care team.  At your pre-op exam, talk to your care team about all medicines you take. If you need to stop any medicines before surgery, ask when to start taking them again.  We do this for your safety. Many medicines can make you bleed too much during surgery. Some change how well surgery (anesthesia) drugs work.  Call your insurance company to let them know you're having surgery. (If you don't have insurance, call [phone].)  Call your clinic if there's any change in your health. This includes signs of a cold or flu (sore throat, runny nose, cough, rash, fever). It also includes a scrape or scratch near the surgery site.  If you have questions on the day of surgery, call your hospital or surgery center.  Eating and drinking guidelines  For your safety: Unless your surgeon tells you otherwise,  follow the guidelines below.  Eat and drink as usual until 8 hours before you arrive for surgery. After that, no food or milk.  Drink clear liquids until 2 hours before you arrive. These are liquids you can see through, like water, Gatorade, and Propel Water. They also include plain black coffee and tea (no cream or milk), candy, and breath mints. You can spit out gum when you arrive.  If you drink alcohol: Stop drinking it the night before surgery.  If your care team tells you to take medicine on the morning of surgery, it's okay to take it with a sip of water.  Preventing infection  Shower or bathe the night before and morning of your surgery. Follow the instructions your clinic gave you. (If no instructions, use regular soap.)  Don't shave or clip hair near your surgery site. We'll remove the hair if needed.  Don't smoke or vape the morning of surgery. You may chew nicotine gum up to 2 hours before surgery. A nicotine patch is okay.  Note: Some surgeries require you to completely quit smoking and nicotine. Check with your surgeon.  Your care team will make every effort to keep you safe from infection. We will:  Clean our hands often with soap and water (or an alcohol-based hand rub).  Clean the skin at your surgery site with a special soap that kills germs.  Give you a special gown to keep you warm. (Cold raises the risk of infection.)  Wear special hair covers, masks, gowns and gloves during surgery.  Give antibiotic medicine, if prescribed. Not all surgeries need antibiotics.  What to bring on the day of surgery  Photo ID and insurance card  Copy of your health care directive, if you have one  Glasses and hearing aids (bring cases)  You can't wear contacts during surgery  Inhaler and eye drops, if you use them (tell us about these when you arrive)  CPAP machine or breathing device, if you use them  A few personal items, if spending the night  If you have . . .  A pacemaker, ICD (cardiac defibrillator) or other  implant: Bring the ID card.  An implanted stimulator: Bring the remote control.  A legal guardian: Bring a copy of the certified (court-stamped) guardianship papers.  Please remove any jewelry, including body piercings. Leave jewelry and other valuables at home.  If you're going home the day of surgery  You must have a responsible adult drive you home. They should stay with you overnight as well.  If you don't have someone to stay with you, and you aren't safe to go home alone, we may keep you overnight. Insurance often won't pay for this.  After surgery  If it's hard to control your pain or you need more pain medicine, please call your surgeon's office.  Questions?   If you have any questions for your care team, list them here: _________________________________________________________________________________________________________________________________________________________________________ ____________________________________ ____________________________________ ____________________________________  For informational purposes only. Not to replace the advice of your health care provider. Copyright   2003, 2019 [employer]. All rights reserved. Clinically reviewed by Kennedi Reyna MD. [employer] 698717 - REV 12/22.    How to Take Your Medication Before Surgery  - HOLD (do not take) meloxicam  You can take the rest of your medication with a small sip of water in the AM.

## 2024-04-29 NOTE — PROGRESS NOTES
Preoperative Evaluation  [facility] [place]  [id number]  [place] [address]  Phone: [phone]  Primary Provider: Indigo Rayo  Pre-op Performing Provider: NIDIGO RAYO  Apr 29, 2024       Cory is a 69 year old, presenting for the following:  Pre-Op Exam        4/29/2024     3:02 PM   Additional Questions   Roomed by Princess   Accompanied by na         4/29/2024     3:02 PM   Patient Reported Additional Medications   Patient reports taking the following new medications none     Surgical Information  Surgery/Procedure: Fusion of the first metatarsal phalangeal joint, right foot   Surgery Location: [facility]  Surgeon: Gordon Sandoval DPM   Surgery Date: 4/30/24  Time of Surgery: 1:30 pm  Where patient plans to recover: At home with family  Fax number for surgical facility: Note does not need to be faxed, will be available electronically in Epic.    Assessment & Plan     The proposed surgical procedure is considered INTERMEDIATE risk.      Preop general physical exam  Fully optimized for procedure.  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  Fully optimized for procedure.  Continue to follow with podiatry            - No identified additional risk factors other than previously addressed    Antiplatelet or Anticoagulation Medication Instructions   - Patient is on no antiplatelet or anticoagulation medications.    Additional Medication Instructions  NSAID-meloxicam.  Recommend holding 10 days prior to surgery.  Will be unable to hold for this amount of time.  Recently started meloxicam April 18.  Procedure moderate bleeding risk.  Okay to proceed with procedure with 48-hour hold of meloxicam    Recommendation  APPROVAL GIVEN to proceed with proposed procedure, without further diagnostic evaluation.    Prescription drug management      Subjective       HPI related to upcoming  procedure: has been having right great toe pain for some time.  Saw podiatry, Is going to be having fusion of the first metatarsal.  Does have a  arranged.  Has not been taking any over-the-counter NSAIDs.  Last dose of meloxicam was yesterday.          4/29/2024     2:55 PM   Preop Questions   1. Have you ever had a heart attack or stroke? No   2. Have you ever had surgery on your heart or blood vessels, such as a stent placement, a coronary artery bypass, or surgery on an artery in your head, neck, heart, or legs? No   3. Do you have chest pain with activity? No   4. Do you have a history of  heart failure? No   5. Do you currently have a cold, bronchitis or symptoms of other infection? No   6. Do you have a cough, shortness of breath, or wheezing? No   7. Do you or anyone in your family have previous history of blood clots? No   8. Do you or does anyone in your family have a serious bleeding problem such as prolonged bleeding following surgeries or cuts? No   9. Have you ever had problems with anemia or been told to take iron pills? No   10. Have you had any abnormal blood loss such as black, tarry or bloody stools? No   11. Have you ever had a blood transfusion? No   12. Are you willing to have a blood transfusion if it is medically needed before, during, or after your surgery? Yes   13. Have you or any of your relatives ever had problems with anesthesia? No   14. Do you have sleep apnea, excessive snoring or daytime drowsiness? No   15. Do you have any artifical heart valves or other implanted medical devices like a pacemaker, defibrillator, or continuous glucose monitor? No   16. Do you have artificial joints? YES - left knee    17. Are you allergic to latex? No       Preoperative Review of    reviewed - no record of controlled substances prescribed.      Patient Active Problem List    Diagnosis Date Noted    Kyphosis (acquired) (postural) 08/29/2023     Priority: Medium    Arthritis of left knee  03/25/2022     Priority: Medium    Sleep disturbance 03/24/2022     Priority: Medium    Status post total knee replacement 03/24/2022     Priority: Medium    Morbid obesity (H) 07/15/2021     Priority: Medium    Gastroesophageal reflux disease with esophagitis without hemorrhage 07/15/2021     Priority: Medium    Abnormal glucose 01/24/2019     Priority: Medium    Hyperlipidemia LDL goal <100 01/11/2018     Priority: Medium      Past Medical History:   Diagnosis Date    Arthritis of left knee 3/25/2022     Past Surgical History:   Procedure Laterality Date    ARTHROPLASTY KNEE Left 3/24/2022    Procedure: left TOTAL Knee Arthroplasty;  Surgeon: Allen White MD;  Location: [facility]    COLONOSCOPY  2/20/2014    Procedure: COLONOSCOPY;  Colonoscopy  ;  Surgeon: Murali Gill MD;  Location: [facility]     Current Outpatient Medications   Medication Sig Dispense Refill    Ascorbic Acid (VITAMIN C PO) Take 500 mg by mouth daily      Cholecalciferol (VITAMIN D-3) 25 MCG (1000 UT) CAPS Take 1 capsule by mouth daily      doxylamine (UNISOM) 25 MG TABS Take 25 mg by mouth at bedtime      fluticasone (FLONASE) 50 MCG/ACT nasal spray Spray 1 spray into both nostrils daily 16 g 11    ibuprofen (ADVIL/MOTRIN) 600 MG tablet Take 1 tablet (600 mg) by mouth every 6 hours as needed (mild) 30 tablet 0    lisinopril (ZESTRIL) 10 MG tablet Take 1 tablet (10 mg) by mouth daily 90 tablet 3    meloxicam (MOBIC) 15 MG tablet Take 1 tablet (15 mg) by mouth daily No additional over the counter ibuprofen, Advil, naproxen, aleve. Ok to take Tylenol or acetaminophen. 90 tablet 3    Multiple Vitamins-Minerals (MULTIVITAMIN ADULT PO) Take 1 tablet by mouth daily      simvastatin (ZOCOR) 40 MG tablet Take 1 tablet (40 mg) by mouth at bedtime 90 tablet 3       No Known Allergies     Social History     Tobacco Use    Smoking status: Never     Passive exposure: Never    Smokeless tobacco: Never   Substance Use Topics    Alcohol use: Yes  "      History   Drug Use No         Review of Systems   Constitutional:  Negative for fatigue.   HENT:  Negative for congestion, ear pain, rhinorrhea, sinus pressure and sore throat.    Respiratory:  Negative for cough, shortness of breath and wheezing.    Cardiovascular:  Negative for chest pain and palpitations.   Gastrointestinal:  Negative for abdominal pain, constipation and diarrhea.   Genitourinary:  Negative for dysuria and frequency.   Musculoskeletal:  Positive for arthralgias (right great toe). Negative for joint swelling.   Skin:  Negative for rash.   Neurological:  Negative for dizziness, light-headedness, numbness and headaches.   Psychiatric/Behavioral:  Negative for dysphoric mood and sleep disturbance. The patient is not nervous/anxious.      Review of Systems   Constitutional:  Negative for fatigue.   HENT:  Negative for congestion, ear pain, rhinorrhea, sinus pressure and sore throat.    Respiratory:  Negative for cough, shortness of breath and wheezing.    Cardiovascular:  Negative for chest pain, palpitations and leg swelling.   Gastrointestinal:  Negative for abdominal pain, constipation and diarrhea.   Genitourinary:  Negative for dysuria and frequency.   Musculoskeletal:  Positive for arthralgias (right great toe). Negative for joint swelling.   Skin:  Negative for rash.   Neurological:  Negative for dizziness, light-headedness, numbness and headaches.   Psychiatric/Behavioral:  Negative for dysphoric mood and sleep disturbance. The patient is not nervous/anxious.         Objective    /84   Pulse 107   Temp 97.8  F (36.6  C) (Temporal)   Resp 24   Ht 1.74 m (5' 8.5\")   Wt 97.5 kg (215 lb)   SpO2 94%   BMI 32.21 kg/m     Estimated body mass index is 32.21 kg/m  as calculated from the following:    Height as of this encounter: 1.74 m (5' 8.5\").    Weight as of this encounter: 97.5 kg (215 lb).  Physical Exam  Constitutional:       Appearance: He is well-developed.   HENT:      " "Right Ear: Tympanic membrane and external ear normal.      Left Ear: Tympanic membrane and external ear normal.      Mouth/Throat:      Mouth: Mucous membranes are moist.      Pharynx: Uvula midline.   Neck:      Thyroid: No thyromegaly.      Vascular: No carotid bruit.   Cardiovascular:      Rate and Rhythm: Normal rate and regular rhythm.      Heart sounds: Normal heart sounds.   Pulmonary:      Effort: Pulmonary effort is normal.      Breath sounds: Normal breath sounds.   Abdominal:      General: Bowel sounds are normal.      Palpations: Abdomen is soft.   Musculoskeletal:      Right lower leg: No edema.      Left lower leg: No edema.   Skin:     General: Skin is warm and dry.   Neurological:      Mental Status: He is alert.   Psychiatric:         Mood and Affect: Mood normal.         Lab Results   Component Value Date    WBC 3.3 09/08/2023    WBC 4.6 07/05/2017     Lab Results   Component Value Date    RBC 4.73 09/08/2023    RBC 5.11 07/05/2017     Lab Results   Component Value Date    HGB 15.2 09/08/2023    HGB 16.0 07/05/2017     Lab Results   Component Value Date    HCT 43.9 09/08/2023    HCT 47.5 07/05/2017     No components found for: \"MCT\"  Lab Results   Component Value Date    MCV 93 09/08/2023    MCV 93 07/05/2017     Lab Results   Component Value Date    MCH 32.1 09/08/2023    MCH 31.3 07/05/2017     Lab Results   Component Value Date    MCHC 34.6 09/08/2023    MCHC 33.7 07/05/2017     Lab Results   Component Value Date    RDW 11.5 09/08/2023    RDW 12.4 07/05/2017     Lab Results   Component Value Date     09/08/2023     07/05/2017     Recent Labs   Lab Test 04/18/24  1030 09/08/23  0916   * 134*   POTASSIUM 5.3 4.9   CHLORIDE 97* 99   CO2 26 27   ANIONGAP 11 8   * 107*   BUN 18.8 17.5   CR 0.95 0.89   DELANO 9.6 9.3     Lab Results   Component Value Date    A1C 5.5 04/18/2024    A1C 5.6 03/16/2023    A1C 5.5 07/15/2021    A1C 5.6 05/08/2020         Diagnostics     No EKG " required, no history of coronary heart disease, significant arrhythmia, peripheral arterial disease or other structural heart disease.    Revised Cardiac Risk Index (RCRI)  The patient has the following serious cardiovascular risks for perioperative complications:   - No serious cardiac risks = 0 points     RCRI Interpretation: 0 points: Class I (very low risk - 0.4% complication rate)         Signed Electronically by: CULLEN Talbot CNP  Copy of this evaluation report is provided to requesting physician.

## 2024-04-30 ENCOUNTER — APPOINTMENT (OUTPATIENT)
Dept: GENERAL RADIOLOGY | Facility: CLINIC | Age: 69
End: 2024-04-30
Attending: PODIATRIST
Payer: COMMERCIAL

## 2024-04-30 ENCOUNTER — HOSPITAL ENCOUNTER (OUTPATIENT)
Facility: CLINIC | Age: 69
Discharge: HOME | End: 2024-04-30
Attending: PODIATRIST | Admitting: PODIATRIST
Payer: COMMERCIAL

## 2024-04-30 ENCOUNTER — ANESTHESIA (OUTPATIENT)
Dept: SURGERY | Facility: CLINIC | Age: 69
End: 2024-04-30
Payer: COMMERCIAL

## 2024-04-30 VITALS
WEIGHT: 215 LBS | RESPIRATION RATE: 18 BRPM | BODY MASS INDEX: 31.84 KG/M2 | OXYGEN SATURATION: 95 % | TEMPERATURE: 97.8 F | DIASTOLIC BLOOD PRESSURE: 97 MMHG | HEART RATE: 83 BPM | SYSTOLIC BLOOD PRESSURE: 147 MMHG | HEIGHT: 69 IN

## 2024-04-30 DIAGNOSIS — M20.21 HALLUX RIGIDUS, RIGHT FOOT: Primary | ICD-10-CM

## 2024-04-30 PROCEDURE — C1769 GUIDE WIRE: HCPCS | Performed by: PODIATRIST

## 2024-04-30 PROCEDURE — 250N000011 HC RX IP 250 OP 636: Performed by: PODIATRIST

## 2024-04-30 PROCEDURE — 999N000141 HC STATISTIC PRE-PROCEDURE NURSING ASSESSMENT: Performed by: PODIATRIST

## 2024-04-30 PROCEDURE — 28750 FUSION OF BIG TOE JOINT: CPT | Mod: T5 | Performed by: PODIATRIST

## 2024-04-30 PROCEDURE — 250N000009 HC RX 250: Performed by: NURSE ANESTHETIST, CERTIFIED REGISTERED

## 2024-04-30 PROCEDURE — 272N000001 HC OR GENERAL SUPPLY STERILE: Performed by: PODIATRIST

## 2024-04-30 PROCEDURE — 258N000003 HC RX IP 258 OP 636: Performed by: NURSE ANESTHETIST, CERTIFIED REGISTERED

## 2024-04-30 PROCEDURE — 999N000179 XR SURGERY CARM FLUORO LESS THAN 5 MIN W STILLS: Mod: TC

## 2024-04-30 PROCEDURE — 250N000013 HC RX MED GY IP 250 OP 250 PS 637: Performed by: NURSE ANESTHETIST, CERTIFIED REGISTERED

## 2024-04-30 PROCEDURE — 250N000011 HC RX IP 250 OP 636: Performed by: NURSE ANESTHETIST, CERTIFIED REGISTERED

## 2024-04-30 PROCEDURE — 370N000017 HC ANESTHESIA TECHNICAL FEE, PER MIN: Performed by: PODIATRIST

## 2024-04-30 PROCEDURE — 360N000083 HC SURGERY LEVEL 3 W/ FLUORO, PER MIN: Performed by: PODIATRIST

## 2024-04-30 PROCEDURE — C1713 ANCHOR/SCREW BN/BN,TIS/BN: HCPCS | Performed by: PODIATRIST

## 2024-04-30 PROCEDURE — 710N000012 HC RECOVERY PHASE 2, PER MINUTE: Performed by: PODIATRIST

## 2024-04-30 DEVICE — SCREW BN 18MM 3MM TI VA NS KREULOCK LF AR-8933VCL-18: Type: IMPLANTABLE DEVICE | Site: TOE | Status: FUNCTIONAL

## 2024-04-30 DEVICE — IMP SCR ARTHREX CORTICAL 3.0X20MM AR-9933-20: Type: IMPLANTABLE DEVICE | Site: TOE | Status: FUNCTIONAL

## 2024-04-30 DEVICE — IMPLANTABLE DEVICE: Type: IMPLANTABLE DEVICE | Site: TOE | Status: FUNCTIONAL

## 2024-04-30 DEVICE — IMP PLATE ARTHREX COMPRESSION MXFRC STD 0D RIGHT AR-9944S-0R: Type: IMPLANTABLE DEVICE | Site: TOE | Status: FUNCTIONAL

## 2024-04-30 RX ORDER — ACETAMINOPHEN 325 MG/1
975 TABLET ORAL ONCE
Status: COMPLETED | OUTPATIENT
Start: 2024-04-30 | End: 2024-04-30

## 2024-04-30 RX ORDER — OXYCODONE HYDROCHLORIDE 5 MG/1
5 TABLET ORAL
Status: DISCONTINUED | OUTPATIENT
Start: 2024-04-30 | End: 2024-04-30 | Stop reason: HOSPADM

## 2024-04-30 RX ORDER — DEXAMETHASONE SODIUM PHOSPHATE 10 MG/ML
INJECTION, SOLUTION INTRAMUSCULAR; INTRAVENOUS
Status: COMPLETED | OUTPATIENT
Start: 2024-04-30 | End: 2024-04-30

## 2024-04-30 RX ORDER — KETAMINE HYDROCHLORIDE 10 MG/ML
INJECTION INTRAMUSCULAR; INTRAVENOUS PRN
Status: DISCONTINUED | OUTPATIENT
Start: 2024-04-30 | End: 2024-04-30

## 2024-04-30 RX ORDER — ONDANSETRON 2 MG/ML
4 INJECTION INTRAMUSCULAR; INTRAVENOUS EVERY 30 MIN PRN
Status: DISCONTINUED | OUTPATIENT
Start: 2024-04-30 | End: 2024-04-30 | Stop reason: HOSPADM

## 2024-04-30 RX ORDER — BUPIVACAINE HYDROCHLORIDE 5 MG/ML
INJECTION, SOLUTION EPIDURAL; INTRACAUDAL
Status: DISCONTINUED | OUTPATIENT
Start: 2024-04-30 | End: 2024-04-30

## 2024-04-30 RX ORDER — ONDANSETRON 4 MG/1
4 TABLET, ORALLY DISINTEGRATING ORAL EVERY 30 MIN PRN
Status: DISCONTINUED | OUTPATIENT
Start: 2024-04-30 | End: 2024-04-30 | Stop reason: HOSPADM

## 2024-04-30 RX ORDER — NALOXONE HYDROCHLORIDE 0.4 MG/ML
0.1 INJECTION, SOLUTION INTRAMUSCULAR; INTRAVENOUS; SUBCUTANEOUS
Status: DISCONTINUED | OUTPATIENT
Start: 2024-04-30 | End: 2024-04-30 | Stop reason: HOSPADM

## 2024-04-30 RX ORDER — LIDOCAINE HYDROCHLORIDE 20 MG/ML
INJECTION, SOLUTION INFILTRATION; PERINEURAL PRN
Status: DISCONTINUED | OUTPATIENT
Start: 2024-04-30 | End: 2024-04-30

## 2024-04-30 RX ORDER — ONDANSETRON 4 MG/1
4 TABLET, ORALLY DISINTEGRATING ORAL
Status: DISCONTINUED | OUTPATIENT
Start: 2024-04-30 | End: 2024-04-30 | Stop reason: HOSPADM

## 2024-04-30 RX ORDER — CEFAZOLIN SODIUM/WATER 2 G/20 ML
2 SYRINGE (ML) INTRAVENOUS
Status: COMPLETED | OUTPATIENT
Start: 2024-04-30 | End: 2024-04-30

## 2024-04-30 RX ORDER — OXYCODONE HYDROCHLORIDE 5 MG/1
10 TABLET ORAL
Status: DISCONTINUED | OUTPATIENT
Start: 2024-04-30 | End: 2024-04-30 | Stop reason: HOSPADM

## 2024-04-30 RX ORDER — ONDANSETRON 2 MG/ML
INJECTION INTRAMUSCULAR; INTRAVENOUS PRN
Status: DISCONTINUED | OUTPATIENT
Start: 2024-04-30 | End: 2024-04-30

## 2024-04-30 RX ORDER — LIDOCAINE 40 MG/G
CREAM TOPICAL
Status: DISCONTINUED | OUTPATIENT
Start: 2024-04-30 | End: 2024-04-30 | Stop reason: HOSPADM

## 2024-04-30 RX ORDER — ROPIVACAINE HYDROCHLORIDE 5 MG/ML
INJECTION, SOLUTION EPIDURAL; INFILTRATION; PERINEURAL
Status: COMPLETED | OUTPATIENT
Start: 2024-04-30 | End: 2024-04-30

## 2024-04-30 RX ORDER — FENTANYL CITRATE 50 UG/ML
25 INJECTION, SOLUTION INTRAMUSCULAR; INTRAVENOUS
Status: DISCONTINUED | OUTPATIENT
Start: 2024-04-30 | End: 2024-04-30 | Stop reason: HOSPADM

## 2024-04-30 RX ORDER — DEXAMETHASONE SODIUM PHOSPHATE 4 MG/ML
INJECTION, SOLUTION INTRA-ARTICULAR; INTRALESIONAL; INTRAMUSCULAR; INTRAVENOUS; SOFT TISSUE PRN
Status: DISCONTINUED | OUTPATIENT
Start: 2024-04-30 | End: 2024-04-30

## 2024-04-30 RX ORDER — SODIUM CHLORIDE, SODIUM LACTATE, POTASSIUM CHLORIDE, CALCIUM CHLORIDE 600; 310; 30; 20 MG/100ML; MG/100ML; MG/100ML; MG/100ML
INJECTION, SOLUTION INTRAVENOUS CONTINUOUS
Status: DISCONTINUED | OUTPATIENT
Start: 2024-04-30 | End: 2024-04-30 | Stop reason: HOSPADM

## 2024-04-30 RX ORDER — CEFAZOLIN SODIUM/WATER 2 G/20 ML
2 SYRINGE (ML) INTRAVENOUS SEE ADMIN INSTRUCTIONS
Status: DISCONTINUED | OUTPATIENT
Start: 2024-04-30 | End: 2024-04-30 | Stop reason: HOSPADM

## 2024-04-30 RX ORDER — PROPOFOL 10 MG/ML
INJECTION, EMULSION INTRAVENOUS PRN
Status: DISCONTINUED | OUTPATIENT
Start: 2024-04-30 | End: 2024-04-30

## 2024-04-30 RX ORDER — AMOXICILLIN 250 MG
1-2 CAPSULE ORAL 2 TIMES DAILY
Qty: 30 TABLET | Refills: 0 | Status: SHIPPED | OUTPATIENT
Start: 2024-04-30 | End: 2024-05-06

## 2024-04-30 RX ORDER — PROPOFOL 10 MG/ML
INJECTION, EMULSION INTRAVENOUS CONTINUOUS PRN
Status: DISCONTINUED | OUTPATIENT
Start: 2024-04-30 | End: 2024-04-30

## 2024-04-30 RX ORDER — ACETAMINOPHEN 325 MG/1
650 TABLET ORAL EVERY 4 HOURS PRN
Qty: 50 TABLET | Refills: 0 | Status: SHIPPED | OUTPATIENT
Start: 2024-04-30 | End: 2024-05-06

## 2024-04-30 RX ORDER — OXYCODONE HYDROCHLORIDE 5 MG/1
5 TABLET ORAL EVERY 4 HOURS PRN
Qty: 16 TABLET | Refills: 0 | Status: SHIPPED | OUTPATIENT
Start: 2024-04-30 | End: 2024-05-06

## 2024-04-30 RX ADMIN — Medication 2 G: at 12:40

## 2024-04-30 RX ADMIN — KETAMINE HYDROCHLORIDE 10 MG: 10 INJECTION INTRAMUSCULAR; INTRAVENOUS at 13:03

## 2024-04-30 RX ADMIN — PROPOFOL 200 MCG/KG/MIN: 10 INJECTION, EMULSION INTRAVENOUS at 12:52

## 2024-04-30 RX ADMIN — ROPIVACAINE HYDROCHLORIDE 10 ML: 5 INJECTION, SOLUTION EPIDURAL; INFILTRATION; PERINEURAL at 11:54

## 2024-04-30 RX ADMIN — DEXAMETHASONE SODIUM PHOSPHATE 4 MG: 10 INJECTION, SOLUTION INTRAMUSCULAR; INTRAVENOUS at 11:50

## 2024-04-30 RX ADMIN — ONDANSETRON 4 MG: 2 INJECTION INTRAMUSCULAR; INTRAVENOUS at 12:45

## 2024-04-30 RX ADMIN — ROPIVACAINE HYDROCHLORIDE 20 ML: 5 INJECTION, SOLUTION EPIDURAL; INFILTRATION; PERINEURAL at 11:51

## 2024-04-30 RX ADMIN — DEXAMETHASONE SODIUM PHOSPHATE 4 MG: 4 INJECTION, SOLUTION INTRA-ARTICULAR; INTRALESIONAL; INTRAMUSCULAR; INTRAVENOUS; SOFT TISSUE at 12:46

## 2024-04-30 RX ADMIN — SODIUM CHLORIDE, POTASSIUM CHLORIDE, SODIUM LACTATE AND CALCIUM CHLORIDE: 600; 310; 30; 20 INJECTION, SOLUTION INTRAVENOUS at 11:39

## 2024-04-30 RX ADMIN — LIDOCAINE HYDROCHLORIDE 0.1 ML: 10 INJECTION, SOLUTION EPIDURAL; INFILTRATION; INTRACAUDAL; PERINEURAL at 11:38

## 2024-04-30 RX ADMIN — SODIUM CHLORIDE, POTASSIUM CHLORIDE, SODIUM LACTATE AND CALCIUM CHLORIDE: 600; 310; 30; 20 INJECTION, SOLUTION INTRAVENOUS at 13:03

## 2024-04-30 RX ADMIN — KETAMINE HYDROCHLORIDE 10 MG: 10 INJECTION INTRAMUSCULAR; INTRAVENOUS at 12:55

## 2024-04-30 RX ADMIN — ACETAMINOPHEN 975 MG: 325 TABLET, FILM COATED ORAL at 11:30

## 2024-04-30 RX ADMIN — PROPOFOL 50 MG: 10 INJECTION, EMULSION INTRAVENOUS at 12:46

## 2024-04-30 RX ADMIN — LIDOCAINE HYDROCHLORIDE 100 MG: 20 INJECTION, SOLUTION INFILTRATION; PERINEURAL at 12:46

## 2024-04-30 RX ADMIN — MIDAZOLAM 2 MG: 1 INJECTION INTRAMUSCULAR; INTRAVENOUS at 11:47

## 2024-04-30 ASSESSMENT — ACTIVITIES OF DAILY LIVING (ADL)
ADLS_ACUITY_SCORE: 21
ADLS_ACUITY_SCORE: 21
ADLS_ACUITY_SCORE: 20
ADLS_ACUITY_SCORE: 21

## 2024-04-30 NOTE — ANESTHESIA PROCEDURE NOTES
Femoral (via adductor canal) Procedure Note    Pre-Procedure   Staff -        CRNA: Fabien Almeida APRN CRNA       Performed By: CRNA       Location: pre-op       Pre-Anesthestic Checklist: patient identified, IV checked, site marked, risks and benefits discussed, informed consent, monitors and equipment checked, pre-op evaluation, at physician/surgeon's request and post-op pain management  Timeout:       Correct Patient: Yes        Correct Procedure: Yes        Correct Site: Yes        Correct Position: Yes        Correct Laterality: Yes        Site Marked: Yes  Procedure Documentation  Procedure: Femoral (via adductor canal)       Laterality: right       Patient Position: prone       Patient Prep/Sterile Barriers: sterile gloves, mask, patient draped       Skin prep: Chloraprep       Local skin infiltrated with 1 mL of 1% lidocaine.        Needle Type: short bevel and insulated       Needle Gauge: 21.        Needle Length (Inches): 4        Ultrasound guided       1. Ultrasound was used to identify targeted nerve, plexus, vascular marker, or fascial plane and place a needle adjacent to it in real-time.       2. Ultrasound was used to visualize the spread of anesthetic in close proximity to the above referenced structure.       3. A permanent image is entered into the patient's record.       4. The visualized anatomic structures appeared normal.       5. There were no apparent abnormal pathologic findings.    Assessment/Narrative         The placement was negative for: blood aspirated, painful injection and site bleeding       Paresthesias: Resolved.       Bolus given via needle. no blood aspirated via catheter.        Secured via.        Insertion/Infusion Method: Single Shot       Complications: none       Injection made incrementally with aspirations every 5 mL.    Medication(s) Administered   Ropivacaine 0.5% PF (Infiltration) - Infiltration   10 mL - 4/30/2024 11:54:00 AM    FOR [facility] ([facility]) ONLY:    "Pain Team Contact information: please page the Pain Team Via [facility]. Search \"Pain\". During daytime hours, please page the attending first. At night please page the resident first.      "

## 2024-04-30 NOTE — OP NOTE
PREOPERATIVE DIAGNOSIS:   1.  Hallux rigidus, right foot.     POSTOPERATIVE DIAGNOSIS:   1.  Hallux Rigidus, right foot.     PROCEDURE:   1.  Arthrodesis metatarsophalangeal joint, right foot.     PATHOLOGY: None.     ANESTHESIA: Monitored Anesthesia Care with a poplitea block on the right lower extremity provided by anesthesia services for regional block and postoperative pain management    ESTIMATED BLOOD LOSS: Less than 10 mL     INDICATIONS FOR PROCEDURE: The patient has been seen and evaluated in clinic for the above-mentioned diagnoses.  They have failed to respond to nonoperative care measures and/or surgical care for condition was indicated.  They have elected to proceed as recommended/indicated with surgical care after a thorough discussion of the associated pros, cons, risks and benefits of the operations as well as the postoperative course and details.  All associated questions were answered.  Verbal and written form informed consent was obtained.  Please see additional information within the clinical notes.    PROCEDURE IN DETAIL: The patient was seen and evaluated in the preoperative holding area.  The surgical site was marked.  The consent was signed.  The H&P was updated/reviewed.  The patient was transported from the preoperative holding area to the surgical suite.  The patient was placed on the operative table in a supine position.  Anesthesia was obtained and antibiotics were administered via IV.  An ankle tourniquet was applied the right ankle.  The operative extremity was prepped and draped sterilely.  Then, a timeout was performed to identify the proper patient, surgical site and the procedures to be performed.  The foot/ankle was exsanguinated, and the tourniquet was inflated.    The procedure began by making a linear longitudinal incision over the dorsal aspect of the first metatarsophalangeal joint on the right foot. Incision was carried full thickness down to subcutaneous utilizing sharp and  blunt dissection. Care was taken to identify and retract all vital neurovascular structures. All active bleeders were ligated and cauterized as necessary. Further dissection was carried down to the capsule where capsulotomy was performed. All capsule attachments were resected away from the first metatarsal head and base of proximal phalanx. Upon examination of the first metatarsophalangeal joint, roughly 95% of all hyaline cartilage was gone with significant amounts of perichondral ossification and spurs.    Next, the head of first metatarsal was removed of all remaining hyaline cartilage and surrounding bone spurs utilizing the Arthrex reamer. The base of the proximal phalanx was also fashioned with the reamer for anatomical fit between the two bones. Next, utilizing a flat plate the foot was positioned in simulated weightbearing position. The great toe was positioned so that it was touching the plate and the joint was secured utilizing a guidepin.     Next an Arthrex MaxForce MTP plate was positioned over the dorsal aspect of the first metatarsophalangeal joint.  The plate was temporarily secured utilizing BB tacks.  A combination of cortical and locking screws were used to secure the plate to the proximal phalanx.  Next the BB tacks were removed and the drill guide for the ratcheting device was made bicortical.  The ratcheting device was then secured in place and rotated clockwise compressing the first metatarsophalangeal joint.  A BB tack was then placed back in to the plate to secure compression and position.  Next a cortical screw was placed through the plate into the first metatarsal with excellent compression and stability noted.  The BB tack was removed.  A locking screw was placed on the most proximal segment of the plate into the first metatarsal.  Fluoroscopy was utilized to confirm proper anatomical position, compression of the first metatarsophalangeal joint and placement of hardware.   The wound was  then irrigated with copious amounts of normal sterile saline.  The tourniquet was deflated and prompt response was noted to all five digits of the right foot.  The capsule was reapproximated utilizing 3-0 Vicryl suture.  The skin was reapproximated with 4-0 nylon suture in a continuous running interlocking fashion. The wound was dressed with a sterile Arthrex Jumpstart dressing, 4 x 4s, cast padding and Ace wrap.     The patient tolerated these procedures well and was transferred from the operative table to the transport cart and taken from the OR to the PACU.  In PACU, patient and staff given orders and instructions for post-operative care in the following areas: post op pain management, weight-bearing status, dressing/splint care, weight-bearing assistive devices, elevation of the extremity, ice/cold therapy, DVT/blood clot prevention, nutrition and post-operative concerns to be aware.  Case and post-operative care measures were reviewed with the patient and family members present.  A post operative instruction sheet was provided.  If concerns or questions arise they will contact our clinic.  If acute concerns develop they will present emergently to a nearby medical center.      PARRIS MEEK DPM, FACFAS

## 2024-04-30 NOTE — ANESTHESIA CARE TRANSFER NOTE
Patient: Phong Cuevas    Procedure: Procedure(s):  Fusion of the first metatarsal phalangeal joint, right foot       Diagnosis: Hallux rigidus, right foot [M20.21]  Diagnosis Additional Information: No value filed.    Anesthesia Type:   General     Note:    Oropharynx: oropharynx clear of all foreign objects and spontaneously breathing  Level of Consciousness: awake  Oxygen Supplementation: room air    Independent Airway: airway patency satisfactory and stable  Dentition: dentition unchanged  Vital Signs Stable: post-procedure vital signs reviewed and stable  Report to RN Given: handoff report given  Patient transferred to: Phase II    Handoff Report: Identifed the Patient, Identified the Reponsible Provider, Reviewed the pertinent medical history, Discussed the surgical course, Reviewed Intra-OP anesthesia mangement and issues during anesthesia, Set expectations for post-procedure period and Allowed opportunity for questions and acknowledgement of understanding    Vitals:  Vitals Value Taken Time   BP     Temp     Pulse     Resp     SpO2         Electronically Signed By: Fabien Almeida CRNA, APRN CRNA  April 30, 2024  1:37 PM

## 2024-04-30 NOTE — ANESTHESIA POSTPROCEDURE EVALUATION
Patient: Phong Cuevas    Procedure: Procedure(s):  Fusion of the first metatarsal phalangeal joint, right foot       Anesthesia Type:  General    Note:  Disposition: Outpatient   Postop Pain Control: Uneventful            Sign Out: Well controlled pain   PONV: No   Neuro/Psych: Uneventful            Sign Out: Acceptable/Baseline neuro status   Airway/Respiratory: Uneventful            Sign Out: Acceptable/Baseline resp. status   CV/Hemodynamics: Uneventful            Sign Out: Acceptable CV status; No obvious hypovolemia; No obvious fluid overload   Other NRE: NONE   DID A NON-ROUTINE EVENT OCCUR? No           Last vitals:  Vitals Value Taken Time   /97 04/30/24 1400   Temp 36.6  C (97.8  F) 04/30/24 1337   Pulse 83 04/30/24 1400   Resp 16 04/30/24 1337   SpO2 93 % 04/30/24 1410   Vitals shown include unfiled device data.    Electronically Signed By: CULLEN Genao CRNA  April 30, 2024  2:29 PM

## 2024-04-30 NOTE — ANESTHESIA PROCEDURE NOTES
Sciatic Procedure Note    Pre-Procedure   Staff -        CRNA: Fabien Almeida APRN CRNA       Performed By: CRNA       Location: pre-op       Procedure Start/Stop Times: 4/30/2024 11:47 PM and 4/30/2024 11:56 PM       Pre-Anesthestic Checklist: patient identified, IV checked, site marked, risks and benefits discussed, informed consent, monitors and equipment checked, pre-op evaluation, at physician/surgeon's request and post-op pain management  Timeout:       Correct Patient: Yes        Correct Procedure: Yes        Correct Site: Yes        Correct Position: Yes        Correct Laterality: Yes        Site Marked: Yes  Procedure Documentation  Procedure: Sciatic       Laterality: right       Patient Position: prone       Patient Prep/Sterile Barriers: sterile gloves, mask, patient draped       Skin prep: Chloraprep       Local skin infiltrated with 3 mL of 1% lidocaine.  (popliteal approach).       Needle Type: insulated and short bevel       Needle Gauge: 21.        Needle Length (millimeters): 100        Ultrasound guided       1. Ultrasound was used to identify targeted nerve, plexus, vascular marker, or fascial plane and place a needle adjacent to it in real-time.       2. Ultrasound was used to visualize the spread of anesthetic in close proximity to the above referenced structure.       3. A permanent image is entered into the patient's record.       4. The visualized anatomic structures appeared normal.       5. There were no apparent abnormal pathologic findings.       Nerve Stim: Initial Level 1 mA.  Lowest motor response 0.4 mA.    Assessment/Narrative         The placement was negative for: blood aspirated, painful injection and site bleeding       Paresthesias: No.       Test dose of mL at.         Test dose negative, 3 minutes after injection, for signs of intravascular, subdural, or intrathecal injection.       Bolus given via needle. no blood aspirated via catheter.        Secured via.         "Insertion/Infusion Method: Single Shot       Complications: none       Injection made incrementally with aspirations every 5 mL.    Medication(s) Administered   Dexamethasone 10 mg/mL PF (Perineural) - Perineural   4 mg - 4/30/2024 11:50:00 AM  Ropivacaine 0.5% PF (Infiltration) - Infiltration   20 mL - 4/30/2024 11:51:00 AM  Medication Administration Time: 4/30/2024 11:47 PM      FOR [facility] ([facility]) ONLY:   Pain Team Contact information: please page the Pain Team Via [facility]. Search \"Pain\". During daytime hours, please page the attending first. At night please page the resident first.      "

## 2024-04-30 NOTE — DISCHARGE INSTRUCTIONS
Same Day Surgery Discharge Instructions  Special Precautions After Surgery - Adult    It is not unusual to feel lightheaded or faint, up to 24 hours after surgery or while taking pain medication.  If you have these symptoms; sit for a few minutes before standing and have someone assist you when getting up.  You should rest and relax for the next 24 hours and must have someone stay with you for at least 24 hours after your discharge.  DO NOT DRIVE any vehicle or operate mechanical equipment for 24 hours following the end of your surgery.  DO NOT DRIVE while taking narcotic pain medications that have been prescribed by your physician.  If you had a limb operated on, you must be able to use it fully to drive.  DO NOT drink alcoholic beverages for 24 hours following surgery or while taking prescription pain medication.  Drink clear liquids (apple juice, ginger ale, broth, 7-Up, etc.).  Progress to your regular diet as you feel able.  Any questions call your physician and do not make important decisions for 24 hours.        Medications:  Next tylenol at 5:30pm    __________________________________________________________________________________________________________________________________  IMPORTANT NUMBERS:    [facility] [place]:  [phone], [phone]  Pharmacy:  [phone]  Same Day Surgery:  [phone], Monday - Friday until 8:30 p.m.  Urgent Care:  [phone]  Emergency Room:  [phone]      [facility]:  [phone]                                                                             [facility]:  [phone] option 1  [facility]:  [phone]     [facility]:  [phone]   [facility]:  [phone]   Home Medical Equipment: [phone]  [facility]:  [phone]

## 2024-04-30 NOTE — BRIEF OP NOTE
[facility]    Brief Operative Note    Pre-operative diagnosis: Hallux rigidus, right foot [M20.21]  Post-operative diagnosis Same as pre-operative diagnosis    Procedure: Fusion of the first metatarsal phalangeal joint, right foot, Right - Toe    Surgeon: Surgeons and Role:     * Gordon Sandoval, DORAM - Primary  Anesthesia: Combined MAC with Popliteal Block   Estimated Blood Loss: Less than 10 ml    Drains: None  Specimens: * No specimens in log *  Findings:   None.  Complications: None.  Implants:   Implant Name Type Inv. Item Serial No.  Lot No. LRB No. Used Action   IMP PLATE ARTHREX COMPRESSION MXFRC STD 0D RIGHT [id number] - [id number] Metallic Hardware/[place] IMP PLATE ARTHREX COMPRESSION MXFRC STD 0D RIGHT [id number]  ARTHREX  Right 1 Implanted   IMP SCR ARTHREX CORTICAL 3.0X20MM AR-9933-20 - [id number] Metallic Hardware/[place] IMP SCR ARTHREX CORTICAL 3.0X20MM [id number]-[id number]  ARTHREX  Right 2 Implanted   SCREW BN 18MM 3MM TI VA NS KREULOCK  [id number] - [id number] Metallic Hardware/[place] SCREW BN 18MM 3MM TI VA NS KREULOCK  [id number]  ARTHREX  Right 1 Implanted   SCREW BN 26MM 3MM TI VA NS KREULOCK  [id number] - [id number] Metallic Hardware/[place] SCREW BN 26MM 3MM TI VA NS KREULOCK  [id number]  ARTHREX  Right 2 Implanted

## 2024-05-01 ENCOUNTER — OFFICE VISIT (OUTPATIENT)
Dept: PODIATRY | Facility: CLINIC | Age: 69
End: 2024-05-01
Payer: COMMERCIAL

## 2024-05-01 DIAGNOSIS — M20.21 HALLUX RIGIDUS, RIGHT FOOT: Primary | ICD-10-CM

## 2024-05-01 DIAGNOSIS — Z98.890 STATUS POST RIGHT FOOT SURGERY: ICD-10-CM

## 2024-05-01 PROCEDURE — 99024 POSTOP FOLLOW-UP VISIT: CPT | Performed by: PODIATRIST

## 2024-05-01 NOTE — LETTER
5/1/2024         RE: Phong Cuevas  [address]  [place] [address]        Dear Colleague,    Thank you for referring your patient, Phong Cuevas, to the [facility] [place]. Please see a copy of my visit note below.    Phong presents to the office s/p one day arthrodesis of the first metatarsophalangeal joint of the right foot .  The patient relates the dressings became saturated with blood last night. The patient relates good compliance with postoperative instructions.  The patient denies any severe pain, fevers, chills, nausea or vomiting.  The patient denies having any calf swelling or tenderness.    There were no vitals taken for this visit.       Physical Exam:    The patient appears to be in no distress and in good spirits.  The bandages appear saturated with blood.  Neurovascular status unchanged with < 3 sec capillary refill to all digits.  No evidence of allodynia.  Noted mild to moderate edema to the operative site on the right foot.  Sutures are intact and the skin is well coapted with no erythema or drainage noted.  No active bleeding noted.  No pain on palpation, erythema or noted calor over the back of the calf.         Assessment:     ICD-10-CM    1. Hallux rigidus, right foot  M20.21       2. Status post right foot surgery  Z98.890           Plan:  Sutures remain intact.  A sterile dressing was reapplied.  The patient was instructed to continue non   weightbearing to the right foot.  The patient is to keep the dressings clean, dry and intact and to continue with elevation of the right foot.  To decrease the risk of developing a blood clot, the patient was instructed to continue with performing leg lifts and knee bends throughout the day to help keep blood moving.  The patient was instructed that if they notice any calf pain, swelling, or shortness of breath, they should to the nearest ER or Urgent Care to be evaluated for a blood clot.  The patient  will return to the office in one week for suture removal.      Phong verbalized agreement with and understanding of the rational for the diagnosis and treatment plan.  All questions were answered to best of my ability and the patient's satisfaction. The patient was advised to contact the clinic with any questions that may arise after the clinic visit.      Disclaimer: This note consists of symbols derived from keyboarding, dictation and/or voice recognition software. As a result, there may be errors in the script that have gone undetected. Please consider this when interpreting information found in this chart.       VALENTINA Sandoval D.P.M., F.A.C.F.A.S.      Again, thank you for allowing me to participate in the care of your patient.        Sincerely,        Gordon Sandoval DPM

## 2024-05-01 NOTE — NURSING NOTE
"Chief Complaint   Patient presents with    Surgical Followup     Bleeding through the bandage- bandage change right foot       Initial There were no vitals taken for this visit. Estimated body mass index is 32.22 kg/m  as calculated from the following:    Height as of 4/30/24: 1.74 m (5' 8.5\").    Weight as of 4/30/24: 97.5 kg (215 lb).  Medications and allergies reviewed.      Galina ÁLVAREZ MA    "

## 2024-05-01 NOTE — PROGRESS NOTES
Phong presents to the office s/p one day arthrodesis of the first metatarsophalangeal joint of the right foot .  The patient relates the dressings became saturated with blood last night. The patient relates good compliance with postoperative instructions.  The patient denies any severe pain, fevers, chills, nausea or vomiting.  The patient denies having any calf swelling or tenderness.    There were no vitals taken for this visit.       Physical Exam:    The patient appears to be in no distress and in good spirits.  The bandages appear saturated with blood.  Neurovascular status unchanged with < 3 sec capillary refill to all digits.  No evidence of allodynia.  Noted mild to moderate edema to the operative site on the right foot.  Sutures are intact and the skin is well coapted with no erythema or drainage noted.  No active bleeding noted.  No pain on palpation, erythema or noted calor over the back of the calf.         Assessment:     ICD-10-CM    1. Hallux rigidus, right foot  M20.21       2. Status post right foot surgery  Z98.890           Plan:  Sutures remain intact.  A sterile dressing was reapplied.  The patient was instructed to continue non   weightbearing to the right foot.  The patient is to keep the dressings clean, dry and intact and to continue with elevation of the right foot.  To decrease the risk of developing a blood clot, the patient was instructed to continue with performing leg lifts and knee bends throughout the day to help keep blood moving.  The patient was instructed that if they notice any calf pain, swelling, or shortness of breath, they should to the nearest ER or Urgent Care to be evaluated for a blood clot.  The patient will return to the office in one week for suture removal.      Phong verbalized agreement with and understanding of the rational for the diagnosis and treatment plan.  All questions were answered to best of my ability and the patient's satisfaction. The patient was  advised to contact the clinic with any questions that may arise after the clinic visit.      Disclaimer: This note consists of symbols derived from keyboarding, dictation and/or voice recognition software. As a result, there may be errors in the script that have gone undetected. Please consider this when interpreting information found in this chart.       VALENTINA Sandoval D.P.M., JOSÉ [email or web address].

## 2024-05-06 ENCOUNTER — OFFICE VISIT (OUTPATIENT)
Dept: PODIATRY | Facility: CLINIC | Age: 69
End: 2024-05-06
Payer: COMMERCIAL

## 2024-05-06 ENCOUNTER — ANCILLARY PROCEDURE (OUTPATIENT)
Dept: GENERAL RADIOLOGY | Facility: CLINIC | Age: 69
End: 2024-05-06
Attending: PODIATRIST
Payer: COMMERCIAL

## 2024-05-06 VITALS
HEIGHT: 69 IN | BODY MASS INDEX: 31.84 KG/M2 | WEIGHT: 215 LBS | SYSTOLIC BLOOD PRESSURE: 113 MMHG | HEART RATE: 113 BPM | DIASTOLIC BLOOD PRESSURE: 89 MMHG

## 2024-05-06 DIAGNOSIS — Z98.890 STATUS POST RIGHT FOOT SURGERY: ICD-10-CM

## 2024-05-06 DIAGNOSIS — M20.21 HALLUX RIGIDUS, RIGHT FOOT: Primary | ICD-10-CM

## 2024-05-06 PROCEDURE — 73630 X-RAY EXAM OF FOOT: CPT | Mod: TC | Performed by: RADIOLOGY

## 2024-05-06 PROCEDURE — 99024 POSTOP FOLLOW-UP VISIT: CPT | Performed by: PODIATRIST

## 2024-05-06 NOTE — LETTER
"    5/6/2024         RE: Phong Cuevas  [address] [facility] [address]        Dear Colleague,    Thank you for referring your patient, Phong Cuevas, to the [facility] [place]. Please see a copy of my visit note below.    Phong presents to the office s/p one week arthrodesis of the first metatarsophalangeal joint of the right foot .  The patient relates keeping the bandages clean, dry and intact.  The patient relates good compliance with postoperative instructions.  The patient denies any severe pain, fevers, chills, nausea or vomiting.  The patient denies having any calf swelling or tenderness.    /89   Pulse 113   Ht 1.74 m (5' 8.5\")   Wt 97.5 kg (215 lb)   BMI 32.22 kg/m         Physical Exam:    The patient appears to be in no distress and in good spirits.  The bandages appear clean, dry and intact with no strikethrough noted.   Neurovascular status unchanged with < 3 sec capillary refill to all digits.  No evidence of allodynia.  Noted mild to moderate edema to the operative site on the right foot.  Sutures are intact and the skin is well coapted with no erythema or drainage noted.  No pain on palpation, erythema or noted calor over the back of the calf.    Radiograph:  AP, lateral and medial oblique evaluation of right foot reveals instrumented arthrodesis of the first metatarsophalangeal joint with hardware properly placed and intact.      Assessment:     ICD-10-CM    1. Hallux rigidus, right foot  M20.21 XR Foot Right G/E 3 Views      2. Status post right foot surgery  Z98.890 XR Foot Right G/E 3 Views          Plan:  Sutures remain intact.  A sterile dressing was reapplied.  The patient was instructed to continue non protected weightbearing to the right foot.  The patient is to keep the dressings clean, dry and intact and to continue with elevation of the right foot.  To decrease the risk of developing a blood clot, the patient was instructed to " continue with performing leg lifts and knee bends throughout the day to help keep blood moving.  The patient was instructed that if they notice any calf pain, swelling, or shortness of breath, they should to the nearest ER or Urgent Care to be evaluated for a blood clot.  The patient will return to the office in one week for suture removal.      Phong verbalized agreement with and understanding of the rational for the diagnosis and treatment plan.  All questions were answered to best of my ability and the patient's satisfaction. The patient was advised to contact the clinic with any questions that may arise after the clinic visit.      Disclaimer: This note consists of symbols derived from keyboarding, dictation and/or voice recognition software. As a result, there may be errors in the script that have gone undetected. Please consider this when interpreting information found in this chart.       HENRI Mock.P.DESTINI., F.A.C.F.A.S.      Again, thank you for allowing me to participate in the care of your patient.        Sincerely,        Gordon Sandoval DPM

## 2024-05-06 NOTE — PROGRESS NOTES
"Phong presents to the office s/p one week arthrodesis of the first metatarsophalangeal joint of the right foot .  The patient relates keeping the bandages clean, dry and intact.  The patient relates good compliance with postoperative instructions.  The patient denies any severe pain, fevers, chills, nausea or vomiting.  The patient denies having any calf swelling or tenderness.    /89   Pulse 113   Ht 1.74 m (5' 8.5\")   Wt 97.5 kg (215 lb)   BMI 32.22 kg/m         Physical Exam:    The patient appears to be in no distress and in good spirits.  The bandages appear clean, dry and intact with no strikethrough noted.   Neurovascular status unchanged with < 3 sec capillary refill to all digits.  No evidence of allodynia.  Noted mild to moderate edema to the operative site on the right foot.  Sutures are intact and the skin is well coapted with no erythema or drainage noted.  No pain on palpation, erythema or noted calor over the back of the calf.    Radiograph:  AP, lateral and medial oblique evaluation of right foot reveals instrumented arthrodesis of the first metatarsophalangeal joint with hardware properly placed and intact.      Assessment:     ICD-10-CM    1. Hallux rigidus, right foot  M20.21 XR Foot Right G/E 3 Views      2. Status post right foot surgery  Z98.890 XR Foot Right G/E 3 Views          Plan:  Sutures remain intact.  A sterile dressing was reapplied.  The patient was instructed to continue non weightbearing to the right foot.  The patient is to keep the dressings clean, dry and intact and to continue with elevation of the right foot.  To decrease the risk of developing a blood clot, the patient was instructed to continue with performing leg lifts and knee bends throughout the day to help keep blood moving.  The patient was instructed that if they notice any calf pain, swelling, or shortness of breath, they should to the nearest ER or Urgent Care to be evaluated for a blood clot.  The " patient will return to the office in one week for suture removal.      Phong verbalized agreement with and understanding of the rational for the diagnosis and treatment plan.  All questions were answered to best of my ability and the patient's satisfaction. The patient was advised to contact the clinic with any questions that may arise after the clinic visit.      Disclaimer: This note consists of symbols derived from keyboarding, dictation and/or voice recognition software. As a result, there may be errors in the script that have gone undetected. Please consider this when interpreting information found in this chart.       VALENTINA Sandoval D.P.M., F.GRANT.SERGIO.F.A.S.

## 2024-05-06 NOTE — NURSING NOTE
"Chief Complaint   Patient presents with    Surgical Followup     RIGHT FOOT- no concerns       Initial /89   Pulse 113   Ht 1.74 m (5' 8.5\")   Wt 97.5 kg (215 lb)   BMI 32.22 kg/m   Estimated body mass index is 32.22 kg/m  as calculated from the following:    Height as of this encounter: 1.74 m (5' 8.5\").    Weight as of this encounter: 97.5 kg (215 lb).  Medications and allergies reviewed.      Galina ÁLVAREZ MA    "

## 2024-05-13 ENCOUNTER — OFFICE VISIT (OUTPATIENT)
Dept: PODIATRY | Facility: CLINIC | Age: 69
End: 2024-05-13
Payer: COMMERCIAL

## 2024-05-13 VITALS
HEIGHT: 69 IN | WEIGHT: 215 LBS | HEART RATE: 92 BPM | BODY MASS INDEX: 31.84 KG/M2 | SYSTOLIC BLOOD PRESSURE: 147 MMHG | DIASTOLIC BLOOD PRESSURE: 92 MMHG

## 2024-05-13 DIAGNOSIS — Z98.890 STATUS POST RIGHT FOOT SURGERY: ICD-10-CM

## 2024-05-13 DIAGNOSIS — M20.21 HALLUX RIGIDUS, RIGHT FOOT: Primary | ICD-10-CM

## 2024-05-13 PROCEDURE — 99024 POSTOP FOLLOW-UP VISIT: CPT | Performed by: PODIATRIST

## 2024-05-13 NOTE — NURSING NOTE
"Chief Complaint   Patient presents with    Suture Removal     Right foot- no concerns       Initial BP (!) 147/92   Pulse 92   Ht 1.74 m (5' 8.5\")   Wt 97.5 kg (215 lb)   BMI 32.22 kg/m   Estimated body mass index is 32.22 kg/m  as calculated from the following:    Height as of this encounter: 1.74 m (5' 8.5\").    Weight as of this encounter: 97.5 kg (215 lb).  Medications and allergies reviewed.      Galina ÁLVAREZ MA    "

## 2024-05-13 NOTE — PROGRESS NOTES
"Phong presents to the office s/p two weeks arthrodesis of the first metatarsophalangeal joint of the right foot .  The patient relates keeping the bandages clean, dry and intact.  The patient relates good compliance with postoperative instructions.  The patient denies any severe pain, fevers, chills, nausea or vomiting.  The patient denies having any calf swelling or tenderness.    BP (!) 147/92   Pulse 92   Ht 1.74 m (5' 8.5\")   Wt 97.5 kg (215 lb)   BMI 32.22 kg/m         Physical Exam:    The patient appears to be in no distress and in good spirits.  The bandages appear clean, dry and intact with no strikethrough noted.   Neurovascular status unchanged with < 3 sec capillary refill to all digits.  No evidence of allodynia.  Noted mild to moderate edema to the operative site on the right foot.  Sutures are intact and the skin is well coapted with no erythema or drainage noted.  No pain on palpation, erythema or noted calor over the back of the calf.         Assessment:     ICD-10-CM    1. Hallux rigidus, right foot  M20.21       2. Status post right foot surgery  Z98.890           Plan:  Sutures were removed and bandage applied.  The patient will remain non weight bearing on the right foot.  The patient will return in one month for reevaluation and repeat x-rays.    Phong verbalized agreement with and understanding of the rational for the diagnosis and treatment plan.  All questions were answered to best of my ability and the patient's satisfaction. The patient was advised to contact the clinic with any questions that may arise after the clinic visit.      Disclaimer: This note consists of symbols derived from keyboarding, dictation and/or voice recognition software. As a result, there may be errors in the script that have gone undetected. Please consider this when interpreting information found in this chart.       VALENTINA Sandoval D.P.M., F.A.C.F.A.S.    "

## 2024-05-13 NOTE — LETTER
"    5/13/2024         RE: Phong Cuevas  [address] [facility] [address]        Dear Colleague,    Thank you for referring your patient, Phong Cuevas, to the [facility] [place]. Please see a copy of my visit note below.    Phong presents to the office s/p two weeks arthrodesis of the first metatarsophalangeal joint of the right foot .  The patient relates keeping the bandages clean, dry and intact.  The patient relates good compliance with postoperative instructions.  The patient denies any severe pain, fevers, chills, nausea or vomiting.  The patient denies having any calf swelling or tenderness.    BP (!) 147/92   Pulse 92   Ht 1.74 m (5' 8.5\")   Wt 97.5 kg (215 lb)   BMI 32.22 kg/m         Physical Exam:    The patient appears to be in no distress and in good spirits.  The bandages appear clean, dry and intact with no strikethrough noted.   Neurovascular status unchanged with < 3 sec capillary refill to all digits.  No evidence of allodynia.  Noted mild to moderate edema to the operative site on the right foot.  Sutures are intact and the skin is well coapted with no erythema or drainage noted.  No pain on palpation, erythema or noted calor over the back of the calf.         Assessment:     ICD-10-CM    1. Hallux rigidus, right foot  M20.21       2. Status post right foot surgery  Z98.890           Plan:  Sutures were removed and bandage applied.  The patient will remain non weight bearing on the right foot.  The patient will return in one month for reevaluation and repeat x-rays.    Phong verbalized agreement with and understanding of the rational for the diagnosis and treatment plan.  All questions were answered to best of my ability and the patient's satisfaction. The patient was advised to contact the clinic with any questions that may arise after the clinic visit.      Disclaimer: This note consists of symbols derived from keyboarding, dictation and/or " voice recognition software. As a result, there may be errors in the script that have gone undetected. Please consider this when interpreting information found in this chart.       VALENTINA Sandoval D.P.M., F.A.C.F.A.S.      Again, thank you for allowing me to participate in the care of your patient.        Sincerely,        Gordon Sandoval DPM

## 2024-06-10 ENCOUNTER — OFFICE VISIT (OUTPATIENT)
Dept: PODIATRY | Facility: CLINIC | Age: 69
End: 2024-06-10
Payer: COMMERCIAL

## 2024-06-10 ENCOUNTER — ANCILLARY PROCEDURE (OUTPATIENT)
Dept: GENERAL RADIOLOGY | Facility: CLINIC | Age: 69
End: 2024-06-10
Attending: PODIATRIST
Payer: COMMERCIAL

## 2024-06-10 VITALS
BODY MASS INDEX: 31.84 KG/M2 | HEART RATE: 108 BPM | WEIGHT: 215 LBS | HEIGHT: 69 IN | SYSTOLIC BLOOD PRESSURE: 137 MMHG | DIASTOLIC BLOOD PRESSURE: 88 MMHG

## 2024-06-10 DIAGNOSIS — Z98.890 STATUS POST RIGHT FOOT SURGERY: ICD-10-CM

## 2024-06-10 DIAGNOSIS — M20.21 HALLUX RIGIDUS, RIGHT FOOT: Primary | ICD-10-CM

## 2024-06-10 PROCEDURE — 73630 X-RAY EXAM OF FOOT: CPT | Mod: TC | Performed by: RADIOLOGY

## 2024-06-10 PROCEDURE — 99024 POSTOP FOLLOW-UP VISIT: CPT | Performed by: PODIATRIST

## 2024-06-10 NOTE — NURSING NOTE
"Chief Complaint   Patient presents with    Surgical Followup     Right foot- swelling no other concerns       Initial /88   Pulse 108   Ht 1.74 m (5' 8.5\")   Wt 97.5 kg (215 lb)   BMI 32.22 kg/m   Estimated body mass index is 32.22 kg/m  as calculated from the following:    Height as of this encounter: 1.74 m (5' 8.5\").    Weight as of this encounter: 97.5 kg (215 lb).  Medications and allergies reviewed.      Galina ÁLVAREZ MA    "

## 2024-06-10 NOTE — LETTER
"6/10/2024      Phong Cuevas  [address]  [facility] [address]      Dear Colleague,    Thank you for referring your patient, Phong Cuevas, to the [facility] [place]. Please see a copy of my visit note below.    Phong returns to the office for reevaluation of the right foot.  The patient relates following the instructions given at the last visit with noted overall less pain and more improvement in function of the right foot.   The patient relates no other problems.    Vitals: /88   Pulse 108   Ht 1.74 m (5' 8.5\")   Wt 97.5 kg (215 lb)   BMI 32.22 kg/m    BMI= Body mass index is 32.22 kg/m .    Lower Extremity Physical Exam:      Neurovascular status remains unchanged.  Muscular exam is within normal limits to major muscle groups.  Integument is intact.      Noted decreased pain on palpation over the first metatarsophalangeal joint on the right.  Mild edema noted.    Diagnostics:  Radiograph evaluation including three views of the right foot reveals interval healing with increased trabeculation of the first metatarsophalangeal joint with hardware intact.  I personally evaluated the images as well as reviewed the images with the patient pointing out the findings.      Assessment:     ICD-10-CM    1. Hallux rigidus, right foot  M20.21 XR Foot Right G/E 3 Views      2. Status post right foot surgery  Z98.890 XR Foot Right G/E 3 Views          Plan:    I have explained to Phong about the progress of the conditions.  At this time, the patient was educated on the importance of offloading supportive shoes and other devices.  I demonstrated to the patient calf stretches to perform every hour daily until symptoms resolve.  After symptoms resolve, the patient was advised to perform the stretches 3 times daily to prevent future recurrence.  The patient was instructed to perform warm soaks with Epson salt after which to also apply over-the-counter Voltaren gel to deeply " massage the injured tissue.  The patient was instructed to do this on a daily basis until symptoms resolve.   The patient may return in four weeks for reevaluation to determine if any further treatment will be needed.      Phong verbalized agreement with and understanding of the rational for the diagnosis and treatment plan.  All questions were answered to best of my ability and the patient's satisfaction. The patient was advised to contact the clinic with any questions that may arise after the clinic visit.      Disclaimer: This note consists of symbols derived from keyboarding, dictation and/or voice recognition software. As a result, there may be errors in the script that have gone undetected. Please consider this when interpreting information found in this chart.       HENRI Mock.P.M., F.A.C.F.A.S.        Again, thank you for allowing me to participate in the care of your patient.        Sincerely,        Gordon Sandoval DPM

## 2024-06-10 NOTE — PATIENT INSTRUCTIONS

## 2024-06-10 NOTE — PROGRESS NOTES
"Phong returns to the office for reevaluation of the right foot.  The patient relates following the instructions given at the last visit with noted overall less pain and more improvement in function of the right foot.   The patient relates no other problems.    Vitals: /88   Pulse 108   Ht 1.74 m (5' 8.5\")   Wt 97.5 kg (215 lb)   BMI 32.22 kg/m    BMI= Body mass index is 32.22 kg/m .    Lower Extremity Physical Exam:      Neurovascular status remains unchanged.  Muscular exam is within normal limits to major muscle groups.  Integument is intact.      Noted decreased pain on palpation over the first metatarsophalangeal joint on the right.  Mild edema noted.    Diagnostics:  Radiograph evaluation including three views of the right foot reveals interval healing with increased trabeculation of the first metatarsophalangeal joint with hardware intact.  I personally evaluated the images as well as reviewed the images with the patient pointing out the findings.      Assessment:     ICD-10-CM    1. Hallux rigidus, right foot  M20.21 XR Foot Right G/E 3 Views      2. Status post right foot surgery  Z98.890 XR Foot Right G/E 3 Views          Plan:    I have explained to Phong about the progress of the conditions.  At this time, the patient was educated on the importance of offloading supportive shoes and other devices.  I demonstrated to the patient calf stretches to perform every hour daily until symptoms resolve.  After symptoms resolve, the patient was advised to perform the stretches 3 times daily to prevent future recurrence.  The patient was instructed to perform warm soaks with Epson salt after which to also apply over-the-counter Voltaren gel to deeply massage the injured tissue.  The patient was instructed to do this on a daily basis until symptoms resolve.   The patient may return in four weeks for reevaluation to determine if any further treatment will be needed.      Phong verbalized agreement with " and understanding of the rational for the diagnosis and treatment plan.  All questions were answered to best of my ability and the patient's satisfaction. The patient was advised to contact the clinic with any questions that may arise after the clinic visit.      Disclaimer: This note consists of symbols derived from keyboarding, dictation and/or voice recognition software. As a result, there may be errors in the script that have gone undetected. Please consider this when interpreting information found in this chart.       VALENTINA Sandoval D.P.M., F.A.C.F.A.S.

## 2024-06-13 ENCOUNTER — MYC MEDICAL ADVICE (OUTPATIENT)
Dept: FAMILY MEDICINE | Facility: CLINIC | Age: 69
End: 2024-06-13
Payer: COMMERCIAL

## 2024-06-13 DIAGNOSIS — R09.81 NASAL CONGESTION: ICD-10-CM

## 2024-06-13 DIAGNOSIS — I10 BENIGN ESSENTIAL HYPERTENSION: ICD-10-CM

## 2024-06-13 DIAGNOSIS — E78.5 HYPERLIPIDEMIA LDL GOAL <100: ICD-10-CM

## 2024-06-13 RX ORDER — FLUTICASONE PROPIONATE 50 MCG
1 SPRAY, SUSPENSION (ML) NASAL DAILY
Qty: 16 G | Refills: 8 | Status: SHIPPED | OUTPATIENT
Start: 2024-06-13

## 2024-06-13 RX ORDER — LISINOPRIL 10 MG/1
10 TABLET ORAL DAILY
Qty: 90 TABLET | Refills: 2 | Status: SHIPPED | OUTPATIENT
Start: 2024-06-13

## 2024-06-13 RX ORDER — SIMVASTATIN 40 MG
40 TABLET ORAL AT BEDTIME
Qty: 90 TABLET | Refills: 2 | Status: SHIPPED | OUTPATIENT
Start: 2024-06-13

## 2024-06-13 NOTE — TELEPHONE ENCOUNTER
"See patient's Apset message, I believe lisinopril and simvastatin are the meds in question.    I called [facility] to see what the issue is.  On hold for several minutes with \"more than 3 callers ahead of me\".   I see the same thing happened to the other nurse who tried to call that [facility].      I called patient, [facility], says Wallee ann told him to just go to another [facility].    He doesn't want to use [facility] anymore, I franklin'd up [facility] and resent his Rx's there.   He was seen for routine visit in April, not due back until April 2025.    Patient will let us know if [facility] has issues.    María RIVERA RN  [facility] Triage        "

## 2024-09-17 ENCOUNTER — MYC MEDICAL ADVICE (OUTPATIENT)
Dept: FAMILY MEDICINE | Facility: CLINIC | Age: 69
End: 2024-09-17
Payer: COMMERCIAL

## 2024-09-17 DIAGNOSIS — M19.071 OSTEOARTHRITIS OF JOINT OF TOE OF RIGHT FOOT: Primary | ICD-10-CM

## 2024-09-18 RX ORDER — MELOXICAM 15 MG/1
15 TABLET ORAL DAILY
Qty: 90 TABLET | Refills: 1 | Status: SHIPPED | OUTPATIENT
Start: 2024-09-18

## 2024-09-24 ENCOUNTER — OFFICE VISIT (OUTPATIENT)
Dept: PEDIATRICS | Facility: CLINIC | Age: 69
End: 2024-09-24
Payer: COMMERCIAL

## 2024-09-24 ENCOUNTER — NURSE TRIAGE (OUTPATIENT)
Dept: FAMILY MEDICINE | Facility: CLINIC | Age: 69
End: 2024-09-24

## 2024-09-24 ENCOUNTER — ANCILLARY PROCEDURE (OUTPATIENT)
Dept: GENERAL RADIOLOGY | Facility: CLINIC | Age: 69
End: 2024-09-24
Payer: COMMERCIAL

## 2024-09-24 VITALS
HEART RATE: 106 BPM | DIASTOLIC BLOOD PRESSURE: 91 MMHG | OXYGEN SATURATION: 97 % | SYSTOLIC BLOOD PRESSURE: 155 MMHG | BODY MASS INDEX: 32.5 KG/M2 | TEMPERATURE: 97.7 F | WEIGHT: 219.4 LBS | HEIGHT: 69 IN | RESPIRATION RATE: 18 BRPM

## 2024-09-24 DIAGNOSIS — R07.89 INTERMITTENT RIGHT-SIDED CHEST PAIN: ICD-10-CM

## 2024-09-24 DIAGNOSIS — J98.6 ELEVATED HEMIDIAPHRAGM: ICD-10-CM

## 2024-09-24 DIAGNOSIS — R29.898 LEG WEAKNESS, BILATERAL: ICD-10-CM

## 2024-09-24 DIAGNOSIS — R07.89 INTERMITTENT RIGHT-SIDED CHEST PAIN: Primary | ICD-10-CM

## 2024-09-24 LAB
CK SERPL-CCNC: 106 U/L (ref 39–308)
CRP SERPL-MCNC: 6.98 MG/L
D DIMER PPP FEU-MCNC: 0.35 UG/ML FEU (ref 0–0.5)
ERYTHROCYTE [DISTWIDTH] IN BLOOD BY AUTOMATED COUNT: 11.8 % (ref 10–15)
HCT VFR BLD AUTO: 44.6 % (ref 40–53)
HGB BLD-MCNC: 14.8 G/DL (ref 13.3–17.7)
MCH RBC QN AUTO: 31.6 PG (ref 26.5–33)
MCHC RBC AUTO-ENTMCNC: 33.2 G/DL (ref 31.5–36.5)
MCV RBC AUTO: 95 FL (ref 78–100)
PLATELET # BLD AUTO: 167 10E3/UL (ref 150–450)
RBC # BLD AUTO: 4.69 10E6/UL (ref 4.4–5.9)
TROPONIN T SERPL HS-MCNC: 12 NG/L
WBC # BLD AUTO: 6.1 10E3/UL (ref 4–11)

## 2024-09-24 PROCEDURE — 99215 OFFICE O/P EST HI 40 MIN: CPT

## 2024-09-24 PROCEDURE — 84484 ASSAY OF TROPONIN QUANT: CPT

## 2024-09-24 PROCEDURE — 85027 COMPLETE CBC AUTOMATED: CPT

## 2024-09-24 PROCEDURE — 71046 X-RAY EXAM CHEST 2 VIEWS: CPT | Mod: TC | Performed by: RADIOLOGY

## 2024-09-24 PROCEDURE — 86140 C-REACTIVE PROTEIN: CPT

## 2024-09-24 PROCEDURE — 93000 ELECTROCARDIOGRAM COMPLETE: CPT

## 2024-09-24 PROCEDURE — 85379 FIBRIN DEGRADATION QUANT: CPT

## 2024-09-24 PROCEDURE — 82550 ASSAY OF CK (CPK): CPT

## 2024-09-24 PROCEDURE — 36415 COLL VENOUS BLD VENIPUNCTURE: CPT

## 2024-09-24 ASSESSMENT — PAIN SCALES - GENERAL: PAINLEVEL: MODERATE PAIN (4)

## 2024-09-24 NOTE — Clinical Note
I had the pleasure of seeing Cory today in the [place] [facility] regarding right anterior chest discomfort.  The most notable abnormality found on workup today is that of an elevated right hemidiaphragm, also present in 2023, though new since 2019.  It is possible that the elevated position of this right diaphragm contributes to Cory's right anterior chest pain, and probably accounts for his mild exertional dyspnea and the sensation that he cannot take a deep breath in on occasion.  To further explore this elevated right hemidiaphragm, I have taken the liberty of ordering a CT chest with contrast to see if we can pinpoint a reason the hemidiaphragm is elevated, as well as a sniff test to see if the diaphragm is elevated and functioning normally, or whether it is paralyzed and moves paradoxically.  I will ask that the results go to you for your interpretation and intervention.  Thank you.

## 2024-09-24 NOTE — TELEPHONE ENCOUNTER
"Patient call taken, scheduled to see Dr. Scott tomorrow AM, chest pain for past 4-5 days, off and on.   Denies injury, says it was just about gone a couple days ago, just mildly bothersome today.   Had an all day bad pain starting a few mornings ago, says he \"could hardly breath\" but then he says the pain was level 5-6.        See triage below.    GO TO ED/UCC NOW (OR TO OFFICE WITH PCP APPROVAL):              [facility] eval is the other option, I called [place] [facility], they will see him at 1:30 today, patient scheduled.    Cancelled visit with Dr. Scott.    María RIVERA RN  [facility] Triage       Reason for Disposition   Chest pain lasting longer than 5 minutes and occurred in last 3 days (72 hours) (Exception: Feels exactly the same as previously diagnosed heartburn and has accompanying sour taste in mouth.)    Additional Information   Negative: Followed an injury to chest   Negative: SEVERE chest pain   Negative: Pain also in shoulder(s) or arm(s) or jaw   Negative: Difficulty breathing   Negative: Cocaine use within last 3 days   Negative: Major surgery in the past month   Negative: Hip or leg fracture (broken bone) in past month (or had cast on leg or ankle in past month)   Negative: Illness requiring prolonged bedrest in past month (e.g., immobilization, long hospital stay)   Negative: Long-distance travel in past month (e.g., car, bus, train, plane; with trip lasting 6 or more hours)   Negative: History of prior 'blood clot' in leg or lungs (i.e., deep vein thrombosis, pulmonary embolism)   Negative: History of inherited increased risk of blood clots (e.g., Factor 5 Leiden, Anti-thrombin 3, Protein C or Protein S deficiency, Prothrombin mutation)   Negative: Cancer treatment in the past two months (or has cancer now)   Negative: Heart beating irregularly or very rapidly    Answer Assessment - Initial Assessment Questions  1. LOCATION: \"Where does it hurt?\"        Right upper chest  2. RADIATION: \"Does " "the pain go anywhere else?\" (e.g., into neck, jaw, arms, back)      Right shoulder hurts, unsure when that started, no numbness or tingling  3. ONSET: \"When did the chest pain begin?\" (Minutes, hours or days)       4-5 days ago  4. PATTERN: \"Does the pain come and go, or has it been constant since it started?\"  \"Does it get worse with exertion?\"       Comes and goes, unsure if worse with exertion  5. DURATION: \"How long does it last\" (e.g., seconds, minutes, hours)      Was really bad a couple mornings ago, lasted \"all day\"  6. SEVERITY: \"How bad is the pain?\"  (e.g., Scale 1-10; mild, moderate, or severe)     - MILD (1-3): doesn't interfere with normal activities      - MODERATE (4-7): interferes with normal activities or awakens from sleep     - SEVERE (8-10): excruciating pain, unable to do any normal activities        Was level 5-6 on the \"bad day\"  7. CARDIAC RISK FACTORS: \"Do you have any history of heart problems or risk factors for heart disease?\" (e.g., angina, prior heart attack; diabetes, high blood pressure, high cholesterol, smoker, or strong family history of heart disease)      Is on cholesterol med  8. PULMONARY RISK FACTORS: \"Do you have any history of lung disease?\"  (e.g., blood clots in lung, asthma, emphysema, birth control pills)      no  9. CAUSE: \"What do you think is causing the chest pain?\"      unsure  10. OTHER SYMPTOMS: \"Do you have any other symptoms?\" (e.g., dizziness, nausea, vomiting, sweating, fever, difficulty breathing, cough)        no  11. PREGNANCY: \"Is there any chance you are pregnant?\" \"When was your last menstrual period?\"        N/a    Protocols used: Chest Pain-A-[place]    "

## 2024-09-24 NOTE — PATIENT INSTRUCTIONS
Based upon evaluation today, we find no evidence that you are having heart pains to explain this right sided chest pain, and there is no evidence by lab work that you have had a blood clot to account for this pain.  The most notable abnormality was that of elevation of your right diaphragm on chest x-ray.  This is a potential contributor to the right sided chest discomfort you are having, and may be a cause of the breathing problems that you report, including some shortness of breath and an inability to take a deep breath then on occasion.    To further evaluate this elevated right diaphragm, I have taken the liberty of ordering a CT scan of your chest, as well as a sniff test to evaluate movement of this right diaphragm.  I will will have the results go to CULLEN Dutton CNP, and I have written her a note advising her that these studies have been ordered and the results will be forthcoming.    In the meantime, you are not leaving yourself at any risk by performing routine exercise even if it makes you somewhat short of breath.  However, if you experience any chest discomfort while you are exercising, please stop exerting yourself, and contact your primary care provider for advice.    It was a pleasure meeting you and Lashonda today.  I hope that you start to feel better soon.

## 2024-09-24 NOTE — PROGRESS NOTES
Acute and Diagnostic Services Clinic Visit    Assessment & Plan     (R07.89) Intermittent right-sided chest pain  (primary encounter diagnosis)  Comment: Has had 5 days of intermittent pain over the right anterior chest, roughly mid sternum level, and just lateral to the right sternal edge.  The pain is not exertional, though does have a pleuritic component and that it is worse with deep breath and coughing.  Some of his discomfort is reproducible by deep palpation of the bony structures of the right anterior chest.  He has a Wells score of 0, and no prior history of cardiac disease.  Plan:   XR Chest 2 Views.  EKG 12-lead, tracing only.  3.   CBC with platelets, D dimer quantitative,         Troponin T, High Sensitivity, CRP inflammation.    (R29.218) Leg weakness, bilateral  Comment: Presents over the last year or 2, and gradually getting worse.  He does not have associated pain.  Duration of leg weakness corresponds roughly to being on simvastatin.  Plan:   CK total ordered.    DISCUSSION/MEDICAL DECISION MAKING:  EKG was notable for inverted T waves in lead III and in lead V3.  When compared to a prior tracing of 3/17/2022, this T wave inversion in lead III is unchanged, though the inversion in V3 is new.  However, the tracing is not suggestive of acute ischemia.  Troponin is normal, D-dimer is normal, CBC is normal, total CK is normal, and CRP is minimally and probably insignificantly elevated at 6.98.  Chest x-ray is notable for an elevated right hemidiaphragm, discussed more below.    (J98.6) Elevated right hemidiaphragm  Comment: Chest x-ray demonstrates elevation of the right hemidiaphragm, with a bandlike opacity in the lung base above it suggestive of atelectasis.  I looked over to previous chest x-rays for comparison.  The right hemidiaphragm was elevated on a chest x-ray of 9/8/2023, though the level of elevation was not quite as notable as it was today.  The right hemidiaphragm was in a normal position  "on 3/21/2019.  Cory's significant/other, Lashonda, joined us for discussion of this finding.  I think that this right Ramses diaphragmatic elevation is symptomatic, and that Cory does report some exertional dyspnea, and at levels of higher exertion finds it difficult to take a deep breath in.  It is also possible that the elevated position of this right hemidiaphragm contributes to his right anterior chest discomfort which was his primary symptom for this ADS visit today.  I think that this hemidiaphragm should be explored more.  First, I suggested to Cory that he undergo chest CT with contrast looking for potential causes of elevation or paralysis of the right hemidiaphragm.  Second, I recommended a sniff evaluation to see if this right hemidiaphragm is elevated but moves normally, or whether it is paralyzed with paradoxical movement.  He readily agrees to this plan.  I will take the liberty of ordering the studies, and will ensure that the results go to his primary care provider, CULLEN Dutton CNP.  Plan:  CT chest with contrast ordered to evaluate for potential causes of right hemidiaphragmatic elevation versus paralysis.  Radiographic sniff test to see if this diaphragm is elevated or if it is paralyzed.  Results will go to CULLEN Dutton CNP, and Cory is encouraged to arrange for follow-up to discuss these results.    54 minutes were spent doing chart review, history and exam, documentation and further activities per the note.       BMI  Estimated body mass index is 32.87 kg/m  as calculated from the following:    Height as of this encounter: 1.74 m (5' 8.5\").    Weight as of this encounter: 99.5 kg (219 lb 6.4 oz).     Subjective   Cory is a 69 year old, presenting for the following health issues:  Chest Pain    HPI     Chest Pain  Onset/Duration: Started 4-5 days ago, went away, then came back  Description:   Location: right side  Character: achey  Radiation: no  Duration: intermittent   Intensity: " "mild  Progression of Symptoms: same and intermittent  Accompanying Signs & Symptoms:  Shortness of breath: No  Sweating: No  Nausea/vomiting: No  Lightheadedness: No  Palpitations: No  Fever/Chills: No  Cough: No           Heartburn: YES  History:   Family history of heart disease: No  Tobacco use: No  Previous similar symptoms: no   Precipitating factors:   Worse with exertion: No  Worse with deep breaths: sometimes           Related to eating: No           Better with burping: No  Alleviating factors: none  Therapies tried and outcome: acetaminophen    Cory Cuevas is a 69-year-old man seen today in the Acute Diagnostic Services (ADS) clinic at [facility] regarding right sided chest pain.    Beginning approximately 5 days ago, Cory has been experiencing intermittent pain over his right anterior chest.  He points out a location at the level of the mid sternum, and just lateral to the right sternal edge.  Initially, the pain seemed like it was improving, so he did not seek medical attention.  Beginning yesterday, the pain became more prominent once again, and because of that, along with the urging of his significant/other, he sought medical attention today.  He contacted his primary care provider, who referred him here to ADS for evaluation.    Cory says that taking a deep breath is provocative for this right anterior chest pain, as he has cough.  He can also reproduce some of the pain by pressing over the bones lateral to the right side of the sternum.  The pain does not get worse with exertion.  It can occur at rest.  The pain is largely \"sharp\" in quality when it occurs.    Cory has no associated dyspnea.  He has some cough if he \"swallows wrong\" while eating, though has no sputum production and denies hemoptysis.  He does not wheeze.  He has no known history of cardiac disease.  He denies hypertension, and is being treated for hyperlipidemia.  He does not have diabetes mellitus.  There is no family history " "of heart disease.    Although Cory had an operation on his right foot, that was back in 4/2024.  He had no prolonged immobilization, and his right leg was not immobilized in any way.  He reports normal mobility for at least the last few months.  He has had no leg pain or swelling.    Review of Systems  Musculoskeletal: Reports weakness over his bilateral legs, present at least for a year and perhaps longer, and seemingly getting slowly worse.  He resists squatting down, as it requires him to involve his arms to stand back up again.  He does not have focal weakness, and has no associated numbness or pain.  The duration of leg weakness corresponds roughly to his being on simvastatin for hyperlipidemia.      Objective    BP (!) 155/91 (BP Location: Left arm, Patient Position: Sitting, Cuff Size: Adult Large)   Pulse 106   Temp 97.7  F (36.5  C) (Oral)   Resp 18   Ht 1.74 m (5' 8.5\")   Wt 99.5 kg (219 lb 6.4 oz)   SpO2 97%   BMI 32.87 kg/m    Body mass index is 32.87 kg/m .  Physical Exam   GENERAL: Very pleasant man, who appears well.  RESP: No bony deformities are seen over the right anterior chest.  However, there is some reproducible pain by palpation of the bony structures of the right anterior chest at the mid sternum level, just lateral to the right sternal border.  There is no crepitus to palpation.  No accessory muscle use at rest.  Lungs clear throughout on inspiration and expiration.  Expiration not prolonged, no wheeze.  CV: Regular rate and rhythm, mildly tachycardic.  Normal S1 S2, no murmur or extra sound.  No lower extremity edema.  ABDOMEN: Soft, non-tender, no guarding.  Bowel sounds positive.  MS: No bony deformities noted.  No red or inflamed joints.  SKIN: Warm and dry, no rashes.  NEURO: Alert, oriented, conversant.  Cranial nerves III - XII grossly intact.  No gross motor or sensory deficits.    PSYCH: Calm, alert, conversant.  Able to articulate logical thoughts, no tangential thoughts, no " hallucinations or delusions.  Affect normal.    CHEST TWO VIEWS 9/24/2024 2:23 PM      HISTORY: Pain over the right anterior chest just lateral to the  sternum, pleuritic component, unknown cause.; Intermittent right-sided  chest pain     COMPARISON: Chest x-ray 9/8/2023                                                                       IMPRESSION: Borderline heart size. Normal pulmonary vasculature.  Elevation of the right hemidiaphragm has increased relative to  9/8/2023. Increased right basilar opacities likely representing volume  loss. Left lung is clear. No pleural effusion or pneumothorax. No  definite acute osseous abnormality.     SADA MCRAE MD   Results for orders placed or performed in visit on 09/24/24 (from the past 24 hour(s))   CK total   Result Value Ref Range     39 - 308 U/L   CBC with platelets   Result Value Ref Range    WBC Count 6.1 4.0 - 11.0 10e3/uL    RBC Count 4.69 4.40 - 5.90 10e6/uL    Hemoglobin 14.8 13.3 - 17.7 g/dL    Hematocrit 44.6 40.0 - 53.0 %    MCV 95 78 - 100 fL    MCH 31.6 26.5 - 33.0 pg    MCHC 33.2 31.5 - 36.5 g/dL    RDW 11.8 10.0 - 15.0 %    Platelet Count 167 150 - 450 10e3/uL   D dimer quantitative   Result Value Ref Range    D-Dimer Quantitative 0.35 0.00 - 0.50 ug/mL FEU    Narrative    This D-dimer assay is intended for use in conjunction with a clinical pretest probability assessment model to exclude pulmonary embolism (PE) and deep venous thrombosis (DVT) in outpatients suspected of PE or DVT. The cut-off value is 0.50 ug/mL FEU.    For patients 50 years of age or older, the application of age-adjusted cut-off values for D-Dimer may increase the specificity without significant effect on sensitivity. The literature suggested calculation age adjusted cut-off in ug/L = age in years x 10 ug/L. The results in this laboratory are reported as ug/mL rather than ug/L. The calculation for age adjusted cut off in ug/mL= age in years x 0.01 ug/mL. For example, the  cut off for a 76 year old male is 76 x 0.01 ug/mL = 0.76 ug/mL (760 ug/L).    M Regina et al. Age adjusted D-dimer cut-off levels to rule out pulmonary embolism: The ADJUST-PE Study. LUIS 2014;311:5901-4661.; HJ Cortney et al. Diagnostic accuracy of conventional or age adjusted D-dimer cutoff values in older patients with suspected venous thromboembolism. Systemic review and meta-analysis. BMJ 2013:346:f2492.   Troponin T, High Sensitivity   Result Value Ref Range    Troponin T, High Sensitivity 12 <=22 ng/L   CRP inflammation   Result Value Ref Range    CRP Inflammation 6.98 (H) <5.00 mg/L         In 1326  Out 1354  In 1458  Out 1519    Signed Electronically by: IMER GRANADO

## 2024-10-03 ENCOUNTER — MYC MEDICAL ADVICE (OUTPATIENT)
Dept: FAMILY MEDICINE | Facility: CLINIC | Age: 69
End: 2024-10-03
Payer: COMMERCIAL

## 2024-10-03 NOTE — TELEPHONE ENCOUNTER
"See patient's mychart message, he is referring to the 9/24/24 chest x-ray (ordered by another provider in the context of an ADS visit that day at [place]):    CHEST TWO VIEWS 9/24/2024 2:23 PM      HISTORY: Pain over the right anterior chest just lateral to the  sternum, pleuritic component, unknown cause.; Intermittent right-sided  chest pain     COMPARISON: Chest x-ray 9/8/2023                                                                       IMPRESSION: Borderline heart size. Normal pulmonary vasculature.  Elevation of the right hemidiaphragm has increased relative to  9/8/2023. Increased right basilar opacities likely representing volume  loss. Left lung is clear. No pleural effusion or pneumothorax. No  definite acute osseous abnormality.     SADA MCRAE MD             Routed to PCP to explain \"borderline heart size\" to patient.    María RIVERA RN  [facility] Triage    "

## 2024-10-10 ENCOUNTER — VIRTUAL VISIT (OUTPATIENT)
Dept: FAMILY MEDICINE | Facility: CLINIC | Age: 69
End: 2024-10-10
Payer: COMMERCIAL

## 2024-10-10 DIAGNOSIS — J98.6 ELEVATED HEMIDIAPHRAGM: Primary | ICD-10-CM

## 2024-10-10 PROCEDURE — 99442 PR PHYSICIAN TELEPHONE EVALUATION 11-20 MIN: CPT | Mod: 95 | Performed by: NURSE PRACTITIONER

## 2024-10-10 RX ORDER — DOXYCYCLINE 100 MG/1
100 TABLET ORAL DAILY
COMMUNITY
Start: 2024-10-09

## 2024-10-10 NOTE — PROGRESS NOTES
Cory is a 69 year old who is being evaluated via a billable video visit.    How would you like to obtain your AVS? MyChart  If the video visit is dropped, the invitation should be resent by: Send to e-mail at: [email or web address]  Will anyone else be joining your video visit? No      Assessment & Plan     Elevated hemidiaphragm  Most recent imaging reviewed.  All questions answered to best of my ability.  Complete follow-up testing as previously recommended.  Will arrange for sleep evaluation.  Full plan will depend on outcome of testing.  - Adult Sleep Eval & Management  Referral; Future      Subjective   Cory is a 69 year old, presenting for the following health issues:  Hospital F/U      10/10/2024    11:44 AM   Additional Questions   Roomed by Gypsy PACKER         10/10/2024    11:44 AM   Patient Reported Additional Medications   Patient reports taking the following new medications None per patient         HPI         Objective       Phone call completed for follow-up up after ADS visit.  Had imaging which showed elevated right hemidiaphragm.  Has been experiencing shortness of breath.  Denies having any further chest pain.  Does have additional testing scheduled.     Vitals:  No vitals were obtained today due to virtual visit.    Physical Exam   No PE completed due to phone call visit        Phone call: 12 minutes     Type of service:  Video Visit       Distant Location (provider location):  On-site  Signed Electronically by: CULLEN Talbot CNP

## 2024-10-24 ENCOUNTER — HOSPITAL ENCOUNTER (OUTPATIENT)
Dept: GENERAL RADIOLOGY | Facility: CLINIC | Age: 69
Discharge: HOME | End: 2024-10-24
Attending: NURSE PRACTITIONER
Payer: COMMERCIAL

## 2024-10-24 ENCOUNTER — HOSPITAL ENCOUNTER (OUTPATIENT)
Dept: CT IMAGING | Facility: CLINIC | Age: 69
Discharge: HOME | End: 2024-10-24
Attending: NURSE PRACTITIONER
Payer: COMMERCIAL

## 2024-10-24 DIAGNOSIS — J98.6 ELEVATED HEMIDIAPHRAGM: ICD-10-CM

## 2024-10-24 PROCEDURE — 250N000011 HC RX IP 250 OP 636: Performed by: RADIOLOGY

## 2024-10-24 PROCEDURE — 76000 FLUOROSCOPY <1 HR PHYS/QHP: CPT

## 2024-10-24 PROCEDURE — 250N000009 HC RX 250: Performed by: RADIOLOGY

## 2024-10-24 PROCEDURE — 71260 CT THORAX DX C+: CPT

## 2024-10-24 RX ORDER — IOPAMIDOL 755 MG/ML
107 INJECTION, SOLUTION INTRAVASCULAR ONCE
Status: COMPLETED | OUTPATIENT
Start: 2024-10-24 | End: 2024-10-24

## 2024-10-24 RX ADMIN — IOPAMIDOL 107 ML: 755 INJECTION, SOLUTION INTRAVENOUS at 09:16

## 2024-10-24 RX ADMIN — SODIUM CHLORIDE 67 ML: 9 INJECTION, SOLUTION INTRAVENOUS at 09:16

## 2024-10-26 ENCOUNTER — MYC MEDICAL ADVICE (OUTPATIENT)
Dept: FAMILY MEDICINE | Facility: CLINIC | Age: 69
End: 2024-10-26
Payer: COMMERCIAL

## 2024-10-28 DIAGNOSIS — J98.6 ELEVATED HEMIDIAPHRAGM: Primary | ICD-10-CM

## 2024-10-28 NOTE — TELEPHONE ENCOUNTER
It appears patient has seen the following result note written this AM:    Cory,     The CT that you had done of your chest continues to show the elevation of your right diaphragm.  Additionally, the x-ray that you had done shows that there is minimal movement on the right side of your lung related to this.  I would recommend that you be evaluated by a pulmonologist, lung specialist.  I have placed that referral order.  Typically, they contact you within the next 2-3 business days to set up appointment date, time and location.     Take Indigo leung,     The CT that you had done of your chest continues to show the elevation of your right diaphragm.  Additionally, the x-ray that you had done shows that there is minimal movement on the right side of your lung related to this.  I would recommend that you be evaluated by a pulmonologist, lung specialist.  I have placed that referral order.  Typically, they contact you within the next 2-3 business days to set up appointment date, time and location.     Take Indigo leung   Written by CULLEN Cary CNP on 10/28/2024  9:27 AM CDT  Seen by patient hPong Cuevas on 10/28/2024 12:58 PM    I called and spoke to patient, reviewed the note, provided him with number to call to schedule with pulmonology if he does not hear from them in a couple of days.    Patient verbalized understanding of and agreement with plan.    María RIVERA RN  [facility] Triage

## 2024-10-28 NOTE — TELEPHONE ENCOUNTER
Please call with results and recommendations.  Please see annotations on imaging     Indigo SAUCEDO

## 2024-10-30 DIAGNOSIS — J98.6 ELEVATED HEMIDIAPHRAGM: Primary | ICD-10-CM

## 2024-12-04 ENCOUNTER — OFFICE VISIT (OUTPATIENT)
Dept: FAMILY MEDICINE | Facility: CLINIC | Age: 69
End: 2024-12-04
Payer: COMMERCIAL

## 2024-12-04 VITALS
SYSTOLIC BLOOD PRESSURE: 138 MMHG | HEIGHT: 68 IN | RESPIRATION RATE: 18 BRPM | BODY MASS INDEX: 32.34 KG/M2 | TEMPERATURE: 97.6 F | WEIGHT: 213.4 LBS | OXYGEN SATURATION: 97 % | HEART RATE: 88 BPM | DIASTOLIC BLOOD PRESSURE: 82 MMHG

## 2024-12-04 DIAGNOSIS — I10 BENIGN ESSENTIAL HYPERTENSION: ICD-10-CM

## 2024-12-04 DIAGNOSIS — R05.1 ACUTE COUGH: ICD-10-CM

## 2024-12-04 DIAGNOSIS — M54.2 NECK PAIN: Primary | ICD-10-CM

## 2024-12-04 PROCEDURE — 99213 OFFICE O/P EST LOW 20 MIN: CPT | Performed by: PHYSICIAN ASSISTANT

## 2024-12-04 RX ORDER — METHOCARBAMOL 500 MG/1
250-500 TABLET, FILM COATED ORAL
Qty: 25 TABLET | Refills: 0 | Status: SHIPPED | OUTPATIENT
Start: 2024-12-04

## 2024-12-04 RX ORDER — ALBUTEROL SULFATE 90 UG/1
1-2 INHALANT RESPIRATORY (INHALATION) EVERY 4 HOURS PRN
Qty: 6.7 G | Refills: 0 | Status: SHIPPED | OUTPATIENT
Start: 2024-12-04

## 2024-12-04 RX ORDER — BENZONATATE 100 MG/1
100 CAPSULE ORAL 3 TIMES DAILY PRN
Qty: 25 CAPSULE | Refills: 0 | Status: SHIPPED | OUTPATIENT
Start: 2024-12-04

## 2024-12-04 ASSESSMENT — PAIN SCALES - GENERAL: PAINLEVEL_OUTOF10: NO PAIN (0)

## 2024-12-04 NOTE — PATIENT INSTRUCTIONS
Crystal Ray,    Thank you for allowing [facility] to manage your care.    I am unsure of the cause of your symptoms, but your exam is most consistent with neck muscle strain/spasm. This should resolve with stretching, ice vs heat, over the counter meds and time.     If you develop worsening/changing symptoms at any time, please be seen in clinic/urgent care or call 911/go to the emergency department for evaluation as we discussed.    I sent your prescriptions to your pharmacy. For your pain, please use Tylenol 650mg every 6 hours. You may use 400mg of ibuprofen between doses of Tylenol.     Max acetaminophen (Tylenol) 3,000mg/24 hours  Max ibuprofen 2,000mg/24 hours    For severe pain not controlled by over the counter medications, please use methocarbamol as prescribed. Do not use this medication while driving, operating machinery, with other sedating medications, or while drinking alcohol as it will make you drowsy.    I made a referral to physical therapy. They will be calling in approximately 1 week to set up your appointment.  If you do not hear from them, please call the specialty number on your after visit summary.     Please allow 1-2 business days for our office to contact you in regards to your laboratory/radiological studies.  If not done so, I encourage you to login into [email or web address] ([email or web address] to review your results as well.     Your blood pressure was high today. Get a blood pressure cuff for use at home. Take and record your blood pressure twice daily for 2 weeks. If your blood pressure is greater than or equal to 140/90mm Hg on average, please contact us.    If you have any questions or concerns, please feel free to call us at [phone]    Sincerely,    Vish Higgins PA-C    Did you know?      You can schedule a video visit for follow-up appointments as well as future appointments for certain conditions.  Please see the below link.      [email or web address]    If you have not already done so,  I encourage you to sign up for [email or web address]).  This will allow you to review your results, securely communicate with a provider, and schedule virtual visits as well.

## 2024-12-04 NOTE — PROGRESS NOTES
"  {PROVIDER CHARTING PREFERENCE:003843}    Malathi Ray is a 69 year old, presenting for the following health issues:  Neck Pain        12/4/2024     8:55 AM   Additional Questions   Roomed by Bandar Black CMA   Accompanied by N/A         12/4/2024     8:55 AM   Patient Reported Additional Medications   Patient reports taking the following new medications No new medications     History of Present Illness       Reason for visit:  Neck  Symptom onset:  1-2 weeks ago  Symptoms include:  Sore  Symptom intensity:  Moderate  Symptom progression:  Staying the same  Had these symptoms before:  No  What makes it worse:  Move neck  What makes it better:  Walking   He is taking medications regularly.     Patient is coming in today due to a possible pinched nerve in the neck.   There is no injury noted or numbness or tingling anywhere.  Patient states that sitting for long periods of time in hard chairs can make the neck hard to move and painful.  As well as when bending over.  There is no stiffness noted.    {MA/LPN/RN Pre-Provider Visit Orders- hCG/UA/Strep (Optional):842523}  {SUPERLIST (Optional):203598}  {additonal problems for provider to add (Optional):209239}    {ROS Picklists (Optional):612524}      Objective    BP (!) 146/92   Pulse 117   Temp 97.6  F (36.4  C) (Temporal)   Resp 18   Ht 1.733 m (5' 8.23\")   Wt 96.8 kg (213 lb 6.4 oz)   SpO2 97%   BMI 32.23 kg/m    Body mass index is 32.23 kg/m .  Physical Exam   {Exam List (Optional):411781}    {Diagnostic Test Results (Optional):588067}        Signed Electronically by: SANDRA Montoya  {Email feedback regarding this note to [email or web address]   :786875}  " "visit:  Neck  Symptom onset:  1-2 weeks ago  Symptoms include:  Sore  Symptom intensity:  Moderate  Symptom progression:  Staying the same  Had these symptoms before:  No  What makes it worse:  Move neck  What makes it better:  Walking   He is taking medications regularly.     - Neck pain: Left sided neck pain, worse with sitting, improves with movement and walking. [place] in nature. No radiation. No numbness or tingling  - Reports recent cold symptoms with nighttime cough and phlegm  - PMHx: Elevated right diaphragm, hyperlipidemia, hypertension  - Medications: Lisinopril , simvastatin  - Reports recent use of neck traction device with worsening symptoms  - Reports elevated blood pressure readings at home  - Taking Tylenol arthritis without significant relief  - No prior neck surgery      Review of Systems  Constitutional, HEENT, cardiovascular, pulmonary, gi and gu systems are negative, except as otherwise noted.      Objective    BP (!) 146/92   Pulse 117   Temp 97.6  F (36.4  C) (Temporal)   Resp 18   Ht 1.733 m (5' 8.23\")   Wt 96.8 kg (213 lb 6.4 oz)   SpO2 97%   BMI 32.23 kg/m    Body mass index is 32.23 kg/m .  Physical Exam  Vitals and nursing note reviewed.   Constitutional:       General: He is not in acute distress.     Appearance: He is not ill-appearing or diaphoretic.   HENT:      Head: Normocephalic and atraumatic.      Mouth/Throat:      Mouth: Mucous membranes are moist.      Pharynx: Oropharynx is clear.   Eyes:      Conjunctiva/sclera: Conjunctivae normal.   Neck:      Vascular: No carotid bruit.      Comments: Tenderness to the left trapezius muscle.  No overlying signs of trauma or infection.   Cardiovascular:      Rate and Rhythm: Normal rate and regular rhythm.      Heart sounds: Normal heart sounds. No murmur heard.     No friction rub. No gallop.   Pulmonary:      Effort: Pulmonary effort is normal. No respiratory distress.      Breath sounds: Normal breath sounds. No stridor. No " wheezing, rhonchi or rales.   Musculoskeletal:      Cervical back: Normal range of motion and neck supple. No rigidity.      Comments: BUEs: Distal CMS intact. Remainder of limb non-tender.    Lymphadenopathy:      Cervical: No cervical adenopathy.   Skin:     General: Skin is warm and dry.   Neurological:      General: No focal deficit present.      Mental Status: He is alert. Mental status is at baseline.   Psychiatric:         Mood and Affect: Mood normal.         Behavior: Behavior normal.                Signed Electronically by: SANDRA Montoya

## 2024-12-16 ENCOUNTER — TELEPHONE (OUTPATIENT)
Dept: PULMONOLOGY | Facility: CLINIC | Age: 69
End: 2024-12-16

## 2024-12-16 ENCOUNTER — OFFICE VISIT (OUTPATIENT)
Dept: PULMONOLOGY | Facility: CLINIC | Age: 69
End: 2024-12-16
Payer: COMMERCIAL

## 2024-12-16 VITALS
OXYGEN SATURATION: 95 % | SYSTOLIC BLOOD PRESSURE: 142 MMHG | HEIGHT: 69 IN | HEART RATE: 112 BPM | WEIGHT: 213 LBS | DIASTOLIC BLOOD PRESSURE: 90 MMHG | BODY MASS INDEX: 31.55 KG/M2

## 2024-12-16 DIAGNOSIS — J98.6 ELEVATED HEMIDIAPHRAGM: ICD-10-CM

## 2024-12-16 DIAGNOSIS — J98.4 RESTRICTIVE LUNG DISEASE: Primary | ICD-10-CM

## 2024-12-16 LAB — HGB BLD-MCNC: 15 G/DL

## 2024-12-16 PROCEDURE — 99205 OFFICE O/P NEW HI 60 MIN: CPT | Mod: 25 | Performed by: INTERNAL MEDICINE

## 2024-12-16 NOTE — TELEPHONE ENCOUNTER
DATE: 12/16/2024  DME PROVIDER: [facility]   SUPPLY ORDERED: oximetry on room air   PROVIDER: [facility]    Faxed order    Merly Sinclair LPN         Statement Selected

## 2024-12-16 NOTE — PROGRESS NOTES
"Pulmonary Clinic Outpatient Consultation    Assessment and Plan:  69 year old male never smoker with a history of osteoarthritis s/p left TKA, dyslipidemia, obesity, GERD, presenting for evaluation of right hemidiaphragm paralysis and exertional dyspnea.    Right hemidiaphragm paralysis, exertional dyspnea: Unfortunately the fluoro images are not available on PACS, though the report indicates \"minimal movement\" of the right hemidiaphragm on sniff test. PFT shows moderate restrictive physiology, with ~20% decline in FEV1 and FVC in supine position. Normal DLCOc and chest CT shows no parenchymal lung disease (prominent pretracheal node appears normal to me). Denies daytime hypersomnolence, though does have exertional dyspnea that has been worse over the last year, orthopnea, dyspnea when bending at the waist (eg to tie shoes) despite having intentionally lost about 65 lbs in recent years. Can still exercise on recumbent bicycle for 45 minutes but now has dyspnea quicker during his workout. Will refer to pulmonary rehabilitation and obtain overnight oximetry, and will have him see Dr. Gallegos to evaluate whether plication would be an option for him; I did caution him that surgical approach is not always indicated, and does not always lead to symptomatic improvement.    Plan:  - pulmonary rehabilitation  - overnight oximetry  - referral to Dr. Gallegos with thoracic surgery to evaluate whether right hemidiaphragm plication would be an option  - no indication for inhaled therapies; no airflow obstruction and he has seen no improvement with albuterol  - recommend remaining up to date with respiratory vaccinations  - follow up in 6 months  - encouraged him to contact us with questions or concerning symptoms    Kwan Cat MD  [facility]  Office [phone]  he/him    CCx: Right hemidiaphragm paralysis, exertional dyspnea    HPI: 69 year old male never smoker with a history of osteoarthritis s/p left " TKA, dyslipidemia, obesity, GERD, presenting for evaluation of right hemidiaphragm paralysis and exertional dyspnea. Over the last year has had dyspnea. Can still exercise on recumbent bicycle for 45 minutes but now has dyspnea quicker during his workout. Denies daytime hypersomnolence, though does have exertional dyspnea that has been worse over the last year, orthopnea, dyspnea when bending at the waist (eg to tie shoes) despite having intentionally lost about 65 lbs in recent years. Walking across the yard carrying something can cause dyspnea. No childhood asthma. Occasional snoring per spouse. Sniff test showed right hemidiaphragm paralysis. CT with no parenchymal disease beyond right basilar atelectasis. A pretracheal node is prominent but has normal appearance.    ROS:  A 12-system review was obtained and was negative with the exception of the symptoms endorsed in the history of present illness.    PMH:  never smoker with a history of osteoarthritis s/p left TKA, dyslipidemia, obesity, GERD    PSH:  Past Surgical History:   Procedure Laterality Date    ARTHRODESIS FOOT Right 4/30/2024    Procedure: Fusion of the first metatarsal phalangeal joint, right foot;  Surgeon: Gordon Sandoval DPM;  Location: [facility]    ARTHROPLASTY KNEE Left 3/24/2022    Procedure: left TOTAL Knee Arthroplasty;  Surgeon: Allen White MD;  Location: [facility]    COLONOSCOPY  2/20/2014    Procedure: COLONOSCOPY;  Colonoscopy  ;  Surgeon: Murali Gill MD;  Location: [facility]       Allergies:  No Known Allergies    Family HX:  Family History   Problem Relation Age of Onset    Cancer Father         oral cancer       Social Hx:  Social History     Socioeconomic History    Marital status: Single     Spouse name: Not on file    Number of children: Not on file    Years of education: Not on file    Highest education level: Not on file   Occupational History    Not on file   Tobacco Use    Smoking status: Never     Passive exposure:  Never    Smokeless tobacco: Never   Vaping Use    Vaping status: Never Used   Substance and Sexual Activity    Alcohol use: Yes    Drug use: No    Sexual activity: Not Currently     Partners: Female     Birth control/protection: None     Comment: partner is post-menopausal   Other Topics Concern    Parent/sibling w/ CABG, MI or angioplasty before 65F 55M? No   Social History Narrative    Not on file     Social Drivers of Health     Financial Resource Strain: Unknown (4/18/2024)    Financial Resource Strain     Within the past 12 months, have you or your family members you live with been unable to get utilities (heat, electricity) when it was really needed?: Patient declined   Food Insecurity: Unknown (4/18/2024)    Food Insecurity     Within the past 12 months, did you worry that your food would run out before you got money to buy more?: Patient declined     Within the past 12 months, did the food you bought just not last and you didn t have money to get more?: Patient declined   Transportation Needs: Unknown (4/18/2024)    Transportation Needs     Within the past 12 months, has lack of transportation kept you from medical appointments, getting your medicines, non-medical meetings or appointments, work, or from getting things that you need?: Patient declined   Physical Activity: Unknown (4/18/2024)    Exercise Vital Sign     Days of Exercise per Week: 2 days     Minutes of Exercise per Session: Not on file   Stress: No Stress Concern Present (4/18/2024)    Citizen of [place] of Occupational Health - Occupational Stress Questionnaire     Feeling of Stress : Only a little   Social Connections: Unknown (4/18/2024)    Social Connection and Isolation Panel [NHANES]     Frequency of Communication with Friends and Family: Not on file     Frequency of Social Gatherings with Friends and Family: Three times a week     Attends Zoroastrian Services: Not on file     Active Member of Clubs or Organizations: Not on file     Attends  "Club or Organization Meetings: Not on file     Marital Status: Not on file   Interpersonal Safety: Low Risk  (12/4/2024)    Interpersonal Safety     Do you feel physically and emotionally safe where you currently live?: Yes     Within the past 12 months, have you been hit, slapped, kicked or otherwise physically hurt by someone?: No     Within the past 12 months, have you been humiliated or emotionally abused in other ways by your partner or ex-partner?: No   Housing Stability: Unknown (4/18/2024)    Housing Stability     Do you have housing? : Patient declined     Are you worried about losing your housing?: Patient declined       Current Meds:  Current Outpatient Medications   Medication Sig Dispense Refill    albuterol (PROAIR HFA/PROVENTIL HFA/VENTOLIN HFA) 108 (90 Base) MCG/ACT inhaler Inhale 1-2 puffs into the lungs every 4 hours as needed. 6.7 g 0    Ascorbic Acid (VITAMIN C PO) Take 500 mg by mouth daily      benzonatate (TESSALON) 100 MG capsule Take 1 capsule (100 mg) by mouth 3 times daily as needed for cough. 25 capsule 0    Cholecalciferol (VITAMIN D-3) 25 MCG (1000 UT) CAPS Take 1 capsule by mouth daily.      doxylamine (UNISOM) 25 MG TABS Take 25 mg by mouth at bedtime      lisinopril (ZESTRIL) 10 MG tablet Take 1 tablet (10 mg) by mouth daily 90 tablet 2    meloxicam (MOBIC) 15 MG tablet Take 1 tablet (15 mg) by mouth daily. 90 tablet 1    methocarbamol (ROBAXIN) 500 MG tablet Take 0.5-1 tablets (250-500 mg) by mouth nightly as needed for muscle spasms. 25 tablet 0    Multiple Vitamins-Minerals (MULTIVITAMIN ADULT PO) Take 1 tablet by mouth daily      simvastatin (ZOCOR) 40 MG tablet Take 1 tablet (40 mg) by mouth at bedtime 90 tablet 2       Physical Exam:  BP (!) 142/90   Pulse 112   Ht 1.753 m (5' 9\")   Wt 96.6 kg (213 lb)   SpO2 95%   BMI 31.45 kg/m    Gen: alert, oriented, no distress  HEENT: no cervical or supraclavicular lymphadenopathy  CV: tachy, regular, no M/G/R  Resp: diminished " breath sounds at right base, no focal crackles or wheezes  Skin: no apparent rashes  Ext: no cyanosis, clubbing or edema  Neuro: alert, nonfocal    Labs:  reviewed    Imaging:  CT chest with contrast (October 2024):  - images directly reviewed, formal interpretation follows:  FINDINGS:   LUNGS AND PLEURA: Elevation of the right hemidiaphragm is noted with  volume loss in the right lung base. Otherwise, the lungs are clear. No  effusion. The central airways are patent.     MEDIASTINUM/AXILLAE: No axillary adenopathy. There are several  prominent to borderline enlarged mediastinal lymph nodes measuring up  to 9 mm in short axis. No pericardial effusion. The aorta is normal in  caliber.     CORONARY ARTERY CALCIFICATION: Mild     UPPER ABDOMEN: Diffuse hepatic steatosis is noted.     MUSCULOSKELETAL: Degenerative changes are noted through the spine.                                                                      IMPRESSION:   1.  Elevation of the right hemidiaphragm with volume loss in the right  lung base.  2.  Multiple prominent to borderline enlarged mediastinal lymph nodes.    Sniff test (October 2024):  FINDINGS: Quick inhalation and exhalation shows normal downward  excursion of the left hemidiaphragm. The right hemidiaphragm is  elevated and shows minimal movement with breathing.                                                                      IMPRESSION:  Elevated right hemidiaphragm with minimal movement with  breathing.    Pulmonary Function Testing  December 2024  Moderate restrictive physiology with a ~20% decline in FVC and FEV1 in supine position  Normal DLCOc    Time spent on chart and image review, meeting with the patient to obtain history, perform physical exam, discuss test results, diagnostic possibilities, further testing options, treatment plan options, and care coordination: 68 minutes    The longitudinal plan of care for the diagnosis(es)/condition(s) as documented were addressed during  this visit. Due to the added complexity in care, I will continue to support Rich in the subsequent management and with ongoing continuity of care.

## 2024-12-16 NOTE — PATIENT INSTRUCTIONS
It was good to meet you in clinic today. This is what we discussed:    You do have restriction on your right lung from a paralyzed right diaphragm.  We will get you into pulmonary rehabilitation to improve your breathing.  We will check oxygen level at night and I will contact you with results.  We will get you in to see Dr. Gallegos, a thoracic surgeon, to discuss possible surgical options for this.  I will see you in 6 months.  Contact me with questions or concerns.    Kwan Cat MD  Pulmonary and Critical Care Medicine  [facility]  Office [phone]

## 2024-12-16 NOTE — LETTER
"12/16/2024      Phong Cuevas  [address] [facility] [address]      Dear Colleague,    Thank you for referring your patient, Phong Cuevas, to the [facility]. Please see a copy of my visit note below.    Pulmonary Clinic Outpatient Consultation    Assessment and Plan:  69 year old male never smoker with a history of osteoarthritis s/p left TKA, dyslipidemia, obesity, GERD, presenting for evaluation of right hemidiaphragm paralysis and exertional dyspnea.    Right hemidiaphragm paralysis, exertional dyspnea: Unfortunately the fluoro images are not available on PACS, though the report indicates \"minimal movement\" of the right hemidiaphragm on sniff test. PFT shows moderate restrictive physiology, with ~20% decline in FEV1 and FVC in supine position. Normal DLCOc and chest CT shows no parenchymal lung disease (prominent pretracheal node appears normal to me). Denies daytime hypersomnolence, though does have exertional dyspnea that has been worse over the last year, orthopnea, dyspnea when bending at the waist (eg to tie shoes) despite having intentionally lost about 65 lbs in recent years. Can still exercise on recumbent bicycle for 45 minutes but now has dyspnea quicker during his workout. Will refer to pulmonary rehabilitation and obtain overnight oximetry, and will have him see Dr. Gallegos to evaluate whether plication would be an option for him; I did caution him that surgical approach is not always indicated, and does not always lead to symptomatic improvement.    Plan:  - pulmonary rehabilitation  - overnight oximetry  - referral to Dr. Gallegos with thoracic surgery to evaluate whether right hemidiaphragm plication would be an option  - no indication for inhaled therapies; no airflow obstruction and he has seen no improvement with albuterol  - recommend remaining up to date with respiratory vaccinations  - follow up in 6 months  - encouraged him to contact us with " questions or concerning symptoms    Kwan Cat MD  [facility]  Office [phone]  he/him    CCx: Right hemidiaphragm paralysis, exertional dyspnea    HPI: 69 year old male never smoker with a history of osteoarthritis s/p left TKA, dyslipidemia, obesity, GERD, presenting for evaluation of right hemidiaphragm paralysis and exertional dyspnea. Over the last year has had dyspnea. Can still exercise on recumbent bicycle for 45 minutes but now has dyspnea quicker during his workout. Denies daytime hypersomnolence, though does have exertional dyspnea that has been worse over the last year, orthopnea, dyspnea when bending at the waist (eg to tie shoes) despite having intentionally lost about 65 lbs in recent years. Walking across the yard carrying something can cause dyspnea. No childhood asthma. Occasional snoring per spouse. Sniff test showed right hemidiaphragm paralysis. CT with no parenchymal disease beyond right basilar atelectasis. A pretracheal node is prominent but has normal appearance.    ROS:  A 12-system review was obtained and was negative with the exception of the symptoms endorsed in the history of present illness.    PMH:  never smoker with a history of osteoarthritis s/p left TKA, dyslipidemia, obesity, GERD    PSH:  Past Surgical History:   Procedure Laterality Date     ARTHRODESIS FOOT Right 4/30/2024    Procedure: Fusion of the first metatarsal phalangeal joint, right foot;  Surgeon: Gordon Sandoval DPM;  Location: [facility]     ARTHROPLASTY KNEE Left 3/24/2022    Procedure: left TOTAL Knee Arthroplasty;  Surgeon: Allen White MD;  Location: [facility]     COLONOSCOPY  2/20/2014    Procedure: COLONOSCOPY;  Colonoscopy  ;  Surgeon: Murali Gill MD;  Location: [facility]       Allergies:  No Known Allergies    Family HX:  Family History   Problem Relation Age of Onset     Cancer Father         oral cancer       Social Hx:  Social History     Socioeconomic History      Marital status: Single     Spouse name: Not on file     Number of children: Not on file     Years of education: Not on file     Highest education level: Not on file   Occupational History     Not on file   Tobacco Use     Smoking status: Never     Passive exposure: Never     Smokeless tobacco: Never   Vaping Use     Vaping status: Never Used   Substance and Sexual Activity     Alcohol use: Yes     Drug use: No     Sexual activity: Not Currently     Partners: Female     Birth control/protection: None     Comment: partner is post-menopausal   Other Topics Concern     Parent/sibling w/ CABG, MI or angioplasty before 65F 55M? No   Social History Narrative     Not on file     Social Drivers of Health     Financial Resource Strain: Unknown (4/18/2024)    Financial Resource Strain      Within the past 12 months, have you or your family members you live with been unable to get utilities (heat, electricity) when it was really needed?: Patient declined   Food Insecurity: Unknown (4/18/2024)    Food Insecurity      Within the past 12 months, did you worry that your food would run out before you got money to buy more?: Patient declined      Within the past 12 months, did the food you bought just not last and you didn t have money to get more?: Patient declined   Transportation Needs: Unknown (4/18/2024)    Transportation Needs      Within the past 12 months, has lack of transportation kept you from medical appointments, getting your medicines, non-medical meetings or appointments, work, or from getting things that you need?: Patient declined   Physical Activity: Unknown (4/18/2024)    Exercise Vital Sign      Days of Exercise per Week: 2 days      Minutes of Exercise per Session: Not on file   Stress: No Stress Concern Present (4/18/2024)    [place] of Occupational Health - Occupational Stress Questionnaire      Feeling of Stress : Only a little   Social Connections: Unknown (4/18/2024)    Social Connection and  Isolation Panel [NHANES]      Frequency of Communication with Friends and Family: Not on file      Frequency of Social Gatherings with Friends and Family: Three times a week      Attends Yazdanism Services: Not on file      Active Member of Clubs or Organizations: Not on file      Attends Club or Organization Meetings: Not on file      Marital Status: Not on file   Interpersonal Safety: Low Risk  (12/4/2024)    Interpersonal Safety      Do you feel physically and emotionally safe where you currently live?: Yes      Within the past 12 months, have you been hit, slapped, kicked or otherwise physically hurt by someone?: No      Within the past 12 months, have you been humiliated or emotionally abused in other ways by your partner or ex-partner?: No   Housing Stability: Unknown (4/18/2024)    Housing Stability      Do you have housing? : Patient declined      Are you worried about losing your housing?: Patient declined       Current Meds:  Current Outpatient Medications   Medication Sig Dispense Refill     albuterol (PROAIR HFA/PROVENTIL HFA/VENTOLIN HFA) 108 (90 Base) MCG/ACT inhaler Inhale 1-2 puffs into the lungs every 4 hours as needed. 6.7 g 0     Ascorbic Acid (VITAMIN C PO) Take 500 mg by mouth daily       benzonatate (TESSALON) 100 MG capsule Take 1 capsule (100 mg) by mouth 3 times daily as needed for cough. 25 capsule 0     Cholecalciferol (VITAMIN D-3) 25 MCG (1000 UT) CAPS Take 1 capsule by mouth daily.       doxylamine (UNISOM) 25 MG TABS Take 25 mg by mouth at bedtime       lisinopril (ZESTRIL) 10 MG tablet Take 1 tablet (10 mg) by mouth daily 90 tablet 2     meloxicam (MOBIC) 15 MG tablet Take 1 tablet (15 mg) by mouth daily. 90 tablet 1     methocarbamol (ROBAXIN) 500 MG tablet Take 0.5-1 tablets (250-500 mg) by mouth nightly as needed for muscle spasms. 25 tablet 0     Multiple Vitamins-Minerals (MULTIVITAMIN ADULT PO) Take 1 tablet by mouth daily       simvastatin (ZOCOR) 40 MG tablet Take 1 tablet (40  "mg) by mouth at bedtime 90 tablet 2       Physical Exam:  BP (!) 142/90   Pulse 112   Ht 1.753 m (5' 9\")   Wt 96.6 kg (213 lb)   SpO2 95%   BMI 31.45 kg/m    Gen: alert, oriented, no distress  HEENT: no cervical or supraclavicular lymphadenopathy  CV: tachy, regular, no M/G/R  Resp: diminished breath sounds at right base, no focal crackles or wheezes  Skin: no apparent rashes  Ext: no cyanosis, clubbing or edema  Neuro: alert, nonfocal    Labs:  reviewed    Imaging:  CT chest with contrast (October 2024):  - images directly reviewed, formal interpretation follows:  FINDINGS:   LUNGS AND PLEURA: Elevation of the right hemidiaphragm is noted with  volume loss in the right lung base. Otherwise, the lungs are clear. No  effusion. The central airways are patent.     MEDIASTINUM/AXILLAE: No axillary adenopathy. There are several  prominent to borderline enlarged mediastinal lymph nodes measuring up  to 9 mm in short axis. No pericardial effusion. The aorta is normal in  caliber.     CORONARY ARTERY CALCIFICATION: Mild     UPPER ABDOMEN: Diffuse hepatic steatosis is noted.     MUSCULOSKELETAL: Degenerative changes are noted through the spine.                                                                      IMPRESSION:   1.  Elevation of the right hemidiaphragm with volume loss in the right  lung base.  2.  Multiple prominent to borderline enlarged mediastinal lymph nodes.    Sniff test (October 2024):  FINDINGS: Quick inhalation and exhalation shows normal downward  excursion of the left hemidiaphragm. The right hemidiaphragm is  elevated and shows minimal movement with breathing.                                                                      IMPRESSION:  Elevated right hemidiaphragm with minimal movement with  breathing.    Pulmonary Function Testing  December 2024  Moderate restrictive physiology with a ~20% decline in FVC and FEV1 in supine position  Normal DLCOc    Time spent on chart and image review, " meeting with the patient to obtain history, perform physical exam, discuss test results, diagnostic possibilities, further testing options, treatment plan options, and care coordination: 68 minutes    The longitudinal plan of care for the diagnosis(es)/condition(s) as documented were addressed during this visit. Due to the added complexity in care, I will continue to support Rich in the subsequent management and with ongoing continuity of care.      Again, thank you for allowing me to participate in the care of your patient.        Sincerely,        Kwan Cat MD

## 2024-12-17 LAB
DLCOCOR-%PRED-PRE: 91 %
DLCOCOR-PRE: 22.58 ML/MIN/MMHG
DLCOUNC-%PRED-PRE: 92 %
DLCOUNC-PRE: 22.83 ML/MIN/MMHG
DLCOUNC-PRED: 24.67 ML/MIN/MMHG
ERV-%PRED-PRE: 19 %
ERV-PRE: 0.23 L
ERV-PRED: 1.15 L
EXPTIME-PRE: 4.57 SEC
FEF2575-%PRED-PRE: 83 %
FEF2575-PRE: 1.88 L/SEC
FEF2575-PRED: 2.24 L/SEC
FEFMAX-%PRED-PRE: 61 %
FEFMAX-PRE: 4.94 L/SEC
FEFMAX-PRED: 8 L/SEC
FEV1-%PRED-PRE: 63 %
FEV1-PRE: 1.83 L
FEV1FEV6-PRE: 83 %
FEV1FEV6-PRED: 78 %
FEV1FVC-PRE: 83 %
FEV1FVC-PRED: 77 %
FEV1SVC-PRE: 81 %
FEV1SVC-PRED: 66 %
FIFMAX-PRE: 3.03 L/SEC
FRCPLETH-%PRED-PRE: 70 %
FRCPLETH-PRE: 2.52 L
FRCPLETH-PRED: 3.59 L
FVC-%PRED-PRE: 58 %
FVC-PRE: 2.21 L
FVC-PRED: 3.8 L
IC-%PRED-PRE: 64 %
IC-PRE: 2.03 L
IC-PRED: 3.14 L
RVPLETH-%PRED-PRE: 95 %
RVPLETH-PRE: 2.28 L
RVPLETH-PRED: 2.38 L
TLCPLETH-%PRED-PRE: 67 %
TLCPLETH-PRE: 4.55 L
TLCPLETH-PRED: 6.76 L
VA-%PRED-PRE: 66 %
VA-PRE: 4 L
VC-%PRED-PRE: 52 %
VC-PRE: 2.27 L
VC-PRED: 4.36 L

## 2024-12-17 NOTE — TELEPHONE ENCOUNTER
RECORDS STATUS - ALL OTHER DIAGNOSIS      RECORDS RECEIVED FROM: [facility]   NOTES STATUS DETAILS   OFFICE NOTE from referring provider Epic 12/16/24: Dr. Kwan Cat   PFT [facility] 12/16/24   MEDICATION LIST [facility]    LABS     PATHOLOGY REPORTS     ANYTHING RELATED TO DIAGNOSIS Epic Most recent 12/16/24   IMAGING (NEED IMAGES & REPORT)     CT SCANS PACS 10/24/24: CT Chest   XRAYS PACS 10/24/24-03/21/19: XR Chest

## 2024-12-19 ENCOUNTER — ONCOLOGY VISIT (OUTPATIENT)
Dept: PULMONOLOGY | Facility: CLINIC | Age: 69
End: 2024-12-19
Payer: COMMERCIAL

## 2024-12-19 ENCOUNTER — PRE VISIT (OUTPATIENT)
Dept: PULMONOLOGY | Facility: CLINIC | Age: 69
End: 2024-12-19

## 2024-12-19 VITALS
DIASTOLIC BLOOD PRESSURE: 74 MMHG | BODY MASS INDEX: 31.98 KG/M2 | WEIGHT: 211 LBS | OXYGEN SATURATION: 95 % | HEIGHT: 68 IN | HEART RATE: 115 BPM | SYSTOLIC BLOOD PRESSURE: 136 MMHG

## 2024-12-19 DIAGNOSIS — J98.4 RESTRICTIVE LUNG DISEASE: ICD-10-CM

## 2024-12-19 DIAGNOSIS — J98.6 ELEVATED HEMIDIAPHRAGM: ICD-10-CM

## 2024-12-19 NOTE — PROGRESS NOTES
THORACIC SURGERY - NEW PATIENT OFFICE VISIT      Dear Dr. Alvarado,    I saw Phong Cuevas at Dr. Cat s request in consultation for the evaluation and treatment of an elevated RIGHT hemidiaphragm and progressive dyspnea.     HPI  Phong Cuevas is a 69 year old male with progressive dyspnea on exertion, orthopnea and dyspnea when bending forward.    Previsit Tests   CXR 9/24/2024:    PFT 12/16/2024 (sitting and supine)      Sniff test 10/24/2024: no motion of right hemidiaphragm  CT chest 10/24/2024    CXR 9/2023      CXR 2019      PMH  Reviewed, as below    Past Medical History:   Diagnosis Date    Arthritis of left knee 3/25/2022   Dyslipidemia  Obesity  GERD   Hypertension    PSH  Reviewed, as below    Past Surgical History:   Procedure Laterality Date    ARTHRODESIS FOOT Right 4/30/2024    Procedure: Fusion of the first metatarsal phalangeal joint, right foot;  Surgeon: Gordon Sandoval DPM;  Location: [facility]    ARTHROPLASTY KNEE Left 3/24/2022    Procedure: left TOTAL Knee Arthroplasty;  Surgeon: Allen White MD;  Location: [facility]    COLONOSCOPY  2/20/2014    Procedure: COLONOSCOPY;  Colonoscopy  ;  Surgeon: Murali Gill MD;  Location: [facility]      Current Outpatient Medications   Medication Sig Dispense Refill    Ascorbic Acid (VITAMIN C PO) Take 500 mg by mouth daily      benzonatate (TESSALON) 100 MG capsule Take 1 capsule (100 mg) by mouth 3 times daily as needed for cough. 25 capsule 0    Cholecalciferol (VITAMIN D-3) 25 MCG (1000 UT) CAPS Take 1 capsule by mouth daily.      doxylamine (UNISOM) 25 MG TABS Take 25 mg by mouth at bedtime      lisinopril (ZESTRIL) 10 MG tablet Take 1 tablet (10 mg) by mouth daily 90 tablet 2    meloxicam (MOBIC) 15 MG tablet Take 1 tablet (15 mg) by mouth daily. 90 tablet 1    methocarbamol (ROBAXIN) 500 MG tablet Take 0.5-1 tablets (250-500 mg) by mouth nightly as needed for muscle spasms. 25 tablet 0    Multiple Vitamins-Minerals (MULTIVITAMIN ADULT  "PO) Take 1 tablet by mouth daily      simvastatin (ZOCOR) 40 MG tablet Take 1 tablet (40 mg) by mouth at bedtime 90 tablet 2     No current facility-administered medications for this visit.       ETOH 2-3 beers/night  TOB never    Physical examination  /74 (BP Location: Left arm, Patient Position: Sitting, Cuff Size: Adult Regular)   Pulse 115   Ht 1.735 m (5' 8.3\")   Wt 95.7 kg (211 lb)   SpO2 95%   BMI 31.80 kg/m      Non-contributory.    From a personal perspective, he is here with his significant other Lashonda. He works for [employer].      Code Status: Full Code    Health Care Proxy: He will define it.    IMPRESSION (J98.6) Elevated hemidiaphragm  (J98.4) Restrictive lung disease     This person is a 69 year old male with an elevated RIGHT hemidiaphragm and progressive dyspnea.      PLAN  I spent 45 min on the date of the encounter in chart review, patient visit, review of tests, documentation and/or discussion with other providers about the issues documented above. I reviewed the plan as follows:    Follow-up in clinic in May so he can work this winter season.    No alcohol for 1 week before surgery.    PAC    No more tests    I appreciate the opportunity to participate in the care of your patient and will keep you updated.      Sincerely,    Flako Gallegos MD    "

## 2024-12-19 NOTE — LETTER
12/19/2024      RE: Phong Cuevas  [address] [facility] [address]       THORACIC SURGERY - NEW PATIENT OFFICE VISIT      Dear Dr. Alvarado,    I saw Phong Cuevas at Dr. Cat s request in consultation for the evaluation and treatment of an elevated RIGHT hemidiaphragm and progressive dyspnea.     HPI  Phong Cuevas is a 69 year old male with progressive dyspnea on exertion, orthopnea and dyspnea when bending forward.    Previsit Tests   CXR 9/24/2024:    PFT 12/16/2024 (sitting and supine)      Sniff test 10/24/2024: no motion of right hemidiaphragm  CT chest 10/24/2024    CXR 9/2023      CXR 2019      PMH  Reviewed, as below    Past Medical History:   Diagnosis Date     Arthritis of left knee 3/25/2022   Dyslipidemia  Obesity  GERD   Hypertension    PSH  Reviewed, as below    Past Surgical History:   Procedure Laterality Date     ARTHRODESIS FOOT Right 4/30/2024    Procedure: Fusion of the first metatarsal phalangeal joint, right foot;  Surgeon: Gordon Sandoval DPM;  Location: [facility]     ARTHROPLASTY KNEE Left 3/24/2022    Procedure: left TOTAL Knee Arthroplasty;  Surgeon: Allen White MD;  Location: [facility]     COLONOSCOPY  2/20/2014    Procedure: COLONOSCOPY;  Colonoscopy  ;  Surgeon: Murali Gill MD;  Location: [facility]      Current Outpatient Medications   Medication Sig Dispense Refill     Ascorbic Acid (VITAMIN C PO) Take 500 mg by mouth daily       benzonatate (TESSALON) 100 MG capsule Take 1 capsule (100 mg) by mouth 3 times daily as needed for cough. 25 capsule 0     Cholecalciferol (VITAMIN D-3) 25 MCG (1000 UT) CAPS Take 1 capsule by mouth daily.       doxylamine (UNISOM) 25 MG TABS Take 25 mg by mouth at bedtime       lisinopril (ZESTRIL) 10 MG tablet Take 1 tablet (10 mg) by mouth daily 90 tablet 2     meloxicam (MOBIC) 15 MG tablet Take 1 tablet (15 mg) by mouth daily. 90 tablet 1     methocarbamol (ROBAXIN) 500 MG tablet Take 0.5-1 tablets (250-500 mg) by mouth  "nightly as needed for muscle spasms. 25 tablet 0     Multiple Vitamins-Minerals (MULTIVITAMIN ADULT PO) Take 1 tablet by mouth daily       simvastatin (ZOCOR) 40 MG tablet Take 1 tablet (40 mg) by mouth at bedtime 90 tablet 2     No current facility-administered medications for this visit.       ETOH 2-3 beers/night  TOB never    Physical examination  /74 (BP Location: Left arm, Patient Position: Sitting, Cuff Size: Adult Regular)   Pulse 115   Ht 1.735 m (5' 8.3\")   Wt 95.7 kg (211 lb)   SpO2 95%   BMI 31.80 kg/m      Non-contributory.    From a personal perspective, he is here with his significant other Lashonda. He works for [employer].      Code Status: Full Code    Health Care Proxy: He will define it.    IMPRESSION (J98.6) Elevated hemidiaphragm  (J98.4) Restrictive lung disease     This person is a 69 year old male with an elevated RIGHT hemidiaphragm and progressive dyspnea.      PLAN  I spent 45 min on the date of the encounter in chart review, patient visit, review of tests, documentation and/or discussion with other providers about the issues documented above. I reviewed the plan as follows:    Follow-up in clinic in May so he can work this winter season.    No alcohol for 1 week before surgery.    PAC    No more tests    I appreciate the opportunity to participate in the care of your patient and will keep you updated.      Sincerely,    MD Flako Cardenas MD      "

## 2024-12-30 ENCOUNTER — TRANSFERRED RECORDS (OUTPATIENT)
Dept: HEALTH INFORMATION MANAGEMENT | Facility: CLINIC | Age: 69
End: 2024-12-30
Payer: COMMERCIAL

## 2024-12-31 DIAGNOSIS — M54.2 NECK PAIN: ICD-10-CM

## 2024-12-31 RX ORDER — METHOCARBAMOL 500 MG/1
250-500 TABLET, FILM COATED ORAL
Qty: 25 TABLET | Refills: 0 | Status: SHIPPED | OUTPATIENT
Start: 2024-12-31

## 2025-01-06 ENCOUNTER — TELEPHONE (OUTPATIENT)
Dept: PULMONOLOGY | Facility: CLINIC | Age: 70
End: 2025-01-06
Payer: COMMERCIAL

## 2025-01-06 DIAGNOSIS — J98.6 DIAPHRAGM PARALYSIS: Primary | ICD-10-CM

## 2025-01-06 NOTE — TELEPHONE ENCOUNTER
Pulmonary Telephone Note    Overnight oximetry on 12/30-12/31 showed cumulative time with SpO2 88% or less of 2 hours 24 minutes, with desaturation index of 26 events/hr. Likely due to right hemidiaphragm paralysis, though comorbid JOSE is possible. Called Cory to discuss. He is open to using supplemental oxygen at night, will prescribe 1 L/min. Discussed potential sleep medicine referral; he is not interested in this. He will soon enroll in pulmonary rehabilitation. He has follow-up with Dr. Gallegos in April and will likely schedule right hemidiaphragm plication after that. Has follow-up with me in August. Encouraged him to contact us with questions or concerning symptoms.    (Due to desaturation of SpO2 less than or equal to 88% on room air at night from right hemidiaphragm paralysis, home oxygen therapy will benefit my patient's condition.)    Kwan Cat MD  Pulmonary and Critical Care Medicine  [facility]  Office [phone]  he/him

## 2025-01-07 ENCOUNTER — TELEPHONE (OUTPATIENT)
Dept: PULMONOLOGY | Facility: CLINIC | Age: 70
End: 2025-01-07
Payer: COMMERCIAL

## 2025-01-07 NOTE — TELEPHONE ENCOUNTER
DATE: 01/07/2025  DME PROVIDER: Jamison   SUPPLY ORDERED: oxygen at night  PROVIDER: Wilson    Called customer service spoke to Sudhir Sinclair LPN

## 2025-01-14 ENCOUNTER — HOSPITAL ENCOUNTER (OUTPATIENT)
Dept: CARDIAC REHAB | Facility: CLINIC | Age: 70
Discharge: HOME | End: 2025-01-14
Attending: INTERNAL MEDICINE
Payer: COMMERCIAL

## 2025-01-14 PROCEDURE — G0238 OTH RESP PROC, INDIV: HCPCS

## 2025-01-15 ENCOUNTER — TELEPHONE (OUTPATIENT)
Dept: FAMILY MEDICINE | Facility: CLINIC | Age: 70
End: 2025-01-15
Payer: COMMERCIAL

## 2025-01-15 NOTE — TELEPHONE ENCOUNTER
Appointment question/request    Reason for call:  Pt is wondering if he can switch his 2/13/25 appointment to be a virtual appointment or if there might be something earlier? He is also going out of town and wondering if he can request some medication for pain ahead of time.  (Scheduling also transferred to green line RN for symptoms not improving)      Information relayed to patient:  1-2 business days    Patient has additional questions:  Yes    What are your questions/concerns:  Pt requesting call back about appointment and about possible medication/also transferred speaking to green line RN -     Could we send this information to you in [facility] or would you prefer to receive a phone call?:   Patient would prefer a phone call   Okay to leave a detailed message?: Yes at Cell number on file:    Telephone Information:   Mobile [phone]

## 2025-01-15 NOTE — TELEPHONE ENCOUNTER
RN received call from patient.    Patient states he has ongoing neck pain.    Has not noted any improvement since appointment 12/10.    Patient tried methocarbamol and does not feel it works.    RN assisted in scheduling follow up appointment.    Rk Obrien RN, BSN, PHN  [facility]

## 2025-01-16 ENCOUNTER — ANCILLARY PROCEDURE (OUTPATIENT)
Dept: GENERAL RADIOLOGY | Facility: CLINIC | Age: 70
End: 2025-01-16
Attending: PHYSICIAN ASSISTANT
Payer: COMMERCIAL

## 2025-01-16 ENCOUNTER — OFFICE VISIT (OUTPATIENT)
Dept: FAMILY MEDICINE | Facility: CLINIC | Age: 70
End: 2025-01-16
Payer: COMMERCIAL

## 2025-01-16 VITALS
HEART RATE: 68 BPM | OXYGEN SATURATION: 96 % | HEIGHT: 68 IN | BODY MASS INDEX: 31.86 KG/M2 | TEMPERATURE: 97.9 F | DIASTOLIC BLOOD PRESSURE: 80 MMHG | SYSTOLIC BLOOD PRESSURE: 132 MMHG | WEIGHT: 210.2 LBS | RESPIRATION RATE: 18 BRPM

## 2025-01-16 DIAGNOSIS — M54.2 NECK PAIN: ICD-10-CM

## 2025-01-16 DIAGNOSIS — M54.2 NECK PAIN: Primary | ICD-10-CM

## 2025-01-16 PROCEDURE — 99213 OFFICE O/P EST LOW 20 MIN: CPT | Performed by: PHYSICIAN ASSISTANT

## 2025-01-16 RX ORDER — CYCLOBENZAPRINE HCL 10 MG
5-10 TABLET ORAL
Qty: 25 TABLET | Refills: 0 | Status: SHIPPED | OUTPATIENT
Start: 2025-01-16

## 2025-01-16 ASSESSMENT — PAIN SCALES - GENERAL: PAINLEVEL_OUTOF10: MILD PAIN (2)

## 2025-01-16 NOTE — PATIENT INSTRUCTIONS
Crystal Ray,     Thank you for allowing  [facility] to manage your care.     I am unsure of the cause of your symptoms, but your exam is most consistent with neck muscle strain/spasm. This should resolve with stretching, ice vs heat, over the counter meds and time.      If you develop worsening/changing symptoms at any time, please be seen in clinic/urgent care or call 911/go to the emergency department for evaluation as we discussed.     I sent your prescriptions to your pharmacy. For your pain, please use Tylenol 650mg every 6 hours. You may use 400mg of ibuprofen between doses of Tylenol.      Max acetaminophen (Tylenol) 3,000mg/24 hours  Max ibuprofen 2,000mg/24 hours     For severe pain not controlled by over the counter medications, please use cyclobenzaprine as prescribed. Do not use this medication while driving, operating machinery, with other sedating medications, or while drinking alcohol as it will make you drowsy.     I made a referral to physical therapy. They will be calling in approximately 1 week to set up your appointment.  If you do not hear from them, please call the specialty number on your after visit summary.     [facility] will call you to coordinate your PT care as prescribed by your provider. If you don't hear from a representative within 2 business days, please call [phone].        Please allow 1-2 business days for our office to contact you in regards to your laboratory/radiological studies.  If not done so, I encourage you to login into [facility] ([email or web address] to review your results as well.      Your blood pressure was high today. Get a blood pressure cuff for use at home. Take and record your blood pressure twice daily for 2 weeks. If your blood pressure is greater than or equal to 140/90mm Hg on average, please contact us.     If you have any questions or concerns, please feel free to call us at [phone]     Sincerely,     Vish  SARAH Higgins     Did you know?       You can schedule a video visit for follow-up appointments as well as future appointments for certain conditions.  Please see the below link.      [email or web address]     If you have not already done so,  I encourage you to sign up for [facility] ([email or web address]).  This will allow you to review your results, securely communicate with a provider, and schedule virtual visits as well.

## 2025-01-16 NOTE — PROGRESS NOTES
"  {PROVIDER CHARTING PREFERENCE:085432}    Malathi Ray is a 69 year old, presenting for the following health issues:  Neck Pain        1/16/2025     9:20 AM   Additional Questions   Roomed by Bandar Black CMA   Accompanied by N/A         1/16/2025     9:20 AM   Patient Reported Additional Medications   Patient reports taking the following new medications No new medications     HPI     Patient is coming in today due to continued neck pain.  Patient was previously seen on 12//04/2024.  He did try methocarbomamol but states he feels it did not help.  Pain varies depending on the day and time.  There has been no numbness or tingling noted.  The last 2 days have been bad for pain.  {MA/LPN/RN Pre-Provider Visit Orders- hCG/UA/Strep (Optional):259722}  {SUPERLIST (Optional):010315}  {additonal problems for provider to add (Optional):902307}    {ROS Picklists (Optional):708459}      Objective    /80   Pulse 68   Temp 97.9  F (36.6  C) (Temporal)   Resp 18   Ht 1.73 m (5' 8.11\")   Wt 95.3 kg (210 lb 3.2 oz)   SpO2 96%   BMI 31.86 kg/m    Body mass index is 31.86 kg/m .  Physical Exam   {Exam List (Optional):078734}    {Diagnostic Test Results (Optional):477755}        Signed Electronically by: SANDRA Montoya  {Email feedback regarding this note to [email or web address]   :780942}  " "8.11\").    Weight as of this encounter: 95.3 kg (210 lb 3.2 oz).       See Patient Instructions      Malathi Ray is a 69 year old, presenting for the following health issues:  Neck Pain        1/16/2025     9:20 AM   Additional Questions   Roomed by Bandar Black CMA   Accompanied by N/A         1/16/2025     9:20 AM   Patient Reported Additional Medications   Patient reports taking the following new medications No new medications     HPI     Patient is coming in today due to continued atraumatic neck pain.  Patient was previously seen on 12//04/2024.  He did try methocarbamol but states he feels it did not help.  Pain varies depending on the day and time.  There has been no numbness or tingling noted.  The last 2 days have been bad for pain.  - Reports neck pain on the side, worsening by the end of the day, making it difficult to straighten the head.  - Pain is not a problem in the morning but worsens with prolonged sitting or standing.  - Previous medication was not effective.  - has not yet scheduled physical therapy for the neck    Review of Systems  Constitutional, HEENT, cardiovascular, pulmonary, gi and gu systems are negative, except as otherwise noted.      Objective    /80   Pulse 68   Temp 97.9  F (36.6  C) (Temporal)   Resp 18   Ht 1.73 m (5' 8.11\")   Wt 95.3 kg (210 lb 3.2 oz)   SpO2 96%   BMI 31.86 kg/m    Body mass index is 31.86 kg/m .  Physical Exam  Vitals and nursing note reviewed.   Constitutional:       General: He is not in acute distress.     Appearance: Normal appearance. He is not diaphoretic.   HENT:      Head: Normocephalic and atraumatic.      Nose: Nose normal.   Eyes:      Conjunctiva/sclera: Conjunctivae normal.   Neck:      Vascular: No carotid bruit.      Comments: No midline spinal tenderness. Left paraspinal cervical tenderness.  No overlying signs of trauma or infection.  Pulmonary:      Effort: Pulmonary effort is normal. No respiratory distress. "   Musculoskeletal:      Cervical back: Neck supple. No rigidity.   Lymphadenopathy:      Cervical: No cervical adenopathy.   Skin:     General: Skin is dry.      Coloration: Skin is not jaundiced or pale.   Neurological:      General: No focal deficit present.      Mental Status: He is alert. Mental status is at baseline.      Comments: Normal strength and sensation in face and upper extremities bilaterally.   Psychiatric:         Mood and Affect: Mood normal.         Behavior: Behavior normal.          Results for orders placed or performed in visit on 01/16/25   XR Cervical Spine 2/3 Views     Status: None    Narrative    EXAM: XR CERVICAL SPINE 2/3 VIEWS  LOCATION: [facility]  DATE: 01/16/2025    INDICATION: Neck pain.  COMPARISON: None.      Impression    IMPRESSION: No gross cervical vertebral body height loss is identified. Retrolisthesis of C2 on C3 measuring 2-3 mm. Trace retrolisthesis of C3 on C4. Anterolisthesis of C4 on C5 measuring 1-2 mm. Retrolisthesis of C5 on C6 measuring approximately 2 mm.   Mild broad dextroconvex curvature of the lower cervical spine and thoracic spine.    There is moderate to severe disc space narrowing at C2-C3 and mild/mild to moderate disc space narrowing elsewhere in the cervical spine and the visualized upper thoracic spine. Scattered marginal endplate and uncovertebral spurs. Multilevel degenerative   facet disease. The prevertebral soft tissues appear normal in thickness. On the open-mouth odontoid view, no evidence for displaced fracture of the base of the odontoid process, and the lateral masses of C1 and C2 appear symmetric.               Signed Electronically by: SANDRA Montoya

## 2025-01-21 ENCOUNTER — MYC MEDICAL ADVICE (OUTPATIENT)
Dept: PULMONOLOGY | Facility: CLINIC | Age: 70
End: 2025-01-21
Payer: COMMERCIAL

## 2025-01-21 NOTE — TELEPHONE ENCOUNTER
Called and spoke with [facility]. They will reach out to their oxygen team to see what they need for documentation. Dr. Cat did addend his notes from 12/16/24. They will call nurse line to see if this is all they needed.

## 2025-01-23 PROBLEM — M54.2 NECK PAIN: Status: ACTIVE | Noted: 2025-01-23

## 2025-02-06 ENCOUNTER — THERAPY VISIT (OUTPATIENT)
Dept: PHYSICAL THERAPY | Facility: CLINIC | Age: 70
End: 2025-02-06
Attending: PHYSICIAN ASSISTANT
Payer: COMMERCIAL

## 2025-02-06 DIAGNOSIS — M54.2 NECK PAIN: Primary | ICD-10-CM

## 2025-02-06 DIAGNOSIS — M40.00 KYPHOSIS (ACQUIRED) (POSTURAL): ICD-10-CM

## 2025-02-06 PROCEDURE — 97110 THERAPEUTIC EXERCISES: CPT | Mod: GP | Performed by: PHYSICAL THERAPIST

## 2025-02-13 ENCOUNTER — OFFICE VISIT (OUTPATIENT)
Dept: FAMILY MEDICINE | Facility: CLINIC | Age: 70
End: 2025-02-13
Payer: COMMERCIAL

## 2025-02-13 VITALS
HEIGHT: 69 IN | TEMPERATURE: 98.1 F | HEART RATE: 124 BPM | SYSTOLIC BLOOD PRESSURE: 139 MMHG | BODY MASS INDEX: 30.98 KG/M2 | OXYGEN SATURATION: 98 % | RESPIRATION RATE: 18 BRPM | DIASTOLIC BLOOD PRESSURE: 88 MMHG | WEIGHT: 209.2 LBS

## 2025-02-13 DIAGNOSIS — R73.09 ABNORMAL GLUCOSE: ICD-10-CM

## 2025-02-13 DIAGNOSIS — Z12.5 SCREENING FOR PROSTATE CANCER: ICD-10-CM

## 2025-02-13 DIAGNOSIS — J98.6 ELEVATED HEMIDIAPHRAGM: ICD-10-CM

## 2025-02-13 DIAGNOSIS — I10 BENIGN ESSENTIAL HYPERTENSION: Primary | ICD-10-CM

## 2025-02-13 DIAGNOSIS — M19.071 OSTEOARTHRITIS OF JOINT OF TOE OF RIGHT FOOT: ICD-10-CM

## 2025-02-13 DIAGNOSIS — M54.2 NECK PAIN: ICD-10-CM

## 2025-02-13 DIAGNOSIS — E78.5 HYPERLIPIDEMIA LDL GOAL <100: ICD-10-CM

## 2025-02-13 DIAGNOSIS — R73.03 PREDIABETES: Primary | ICD-10-CM

## 2025-02-13 PROBLEM — E66.01 MORBID OBESITY (H): Status: RESOLVED | Noted: 2021-07-15 | Resolved: 2025-02-13

## 2025-02-13 LAB
EST. AVERAGE GLUCOSE BLD GHB EST-MCNC: 120 MG/DL
HBA1C MFR BLD: 5.8 % (ref 0–5.6)

## 2025-02-13 RX ORDER — MELOXICAM 15 MG/1
15 TABLET ORAL DAILY
Qty: 90 TABLET | Refills: 3 | Status: SHIPPED | OUTPATIENT
Start: 2025-02-13

## 2025-02-13 RX ORDER — CYCLOBENZAPRINE HCL 10 MG
5-10 TABLET ORAL
Qty: 30 TABLET | Refills: 1 | Status: SHIPPED | OUTPATIENT
Start: 2025-02-13

## 2025-02-13 ASSESSMENT — PAIN SCALES - GENERAL: PAINLEVEL_OUTOF10: MILD PAIN (3)

## 2025-02-13 NOTE — PATIENT INSTRUCTIONS
Please go to the pharmacy for your RSV (respiratory syncytial virus)vaccine.  You will need 1 dose of the RSV vaccine, 1 time. The RSV vaccine can prevent lower respiratory tract disease caused by respiratory syncytial virus (RSV). RSV is a common respiratory virus that usually causes mild, cold-like symptoms. RSV can cause illness in people of all ages but may be especially serious for infants and older adults. Adults at highest risk for severe RSV disease include older adults, adults with chronic medical conditions such as heart or lung disease, weakened immune systems, or certain other underlying medical conditions, or who live in nursing homes or long-term care facilities    Please go to the pharmacy for your pneumonia vaccine, Prevnar 20.  This vaccine helps protect against 20 types strains of streptococcal pneumonia that can cause serious infections in adults-pneumonia.  After the vaccine it is common to have some injection site redness, warmth and mild swelling.  This should resolve on its own after 24 to 48 hours.  Applying a cool compress to the site can help to ease the discomfort.  Also, you may feel a bit tired and rundown for 28 to 48 hours after receiving the injection.  If you are over age 65 you only need to get this vaccine once.  If you are under age 65 this vaccine should be repeated in 5 years.

## 2025-02-13 NOTE — PROGRESS NOTES
"  Assessment & Plan     Neck pain  Refill of cyclobenzaprine given to have needed.  Continue meloxicam.  Continue therapy exercises.  - cyclobenzaprine (FLEXERIL) 10 MG tablet; Take 0.5-1 tablets (5-10 mg) by mouth nightly as needed for muscle spasms.    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  Plan to continue.  Refill sent  Will recheck lab here today.  Follow-up in 1 year  - meloxicam (MOBIC) 15 MG tablet; Take 1 tablet (15 mg) by mouth daily.    Benign essential hypertension  -Blood pressure well-controlled today in clinic.  Has refills of medication available.  Will update labs here today.  - Albumin Random Urine Quantitative with Creat Ratio; Future  - Basic metabolic panel; Future  - Albumin Random Urine Quantitative with Creat Ratio  - Basic metabolic panel    Elevated hemidiaphragm  Pulmonology notes reviewed.  Encouraged to continue to follow with pulmonology.  Plans to schedule surgery this spring.    Abnormal glucose  - Basic metabolic panel; Future  - Hemoglobin A1c; Future  - Basic metabolic panel  - Hemoglobin A1c    Hyperlipidemia LDL goal <100  - Lipid panel reflex to direct LDL Fasting; Future  - Lipid panel reflex to direct LDL Fasting    Screening for prostate cancer  Shared decision making regarding use of PSA testing.  Not currently having any symptoms, no decreased urine stream no difficulty emptying bladder, no nocturia.  - Prostate Specific Antigen Screen; Future  - Prostate Specific Antigen Screen    The longitudinal plan of care for the diagnosis(es)/condition(s) as documented were addressed during this visit. Due to the added complexity in care, I will continue to support Cory in the subsequent management and with ongoing continuity of care.      BMI  Estimated body mass index is 31.35 kg/m  as calculated from the following:    Height as of this encounter: 1.74 m (5' 8.5\").    Weight as of this encounter: 94.9 kg (209 lb 3.2 oz).         Subjective   Cory is a 69 year " "old, presenting for the following health issues:  Follow Up (Neck, back and abdominal pain.)        2/13/2025    10:14 AM   Additional Questions   Roomed by Christian Milian   Accompanied by n/a         2/13/2025    10:14 AM   Patient Reported Additional Medications   Patient reports taking the following new medications n/a     History of Present Illness       Reason for visit:  Year ck up     blood work   He is taking medications regularly.       Here today for follow-up.  Has seen pulmonology close.  Interval situation for elevated, but feels hemidiaphragm.  Going to symptoms pulmonary rehab.  Does not feel like it was very beneficial.  Continues to be short of breath.  Feels like it is gradually getting a bit worse.  Has oxygen to wear at night.  Has been struggling to wear due to the machine being loud.  Plans to move spreadsheet to a different room and get a walker for oxygen tubing.    Continues to have back pain and neck pain.  Was given prescription for Flexeril.  Does feel like this helped.  Has not been taking meloxicam.  Uncertain why he stopped it.  Did feel like was helping was taking it.  Has been to physical therapy for neck and back.  Tries to do exercises at home.    Continues to take blood pressure medication.  Is not having any dizziness or lightheaded.  No chest pain, palpitations or shortness of breath.  Does not check blood pressure at home.          Objective    /88   Pulse (!) 124   Temp 98.1  F (36.7  C) (Temporal)   Resp 18   Ht 1.74 m (5' 8.5\")   Wt 94.9 kg (209 lb 3.2 oz)   SpO2 98%   BMI 31.35 kg/m    Body mass index is 31.35 kg/m .  Physical Exam  Constitutional:       Appearance: He is well-developed.   HENT:      Right Ear: Tympanic membrane and external ear normal.      Left Ear: Tympanic membrane and external ear normal.      Mouth/Throat:      Mouth: Mucous membranes are moist.      Pharynx: Uvula midline.   Neck:      Thyroid: No thyromegaly.      Vascular: No carotid bruit. "   Cardiovascular:      Rate and Rhythm: Normal rate and regular rhythm.      Heart sounds: Normal heart sounds.   Pulmonary:      Effort: Pulmonary effort is normal.      Breath sounds: Normal breath sounds.   Abdominal:      General: Bowel sounds are normal.      Palpations: Abdomen is soft.   Musculoskeletal:      Right lower leg: No edema.      Left lower leg: No edema.   Skin:     General: Skin is warm and dry.   Neurological:      Mental Status: He is alert.   Psychiatric:         Mood and Affect: Mood normal.                Signed Electronically by: CULLEN Talbot CNP

## 2025-03-03 ENCOUNTER — HOSPITAL ENCOUNTER (EMERGENCY)
Facility: CLINIC | Age: 70
Discharge: HOME | End: 2025-03-03
Attending: FAMILY MEDICINE | Admitting: FAMILY MEDICINE
Payer: COMMERCIAL

## 2025-03-03 ENCOUNTER — APPOINTMENT (OUTPATIENT)
Dept: GENERAL RADIOLOGY | Facility: CLINIC | Age: 70
End: 2025-03-03
Attending: FAMILY MEDICINE
Payer: COMMERCIAL

## 2025-03-03 ENCOUNTER — NURSE TRIAGE (OUTPATIENT)
Dept: FAMILY MEDICINE | Facility: CLINIC | Age: 70
End: 2025-03-03

## 2025-03-03 VITALS
WEIGHT: 210 LBS | BODY MASS INDEX: 30.06 KG/M2 | HEART RATE: 113 BPM | OXYGEN SATURATION: 93 % | DIASTOLIC BLOOD PRESSURE: 80 MMHG | TEMPERATURE: 98.2 F | RESPIRATION RATE: 27 BRPM | HEIGHT: 70 IN | SYSTOLIC BLOOD PRESSURE: 136 MMHG

## 2025-03-03 DIAGNOSIS — E86.0 DEHYDRATION: ICD-10-CM

## 2025-03-03 DIAGNOSIS — J18.9 PNEUMONIA DUE TO INFECTIOUS ORGANISM, UNSPECIFIED LATERALITY, UNSPECIFIED PART OF LUNG: ICD-10-CM

## 2025-03-03 LAB
ALBUMIN SERPL BCG-MCNC: 4.3 G/DL (ref 3.5–5.2)
ALP SERPL-CCNC: 93 U/L (ref 40–150)
ALT SERPL W P-5'-P-CCNC: 23 U/L (ref 0–70)
ANION GAP SERPL CALCULATED.3IONS-SCNC: 14 MMOL/L (ref 7–15)
AST SERPL W P-5'-P-CCNC: 24 U/L (ref 0–45)
ATRIAL RATE - MUSE: 135 BPM
BASE EXCESS BLDV CALC-SCNC: 4 MMOL/L (ref -3–3)
BASOPHILS # BLD AUTO: 0.1 10E3/UL (ref 0–0.2)
BASOPHILS NFR BLD AUTO: 0 %
BILIRUB SERPL-MCNC: 0.7 MG/DL
BUN SERPL-MCNC: 17.4 MG/DL (ref 8–23)
CALCIUM SERPL-MCNC: 9.4 MG/DL (ref 8.8–10.4)
CHLORIDE SERPL-SCNC: 90 MMOL/L (ref 98–107)
CREAT SERPL-MCNC: 0.65 MG/DL (ref 0.67–1.17)
DIASTOLIC BLOOD PRESSURE - MUSE: NORMAL MMHG
EGFRCR SERPLBLD CKD-EPI 2021: >90 ML/MIN/1.73M2
EOSINOPHIL # BLD AUTO: 0.1 10E3/UL (ref 0–0.7)
EOSINOPHIL NFR BLD AUTO: 0 %
ERYTHROCYTE [DISTWIDTH] IN BLOOD BY AUTOMATED COUNT: 11.9 % (ref 10–15)
FLUAV RNA SPEC QL NAA+PROBE: NEGATIVE
FLUBV RNA RESP QL NAA+PROBE: NEGATIVE
GLUCOSE SERPL-MCNC: 113 MG/DL (ref 70–99)
HCO3 BLDV-SCNC: 31 MMOL/L (ref 21–28)
HCO3 SERPL-SCNC: 23 MMOL/L (ref 22–29)
HCT VFR BLD AUTO: 44.5 % (ref 40–53)
HGB BLD-MCNC: 15 G/DL (ref 13.3–17.7)
HOLD SPECIMEN: NORMAL
IMM GRANULOCYTES # BLD: 0.1 10E3/UL
IMM GRANULOCYTES NFR BLD: 1 %
INTERPRETATION ECG - MUSE: NORMAL
LACTATE SERPL-SCNC: 1 MMOL/L (ref 0.7–2)
LYMPHOCYTES # BLD AUTO: 1.1 10E3/UL (ref 0.8–5.3)
LYMPHOCYTES NFR BLD AUTO: 6 %
MCH RBC QN AUTO: 32.4 PG (ref 26.5–33)
MCHC RBC AUTO-ENTMCNC: 33.7 G/DL (ref 31.5–36.5)
MCV RBC AUTO: 96 FL (ref 78–100)
MONOCYTES # BLD AUTO: 1 10E3/UL (ref 0–1.3)
MONOCYTES NFR BLD AUTO: 5 %
NEUTROPHILS # BLD AUTO: 17.9 10E3/UL (ref 1.6–8.3)
NEUTROPHILS NFR BLD AUTO: 88 %
NRBC # BLD AUTO: 0 10E3/UL
NRBC BLD AUTO-RTO: 0 /100
NT-PROBNP SERPL-MCNC: <36 PG/ML (ref 0–900)
O2/TOTAL GAS SETTING VFR VENT: ABNORMAL %
OXYHGB MFR BLDV: 29 % (ref 70–75)
P AXIS - MUSE: 21 DEGREES
PCO2 BLDV: 53 MM HG (ref 40–50)
PH BLDV: 7.37 [PH] (ref 7.32–7.43)
PLATELET # BLD AUTO: 260 10E3/UL (ref 150–450)
PO2 BLDV: 20 MM HG (ref 25–47)
POTASSIUM SERPL-SCNC: 5.1 MMOL/L (ref 3.4–5.3)
PR INTERVAL - MUSE: 158 MS
PROT SERPL-MCNC: 7.9 G/DL (ref 6.4–8.3)
QRS DURATION - MUSE: 86 MS
QT - MUSE: 276 MS
QTC - MUSE: 414 MS
R AXIS - MUSE: 3 DEGREES
RBC # BLD AUTO: 4.63 10E6/UL (ref 4.4–5.9)
RSV RNA SPEC NAA+PROBE: NEGATIVE
SAO2 % BLDV: 29.6 % (ref 70–75)
SARS-COV-2 RNA RESP QL NAA+PROBE: NEGATIVE
SODIUM SERPL-SCNC: 127 MMOL/L (ref 135–145)
SYSTOLIC BLOOD PRESSURE - MUSE: NORMAL MMHG
T AXIS - MUSE: 22 DEGREES
TROPONIN T SERPL HS-MCNC: 11 NG/L
TROPONIN T SERPL HS-MCNC: 14 NG/L
VENTRICULAR RATE- MUSE: 135 BPM
WBC # BLD AUTO: 20.2 10E3/UL (ref 4–11)

## 2025-03-03 PROCEDURE — 71046 X-RAY EXAM CHEST 2 VIEWS: CPT

## 2025-03-03 PROCEDURE — 93010 ELECTROCARDIOGRAM REPORT: CPT | Performed by: FAMILY MEDICINE

## 2025-03-03 PROCEDURE — 96368 THER/DIAG CONCURRENT INF: CPT | Performed by: FAMILY MEDICINE

## 2025-03-03 PROCEDURE — 96366 THER/PROPH/DIAG IV INF ADDON: CPT | Performed by: FAMILY MEDICINE

## 2025-03-03 PROCEDURE — 96375 TX/PRO/DX INJ NEW DRUG ADDON: CPT | Performed by: FAMILY MEDICINE

## 2025-03-03 PROCEDURE — 99284 EMERGENCY DEPT VISIT MOD MDM: CPT | Performed by: FAMILY MEDICINE

## 2025-03-03 PROCEDURE — 96376 TX/PRO/DX INJ SAME DRUG ADON: CPT | Performed by: FAMILY MEDICINE

## 2025-03-03 PROCEDURE — 83605 ASSAY OF LACTIC ACID: CPT | Performed by: FAMILY MEDICINE

## 2025-03-03 PROCEDURE — 250N000009 HC RX 250: Performed by: FAMILY MEDICINE

## 2025-03-03 PROCEDURE — 80053 COMPREHEN METABOLIC PANEL: CPT | Performed by: FAMILY MEDICINE

## 2025-03-03 PROCEDURE — 96365 THER/PROPH/DIAG IV INF INIT: CPT | Performed by: FAMILY MEDICINE

## 2025-03-03 PROCEDURE — 96361 HYDRATE IV INFUSION ADD-ON: CPT | Performed by: FAMILY MEDICINE

## 2025-03-03 PROCEDURE — 250N000011 HC RX IP 250 OP 636: Performed by: FAMILY MEDICINE

## 2025-03-03 PROCEDURE — 83880 ASSAY OF NATRIURETIC PEPTIDE: CPT | Performed by: FAMILY MEDICINE

## 2025-03-03 PROCEDURE — 93005 ELECTROCARDIOGRAM TRACING: CPT | Performed by: FAMILY MEDICINE

## 2025-03-03 PROCEDURE — 36415 COLL VENOUS BLD VENIPUNCTURE: CPT | Performed by: FAMILY MEDICINE

## 2025-03-03 PROCEDURE — 84484 ASSAY OF TROPONIN QUANT: CPT | Performed by: FAMILY MEDICINE

## 2025-03-03 PROCEDURE — 258N000003 HC RX IP 258 OP 636: Performed by: FAMILY MEDICINE

## 2025-03-03 PROCEDURE — 82805 BLOOD GASES W/O2 SATURATION: CPT | Performed by: FAMILY MEDICINE

## 2025-03-03 PROCEDURE — 85025 COMPLETE CBC W/AUTO DIFF WBC: CPT | Performed by: FAMILY MEDICINE

## 2025-03-03 PROCEDURE — 87637 SARSCOV2&INF A&B&RSV AMP PRB: CPT | Performed by: FAMILY MEDICINE

## 2025-03-03 PROCEDURE — 99285 EMERGENCY DEPT VISIT HI MDM: CPT | Mod: 25 | Performed by: FAMILY MEDICINE

## 2025-03-03 RX ORDER — OLANZAPINE 10 MG/1
5 INJECTION, POWDER, LYOPHILIZED, FOR SOLUTION INTRAMUSCULAR ONCE
Status: COMPLETED | OUTPATIENT
Start: 2025-03-03 | End: 2025-03-03

## 2025-03-03 RX ORDER — CEFTRIAXONE 1 G/1
1 INJECTION, POWDER, FOR SOLUTION INTRAMUSCULAR; INTRAVENOUS ONCE
Status: COMPLETED | OUTPATIENT
Start: 2025-03-03 | End: 2025-03-03

## 2025-03-03 RX ORDER — IOPAMIDOL 755 MG/ML
86 INJECTION, SOLUTION INTRAVASCULAR ONCE
Status: COMPLETED | OUTPATIENT
Start: 2025-03-03 | End: 2025-03-03

## 2025-03-03 RX ORDER — AZITHROMYCIN 250 MG/1
TABLET, FILM COATED ORAL
Qty: 6 TABLET | Refills: 0 | Status: SHIPPED | OUTPATIENT
Start: 2025-03-03 | End: 2025-03-08

## 2025-03-03 RX ADMIN — OLANZAPINE 5 MG: 10 INJECTION, POWDER, FOR SOLUTION INTRAMUSCULAR at 11:03

## 2025-03-03 RX ADMIN — SODIUM CHLORIDE 1000 ML: 0.9 INJECTION, SOLUTION INTRAVENOUS at 12:07

## 2025-03-03 RX ADMIN — CEFTRIAXONE SODIUM 1 G: 1 INJECTION, POWDER, FOR SOLUTION INTRAMUSCULAR; INTRAVENOUS at 12:06

## 2025-03-03 RX ADMIN — AZITHROMYCIN MONOHYDRATE 500 MG: 500 INJECTION, POWDER, LYOPHILIZED, FOR SOLUTION INTRAVENOUS at 13:01

## 2025-03-03 RX ADMIN — OLANZAPINE 5 MG: 10 INJECTION, POWDER, FOR SOLUTION INTRAMUSCULAR at 09:55

## 2025-03-03 RX ADMIN — SODIUM CHLORIDE 1000 ML: 0.9 INJECTION, SOLUTION INTRAVENOUS at 09:56

## 2025-03-03 ASSESSMENT — ACTIVITIES OF DAILY LIVING (ADL)
ADLS_ACUITY_SCORE: 43

## 2025-03-03 ASSESSMENT — COLUMBIA-SUICIDE SEVERITY RATING SCALE - C-SSRS
2. HAVE YOU ACTUALLY HAD ANY THOUGHTS OF KILLING YOURSELF IN THE PAST MONTH?: NO
1. IN THE PAST MONTH, HAVE YOU WISHED YOU WERE DEAD OR WISHED YOU COULD GO TO SLEEP AND NOT WAKE UP?: NO
6. HAVE YOU EVER DONE ANYTHING, STARTED TO DO ANYTHING, OR PREPARED TO DO ANYTHING TO END YOUR LIFE?: NO

## 2025-03-03 NOTE — DISCHARGE INSTRUCTIONS
It is very important that you maintain good fluid intake and keep yourself well-hydrated.  Push fluids, rest.  Augmentin and Zithromax as directed.  Continue with home oxygen as needed.  Return to the emergency department if not improving or worse.

## 2025-03-03 NOTE — ED PROVIDER NOTES
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  Increased sputum productivity, thicker than usual, more short of breath.  The patient has as needed oxygen for home in the form of a concentrator.  He uses this occasionally but over the last couple of weeks it has been 24/7 for the most part.  This is problematic for him because with the noise of the concentrator he cannot sleep.  He notes no fever chills or sweats.  No body aches other than his usual baseline body aches as he puts it.  Patient drives a lot to short medium distance all the time.  Non-smoker never smoked.  Bronchodilator therapy has been tried for this in the past and it has been ineffective per patient.    Allergies:  No Known Allergies    Problem List:    Patient Active Problem List    Diagnosis Date Noted    Prediabetes 02/13/2025     Priority: Medium    Neck pain 01/23/2025     Priority: Medium    Kyphosis (acquired) (postural) 08/29/2023     Priority: Medium    Arthritis of left knee 03/25/2022     Priority: Medium    Sleep disturbance 03/24/2022     Priority: Medium    Status post total knee replacement 03/24/2022     Priority: Medium    Gastroesophageal reflux disease with esophagitis without hemorrhage 07/15/2021     Priority: Medium    Abnormal glucose 01/24/2019     Priority: Medium    Hyperlipidemia LDL goal <100 01/11/2018     Priority: Medium        Past Medical History:    Past Medical History:   Diagnosis Date    Arthritis of left knee 3/25/2022       Past Surgical History:    Past Surgical History:   Procedure  "Laterality Date    ARTHRODESIS FOOT Right 4/30/2024    Procedure: Fusion of the first metatarsal phalangeal joint, right foot;  Surgeon: Gordon Sandoval DPM;  Location: [facility]    ARTHROPLASTY KNEE Left 3/24/2022    Procedure: left TOTAL Knee Arthroplasty;  Surgeon: Allen White MD;  Location: [facility]    COLONOSCOPY  2/20/2014    Procedure: COLONOSCOPY;  Colonoscopy  ;  Surgeon: Murali Gill MD;  Location: [facility]       Family History:    Family History   Problem Relation Age of Onset    Cancer Father         oral cancer       Social History:  Marital Status:  Single [1]  Social History     Tobacco Use    Smoking status: Never     Passive exposure: Never    Smokeless tobacco: Never   Vaping Use    Vaping status: Never Used   Substance Use Topics    Alcohol use: Yes    Drug use: No        Medications:    amoxicillin-clavulanate (AUGMENTIN) 875-125 MG tablet  Ascorbic Acid (VITAMIN C PO)  azithromycin (ZITHROMAX Z-KATELYNN) 250 MG tablet  Cholecalciferol (VITAMIN D-3) 25 MCG (1000 UT) CAPS  cyclobenzaprine (FLEXERIL) 10 MG tablet  doxylamine (UNISOM) 25 MG TABS  lisinopril (ZESTRIL) 10 MG tablet  meloxicam (MOBIC) 15 MG tablet  Multiple Vitamins-Minerals (MULTIVITAMIN ADULT PO)  simvastatin (ZOCOR) 40 MG tablet          Review of Systems   All other systems reviewed and are negative.      Physical Exam   BP: (!) 171/109  Pulse: (!) 132  Temp: 98.2  F (36.8  C)  Resp: 22  Height: 177.8 cm (5' 10\")  Weight: 95.3 kg (210 lb)  SpO2: (!) 90 %      Physical Exam  Vitals and nursing note reviewed.   Constitutional:       Appearance: He is well-developed and normal weight.   HENT:      Head: Normocephalic and atraumatic.      Mouth/Throat:      Mouth: Mucous membranes are moist.      Pharynx: Oropharynx is clear.   Eyes:      Extraocular Movements: Extraocular movements intact.      Pupils: Pupils are equal, round, and reactive to light.   Cardiovascular:      Rate and Rhythm: Regular rhythm. Tachycardia present. "   Pulmonary:      Effort: Tachypnea and accessory muscle usage present.      Comments: Poor air entry posteriorly to the inferior scapular border right side.  Normal  Air entry throughout the left chest.  Abdominal:      General: Bowel sounds are normal.      Palpations: Abdomen is soft.   Musculoskeletal:         General: Normal range of motion.      Cervical back: Normal range of motion and neck supple.   Skin:     General: Skin is warm and dry.      Capillary Refill: Capillary refill takes less than 2 seconds.   Neurological:      General: No focal deficit present.      Mental Status: He is alert.   Psychiatric:         Mood and Affect: Mood normal.         Behavior: Behavior normal.         ED Course        Procedures               Results for orders placed or performed during the hospital encounter of 03/03/25 (from the past 24 hours)   [employer] Draw    Narrative    The following orders were created for panel order [employer] Draw.  Procedure                               Abnormality         Status                     ---------                               -----------         ------                     Extra Blue Top Tube[[id number]]                              Final result               Extra Green Top (Lithium...[[id number]]                      Final result               Extra Purple Top Tube[[id number]]                            Final result               Extra Heparinized Syringe[[id number]]                        Final result                 Please view results for these tests on the individual orders.   Extra Blue Top Tube   Result Value Ref Range    Hold Specimen JIC    Extra Green Top (Lithium Heparin) Tube   Result Value Ref Range    Hold Specimen JIC    Extra Purple Top Tube   Result Value Ref Range    Hold Specimen JIC    Extra Heparinized Syringe   Result Value Ref Range    Hold Specimen JIC    CBC with platelets, differential    Narrative    The following orders were created for panel order CBC with  platelets, differential.  Procedure                               Abnormality         Status                     ---------                               -----------         ------                     CBC with platelets and d...[[id number]]  Abnormal            Final result                 Please view results for these tests on the individual orders.   Comprehensive metabolic panel   Result Value Ref Range    Sodium 127 (L) 135 - 145 mmol/L    Potassium 5.1 3.4 - 5.3 mmol/L    Carbon Dioxide (CO2) 23 22 - 29 mmol/L    Anion Gap 14 7 - 15 mmol/L    Urea Nitrogen 17.4 8.0 - 23.0 mg/dL    Creatinine 0.65 (L) 0.67 - 1.17 mg/dL    GFR Estimate >90 >60 mL/min/1.73m2    Calcium 9.4 8.8 - 10.4 mg/dL    Chloride 90 (L) 98 - 107 mmol/L    Glucose 113 (H) 70 - 99 mg/dL    Alkaline Phosphatase 93 40 - 150 U/L    AST 24 0 - 45 U/L    ALT 23 0 - 70 U/L    Protein Total 7.9 6.4 - 8.3 g/dL    Albumin 4.3 3.5 - 5.2 g/dL    Bilirubin Total 0.7 <=1.2 mg/dL   Troponin T, High Sensitivity   Result Value Ref Range    Troponin T, High Sensitivity 14 <=22 ng/L   NT pro BNP   Result Value Ref Range    N terminal Pro BNP Inpatient <36 0 - 900 pg/mL   CBC with platelets and differential   Result Value Ref Range    WBC Count 20.2 (H) 4.0 - 11.0 10e3/uL    RBC Count 4.63 4.40 - 5.90 10e6/uL    Hemoglobin 15.0 13.3 - 17.7 g/dL    Hematocrit 44.5 40.0 - 53.0 %    MCV 96 78 - 100 fL    MCH 32.4 26.5 - 33.0 pg    MCHC 33.7 31.5 - 36.5 g/dL    RDW 11.9 10.0 - 15.0 %    Platelet Count 260 150 - 450 10e3/uL    % Neutrophils 88 %    % Lymphocytes 6 %    % Monocytes 5 %    % Eosinophils 0 %    % Basophils 0 %    % Immature Granulocytes 1 %    NRBCs per 100 WBC 0 <1 /100    Absolute Neutrophils 17.9 (H) 1.6 - 8.3 10e3/uL    Absolute Lymphocytes 1.1 0.8 - 5.3 10e3/uL    Absolute Monocytes 1.0 0.0 - 1.3 10e3/uL    Absolute Eosinophils 0.1 0.0 - 0.7 10e3/uL    Absolute Basophils 0.1 0.0 - 0.2 10e3/uL    Absolute Immature Granulocytes 0.1 <=0.4 10e3/uL     Absolute NRBCs 0.0 10e3/uL   Blood gas venous   Result Value Ref Range    pH Venous 7.37 7.32 - 7.43    pCO2 Venous 53 (H) 40 - 50 mm Hg    pO2 Venous 20 (L) 25 - 47 mm Hg    Bicarbonate Venous 31 (H) 21 - 28 mmol/L    Base Excess/Deficit Venous 4.0 (H) -3.0 - 3.0 mmol/L    FIO2      Oxyhemoglobin Venous 29 (L) 70 - 75 %    O2 Sat, Venous 29.6 (L) 70.0 - 75.0 %    Narrative    In healthy individuals, oxyhemoglobin (O2Hb) and oxygen saturation (SO2) are approximately equal. In the presence of dyshemoglobins, oxyhemoglobin can be considerably lower than oxygen saturation.   EKG 12-lead, tracing only   Result Value Ref Range    Systolic Blood Pressure  mmHg    Diastolic Blood Pressure  mmHg    Ventricular Rate 135 BPM    Atrial Rate 135 BPM    VT Interval 158 ms    QRS Duration 86 ms     ms    QTc 414 ms    P Axis 21 degrees    R AXIS 3 degrees    T Axis 22 degrees    Interpretation ECG       Sinus tachycardia  Inferior infarct , age undetermined  Abnormal ECG  No previous ECGs available     Lactic Acid Whole Blood with 1X Repeat in 2 HR when >2   Result Value Ref Range    Lactic Acid, Initial 1.0 0.7 - 2.0 mmol/L   Troponin T, High Sensitivity   Result Value Ref Range    Troponin T, High Sensitivity 11 <=22 ng/L   XR Chest 2 Views    Narrative    EXAM: XR CHEST 2 VIEWS  LOCATION: [facility]  DATE: 3/3/2025    INDICATION: Shortness of breath, right sided pneumonia suspected, patient unable to lie flat for CT as preferred imaging diagnostic  COMPARISON: 10/24/2024      Impression    IMPRESSION: Mild linear opacities in the bilateral lung bases may be due to pneumonia and/or atelectasis. No pleural effusion or pneumothorax. Normal heart size.   Influenza A/B, RSV and SARS-CoV2 PCR (COVID-19) Nose    Specimen: Nose; Swab   Result Value Ref Range    Influenza A PCR Negative Negative    Influenza B PCR Negative Negative    RSV PCR Negative Negative    SARS CoV2 PCR Negative Negative     Narrative    Testing was performed using the Xpert Xpress CoV2/Flu/RSV Assay on the [employer] GeneXpert Instrument. This test should be ordered for the detection of SARS-CoV2, influenza, and RSV viruses in individuals with signs and symptoms of respiratory tract infection. This test is for in vitro diagnostic use under the [place] FDA for laboratories certified under CLIA to perform high or moderate complexity testing. This test has been [place] FDA cleared. A negative result does not rule out the presence of PCR inhibitors in the specimen or target RNA in concentration below the limit of detection for the assay. If only one viral target is positive but coinfection with multiple targets is suspected, the sample should be re-tested with another FDA cleared, approved, or authorized test, if coninfection would change clinical management. This test was validated by the [facility] [employer]. These laboratories are certified under the Clinical Laboratory Improvement Amendments of 1988 (CLIA-88) as qualified to perfom high complexity laboratory testing.   11:11 AM  Despite verbal reassurance, supplemental oxygen, Zyprexa 5 mg IV x 2 aliquots the patient is not able to lie flat and refuses CT. the patient is aware that by not performing CT imaging we would not be able to visualize a potential pulmonary emboli as part of the explanation for his hypoxia and his acute presentation with tachycardia shortness of breath and hypoxia.  Chest x-ray was ordered.    1:52 PM  Recheck on the patient finds him feeling improved, his heart rate is gone down from the 1 30-40 range at presentation with 2 L of fluid down to between 101 10.  He feels much better.  We discussed chest x-ray results as reviewed above.  I plan to treat him in the ED with Rocephin and Zithromax and then disposition to home on Augmentin and Zithromax.  Return criteria were discussed.    Medications   sodium chloride 0.9% BOLUS 1,000 mL (0 mLs Intravenous Stopped  3/3/25 1155)   iopamidol (ISOVUE-370) solution 86 mL (86 mLs Intravenous Not Given 3/3/25 0922)   sodium chloride 0.9 % bag 500mL for CT scan flush use (100 mLs Intravenous Not Given 3/3/25 0922)   OLANZapine (zyPREXA) IV injection 5 mg (5 mg Intravenous $Given 3/3/25 0955)   OLANZapine (zyPREXA) IV injection 5 mg (5 mg Intravenous $Given 3/3/25 1103)   sodium chloride 0.9% BOLUS 1,000 mL (1,000 mLs Intravenous $New Bag 3/3/25 1207)   cefTRIAXone (ROCEPHIN) 1 g vial to attach to  mL bag for ADULTS or NS 50 mL bag for PEDS (1 g Intravenous $New Bag 3/3/25 1206)   azithromycin (ZITHROMAX) 500 mg in sodium chloride 0.9 % 250 mL intermittent infusion (500 mg Intravenous $Started 3/3/25 1301)       Assessments & Plan (with Medical Decision Making)   Assessment and plan with medical decision making at the time stamp above.      Disclaimer: This note consists of symbols derived from keyboarding, dictation and/or voice recognition software. As a result, there may be errors in the script that have gone undetected. Please consider this when interpreting information found in this chart.      I have reviewed the nursing notes.    I have reviewed the findings, diagnosis, plan and need for follow up with the patient.          New Prescriptions    AMOXICILLIN-CLAVULANATE (AUGMENTIN) 875-125 MG TABLET    Take 1 tablet by mouth 2 times daily for 10 days.    AZITHROMYCIN (ZITHROMAX Z-KATELYNN) 250 MG TABLET    Two tablets on the first day, then one tablet daily for the next 4 days       Final diagnoses:   Pneumonia due to infectious organism, unspecified laterality, unspecified part of lung   Dehydration       3/3/2025   [facility] EMERGENCY DEPT       Tiago, Sim Michael MD  03/03/25 9871

## 2025-03-03 NOTE — TELEPHONE ENCOUNTER
I called the home/mobile number on file.   No answer, unable to leave voicemail as the voicemailbox is full.    Appears patient reads [employer].  [employer] message sent to patient at this time.    Will leave phone/triage encounter in box for further follow up.    María RIVERA RN  [facility] Triage

## 2025-03-03 NOTE — ED TRIAGE NOTES
Patient here for shortness of breath. Hx of frozen diaphragm and has had shortness of breath for a year, is scheduled for surgery next month. Patient states shortness of breath worse last night where he couldn't catch his breath. Patient unable to lay down without breathing becoming worse. States he has a lot of thick phlegm, taking mucinex. HR in triage 130, EKG ordered. Tachypnea.     Triage Assessment (Adult)       Row Name 03/03/25 0817          Triage Assessment    Airway WDL WDL        Respiratory WDL    Respiratory WDL X;cough;rhythm/pattern     Rhythm/Pattern, Respiratory shortness of breath     Cough Frequency infrequent     Cough Type congested        Skin Circulation/Temperature WDL    Skin Circulation/Temperature WDL WDL        Cardiac WDL    Cardiac WDL X;rhythm        Peripheral/Neurovascular WDL    Peripheral Neurovascular WDL WDL        Cognitive/Neuro/Behavioral WDL    Cognitive/Neuro/Behavioral WDL X     Level of Consciousness alert     Orientation oriented x 4        Pupils (CN II)    Pupil PERRLA yes     Pupil Size Left 3 mm     Pupil Size Right 3 mm        [place] Coma Scale    Best Eye Response 4-->(E4) spontaneous     Best Motor Response 6-->(M6) obeys commands     Best Verbal Response 5-->(V5) oriented     [place] Coma Scale Score 15

## 2025-03-03 NOTE — TELEPHONE ENCOUNTER
"S-(situation): pt has been having SOB/ the last day or SO - he has noticing it getting worse over night when he was laying down he was getting increased SOB  - he is better when he is sitting up or walking around   Pt did state that he has been in the car a lot in the last several weeks going on snowmobiling  trips to [place], [place] etc     B-(background): was recently told he has a \"shriveled up diaphragm\" on right side     A-(assessment): Cough at times to get the phlegm up - yellow/white, runny nose, headaches on going         Denies: CP, fevers, chills, dizziness, nausea, vomiting, blurred vision,    R-(recommendations): to go to the ER - pt stated that he would rather not. Rn explained why the ER and risks of not going to the ER - including to and up to death, and  stroke     Routing to PCP to review and advise     Thank you     Galina George RN, BSN  [facility] Triage      Reason for Disposition   Long-distance travel in past month (e.g., car, bus, train, plane; with trip lasting 6 or more hours)    Additional Information   Negative: SEVERE difficulty breathing (e.g., struggling for each breath, speaks in single words, pulse > 120)   Negative: Breathing stopped and hasn't returned   Negative: Choking on something   Negative: Bluish (or gray) lips or face   Negative: Difficult to awaken or acting confused (e.g., disoriented, slurred speech)   Negative: Passed out (e.g., fainted, lost consciousness, blacked out and was not responding)   Negative: Wheezing started suddenly after medicine, an allergic food, or bee sting   Negative: Stridor (harsh sound while breathing in)   Negative: Slow, shallow and weak breathing   Negative: Sounds like a life-threatening emergency to the triager   Negative: Chest pain   Negative: Wheezing (high pitched whistling sound) and previous asthma attacks or use of asthma medicines   Negative: Breathing difficulty and within 14 days of COVID-19 EXPOSURE (close " "contact) with someone diagnosed with COVID-19 (e.g., COVID test positive)   Negative: Breathing difficulty and COVID-19 is widespread in the community   Negative: Breathing diffculty and only present when coughing   Negative: Breathing difficulty and only from stuffy nose   Negative: Breathing diffculty and only from stuffy nose or runny nose from common cold   Negative: Fever > 103 F (39.4 C)   Negative: Fever > 101 F (38.3 C) and over 60 years of age   Negative: Fever > 100 F (37.8 C) and bedridden (e.g., nursing home patient, stroke, chronic illness, recovering from surgery)   Negative: Fever > 100 F (37.8 C) and diabetes mellitus or weak immune system (e.g., HIV positive, cancer chemo, splenectomy, organ transplant, chronic steroids)   Negative: Periods where breathing stops and then resumes normally and bedridden (e.g., nursing home patient, CVA)   Negative: Pregnant or postpartum (from 0 to 6 weeks after delivery)   Negative: Patient sounds very sick or weak to the triager   Negative: Major surgery in the past month   Negative: Illness requiring prolonged bedrest in past month (e.g., immobilization, long hospital stay)   Negative: Hip or leg fracture (broken bone) in past month (or had cast on leg or ankle in past month)   Negative: Any history of prior \"blood clot\" in leg or lungs   Negative: Oxygen level (e.g., pulse oximetry) 90% or lower   Negative: Wheezing can be heard across the room   Negative: Drooling or spitting out saliva (because can't swallow)   Negative: MODERATE difficulty breathing (e.g., speaks in phrases, SOB even at rest, pulse 100-120) of new-onset or worse than normal    Answer Assessment - Initial Assessment Questions  1. RESPIRATORY STATUS: \"Describe your breathing?\" (e.g., wheezing, shortness of breath, unable to speak, severe coughing)       Feels SOB  Phlegm - white and yellow     2. ONSET: \"When did this breathing problem begin?\"       Last night 3/2/2025  He has a shrinking " "diaphragm - right side    3. PATTERN \"Does the difficult breathing come and go, or has it been constant since it started?\"       Only when he is laying down flat   It is improved when he is sitting up or walking around     4. SEVERITY: \"How bad is your breathing?\" (e.g., mild, moderate, severe)       Severe when he is laying down but when he is sitting up or walking around he feels fine     5. RECURRENT SYMPTOM: \"Have you had difficulty breathing before?\" If Yes, ask: \"When was the last time?\" and \"What happened that time?\"       Not laying down before     6. CARDIAC HISTORY: \"Do you have any history of heart disease?\" (e.g., heart attack, angina, bypass surgery, angioplasty)       Denies heart history     7. LUNG HISTORY: \"Do you have any history of lung disease?\"  (e.g., pulmonary embolus, asthma, emphysema)      Diaphragm concerns on the right side     8. CAUSE: \"What do you think is causing the breathing problem?\"       Unknown     9. OTHER SYMPTOMS: \"Do you have any other symptoms?\" (e.g., chest pain, cough, dizziness, fever, runny nose)  Cough at times to get the phlegm up, runny nose, headaches on going         Denies: CP, fevers, chills, dizziness, nausea, vomiting, blurred vision,    10. O2 SATURATION MONITOR:  \"Do you use an oxygen saturation monitor (pulse oximeter) at home?\" If Yes, ask: \"What is your reading (oxygen level) today?\" \"What is your usual oxygen saturation reading?\" (e.g., 95%)        Unsure     11. PREGNANCY: \"Is there any chance you are pregnant?\" \"When was your last menstrual period?\"        Na   12. TRAVEL: \"Have you traveled out of the country in the last month?\" (e.g., travel history, exposures)        None    Protocols used: Breathing Difficulty-A-[place]    "

## 2025-03-03 NOTE — TELEPHONE ENCOUNTER
I see patient has checked in at the [facility] ER at this time.    María RIVERA RN  [facility] Triage

## 2025-04-16 NOTE — PROGRESS NOTES
THORACIC SURGERY - ESTABLISHED PATIENT OFFICE VISIT       Dear Dr. Alvarado,     I saw Phong Cuevas in follow-up for the evaluation and treatment of an elevated RIGHT hemidiaphragm and progressive dyspnea.      HPI  Phong Cuevas is a 69 year old male with progressive dyspnea on exertion, orthopnea and dyspnea when bending forward.     Previsit Tests   CXR 9/24/2024:    PFT 12/16/2024 (sitting and supine)      Sniff test 10/24/2024: no motion of right hemidiaphragm  CT chest 10/24/2024    CXR 9/2023       CXR 2019       PMH  Reviewed, as below     Past Medical History        Past Medical History:   Diagnosis Date    Arthritis of left knee 3/25/2022      Dyslipidemia  Obesity  GERD   Hypertension     PSH  Reviewed, as below     Past Surgical History         Past Surgical History:   Procedure Laterality Date    ARTHRODESIS FOOT Right 4/30/2024     Procedure: Fusion of the first metatarsal phalangeal joint, right foot;  Surgeon: Gordon Sandoval DPM;  Location: [facility]    ARTHROPLASTY KNEE Left 3/24/2022     Procedure: left TOTAL Knee Arthroplasty;  Surgeon: Allen White MD;  Location: [facility]    COLONOSCOPY   2/20/2014     Procedure: COLONOSCOPY;  Colonoscopy  ;  Surgeon: Murali Gill MD;  Location: [facility]         Current Facility-Administered Medications          Current Outpatient Medications   Medication Sig Dispense Refill    Ascorbic Acid (VITAMIN C PO) Take 500 mg by mouth daily        benzonatate (TESSALON) 100 MG capsule Take 1 capsule (100 mg) by mouth 3 times daily as needed for cough. 25 capsule 0    Cholecalciferol (VITAMIN D-3) 25 MCG (1000 UT) CAPS Take 1 capsule by mouth daily.        doxylamine (UNISOM) 25 MG TABS Take 25 mg by mouth at bedtime        lisinopril (ZESTRIL) 10 MG tablet Take 1 tablet (10 mg) by mouth daily 90 tablet 2    meloxicam (MOBIC) 15 MG tablet Take 1 tablet (15 mg) by mouth daily. 90 tablet 1   Take 0.5-1 tablets (250-500 mg) by mouth nightly as needed for  muscle spasms. 25 tablet 0    Multiple Vitamins-Minerals (MULTIVITAMIN ADULT PO) Take 1 tablet by mouth daily        simvastatin (ZOCOR) 40 MG tablet Take 1 tablet (40 mg) by mouth at bedtime 90 tablet 2      No current facility-administered medications for this visit.            ETOH 2-3 beers/night  TOB never     Physical examination  /66 (BP Location: Left arm, Patient Position: Sitting, Cuff Size: Adult Regular)   Pulse 111   Wt 94.9 kg (209 lb 3.2 oz)   SpO2 92%   BMI 30.02 kg/m         Non-contributory.     From a personal perspective, he is here with his significant other Lashonda. He works for [employer].        Code Status: Full Code     Health Care Proxy: He will define it.     IMPRESSION (J98.6) Elevated hemidiaphragm  (J98.4) Restrictive lung disease     This person is a 69 year old male with an elevated RIGHT hemidiaphragm and progressive dyspnea.       PLAN  I spent 30 min on the date of the encounter in chart review, patient visit, review of tests, documentation and/or discussion with other providers about the issues documented above. I reviewed the plan as follows:     Stop all vitamins and supplements at least 1 week preop    STOP MELOXICAM 1 week preop     No alcohol for 3 weeks before surgery.     PAC     No more tests    Weight loss: If he stops drinking 3-4 beers every night, he will certainly lose some additional weight. We talked about the importance of this, and we also talked about dietary changes.    Procedure planned: RIGHT laparoscopic and possible VATS diaphragm plication with possible thoracotomy.  I reviewed the indications, risks, and benefits of the procedure with Mr. Phong Cuevas. We discussed the intraoperative risks of bleeding and injury to vital organs, potential postoperative complications including, but not limited to, major respiratory events, arrhythmia, bleeding, infection, reoperation, and death. I explained the anticipated hospital course (+/- 2-3 days) and  postoperative recovery including pain control, chest drain management, and variable degrees of dyspnea (or need for supplemental oxygen) and fatigue that tend to get better with time.          I appreciate the opportunity to participate in the care of your patient and will keep you updated.        Sincerely,     Flako Gallegos MD

## 2025-04-17 ENCOUNTER — ONCOLOGY VISIT (OUTPATIENT)
Dept: PULMONOLOGY | Facility: CLINIC | Age: 70
End: 2025-04-17
Payer: COMMERCIAL

## 2025-04-17 ENCOUNTER — PREP FOR PROCEDURE (OUTPATIENT)
Dept: SURGERY | Facility: CLINIC | Age: 70
End: 2025-04-17

## 2025-04-17 VITALS
HEART RATE: 111 BPM | WEIGHT: 209.2 LBS | DIASTOLIC BLOOD PRESSURE: 66 MMHG | SYSTOLIC BLOOD PRESSURE: 132 MMHG | OXYGEN SATURATION: 92 % | BODY MASS INDEX: 30.02 KG/M2

## 2025-04-17 DIAGNOSIS — J98.6 DIAPHRAGM PARALYSIS: Primary | ICD-10-CM

## 2025-04-17 DIAGNOSIS — J98.6 ELEVATED HEMIDIAPHRAGM: Primary | ICD-10-CM

## 2025-04-17 RX ORDER — ENOXAPARIN SODIUM 100 MG/ML
40 INJECTION SUBCUTANEOUS
OUTPATIENT
Start: 2025-04-17

## 2025-04-17 RX ORDER — ACETAMINOPHEN 325 MG/1
975 TABLET ORAL ONCE
OUTPATIENT
Start: 2025-04-17 | End: 2025-04-17

## 2025-04-17 NOTE — LETTER
4/17/2025      RE: Phong Cuevas  [address] [facility] [address]       THORACIC SURGERY - ESTABLISHED PATIENT OFFICE VISIT       Dear Dr. Alvarado,     I saw Phong Cuevas in follow-up for the evaluation and treatment of an elevated RIGHT hemidiaphragm and progressive dyspnea.      HPI  Phong Cuevas is a 69 year old male with progressive dyspnea on exertion, orthopnea and dyspnea when bending forward.     Previsit Tests   CXR 9/24/2024:    PFT 12/16/2024 (sitting and supine)      Sniff test 10/24/2024: no motion of right hemidiaphragm  CT chest 10/24/2024    CXR 9/2023       CXR 2019       PMH  Reviewed, as below     Past Medical History        Past Medical History:   Diagnosis Date     Arthritis of left knee 3/25/2022      Dyslipidemia  Obesity  GERD   Hypertension     PSH  Reviewed, as below     Past Surgical History         Past Surgical History:   Procedure Laterality Date     ARTHRODESIS FOOT Right 4/30/2024     Procedure: Fusion of the first metatarsal phalangeal joint, right foot;  Surgeon: Gordon Sandoval DPM;  Location: [facility]     ARTHROPLASTY KNEE Left 3/24/2022     Procedure: left TOTAL Knee Arthroplasty;  Surgeon: Allen White MD;  Location: [facility]     COLONOSCOPY   2/20/2014     Procedure: COLONOSCOPY;  Colonoscopy  ;  Surgeon: Murali Gill MD;  Location: [facility]         Current Facility-Administered Medications          Current Outpatient Medications   Medication Sig Dispense Refill     Ascorbic Acid (VITAMIN C PO) Take 500 mg by mouth daily         benzonatate (TESSALON) 100 MG capsule Take 1 capsule (100 mg) by mouth 3 times daily as needed for cough. 25 capsule 0     Cholecalciferol (VITAMIN D-3) 25 MCG (1000 UT) CAPS Take 1 capsule by mouth daily.         doxylamine (UNISOM) 25 MG TABS Take 25 mg by mouth at bedtime         lisinopril (ZESTRIL) 10 MG tablet Take 1 tablet (10 mg) by mouth daily 90 tablet 2     meloxicam (MOBIC) 15 MG tablet Take 1 tablet (15  mg) by mouth daily. 90 tablet 1   Take 0.5-1 tablets (250-500 mg) by mouth nightly as needed for muscle spasms. 25 tablet 0     Multiple Vitamins-Minerals (MULTIVITAMIN ADULT PO) Take 1 tablet by mouth daily         simvastatin (ZOCOR) 40 MG tablet Take 1 tablet (40 mg) by mouth at bedtime 90 tablet 2      No current facility-administered medications for this visit.            ETOH 2-3 beers/night  TOB never     Physical examination  /66 (BP Location: Left arm, Patient Position: Sitting, Cuff Size: Adult Regular)   Pulse 111   Wt 94.9 kg (209 lb 3.2 oz)   SpO2 92%   BMI 30.02 kg/m         Non-contributory.     From a personal perspective, he is here with his significant other Lashonda. He works for [employer].        Code Status: Full Code     Health Care Proxy: He will define it.     IMPRESSION (J98.6) Elevated hemidiaphragm  (J98.4) Restrictive lung disease     This person is a 69 year old male with an elevated RIGHT hemidiaphragm and progressive dyspnea.       PLAN  I spent 30 min on the date of the encounter in chart review, patient visit, review of tests, documentation and/or discussion with other providers about the issues documented above. I reviewed the plan as follows:     Stop all vitamins and supplements at least 1 week preop    STOP MELOXICAM 1 week preop     No alcohol for 3 weeks before surgery.     PAC     No more tests    Weight loss: If he stops drinking 3-4 beers every night, he will certainly lose some additional weight. We talked about the importance of this, and we also talked about dietary changes.    Procedure planned: RIGHT laparoscopic and possible VATS diaphragm plication with possible thoracotomy.  I reviewed the indications, risks, and benefits of the procedure with Mr. Phong Cuevas. We discussed the intraoperative risks of bleeding and injury to vital organs, potential postoperative complications including, but not limited to, major respiratory events, arrhythmia, bleeding,  infection, reoperation, and death. I explained the anticipated hospital course (+/- 2-3 days) and postoperative recovery including pain control, chest drain management, and variable degrees of dyspnea (or need for supplemental oxygen) and fatigue that tend to get better with time.          I appreciate the opportunity to participate in the care of your patient and will keep you updated.        Sincerely,     MD Flako Cardenas MD

## 2025-04-21 ENCOUNTER — HOSPITAL ENCOUNTER (INPATIENT)
Facility: CLINIC | Age: 70
Setting detail: SURGERY ADMIT
End: 2025-04-21
Attending: THORACIC SURGERY (CARDIOTHORACIC VASCULAR SURGERY) | Admitting: THORACIC SURGERY (CARDIOTHORACIC VASCULAR SURGERY)
Payer: COMMERCIAL

## 2025-04-21 ENCOUNTER — TELEPHONE (OUTPATIENT)
Dept: SURGERY | Facility: CLINIC | Age: 70
End: 2025-04-21
Payer: COMMERCIAL

## 2025-04-21 NOTE — TELEPHONE ENCOUNTER
Spoke with patient to schedule procedure with Dr. Gallegos   Procedure was scheduled on 5/22 at [facility] OR  Patient will have H&P with PAC    Informed pt of surgery details:  Date:    Thursday, May 22, 2025  Arrival Time:  5:30 am    Pt is aware surgery start time may change, and someone will reach out with the new arrival time, if so.    Patient is aware a COVID-19 test is needed before their procedure ONLY IF symptomatic.   (Patient is aware Thoracic is no longer requiring COVID-19 test)       Patient is aware a / is needed day of surgery.   Surgery Letter was sent via [employer],     Patient has my direct contact information for any further questions.      Appointments in Next Year      Apr 28, 2025 9:00 AM  (Arrive by 8:40 AM)  MEDICARE ANNUAL WELLNESS VISIT with CULLEN Cary CNP  [facility] ([facility]) [phone]     May 06, 2025 9:00 AM  (Arrive by 8:45 AM)  PAC EVALUATION with Tammy Galvin PA-C  [facility] [place] ([facility] [place] ) [phone]     Jun 19, 2025 10:30 AM  (Arrive by 10:15 AM)  Xray General with [email or web address]  [facility] [place] ([facility] [place] ) [phone]     Jun 19, 2025 11:00 AM  (Arrive by 10:45 AM)  Return Patient with CULLEN Prado  [facility] ([facility] [place] ) [phone]

## 2025-04-25 SDOH — HEALTH STABILITY: PHYSICAL HEALTH: ON AVERAGE, HOW MANY MINUTES DO YOU ENGAGE IN EXERCISE AT THIS LEVEL?: 50 MIN

## 2025-04-25 SDOH — HEALTH STABILITY: PHYSICAL HEALTH: ON AVERAGE, HOW MANY DAYS PER WEEK DO YOU ENGAGE IN MODERATE TO STRENUOUS EXERCISE (LIKE A BRISK WALK)?: 3 DAYS

## 2025-04-25 ASSESSMENT — SOCIAL DETERMINANTS OF HEALTH (SDOH): HOW OFTEN DO YOU GET TOGETHER WITH FRIENDS OR RELATIVES?: THREE TIMES A WEEK

## 2025-04-28 ENCOUNTER — OFFICE VISIT (OUTPATIENT)
Dept: FAMILY MEDICINE | Facility: CLINIC | Age: 70
End: 2025-04-28
Attending: NURSE PRACTITIONER
Payer: COMMERCIAL

## 2025-04-28 VITALS
SYSTOLIC BLOOD PRESSURE: 136 MMHG | DIASTOLIC BLOOD PRESSURE: 72 MMHG | HEART RATE: 88 BPM | OXYGEN SATURATION: 95 % | BODY MASS INDEX: 29.75 KG/M2 | RESPIRATION RATE: 20 BRPM | HEIGHT: 70 IN | TEMPERATURE: 97.9 F | WEIGHT: 207.8 LBS

## 2025-04-28 DIAGNOSIS — E78.5 HYPERLIPIDEMIA LDL GOAL <100: ICD-10-CM

## 2025-04-28 DIAGNOSIS — I10 BENIGN ESSENTIAL HYPERTENSION: ICD-10-CM

## 2025-04-28 DIAGNOSIS — Z00.00 ENCOUNTER FOR MEDICARE ANNUAL WELLNESS EXAM: Primary | ICD-10-CM

## 2025-04-28 DIAGNOSIS — Z23 NEED FOR VACCINATION: ICD-10-CM

## 2025-04-28 DIAGNOSIS — J98.6 ELEVATED HEMIDIAPHRAGM: ICD-10-CM

## 2025-04-28 PROCEDURE — 3075F SYST BP GE 130 - 139MM HG: CPT | Performed by: NURSE PRACTITIONER

## 2025-04-28 PROCEDURE — 3078F DIAST BP <80 MM HG: CPT | Performed by: NURSE PRACTITIONER

## 2025-04-28 PROCEDURE — G0439 PPPS, SUBSEQ VISIT: HCPCS | Performed by: NURSE PRACTITIONER

## 2025-04-28 PROCEDURE — G2211 COMPLEX E/M VISIT ADD ON: HCPCS | Performed by: NURSE PRACTITIONER

## 2025-04-28 PROCEDURE — 99214 OFFICE O/P EST MOD 30 MIN: CPT | Mod: 25 | Performed by: NURSE PRACTITIONER

## 2025-04-28 PROCEDURE — 1126F AMNT PAIN NOTED NONE PRSNT: CPT | Performed by: NURSE PRACTITIONER

## 2025-04-28 RX ORDER — SIMVASTATIN 40 MG
40 TABLET ORAL AT BEDTIME
Qty: 90 TABLET | Refills: 3 | Status: SHIPPED | OUTPATIENT
Start: 2025-04-28

## 2025-04-28 RX ORDER — LISINOPRIL 10 MG/1
10 TABLET ORAL DAILY
Qty: 90 TABLET | Refills: 3 | Status: SHIPPED | OUTPATIENT
Start: 2025-04-28

## 2025-04-28 ASSESSMENT — PAIN SCALES - GENERAL: PAINLEVEL_OUTOF10: NO PAIN (0)

## 2025-04-28 NOTE — PROGRESS NOTES
"Preventive Care Visit  [facility] CULLEN Clarke CNP, Family Medicine  Apr 28, 2025      Assessment & Plan     Benign essential hypertension  Stable-blood pressure well-controlled today in clinic.  Previous labs reviewed.  Refill sent.  Follow-up in 1 year  - lisinopril (ZESTRIL) 10 MG tablet; Take 1 tablet (10 mg) by mouth daily.    Hyperlipidemia LDL goal <100  Previous lipids reviewed.  Refill sent.  Follow-up in 1 year  - simvastatin (ZOCOR) 40 MG tablet; Take 1 tablet (40 mg) by mouth at bedtime.    Need for vaccination  Encouraged to go to pharmacy for vaccine  - Tdap, tetanus-diptheria-acell pertussis, (BOOSTRIX) 5-2.5-18.5 LF-MCG/0.5 KIAH injection; Inject 0.5 mLs into the muscle once for 1 dose.  - Tdap, tetanus-diptheria-acell pertussis, (BOOSTRIX) 5-2.5-18.5 LF-MCG/0.5 KIAH injection; Inject 0.5 mLs into the muscle once for 1 dose.    Encounter for Medicare annual wellness exam  Screening guidelines reviewed.   Encouraged to bring in copy of healthcare directive    Elevated hemidiaphragm  Previous pulmonology notes reviewed.  Continue to follow closely with pulmonologist.    The longitudinal plan of care for the diagnosis(es)/condition(s) as documented were addressed during this visit. Due to the added complexity in care, I will continue to support Rich in the subsequent management and with ongoing continuity of care.         BMI  Estimated body mass index is 29.82 kg/m  as calculated from the following:    Height as of this encounter: 1.778 m (5' 10\").    Weight as of this encounter: 94.3 kg (207 lb 12.8 oz).     Counseling  Appropriate preventive services were addressed with this patient via screening, questionnaire, or discussion as appropriate for fall prevention, nutrition, physical activity, Tobacco-use cessation, social engagement, weight loss and cognition.  Checklist reviewing preventive services available has been given to the patient.  Reviewed patient's diet, " addressing concerns and/or questions.   He is at risk for lack of exercise and has been provided with information to increase physical activity for the benefit of his well-being.       Malathi Ray is a 70 year old, presenting for the following:  Wellness Visit (Fasting )        4/28/2025     8:28 AM   Additional Questions   Roomed by GOSIA Milian   Accompanied by n/a         4/28/2025     8:28 AM   Patient Reported Additional Medications   Patient reports taking the following new medications n/a       HPI       Advance Care Planning    Patient states has Health Care Directive and will send to [employer].        4/25/2025   General Health   How would you rate your overall physical health? Good   Feel stress (tense, anxious, or unable to sleep) Not at all         4/25/2025   Nutrition   Diet: Regular (no restrictions)    I don't know       Multiple values from one day are sorted in reverse-chronological order         4/25/2025   Exercise   Days per week of moderate/strenous exercise 3 days   Average minutes spent exercising at this level 50 min         4/25/2025   Social Factors   Frequency of gathering with friends or relatives Three times a week   Worry food won't last until get money to buy more No   Food not last or not have enough money for food? No   Do you have housing? (Housing is defined as stable permanent housing and does not include staying outside in a car, in a tent, in an abandoned building, in an overnight shelter, or couch-surfing.) Yes   Are you worried about losing your housing? No   Lack of transportation? No   Unable to get utilities (heat,electricity)? No         4/25/2025   Fall Risk   Fallen 2 or more times in the past year? No   Trouble with walking or balance? No          4/25/2025   Activities of Daily Living- Home Safety   Needs help with the following daily activites None of the above   Safety concerns in the home None of the above         4/25/2025   Dental   Dentist two times  every year? Yes         4/25/2025   Hearing Screening   Hearing concerns? None of the above         4/25/2025   Driving Risk Screening   Patient/family members have concerns about driving No         4/25/2025   General Alertness/Fatigue Screening   Have you been more tired than usual lately? No         4/25/2025   Urinary Incontinence Screening   Bothered by leaking urine in past 6 months No         Today's PHQ-2 Score:       4/28/2025     8:22 AM   PHQ-2 ( 1999 Pf[employer])   Q1: Little interest or pleasure in doing things 0   Q2: Feeling down, depressed or hopeless 0   PHQ-2 Score 0    Q1: Little interest or pleasure in doing things Not at all   Q2: Feeling down, depressed or hopeless Not at all   PHQ-2 Score 0       Patient-reported           4/25/2025   Substance Use   Alcohol more than 3/day or more than 7/wk No   Do you have a current opioid prescription? No   How severe/bad is pain from 1 to 10? 2/10   Do you use any other substances recreationally? No     Social History     Tobacco Use    Smoking status: Never     Passive exposure: Never    Smokeless tobacco: Never   Vaping Use    Vaping status: Never Used   Substance Use Topics    Alcohol use: Yes    Drug use: No           4/25/2025   AAA Screening   Family history of Abdominal Aortic Aneurysm (AAA)? No   ASCVD Risk   The 10-year ASCVD risk score (Binu MAYORGA, et al., 2019) is: 17.4%    Values used to calculate the score:      Age: 70 years      Sex: Male      Is Non- : No      Diabetic: No      Tobacco smoker: No      Systolic Blood Pressure: 136 mmHg      Is BP treated: Yes      HDL Cholesterol: 83 mg/dL      Total Cholesterol: 176 mg/dL          Reviewed and updated as needed this visit by Provider                  Current providers sharing in care for this patient include:  Patient Care Team:  Indigo Alvarado APRN CNP as PCP - General (Family Medicine)  Indigo Alvarado APRN CNP as Assigned PCP  Gordon Sandoval  "ADRIANA Hinton as Assigned Surgical Provider  Kwan Cat MD as MD (Critical Care)  Kwan Cat MD as Assigned Pulmonology Provider  Flako Gallegos MD as Assigned Heart and Vascular Surgical Provider    The following health maintenance items are reviewed in Epic and correct as of today:  Health Maintenance   Topic Date Due    Pneumococcal Vaccine: 50+ Years (1 of 1 - PCV) Never done    INFLUENZA VACCINE (1) Never done    COVID-19 Vaccine (1 - 2024-25 season) Never done    MEDICARE ANNUAL WELLNESS VISIT  04/18/2025    DTAP/TDAP/TD IMMUNIZATION (3 - Td or Tdap) 06/09/2025    LIPID  02/13/2026    ANNUAL REVIEW OF HM ORDERS  02/13/2026    BMP  03/03/2026    FALL RISK ASSESSMENT  04/28/2026    DIABETES SCREENING  03/03/2028    ADVANCE CARE PLANNING  04/18/2029    RSV VACCINE (1 - 1-dose 75+ series) 03/08/2030    COLORECTAL CANCER SCREENING  07/07/2030    HEPATITIS C SCREENING  Completed    PHQ-2 (once per calendar year)  Completed    ZOSTER IMMUNIZATION  Completed    HPV IMMUNIZATION  Aged Out    MENINGITIS IMMUNIZATION  Aged Out           Last PSA: 2/25, no family history of prostate cancer.   Last Colonoscopy: 7/2020. Repeat in 10 years. No family history of colon cancer   Last eye exam: 1 year ago  Last dental exam: every 6 mo  Last tetanus vaccine: 2015  Last influenza vaccine: Declines   Last shingles vaccine: Has had both doses   Last pneumonia vaccine: Declines   Last COVID vaccine: Declines   Last COVID booster: Declines   Hep C screen (born [birth year]): 2019-negative   HIV screen: Declines   AAA screen (age 65-78 with smoking hx): N/A  IVD (HTN, Hyperlipid, Smoking): N/A  Lung CA screening (55-80, 30 pk smoking hx): N/A            Objective    Exam  /72   Pulse 88   Temp 97.9  F (36.6  C) (Tympanic)   Resp 20   Ht 1.778 m (5' 10\")   Wt 94.3 kg (207 lb 12.8 oz)   SpO2 95%   BMI 29.82 kg/m     Estimated body mass index is 29.82 kg/m  as calculated from the following:    " "Height as of this encounter: 1.778 m (5' 10\").    Weight as of this encounter: 94.3 kg (207 lb 12.8 oz).    Physical Exam  Constitutional:       Appearance: He is well-developed.   HENT:      Right Ear: Tympanic membrane and external ear normal.      Left Ear: Tympanic membrane and external ear normal.      Mouth/Throat:      Mouth: Mucous membranes are moist.      Pharynx: Uvula midline.   Neck:      Thyroid: No thyromegaly.      Vascular: No carotid bruit.   Cardiovascular:      Rate and Rhythm: Normal rate and regular rhythm.      Heart sounds: Normal heart sounds.   Pulmonary:      Effort: Pulmonary effort is normal.      Breath sounds: Normal breath sounds.   Abdominal:      General: Bowel sounds are normal.      Palpations: Abdomen is soft.   Musculoskeletal:      Right lower leg: No edema.      Left lower leg: No edema.   Skin:     General: Skin is warm and dry.   Neurological:      Mental Status: He is alert.   Psychiatric:         Mood and Affect: Mood normal.             4/28/2025   Mini Cog   Clock Draw Score 2 Normal   3 Item Recall 1 object recalled   Mini Cog Total Score 3              Signed Electronically by: CULLEN Talbot CNP    "

## 2025-04-28 NOTE — PATIENT INSTRUCTIONS
Please go to the pharmacy to receive your tetanus (Tdap) vaccine.     Please go to the pharmacy for your pneumonia vaccine, Prevnar 20.  This vaccine helps protect against 20 types strains of streptococcal pneumonia that can cause serious infections in adults-pneumonia.  After the vaccine it is common to have some injection site redness, warmth and mild swelling.  This should resolve on its own after 24 to 48 hours.  Applying a cool compress to the site can help to ease the discomfort.  Also, you may feel a bit tired and rundown for 28 to 48 hours after receiving the injection.  If you are over age 65 you only need to get this vaccine once.  If you are under age 65 this vaccine should be repeated in 5 years.     Debrox ear drops for your right ear.       Patient Education   Preventive Care Advice   This is general advice given by our system to help you stay healthy. However, your care team may have specific advice just for you. Please talk to your care team about your preventive care needs.  Nutrition  Eat 5 or more servings of fruits and vegetables each day.  Try wheat bread, brown rice and whole grain pasta (instead of white bread, rice, and pasta).  Get enough calcium and vitamin D. Check the label on foods and aim for 100% of the RDA (recommended daily allowance).  Lifestyle  Exercise at least 150 minutes each week  (30 minutes a day, 5 days a week).  Do muscle strengthening activities 2 days a week. These help control your weight and prevent disease.  No smoking.  Wear sunscreen to prevent skin cancer.  Have a dental exam and cleaning every 6 months.  Yearly exams  See your health care team every year to talk about:  Any changes in your health.  Any medicines your care team has prescribed.  Preventive care, family planning, and ways to prevent chronic diseases.  Shots (vaccines)   HPV shots (up to age 26), if you've never had them before.  Hepatitis B shots (up to age 59), if you've never had them  before.  COVID-19 shot: Get this shot when it's due.  Flu shot: Get a flu shot every year.  Tetanus shot: Get a tetanus shot every 10 years.  Pneumococcal, hepatitis A, and RSV shots: Ask your care team if you need these based on your risk.  Shingles shot (for age 50 and up)  General health tests  Diabetes screening:  Starting at age 35, Get screened for diabetes at least every 3 years.  If you are younger than age 35, ask your care team if you should be screened for diabetes.  Cholesterol test: At age 39, start having a cholesterol test every 5 years, or more often if advised.  Bone density scan (DEXA): At age 50, ask your care team if you should have this scan for osteoporosis (brittle bones).  Hepatitis C: Get tested at least once in your life.  STIs (sexually transmitted infections)  Before age 24: Ask your care team if you should be screened for STIs.  After age 24: Get screened for STIs if you're at risk. You are at risk for STIs (including HIV) if:  You are sexually active with more than one person.  You don't use condoms every time.  You or a partner was diagnosed with a sexually transmitted infection.  If you are at risk for HIV, ask about PrEP medicine to prevent HIV.  Get tested for HIV at least once in your life, whether you are at risk for HIV or not.  Cancer screening tests  Cervical cancer screening: If you have a cervix, begin getting regular cervical cancer screening tests starting at age 21.  Breast cancer scan (mammogram): If you've ever had breasts, begin having regular mammograms starting at age 40. This is a scan to check for breast cancer.  Colon cancer screening: It is important to start screening for colon cancer at age 45.  Have a colonoscopy test every 10 years (or more often if you're at risk) Or, ask your provider about stool tests like a FIT test every year or Cologuard test every 3 years.  To learn more about your testing options, visit:   .  For help making a decision, visit:    https://Techstars.PlaceFirst/ka34559.  Prostate cancer screening test: If you have a prostate, ask your care team if a prostate cancer screening test (PSA) at age 55 is right for you.  Lung cancer screening: If you are a current or former smoker ages 50 to 80, ask your care team if ongoing lung cancer screenings are right for you.  For informational purposes only. Not to replace the advice of your health care provider. Copyright   2023 [facility]. All rights reserved. Clinically reviewed by the [facility]. [facility] [id number] - REV 01/24.

## 2025-05-05 LAB
ABO + RH BLD: NORMAL
BLD GP AB SCN SERPL QL: NEGATIVE
SPECIMEN EXP DATE BLD: NORMAL

## 2025-05-06 ENCOUNTER — ANESTHESIA EVENT (OUTPATIENT)
Dept: SURGERY | Facility: CLINIC | Age: 70
End: 2025-05-06

## 2025-05-06 ENCOUNTER — OFFICE VISIT (OUTPATIENT)
Dept: SURGERY | Facility: CLINIC | Age: 70
End: 2025-05-06
Payer: COMMERCIAL

## 2025-05-06 ENCOUNTER — LAB (OUTPATIENT)
Dept: LAB | Facility: CLINIC | Age: 70
End: 2025-05-06
Payer: COMMERCIAL

## 2025-05-06 ENCOUNTER — APPOINTMENT (OUTPATIENT)
Dept: LAB | Facility: CLINIC | Age: 70
End: 2025-05-06
Payer: COMMERCIAL

## 2025-05-06 VITALS
OXYGEN SATURATION: 95 % | HEIGHT: 70 IN | HEART RATE: 78 BPM | RESPIRATION RATE: 16 BRPM | DIASTOLIC BLOOD PRESSURE: 90 MMHG | BODY MASS INDEX: 29.76 KG/M2 | TEMPERATURE: 97.5 F | SYSTOLIC BLOOD PRESSURE: 130 MMHG | WEIGHT: 207.9 LBS

## 2025-05-06 DIAGNOSIS — Z01.818 PREOP EXAMINATION: Primary | ICD-10-CM

## 2025-05-06 DIAGNOSIS — J98.6 DIAPHRAGM PARALYSIS: ICD-10-CM

## 2025-05-06 PROCEDURE — 99203 OFFICE O/P NEW LOW 30 MIN: CPT | Performed by: PHYSICIAN ASSISTANT

## 2025-05-06 PROCEDURE — 36415 COLL VENOUS BLD VENIPUNCTURE: CPT | Performed by: PATHOLOGY

## 2025-05-06 PROCEDURE — 86900 BLOOD TYPING SEROLOGIC ABO: CPT

## 2025-05-06 RX ORDER — IBUPROFEN 200 MG
200 TABLET ORAL EVERY 4 HOURS PRN
COMMUNITY

## 2025-05-06 ASSESSMENT — ENCOUNTER SYMPTOMS: SEIZURES: 0

## 2025-05-06 ASSESSMENT — LIFESTYLE VARIABLES: TOBACCO_USE: 0

## 2025-05-06 ASSESSMENT — PAIN SCALES - GENERAL: PAINLEVEL_OUTOF10: NO PAIN (0)

## 2025-05-06 NOTE — H&P
Pre-Operative H & P     CC:  Preoperative exam to assess for increased cardiopulmonary risk while undergoing surgery and anesthesia.    Date of Encounter: 5/6/2025  Primary Care Physician:  Indigo Alvarado     Reason for visit:   Encounter Diagnosis   Name Primary?    Preop examination Yes       HPI  Phong Cuevas is a 70 year old male who presents for pre-operative H & P in preparation for  Procedure Information       Case: [id number] Date/Time: 05/22/25 0730    Procedure: RIGHT PLICATION, DIAPHRAGM, LAPAROSCOPIC, POSSIBLE THORACOSCOPIC (Right: Chest)    Anesthesia type: General    Diagnosis: Diaphragm paralysis [J98.6]    Pre-op diagnosis: Diaphragm paralysis [J98.6]    Location:  OR [address] [place] [address] OR    Providers: Flako Gallegos MD            Patient is being evaluated for comorbid conditions of hypertension, dyslipidemia, GERD    Mr. Cuevas has progressive dyspnea in the setting of an elevated right hemidiaphragm. He was evaluated by Dr. Gallegos and is now scheduled for the above procedure.     History is obtained from the patient and chart review. Patient's wife was also present for this visit.     Hx of abnormal bleeding or anti-platelet use: denies      Past Medical History  Past Medical History:   Diagnosis Date    Arthritis of left knee 3/25/2022       Past Surgical History  Past Surgical History:   Procedure Laterality Date    ARTHRODESIS FOOT Right 4/30/2024    Procedure: Fusion of the first metatarsal phalangeal joint, right foot;  Surgeon: Gordon Sandoval DPM;  Location: [facility]    ARTHROPLASTY KNEE Left 3/24/2022    Procedure: left TOTAL Knee Arthroplasty;  Surgeon: Allen White MD;  Location: [facility]    COLONOSCOPY  2/20/2014    Procedure: COLONOSCOPY;  Colonoscopy  ;  Surgeon: Murali Gill MD;  Location: [facility]       Prior to Admission Medications  Current Outpatient Medications   Medication Sig Dispense Refill    doxylamine (UNISOM) 25 MG TABS Take 25 mg by mouth at  bedtime      ibuprofen (ADVIL/MOTRIN) 200 MG tablet Take 200 mg by mouth every 4 hours as needed for pain.      lisinopril (ZESTRIL) 10 MG tablet Take 1 tablet (10 mg) by mouth daily. (Patient taking differently: Take 10 mg by mouth every morning.) 90 tablet 3    meloxicam (MOBIC) 15 MG tablet Take 1 tablet (15 mg) by mouth daily. (Patient taking differently: Take 15 mg by mouth every other day.) 90 tablet 3    simvastatin (ZOCOR) 40 MG tablet Take 1 tablet (40 mg) by mouth at bedtime. 90 tablet 3    Ascorbic Acid (VITAMIN C PO) Take 500 mg by mouth daily (Patient not taking: Reported on 5/6/2025)      Cholecalciferol (VITAMIN D-3) 25 MCG (1000 UT) CAPS Take 1 capsule by mouth daily. (Patient not taking: Reported on 5/6/2025)      cyclobenzaprine (FLEXERIL) 10 MG tablet Take 0.5-1 tablets (5-10 mg) by mouth nightly as needed for muscle spasms. (Patient not taking: Reported on 5/6/2025) 30 tablet 1    Multiple Vitamins-Minerals (MULTIVITAMIN ADULT PO) Take 1 tablet by mouth daily (Patient not taking: Reported on 5/6/2025)         Allergies  No Known Allergies    Social History  Social History     Socioeconomic History    Marital status: Single     Spouse name: Not on file    Number of children: Not on file    Years of education: Not on file    Highest education level: Not on file   Occupational History    Not on file   Tobacco Use    Smoking status: Never     Passive exposure: Never    Smokeless tobacco: Never   Vaping Use    Vaping status: Never Used   Substance and Sexual Activity    Alcohol use: Not Currently    Drug use: No    Sexual activity: Yes     Partners: Female     Birth control/protection: None     Comment: partner is post-menopausal   Other Topics Concern    Parent/sibling w/ CABG, MI or angioplasty before 65F 55M? No   Social History Narrative    Not on file     Social Drivers of Health     Financial Resource Strain: Low Risk  (4/25/2025)    Financial Resource Strain     Within the past 12 months, have  you or your family members you live with been unable to get utilities (heat, electricity) when it was really needed?: No   Food Insecurity: Low Risk  (4/25/2025)    Food Insecurity     Within the past 12 months, did you worry that your food would run out before you got money to buy more?: No     Within the past 12 months, did the food you bought just not last and you didn t have money to get more?: No   Transportation Needs: Low Risk  (4/25/2025)    Transportation Needs     Within the past 12 months, has lack of transportation kept you from medical appointments, getting your medicines, non-medical meetings or appointments, work, or from getting things that you need?: No   Physical Activity: Sufficiently Active (4/25/2025)    Exercise Vital Sign     Days of Exercise per Week: 3 days     Minutes of Exercise per Session: 50 min   Stress: No Stress Concern Present (4/25/2025)    [employer] - Occupational Stress Questionnaire     Feeling of Stress : Not at all   Social Connections: Unknown (4/25/2025)    Social Connection and Isolation Panel [NHANES]     Frequency of Communication with Friends and Family: Not on file     Frequency of Social Gatherings with Friends and Family: Three times a week     Attends Adventism Services: Not on file     Active Member of Clubs or Organizations: Not on file     Attends Club or Organization Meetings: Not on file     Marital Status: Not on file   Interpersonal Safety: Low Risk  (4/28/2025)    Interpersonal Safety     Do you feel physically and emotionally safe where you currently live?: Yes     Within the past 12 months, have you been hit, slapped, kicked or otherwise physically hurt by someone?: No     Within the past 12 months, have you been humiliated or emotionally abused in other ways by your partner or ex-partner?: No   Housing Stability: Low Risk  (4/25/2025)    Housing Stability     Do you have housing? : Yes     Are you worried about losing your  "housing?: No       Family History  Family History   Problem Relation Age of Onset    Cancer Father         oral cancer    Anesthesia Reaction No family hx of     Deep Vein Thrombosis (DVT) No family hx of        Review of Systems  The complete review of systems is negative other than noted in the HPI or here.   Anesthesia Evaluation   Pt has had prior anesthetic.     No history of anesthetic complications       ROS/MED HX  ENT/Pulmonary: Comment: Elevated right hemidiaphragm    (-) tobacco use   Neurologic:  - neg neurologic ROS  (-) no seizures and no CVA   Cardiovascular:     (+) Dyslipidemia hypertension- -   -  - -                                 Previous cardiac testing   Echo: Date: Results:    Stress Test:  Date: Results:    ECG Reviewed:  Date: 3/2025 Results:  Sinus tachycardia   Inferior infarct , age undetermined   Abnormal ECG     Cath:  Date: Results:   (-) taking anticoagulants/antiplatelets   METS/Exercise Tolerance: 4 - Raking leaves, gardening Comment: Can walk a few block and ascend stairs without exertional symptoms. Trouble breathing with bending over     Hematologic:  - neg hematologic  ROS  (-) history of blood clots and history of blood transfusion   Musculoskeletal:  - neg musculoskeletal ROS     GI/Hepatic:     (+) GERD (improved after losing weight),                   Renal/Genitourinary:  - neg Renal ROS     Endo:  - neg endo ROS  (-) chronic steroid usage   Psychiatric/Substance Use:       Infectious Disease:  - neg infectious disease ROS     Malignancy:       Other:            /90 (BP Location: Right arm, Patient Position: Sitting, Cuff Size: Adult Large)   Pulse 78   Temp 97.5  F (36.4  C) (Oral)   Resp 16   Ht 1.778 m (5' 10\")   Wt 94.3 kg (207 lb 14.4 oz)   SpO2 95%   BMI 29.83 kg/m      Physical Exam   Constitutional: Awake, alert, cooperative, no apparent distress, and appears stated age.  Eyes: Pupils equal, round and reactive to light, extra ocular muscles intact, " sclera clear, conjunctiva normal.  HENT: Normocephalic, oral pharynx with moist mucus membranes, fair dentition- some chipped teeth.   Respiratory: Clear to auscultation bilaterally, no crackles or wheezing.  Cardiovascular: Regular rate and rhythm, normal S1 and S2, and no murmur noted.  Carotids no bruits. No edema. Palpable pulses to radial arteries.   GI: Normal bowel sounds, soft, non-distended, non-tender  Genitourinary:  deferred  Skin: Warm and dry.   Musculoskeletal: Full ROM of neck. There is no redness, warmth, or swelling of the exposed joints. Gross motor strength is normal.    Neurologic: Awake, alert, oriented to name, place and time. Cranial nerves II-XII are grossly intact. Gait is normal.   Neuropsychiatric: Calm, cooperative. Normal affect.     Prior Labs/Diagnostic Studies   All labs and imaging personally reviewed     EKG/ stress test - if available please see in ROS above   No results found.      Latest Ref Rng & Units 12/16/2024     9:45 AM   PFT   FVC L 2.21    FEV1 L 1.83    FVC% % 58    FEV1% % 63          The patient's records and results personally reviewed by this provider.     Outside records reviewed from: Care Everywhere    LAB/DIAGNOSTIC STUDIES TODAY:  BMP    Assessment    Phong Cuevas is a 70 year old male seen as a PAC referral for risk assessment and optimization for anesthesia.    Plan/Recommendations  Pt will be optimized for the proposed procedure.  See below for details on the assessment, risk, and preoperative recommendations    NEUROLOGY  - No history of TIA, CVA or seizure  -Post Op delirium risk factors:  No risk identified    ENT  - No current airway concerns.  Will need to be reassessed day of surgery.  Mallampati: II  TM: > 3    CARDIAC  - No history of CAD and Afib  -hypertension using lisinopril  -dyslipidemia using zocor  -denies cardiac symptoms. Reports he can ascend stairs and walk a few blocks without MEDEROS or CP. He endorses SOB with bending over and if he  "runs stairs.   - METS (Metabolic Equivalents)  Patient performs 4 or more METS exercise without symptoms             Total Score: 0      RCRI-Low risk: Class 2 0.9% complication rate             Total Score: 1    RCRI: High Risk Surgery        PULMONARY  -elevated right hemidiaphragm with the above procedure planned   -h/o pneumonia in March. Has since completed antibiotics and symptoms improved.   JOSE Medium Risk             Total Score: 3    JOSE: Hypertension    JOSE: Over 50 ys old    JOSE: Male      - Denies asthma or inhaler use  - Tobacco History    History   Smoking Status    Never   Smokeless Tobacco    Never       GI  - GERD, denies recent symptoms   PONV Medium Risk  Total Score: 2           1 AN PONV: Patient is not a current smoker    1 AN PONV: Intended Post Op Opioids        /RENAL  - Baseline Creatinine 0.65    ENDOCRINE    - BMI: Estimated body mass index is 29.83 kg/m  as calculated from the following:    Height as of this encounter: 1.778 m (5' 10\").    Weight as of this encounter: 94.3 kg (207 lb 14.4 oz).  Overweight (BMI 25.0-29.9)  - No history of Diabetes Mellitus    HEME  VTE Low Risk 0.5%             Total Score: 3    VTE: Greater than 59 yrs old    VTE: Male      - No history of abnormal bleeding or antiplatelet use.  -will update T&S today     MSK  Patient is NOT Frail             Total Score: 0      -PM&R referral not indicated       Different anesthesia methods/types have been discussed with the patient, but they are aware that the final plan will be decided by the assigned anesthesia provider on the date of service.  The patient is optimized for their procedure. AVS with information on surgery time/arrival time, meds and NPO status given by nursing staff. No further diagnostic testing indicated.      On the day of service:     Prep time: 12 minutes  Visit time: 17 minutes  Documentation time: 5 minutes  ------------------------------------------  Total time: 34 minutes      Tammy PERRY" SARAH Galvin  Preoperative Assessment Center  [facility]  Clinic and Surgery Center  Phone: [phone]  Fax: [phone]

## 2025-05-06 NOTE — PATIENT INSTRUCTIONS
Preparing for Your Surgery      Name:  Phong Cuevas   MRN:  [id number]   :  1955   Today's Date:  2025     The [place] [employer] [address] Construction Project                                Timeline 2025 -2025    This project will affect travel to the [facility] and [facility], as well as the [facility].    Please check the [facility] [address] project website for the most up to date information and give yourself additional time to reach your destination.    Arriving for surgery:  Surgery date:  25  Arrival time:  5.30AM    Please come to:     Please come to:       [facility]    [address] [place]  [address]     The [facility] [place] Patient/Visitor Ramp is at [address]. Patients and visitors who self-park will receive the reduced hospital parking rate. If the Patient /Visitor Ramp is full, please follow the signs to the [employer] car park located at the [facility] entrance.      [facility] [employer] [facility] parking is available (24 hours/ 7 days a week)      Discounted parking pass options are available for patients and visitors. They can be purchased at the [employer] desk at the [facility] entrance.     -    Stop at the security desk and they will direct surgery patients to the Surgery Check in and Family Lounge. [phone]        - If you need directions, a wheelchair or an escort please stop at the Information/security desk in the lobby.     What can I eat or drink?  -  You may eat and drink normally up to 8 hours prior to arrival time. (Until 9.30PM)  -  You may have clear liquids until 2 hours prior to arrival time. (Until 3.30AM)    Examples of clear liquids:  Water  Clear broth  Juices (apple, white grape, white cranberry  and cider) without pulp  Noncarbonated, powder based beverages  (lemonade and Patrick-Aid)  Sodas  (Rabia, 7-Up, ginger ale and seltzer)  Coffee or tea (without milk or cream)  Gatorade    -  No Alcohol or cannabis products for at least 24 hours before surgery.     Which medicines can I take?    Hold Aspirin for 7 days before surgery.   Hold Multivitamins for 7 days before surgery.  Hold Supplements for 7 days before surgery.  Hold Ibuprofen (Advil, Motrin) for 1 day(s) before surgery--unless otherwise directed by surgeon.  Hold Naproxen (Aleve) for 4 days before surgery.    Hold Meloxicam (Mobic) for 10 days before surgery.    -  DO NOT take these medications the day of surgery:  Continue to hold Meloxicam and Ibuprofen  Lisinopril    -  PLEASE TAKE these medications the day of surgery:  None    Simvastatin and Doxylamine can continue at bedtime per usual.    How do I prepare myself?  - Please take 2 showers (one the night prior to surgery and one the morning of surgery) using Scrubcare or Hibiclens soap.    Use this soap only from the neck to your toes. Avoid genital area      Leave the soap on your skin for one minute--then rinse thoroughly.      You may use your own shampoo and conditioner. No other hair products.   - Please remove all jewelry and body piercings.  - No lotions, deodorants or fragrance.  - No makeup or fingernail polish.   - Bring your ID and insurance card.    -For patients being admitted to the [facility] [place] [place]  Family members are to take the patient belongings with them and place them in the lockers provided in the [facility].  Please limit the items you bring to 1 bag as the lockers are small.      -If you use a CPAP machine, please bring the CPAP machine, tubing, and mask to hospital.    -If you have a Deep Brain Stimulator, Spinal Cord Stimulator, or any Neuro Stimulator device---you must bring the remote control to the hospital.      ALL PATIENTS GOING HOME THE SAME DAY OF SURGERY ARE REQUIRED TO HAVE A RESPONSIBLE ADULT TO DRIVE AND BE IN ATTENDANCE WITH THEM FOR 24 HOURS  FOLLOWING SURGERY.    Covid testing policy as of 12/06/2022  Your surgeon will notify and schedule you for a COVID test if one is needed before surgery--please direct any questions or COVID symptoms to your surgeon      Questions or Concerns:    - For any questions regarding the day of surgery or your hospital stay, please contact the Pre Admission Nursing Office at [phone].       - If you have health changes between today and your surgery, please call your surgeon.       - For questions after surgery, please call your surgeons office.           Current Visitor Guidelines    2 adult visitors for adult patients in the pre op area    If additional visitors come (beyond a patient care attendant or a group home caregiver), the additional visitors will be asked to wait in the main lobby of the hospital    Visiting hours: 8 a.m. to 8:30 p.m.    Patients confirmed or suspected to have symptoms of COVID 19 or flu:     No visitors allowed for adult patients.   Children (under age 18) can have 1 named visitor.     People who are sick or showing symptoms of COVID 19 or flu:    Are not allowed to visit patients--we can only make exceptions in special situations.       Please follow these guidelines for your visit:          Please maintain social distance          Masking is optional--however at times you may be asked to wear a mask for the safety of yourself and others     Clean your hands with alcohol hand . Do this when you arrive at and leave the building and patient room,    And again after you touch your mask or anything in the room.     Go directly to and from the room you are visiting.     Stay in the patient s room during your visit. Limit going to other places in the hospital as much as possible     Leave bags and jackets at home or in the car.     For everyone s health, please don t come and go during your visit. That includes for smoking   during your visit.

## 2025-05-19 ENCOUNTER — LAB (OUTPATIENT)
Dept: LAB | Facility: CLINIC | Age: 70
End: 2025-05-19
Payer: COMMERCIAL

## 2025-05-19 DIAGNOSIS — Z01.818 PRE-OP EVALUATION: ICD-10-CM

## 2025-05-19 LAB
ANION GAP SERPL CALCULATED.3IONS-SCNC: 10 MMOL/L (ref 7–15)
BUN SERPL-MCNC: 13.9 MG/DL (ref 8–23)
CALCIUM SERPL-MCNC: 9.3 MG/DL (ref 8.8–10.4)
CHLORIDE SERPL-SCNC: 99 MMOL/L (ref 98–107)
CREAT SERPL-MCNC: 0.91 MG/DL (ref 0.67–1.17)
EGFRCR SERPLBLD CKD-EPI 2021: >90 ML/MIN/1.73M2
ERYTHROCYTE [DISTWIDTH] IN BLOOD BY AUTOMATED COUNT: 11.4 % (ref 10–15)
GLUCOSE SERPL-MCNC: 102 MG/DL (ref 70–99)
HCO3 SERPL-SCNC: 29 MMOL/L (ref 22–29)
HCT VFR BLD AUTO: 46.7 % (ref 40–53)
HGB BLD-MCNC: 15.2 G/DL (ref 13.3–17.7)
MCH RBC QN AUTO: 31 PG (ref 26.5–33)
MCHC RBC AUTO-ENTMCNC: 32.5 G/DL (ref 31.5–36.5)
MCV RBC AUTO: 95 FL (ref 78–100)
PLATELET # BLD AUTO: 194 10E3/UL (ref 150–450)
POTASSIUM SERPL-SCNC: 4.5 MMOL/L (ref 3.4–5.3)
RBC # BLD AUTO: 4.9 10E6/UL (ref 4.4–5.9)
SODIUM SERPL-SCNC: 138 MMOL/L (ref 135–145)
WBC # BLD AUTO: 3.9 10E3/UL (ref 4–11)

## 2025-05-19 PROCEDURE — 85027 COMPLETE CBC AUTOMATED: CPT

## 2025-05-19 PROCEDURE — 36415 COLL VENOUS BLD VENIPUNCTURE: CPT

## 2025-05-19 PROCEDURE — 80048 BASIC METABOLIC PNL TOTAL CA: CPT

## 2025-05-21 ASSESSMENT — LIFESTYLE VARIABLES: TOBACCO_USE: 0

## 2025-05-21 ASSESSMENT — ENCOUNTER SYMPTOMS: SEIZURES: 0

## 2025-05-21 NOTE — ANESTHESIA PREPROCEDURE EVALUATION
Anesthesia Pre-Procedure Evaluation    Patient: Phong Cuevas   MRN: [id number] : 1955          Procedure : Procedure(s):  RIGHT PLICATION, DIAPHRAGM, LAPAROSCOPIC,  POSSIBLE THORACOSCOPIC         Past Medical History:   Diagnosis Date    Arthritis of left knee 3/25/2022      Past Surgical History:   Procedure Laterality Date    ARTHRODESIS FOOT Right 2024    Procedure: Fusion of the first metatarsal phalangeal joint, right foot;  Surgeon: Gordon Sandoval DPM;  Location: [facility]    ARTHROPLASTY KNEE Left 3/24/2022    Procedure: left TOTAL Knee Arthroplasty;  Surgeon: Allen White MD;  Location: [facility]    COLONOSCOPY  2014    Procedure: COLONOSCOPY;  Colonoscopy  ;  Surgeon: Murali Gill MD;  Location: [facility]      No Known Allergies   Social History     Tobacco Use    Smoking status: Never     Passive exposure: Never    Smokeless tobacco: Never   Substance Use Topics    Alcohol use: Not Currently      Wt Readings from Last 1 Encounters:   25 94.3 kg (207 lb 14.4 oz)        Anesthesia Evaluation   Pt has had prior anesthetic.     No history of anesthetic complications       ROS/MED HX  ENT/Pulmonary: Comment: Elevated right hemidiaphragm    (-) tobacco use   Neurologic:  - neg neurologic ROS  (-) no seizures and no CVA   Cardiovascular:     (+) Dyslipidemia hypertension- -   -  - -                                 Previous cardiac testing   Echo: Date: Results:    Stress Test:  Date: Results:    ECG Reviewed:  Date: 3/2025 Results:  Sinus tachycardia   Inferior infarct , age undetermined   Abnormal ECG     Cath:  Date: Results:   (-) taking anticoagulants/antiplatelets   METS/Exercise Tolerance: 4 - Raking leaves, gardening Comment: Can walk a few block and ascend stairs without exertional symptoms. Trouble breathing with bending over     Hematologic:  - neg hematologic  ROS  (-) history of blood clots and history of blood transfusion   Musculoskeletal:  - neg musculoskeletal  "ROS     GI/Hepatic:     (+) GERD (improved after losing weight),                   Renal/Genitourinary:  - neg Renal ROS     Endo:  - neg endo ROS  (-) chronic steroid usage   Psychiatric/Substance Use:       Infectious Disease:  - neg infectious disease ROS     Malignancy:       Other:              Physical Exam    OUTSIDE LABS:  CBC:   Lab Results   Component Value Date    WBC 3.9 (L) 05/19/2025    WBC 20.2 (H) 03/03/2025    HGB 15.2 05/19/2025    HGB 15.0 03/03/2025    HCT 46.7 05/19/2025    HCT 44.5 03/03/2025     05/19/2025     03/03/2025     BMP:   Lab Results   Component Value Date     05/19/2025     (L) 03/03/2025    POTASSIUM 4.5 05/19/2025    POTASSIUM 5.1 03/03/2025    CHLORIDE 99 05/19/2025    CHLORIDE 90 (L) 03/03/2025    CO2 29 05/19/2025    CO2 23 03/03/2025    BUN 13.9 05/19/2025    BUN 17.4 03/03/2025    CR 0.91 05/19/2025    CR 0.65 (L) 03/03/2025     (H) 05/19/2025     (H) 03/03/2025     COAGS: No results found for: \"PTT\", \"INR\", \"FIBR\"  POC: No results found for: \"BGM\", \"HCG\", \"HCGS\"  HEPATIC:   Lab Results   Component Value Date    ALBUMIN 4.3 03/03/2025    PROTTOTAL 7.9 03/03/2025    ALT 23 03/03/2025    AST 24 03/03/2025    ALKPHOS 93 03/03/2025    BILITOTAL 0.7 03/03/2025     OTHER:   Lab Results   Component Value Date    LACT 1.0 03/03/2025    A1C 5.8 (H) 02/13/2025    DELANO 9.3 05/19/2025    LIPASE 201 07/05/2017    AMYLASE 42 07/05/2017    TSH 0.87 07/05/2017    CRP 4.5 01/11/2018    SED 4 01/11/2018       Anesthesia Plan        Kristopher Hazel MD    I have reviewed the pertinent notes and labs in the chart from the past 30 days and (re)examined the patient.  Any updates or changes from those notes are reflected in this note.    Clinically Significant Risk Factors Present on Admission                             # Overweight: Estimated body mass index is 29.83 kg/m  as calculated from the following:    Height as of 5/6/25: 1.778 m (5' 10\").    Weight " as of 5/6/25: 94.3 kg (207 lb 14.4 oz).

## 2025-05-22 ENCOUNTER — ANESTHESIA (OUTPATIENT)
Dept: SURGERY | Facility: CLINIC | Age: 70
End: 2025-05-22

## 2025-06-02 ENCOUNTER — ANESTHESIA EVENT (OUTPATIENT)
Dept: SURGERY | Facility: CLINIC | Age: 70
End: 2025-06-02
Payer: COMMERCIAL

## 2025-06-02 ASSESSMENT — LIFESTYLE VARIABLES: TOBACCO_USE: 0

## 2025-06-02 ASSESSMENT — ENCOUNTER SYMPTOMS: SEIZURES: 0

## 2025-06-02 NOTE — ANESTHESIA PREPROCEDURE EVALUATION
Anesthesia Pre-Procedure Evaluation    Patient: Phong Cuevas   MRN: [id number] : 1955          Procedure : Procedure(s):  RIGHT PLICATION, DIAPHRAGM, LAPAROSCOPIC,  POSSIBLE THORACOSCOPIC         Past Medical History:   Diagnosis Date    Arthritis of left knee 3/25/2022      Past Surgical History:   Procedure Laterality Date    ARTHRODESIS FOOT Right 2024    Procedure: Fusion of the first metatarsal phalangeal joint, right foot;  Surgeon: Gordon Sandoval DPM;  Location: [facility]    ARTHROPLASTY KNEE Left 3/24/2022    Procedure: left TOTAL Knee Arthroplasty;  Surgeon: Allen White MD;  Location: [facility]    COLONOSCOPY  2014    Procedure: COLONOSCOPY;  Colonoscopy  ;  Surgeon: Murali Gill MD;  Location: [facility]      No Known Allergies   Social History     Tobacco Use    Smoking status: Never     Passive exposure: Never    Smokeless tobacco: Never   Substance Use Topics    Alcohol use: Not Currently      Wt Readings from Last 1 Encounters:   25 94.3 kg (207 lb 14.4 oz)        Anesthesia Evaluation   Pt has had prior anesthetic.     No history of anesthetic complications       ROS/MED HX  ENT/Pulmonary: Comment: Elevated right hemidiaphragm    (-) tobacco use   Neurologic:  - neg neurologic ROS  (-) no seizures and no CVA   Cardiovascular:     (+) Dyslipidemia hypertension- -   -  - -                                 Previous cardiac testing   Echo: Date: Results:    Stress Test:  Date: Results:    ECG Reviewed:  Date: 3/2025 Results:  Sinus tachycardia   Inferior infarct , age undetermined   Abnormal ECG     Cath:  Date: Results:   (-) taking anticoagulants/antiplatelets   METS/Exercise Tolerance: 4 - Raking leaves, gardening Comment: Can walk a few block and ascend stairs without exertional symptoms. Trouble breathing with bending over     Hematologic:  - neg hematologic  ROS  (-) history of blood clots and history of blood transfusion   Musculoskeletal:  - neg musculoskeletal  "ROS     GI/Hepatic:     (+) GERD (improved after losing weight), Asymptomatic on medication,                  Renal/Genitourinary:  - neg Renal ROS     Endo:  - neg endo ROS  (-) chronic steroid usage   Psychiatric/Substance Use:       Infectious Disease:  - neg infectious disease ROS     Malignancy:       Other:              Physical Exam  Airway  Mallampati: III  TM distance: >3 FB  Neck ROM: full  Mouth opening: >= 4 cm    Cardiovascular - normal exam   Dental   (+) Minor Abnormalities - some fillings, tiny chips      Pulmonary - normal exam      Neurological - normal exam  He appears awake, alert and oriented x3.    Other Findings       OUTSIDE LABS:  CBC:   Lab Results   Component Value Date    WBC 3.9 (L) 05/19/2025    WBC 20.2 (H) 03/03/2025    HGB 15.2 05/19/2025    HGB 15.0 03/03/2025    HCT 46.7 05/19/2025    HCT 44.5 03/03/2025     05/19/2025     03/03/2025     BMP:   Lab Results   Component Value Date     05/19/2025     (L) 03/03/2025    POTASSIUM 4.5 05/19/2025    POTASSIUM 5.1 03/03/2025    CHLORIDE 99 05/19/2025    CHLORIDE 90 (L) 03/03/2025    CO2 29 05/19/2025    CO2 23 03/03/2025    BUN 13.9 05/19/2025    BUN 17.4 03/03/2025    CR 0.91 05/19/2025    CR 0.65 (L) 03/03/2025     (H) 05/19/2025     (H) 03/03/2025     COAGS: No results found for: \"PTT\", \"INR\", \"FIBR\"  POC: No results found for: \"BGM\", \"HCG\", \"HCGS\"  HEPATIC:   Lab Results   Component Value Date    ALBUMIN 4.3 03/03/2025    PROTTOTAL 7.9 03/03/2025    ALT 23 03/03/2025    AST 24 03/03/2025    ALKPHOS 93 03/03/2025    BILITOTAL 0.7 03/03/2025     OTHER:   Lab Results   Component Value Date    LACT 1.0 03/03/2025    A1C 5.8 (H) 02/13/2025    DELANO 9.3 05/19/2025    LIPASE 201 07/05/2017    AMYLASE 42 07/05/2017    TSH 0.87 07/05/2017    CRP 4.5 01/11/2018    SED 4 01/11/2018       Anesthesia Plan    ASA Status:  2      NPO Status: Will be NPO Appropriate at ...   Anesthesia Type: General.  Airway: " "oral.  Induction: intravenous.  Maintenance: Balanced.   Techniques and Equipment:     - Airway:  Planned airway equipment includes video laryngoscope.  ETT Double Lumen (Fr): 39  Arterial Line Kit: Radial     - Monitoring Plan: standard ASA monitoring, train of four monitoring, arterial line kit, processed EEG monitor     Consents    Anesthesia Plan(s) and associated risks, benefits, and realistic alternatives discussed. Questions answered and patient/representative(s) expressed understanding.     - Discussed: anesthesiologist     - Discussed with:  Patient        - Pt is DNR/DNI Status: no DNR     Blood Consent:      - Discussed with: patient.     - Consented: consented to blood products     Postoperative Care    Pain management: non-narcotic analgesics, multimodal analgesia.     Comments:    Other Comments: I discussed the risks and benefits of general anesthesia with the patient.  Questions were sought and answered.      Allen Chandler MD  Attending Anesthesiologist                   Sebastien Rojas MD    I have reviewed the pertinent notes and labs in the chart from the past 30 days and (re)examined the patient.  Any updates or changes from those notes are reflected in this note.    Clinically Significant Risk Factors Present on Admission                             # Overweight: Estimated body mass index is 29.83 kg/m  as calculated from the following:    Height as of 5/6/25: 1.778 m (5' 10\").    Weight as of 5/6/25: 94.3 kg (207 lb 14.4 oz).                    "

## 2025-06-03 ENCOUNTER — APPOINTMENT (OUTPATIENT)
Dept: CT IMAGING | Facility: CLINIC | Age: 70
DRG: 163 | End: 2025-06-03
Payer: COMMERCIAL

## 2025-06-03 ENCOUNTER — HOSPITAL ENCOUNTER (INPATIENT)
Facility: CLINIC | Age: 70
End: 2025-06-03
Attending: THORACIC SURGERY (CARDIOTHORACIC VASCULAR SURGERY) | Admitting: THORACIC SURGERY (CARDIOTHORACIC VASCULAR SURGERY)
Payer: COMMERCIAL

## 2025-06-03 ENCOUNTER — APPOINTMENT (OUTPATIENT)
Dept: CT IMAGING | Facility: CLINIC | Age: 70
End: 2025-06-03
Attending: PHYSICIAN ASSISTANT
Payer: COMMERCIAL

## 2025-06-03 ENCOUNTER — APPOINTMENT (OUTPATIENT)
Dept: CT IMAGING | Facility: CLINIC | Age: 70
End: 2025-06-03
Payer: COMMERCIAL

## 2025-06-03 ENCOUNTER — APPOINTMENT (OUTPATIENT)
Dept: GENERAL RADIOLOGY | Facility: CLINIC | Age: 70
DRG: 163 | End: 2025-06-03
Payer: COMMERCIAL

## 2025-06-03 ENCOUNTER — ANESTHESIA (OUTPATIENT)
Dept: SURGERY | Facility: CLINIC | Age: 70
End: 2025-06-03
Payer: COMMERCIAL

## 2025-06-03 ENCOUNTER — APPOINTMENT (OUTPATIENT)
Dept: GENERAL RADIOLOGY | Facility: CLINIC | Age: 70
DRG: 163 | End: 2025-06-03
Attending: STUDENT IN AN ORGANIZED HEALTH CARE EDUCATION/TRAINING PROGRAM
Payer: COMMERCIAL

## 2025-06-03 DIAGNOSIS — J98.6 DIAPHRAGM PARALYSIS: Primary | ICD-10-CM

## 2025-06-03 LAB
ALBUMIN SERPL BCG-MCNC: 4.7 G/DL (ref 3.5–5.2)
ALLEN'S TEST: YES
ALP SERPL-CCNC: 57 U/L (ref 40–150)
ALT SERPL W P-5'-P-CCNC: 254 U/L (ref 0–70)
AMMONIA PLAS-SCNC: 39 UMOL/L (ref 16–60)
ANION GAP SERPL CALCULATED.3IONS-SCNC: 13 MMOL/L (ref 7–15)
ANION GAP SERPL CALCULATED.3IONS-SCNC: 13 MMOL/L (ref 7–15)
AST SERPL W P-5'-P-CCNC: 228 U/L (ref 0–45)
BASE EXCESS BLDA CALC-SCNC: -3.3 MMOL/L (ref -3–3)
BASE EXCESS BLDV CALC-SCNC: -3.4 MMOL/L (ref -3–3)
BASOPHILS # BLD AUTO: 0 10E3/UL (ref 0–0.2)
BASOPHILS NFR BLD AUTO: 0 %
BILIRUB SERPL-MCNC: 0.4 MG/DL
BILIRUBIN DIRECT (ROCHE PRO & PURE): 0.17 MG/DL (ref 0–0.45)
BUN SERPL-MCNC: 19.2 MG/DL (ref 8–23)
BUN SERPL-MCNC: 19.7 MG/DL (ref 8–23)
CALCIUM SERPL-MCNC: 9.1 MG/DL (ref 8.8–10.4)
CALCIUM SERPL-MCNC: 9.2 MG/DL (ref 8.8–10.4)
CHLORIDE SERPL-SCNC: 99 MMOL/L (ref 98–107)
CHLORIDE SERPL-SCNC: 99 MMOL/L (ref 98–107)
CREAT SERPL-MCNC: 0.86 MG/DL (ref 0.67–1.17)
CREAT SERPL-MCNC: 0.97 MG/DL (ref 0.67–1.17)
CRP SERPL-MCNC: 13.5 MG/L
EGFRCR SERPLBLD CKD-EPI 2021: 84 ML/MIN/1.73M2
EGFRCR SERPLBLD CKD-EPI 2021: >90 ML/MIN/1.73M2
EOSINOPHIL # BLD AUTO: 0 10E3/UL (ref 0–0.7)
EOSINOPHIL NFR BLD AUTO: 0 %
ERYTHROCYTE [DISTWIDTH] IN BLOOD BY AUTOMATED COUNT: 11.5 % (ref 10–15)
ERYTHROCYTE [DISTWIDTH] IN BLOOD BY AUTOMATED COUNT: 11.7 % (ref 10–15)
ERYTHROCYTE [DISTWIDTH] IN BLOOD BY AUTOMATED COUNT: 11.7 % (ref 10–15)
GLUCOSE BLDC GLUCOMTR-MCNC: 115 MG/DL (ref 70–99)
GLUCOSE BLDC GLUCOMTR-MCNC: 179 MG/DL (ref 70–99)
GLUCOSE SERPL-MCNC: 165 MG/DL (ref 70–99)
GLUCOSE SERPL-MCNC: 174 MG/DL (ref 70–99)
HCO3 BLD-SCNC: 27 MMOL/L (ref 21–28)
HCO3 BLDV-SCNC: 29 MMOL/L (ref 21–28)
HCO3 SERPL-SCNC: 22 MMOL/L (ref 22–29)
HCO3 SERPL-SCNC: 22 MMOL/L (ref 22–29)
HCT VFR BLD AUTO: 48.5 % (ref 40–53)
HCT VFR BLD AUTO: 50.1 % (ref 40–53)
HCT VFR BLD AUTO: 50.1 % (ref 40–53)
HGB BLD-MCNC: 16 G/DL (ref 13.3–17.7)
HGB BLD-MCNC: 16.5 G/DL (ref 13.3–17.7)
HGB BLD-MCNC: 16.5 G/DL (ref 13.3–17.7)
IMM GRANULOCYTES # BLD: 0 10E3/UL
IMM GRANULOCYTES NFR BLD: 0 %
LACTATE SERPL-SCNC: 1.4 MMOL/L (ref 0.7–2)
LYMPHOCYTES # BLD AUTO: 0.7 10E3/UL (ref 0.8–5.3)
LYMPHOCYTES NFR BLD AUTO: 5 %
MAGNESIUM SERPL-MCNC: 1.8 MG/DL (ref 1.7–2.3)
MCH RBC QN AUTO: 31.1 PG (ref 26.5–33)
MCH RBC QN AUTO: 31.1 PG (ref 26.5–33)
MCH RBC QN AUTO: 31.4 PG (ref 26.5–33)
MCHC RBC AUTO-ENTMCNC: 32.9 G/DL (ref 31.5–36.5)
MCHC RBC AUTO-ENTMCNC: 32.9 G/DL (ref 31.5–36.5)
MCHC RBC AUTO-ENTMCNC: 33 G/DL (ref 31.5–36.5)
MCV RBC AUTO: 95 FL (ref 78–100)
MONOCYTES # BLD AUTO: 0.6 10E3/UL (ref 0–1.3)
MONOCYTES NFR BLD AUTO: 4 %
NEUTROPHILS # BLD AUTO: 13.1 10E3/UL (ref 1.6–8.3)
NEUTROPHILS NFR BLD AUTO: 91 %
NRBC # BLD AUTO: 0 10E3/UL
NRBC BLD AUTO-RTO: 0 /100
O2/TOTAL GAS SETTING VFR VENT: 21 %
O2/TOTAL GAS SETTING VFR VENT: 44 %
OXYHGB MFR BLDA: 95 % (ref 92–100)
OXYHGB MFR BLDV: 94 % (ref 70–75)
PCO2 BLD: 73 MM HG (ref 35–45)
PCO2 BLDV: 86 MM HG (ref 40–50)
PH BLD: 7.18 [PH] (ref 7.35–7.45)
PH BLDV: 7.13 [PH] (ref 7.32–7.43)
PLATELET # BLD AUTO: 210 10E3/UL (ref 150–450)
PLATELET # BLD AUTO: 223 10E3/UL (ref 150–450)
PLATELET # BLD AUTO: 223 10E3/UL (ref 150–450)
PO2 BLD: 102 MM HG (ref 80–105)
PO2 BLDV: 89 MM HG (ref 25–47)
POTASSIUM SERPL-SCNC: 5.5 MMOL/L (ref 3.4–5.3)
POTASSIUM SERPL-SCNC: 5.5 MMOL/L (ref 3.4–5.3)
PROT SERPL-MCNC: 7.8 G/DL (ref 6.4–8.3)
RBC # BLD AUTO: 5.09 10E6/UL (ref 4.4–5.9)
RBC # BLD AUTO: 5.3 10E6/UL (ref 4.4–5.9)
RBC # BLD AUTO: 5.3 10E6/UL (ref 4.4–5.9)
SAO2 % BLDA: 96.8 % (ref 95–96)
SAO2 % BLDV: 95.1 % (ref 70–75)
SODIUM SERPL-SCNC: 134 MMOL/L (ref 135–145)
SODIUM SERPL-SCNC: 134 MMOL/L (ref 135–145)
TROPONIN T SERPL HS-MCNC: 13 NG/L
TROPONIN T SERPL HS-MCNC: 16 NG/L
TSH SERPL DL<=0.005 MIU/L-ACNC: 0.96 UIU/ML (ref 0.3–4.2)
WBC # BLD AUTO: 14.4 10E3/UL (ref 4–11)
WBC # BLD AUTO: 14.4 10E3/UL (ref 4–11)
WBC # BLD AUTO: 15.3 10E3/UL (ref 4–11)

## 2025-06-03 PROCEDURE — 70498 CT ANGIOGRAPHY NECK: CPT

## 2025-06-03 PROCEDURE — 82248 BILIRUBIN DIRECT: CPT

## 2025-06-03 PROCEDURE — 70496 CT ANGIOGRAPHY HEAD: CPT | Mod: 26 | Performed by: RADIOLOGY

## 2025-06-03 PROCEDURE — 250N000009 HC RX 250: Performed by: CHIROPRACTOR

## 2025-06-03 PROCEDURE — 85025 COMPLETE CBC W/AUTO DIFF WBC: CPT | Performed by: STUDENT IN AN ORGANIZED HEALTH CARE EDUCATION/TRAINING PROGRAM

## 2025-06-03 PROCEDURE — 86140 C-REACTIVE PROTEIN: CPT | Performed by: STUDENT IN AN ORGANIZED HEALTH CARE EDUCATION/TRAINING PROGRAM

## 2025-06-03 PROCEDURE — 83605 ASSAY OF LACTIC ACID: CPT

## 2025-06-03 PROCEDURE — 36415 COLL VENOUS BLD VENIPUNCTURE: CPT

## 2025-06-03 PROCEDURE — 999N000215 HC STATISTIC HFNC ADULT NON-CPAP

## 2025-06-03 PROCEDURE — 84484 ASSAY OF TROPONIN QUANT: CPT | Performed by: PHYSICIAN ASSISTANT

## 2025-06-03 PROCEDURE — 250N000011 HC RX IP 250 OP 636: Mod: JZ | Performed by: CHIROPRACTOR

## 2025-06-03 PROCEDURE — 999N000157 HC STATISTIC RCP TIME EA 10 MIN

## 2025-06-03 PROCEDURE — 258N000003 HC RX IP 258 OP 636

## 2025-06-03 PROCEDURE — 272N000001 HC OR GENERAL SUPPLY STERILE: Performed by: THORACIC SURGERY (CARDIOTHORACIC VASCULAR SURGERY)

## 2025-06-03 PROCEDURE — 80053 COMPREHEN METABOLIC PANEL: CPT | Performed by: STUDENT IN AN ORGANIZED HEALTH CARE EDUCATION/TRAINING PROGRAM

## 2025-06-03 PROCEDURE — 71275 CT ANGIOGRAPHY CHEST: CPT | Mod: 26 | Performed by: STUDENT IN AN ORGANIZED HEALTH CARE EDUCATION/TRAINING PROGRAM

## 2025-06-03 PROCEDURE — 93005 ELECTROCARDIOGRAM TRACING: CPT

## 2025-06-03 PROCEDURE — 85027 COMPLETE CBC AUTOMATED: CPT

## 2025-06-03 PROCEDURE — 120N000003 HC R&B IMCU UMMC

## 2025-06-03 PROCEDURE — 84155 ASSAY OF PROTEIN SERUM: CPT | Performed by: PHYSICIAN ASSISTANT

## 2025-06-03 PROCEDURE — 93010 ELECTROCARDIOGRAM REPORT: CPT | Performed by: INTERNAL MEDICINE

## 2025-06-03 PROCEDURE — 0BQT4ZZ REPAIR DIAPHRAGM, PERCUTANEOUS ENDOSCOPIC APPROACH: ICD-10-PCS | Performed by: THORACIC SURGERY (CARDIOTHORACIC VASCULAR SURGERY)

## 2025-06-03 PROCEDURE — 71045 X-RAY EXAM CHEST 1 VIEW: CPT | Mod: 26 | Performed by: RADIOLOGY

## 2025-06-03 PROCEDURE — 250N000011 HC RX IP 250 OP 636: Performed by: STUDENT IN AN ORGANIZED HEALTH CARE EDUCATION/TRAINING PROGRAM

## 2025-06-03 PROCEDURE — 49329 UNLSTD LAPS PX ABD PERTM&OMN: CPT | Mod: GC | Performed by: THORACIC SURGERY (CARDIOTHORACIC VASCULAR SURGERY)

## 2025-06-03 PROCEDURE — 74176 CT ABD & PELVIS W/O CONTRAST: CPT

## 2025-06-03 PROCEDURE — 74176 CT ABD & PELVIS W/O CONTRAST: CPT | Mod: 26 | Performed by: RADIOLOGY

## 2025-06-03 PROCEDURE — 999N000141 HC STATISTIC PRE-PROCEDURE NURSING ASSESSMENT: Performed by: THORACIC SURGERY (CARDIOTHORACIC VASCULAR SURGERY)

## 2025-06-03 PROCEDURE — 36415 COLL VENOUS BLD VENIPUNCTURE: CPT | Performed by: STUDENT IN AN ORGANIZED HEALTH CARE EDUCATION/TRAINING PROGRAM

## 2025-06-03 PROCEDURE — 258N000003 HC RX IP 258 OP 636: Performed by: CHIROPRACTOR

## 2025-06-03 PROCEDURE — 82607 VITAMIN B-12: CPT | Performed by: STUDENT IN AN ORGANIZED HEALTH CARE EDUCATION/TRAINING PROGRAM

## 2025-06-03 PROCEDURE — 370N000017 HC ANESTHESIA TECHNICAL FEE, PER MIN: Performed by: THORACIC SURGERY (CARDIOTHORACIC VASCULAR SURGERY)

## 2025-06-03 PROCEDURE — 710N000010 HC RECOVERY PHASE 1, LEVEL 2, PER MIN: Performed by: THORACIC SURGERY (CARDIOTHORACIC VASCULAR SURGERY)

## 2025-06-03 PROCEDURE — 250N000011 HC RX IP 250 OP 636: Performed by: CHIROPRACTOR

## 2025-06-03 PROCEDURE — 250N000013 HC RX MED GY IP 250 OP 250 PS 637: Performed by: CHIROPRACTOR

## 2025-06-03 PROCEDURE — 84443 ASSAY THYROID STIM HORMONE: CPT | Performed by: PHYSICIAN ASSISTANT

## 2025-06-03 PROCEDURE — 999N000065 XR CHEST PORT 1 VIEW

## 2025-06-03 PROCEDURE — 250N000013 HC RX MED GY IP 250 OP 250 PS 637: Performed by: STUDENT IN AN ORGANIZED HEALTH CARE EDUCATION/TRAINING PROGRAM

## 2025-06-03 PROCEDURE — 71275 CT ANGIOGRAPHY CHEST: CPT

## 2025-06-03 PROCEDURE — 70450 CT HEAD/BRAIN W/O DYE: CPT

## 2025-06-03 PROCEDURE — 71045 X-RAY EXAM CHEST 1 VIEW: CPT

## 2025-06-03 PROCEDURE — 82805 BLOOD GASES W/O2 SATURATION: CPT | Performed by: PHYSICIAN ASSISTANT

## 2025-06-03 PROCEDURE — 250N000009 HC RX 250

## 2025-06-03 PROCEDURE — 250N000025 HC SEVOFLURANE, PER MIN: Performed by: THORACIC SURGERY (CARDIOTHORACIC VASCULAR SURGERY)

## 2025-06-03 PROCEDURE — 250N000011 HC RX IP 250 OP 636

## 2025-06-03 PROCEDURE — 360N000077 HC SURGERY LEVEL 4, PER MIN: Performed by: THORACIC SURGERY (CARDIOTHORACIC VASCULAR SURGERY)

## 2025-06-03 PROCEDURE — 99418 PROLNG IP/OBS E/M EA 15 MIN: CPT | Performed by: PHYSICIAN ASSISTANT

## 2025-06-03 PROCEDURE — 71045 X-RAY EXAM CHEST 1 VIEW: CPT | Mod: 26 | Performed by: STUDENT IN AN ORGANIZED HEALTH CARE EDUCATION/TRAINING PROGRAM

## 2025-06-03 PROCEDURE — 82140 ASSAY OF AMMONIA: CPT | Performed by: PHYSICIAN ASSISTANT

## 2025-06-03 PROCEDURE — 82805 BLOOD GASES W/O2 SATURATION: CPT

## 2025-06-03 PROCEDURE — 83735 ASSAY OF MAGNESIUM: CPT | Performed by: STUDENT IN AN ORGANIZED HEALTH CARE EDUCATION/TRAINING PROGRAM

## 2025-06-03 PROCEDURE — 250N000011 HC RX IP 250 OP 636: Performed by: THORACIC SURGERY (CARDIOTHORACIC VASCULAR SURGERY)

## 2025-06-03 PROCEDURE — 250N000009 HC RX 250: Performed by: NURSE ANESTHETIST, CERTIFIED REGISTERED

## 2025-06-03 PROCEDURE — 70498 CT ANGIOGRAPHY NECK: CPT | Mod: 26 | Performed by: RADIOLOGY

## 2025-06-03 PROCEDURE — 250N000009 HC RX 250: Performed by: THORACIC SURGERY (CARDIOTHORACIC VASCULAR SURGERY)

## 2025-06-03 PROCEDURE — 80048 BASIC METABOLIC PNL TOTAL CA: CPT

## 2025-06-03 PROCEDURE — 99233 SBSQ HOSP IP/OBS HIGH 50: CPT | Performed by: PHYSICIAN ASSISTANT

## 2025-06-03 RX ORDER — PROCHLORPERAZINE MALEATE 5 MG/1
5 TABLET ORAL EVERY 6 HOURS PRN
Status: DISCONTINUED | OUTPATIENT
Start: 2025-06-03 | End: 2025-06-07 | Stop reason: HOSPADM

## 2025-06-03 RX ORDER — METHOCARBAMOL 500 MG/1
250 TABLET ORAL EVERY 6 HOURS PRN
Status: DISCONTINUED | OUTPATIENT
Start: 2025-06-03 | End: 2025-06-06

## 2025-06-03 RX ORDER — DEXAMETHASONE SODIUM PHOSPHATE 4 MG/ML
INJECTION, SOLUTION INTRA-ARTICULAR; INTRALESIONAL; INTRAMUSCULAR; INTRAVENOUS; SOFT TISSUE PRN
Status: DISCONTINUED | OUTPATIENT
Start: 2025-06-03 | End: 2025-06-03

## 2025-06-03 RX ORDER — NALOXONE HYDROCHLORIDE 0.4 MG/ML
0.2 INJECTION, SOLUTION INTRAMUSCULAR; INTRAVENOUS; SUBCUTANEOUS
Status: DISCONTINUED | OUTPATIENT
Start: 2025-06-03 | End: 2025-06-07 | Stop reason: HOSPADM

## 2025-06-03 RX ORDER — ACETAMINOPHEN 325 MG/1
975 TABLET ORAL ONCE
Status: COMPLETED | OUTPATIENT
Start: 2025-06-03 | End: 2025-06-03

## 2025-06-03 RX ORDER — CEFAZOLIN SODIUM/WATER 2 G/20 ML
SYRINGE (ML) INTRAVENOUS PRN
Status: DISCONTINUED | OUTPATIENT
Start: 2025-06-03 | End: 2025-06-03

## 2025-06-03 RX ORDER — ONDANSETRON 4 MG/1
4 TABLET, ORALLY DISINTEGRATING ORAL EVERY 30 MIN PRN
Status: DISCONTINUED | OUTPATIENT
Start: 2025-06-03 | End: 2025-06-03 | Stop reason: HOSPADM

## 2025-06-03 RX ORDER — HYDROMORPHONE HCL IN WATER/PF 6 MG/30 ML
0.4 PATIENT CONTROLLED ANALGESIA SYRINGE INTRAVENOUS EVERY 5 MIN PRN
Status: DISCONTINUED | OUTPATIENT
Start: 2025-06-03 | End: 2025-06-03 | Stop reason: HOSPADM

## 2025-06-03 RX ORDER — IOPAMIDOL 755 MG/ML
133 INJECTION, SOLUTION INTRAVASCULAR ONCE
Status: COMPLETED | OUTPATIENT
Start: 2025-06-03 | End: 2025-06-03

## 2025-06-03 RX ORDER — LISINOPRIL 10 MG/1
10 TABLET ORAL EVERY MORNING
Status: DISCONTINUED | OUTPATIENT
Start: 2025-06-04 | End: 2025-06-07 | Stop reason: HOSPADM

## 2025-06-03 RX ORDER — OXYCODONE HYDROCHLORIDE 10 MG/1
10 TABLET ORAL EVERY 4 HOURS PRN
Status: DISCONTINUED | OUTPATIENT
Start: 2025-06-03 | End: 2025-06-03

## 2025-06-03 RX ORDER — HYDROMORPHONE HYDROCHLORIDE 1 MG/ML
0.5 INJECTION, SOLUTION INTRAMUSCULAR; INTRAVENOUS; SUBCUTANEOUS ONCE
Status: COMPLETED | OUTPATIENT
Start: 2025-06-03 | End: 2025-06-03

## 2025-06-03 RX ORDER — CYCLOBENZAPRINE HCL 5 MG
5 TABLET ORAL 3 TIMES DAILY
Status: DISCONTINUED | OUTPATIENT
Start: 2025-06-03 | End: 2025-06-04

## 2025-06-03 RX ORDER — SIMVASTATIN 20 MG
40 TABLET ORAL AT BEDTIME
Status: DISCONTINUED | OUTPATIENT
Start: 2025-06-03 | End: 2025-06-05

## 2025-06-03 RX ORDER — SODIUM CHLORIDE, SODIUM LACTATE, POTASSIUM CHLORIDE, CALCIUM CHLORIDE 600; 310; 30; 20 MG/100ML; MG/100ML; MG/100ML; MG/100ML
INJECTION, SOLUTION INTRAVENOUS CONTINUOUS
Status: DISCONTINUED | OUTPATIENT
Start: 2025-06-03 | End: 2025-06-04

## 2025-06-03 RX ORDER — FENTANYL CITRATE 50 UG/ML
50 INJECTION, SOLUTION INTRAMUSCULAR; INTRAVENOUS EVERY 5 MIN PRN
Status: DISCONTINUED | OUTPATIENT
Start: 2025-06-03 | End: 2025-06-03 | Stop reason: HOSPADM

## 2025-06-03 RX ORDER — LIDOCAINE 40 MG/G
CREAM TOPICAL
Status: DISCONTINUED | OUTPATIENT
Start: 2025-06-03 | End: 2025-06-03 | Stop reason: HOSPADM

## 2025-06-03 RX ORDER — ONDANSETRON 2 MG/ML
INJECTION INTRAMUSCULAR; INTRAVENOUS PRN
Status: DISCONTINUED | OUTPATIENT
Start: 2025-06-03 | End: 2025-06-03

## 2025-06-03 RX ORDER — LIDOCAINE HYDROCHLORIDE 20 MG/ML
INJECTION, SOLUTION INFILTRATION; PERINEURAL PRN
Status: DISCONTINUED | OUTPATIENT
Start: 2025-06-03 | End: 2025-06-03

## 2025-06-03 RX ORDER — ONDANSETRON 2 MG/ML
4 INJECTION INTRAMUSCULAR; INTRAVENOUS EVERY 6 HOURS PRN
Status: DISCONTINUED | OUTPATIENT
Start: 2025-06-03 | End: 2025-06-07 | Stop reason: HOSPADM

## 2025-06-03 RX ORDER — NALOXONE HYDROCHLORIDE 0.4 MG/ML
0.1 INJECTION, SOLUTION INTRAMUSCULAR; INTRAVENOUS; SUBCUTANEOUS
Status: DISCONTINUED | OUTPATIENT
Start: 2025-06-03 | End: 2025-06-03 | Stop reason: HOSPADM

## 2025-06-03 RX ORDER — ACETAMINOPHEN 325 MG/1
975 TABLET ORAL EVERY 8 HOURS
Status: DISCONTINUED | OUTPATIENT
Start: 2025-06-03 | End: 2025-06-06

## 2025-06-03 RX ORDER — BUPIVACAINE HYDROCHLORIDE AND EPINEPHRINE 2.5; 5 MG/ML; UG/ML
INJECTION, SOLUTION EPIDURAL; INFILTRATION; INTRACAUDAL; PERINEURAL PRN
Status: DISCONTINUED | OUTPATIENT
Start: 2025-06-03 | End: 2025-06-03 | Stop reason: HOSPADM

## 2025-06-03 RX ORDER — TAMSULOSIN HYDROCHLORIDE 0.4 MG/1
0.4 CAPSULE ORAL DAILY
Status: DISCONTINUED | OUTPATIENT
Start: 2025-06-04 | End: 2025-06-07 | Stop reason: HOSPADM

## 2025-06-03 RX ORDER — ONDANSETRON 2 MG/ML
4 INJECTION INTRAMUSCULAR; INTRAVENOUS EVERY 30 MIN PRN
Status: DISCONTINUED | OUTPATIENT
Start: 2025-06-03 | End: 2025-06-03 | Stop reason: HOSPADM

## 2025-06-03 RX ORDER — SODIUM CHLORIDE, SODIUM LACTATE, POTASSIUM CHLORIDE, CALCIUM CHLORIDE 600; 310; 30; 20 MG/100ML; MG/100ML; MG/100ML; MG/100ML
INJECTION, SOLUTION INTRAVENOUS CONTINUOUS
Status: DISCONTINUED | OUTPATIENT
Start: 2025-06-03 | End: 2025-06-03 | Stop reason: HOSPADM

## 2025-06-03 RX ORDER — NALOXONE HYDROCHLORIDE 0.4 MG/ML
INJECTION, SOLUTION INTRAMUSCULAR; INTRAVENOUS; SUBCUTANEOUS
Status: COMPLETED
Start: 2025-06-03 | End: 2025-06-03

## 2025-06-03 RX ORDER — AMOXICILLIN 250 MG
1 CAPSULE ORAL 2 TIMES DAILY
Status: DISCONTINUED | OUTPATIENT
Start: 2025-06-03 | End: 2025-06-04

## 2025-06-03 RX ORDER — DEXAMETHASONE SODIUM PHOSPHATE 4 MG/ML
4 INJECTION, SOLUTION INTRA-ARTICULAR; INTRALESIONAL; INTRAMUSCULAR; INTRAVENOUS; SOFT TISSUE
Status: DISCONTINUED | OUTPATIENT
Start: 2025-06-03 | End: 2025-06-03 | Stop reason: HOSPADM

## 2025-06-03 RX ORDER — ONDANSETRON 4 MG/1
4 TABLET, ORALLY DISINTEGRATING ORAL EVERY 6 HOURS PRN
Status: DISCONTINUED | OUTPATIENT
Start: 2025-06-03 | End: 2025-06-07 | Stop reason: HOSPADM

## 2025-06-03 RX ORDER — IOPAMIDOL 755 MG/ML
67 INJECTION, SOLUTION INTRAVASCULAR ONCE
Status: DISCONTINUED | OUTPATIENT
Start: 2025-06-03 | End: 2025-06-03

## 2025-06-03 RX ORDER — ESMOLOL HYDROCHLORIDE 10 MG/ML
INJECTION INTRAVENOUS PRN
Status: DISCONTINUED | OUTPATIENT
Start: 2025-06-03 | End: 2025-06-03

## 2025-06-03 RX ORDER — HYDROMORPHONE HCL IN WATER/PF 6 MG/30 ML
0.2 PATIENT CONTROLLED ANALGESIA SYRINGE INTRAVENOUS
Status: DISCONTINUED | OUTPATIENT
Start: 2025-06-03 | End: 2025-06-03

## 2025-06-03 RX ORDER — HYDROMORPHONE HYDROCHLORIDE 1 MG/ML
INJECTION, SOLUTION INTRAMUSCULAR; INTRAVENOUS; SUBCUTANEOUS
Status: COMPLETED
Start: 2025-06-03 | End: 2025-06-03

## 2025-06-03 RX ORDER — EPHEDRINE SULFATE 50 MG/ML
INJECTION, SOLUTION INTRAMUSCULAR; INTRAVENOUS; SUBCUTANEOUS PRN
Status: DISCONTINUED | OUTPATIENT
Start: 2025-06-03 | End: 2025-06-03

## 2025-06-03 RX ORDER — FAMOTIDINE 20 MG/1
20 TABLET, FILM COATED ORAL 2 TIMES DAILY
Status: DISCONTINUED | OUTPATIENT
Start: 2025-06-03 | End: 2025-06-07 | Stop reason: HOSPADM

## 2025-06-03 RX ORDER — HYDROMORPHONE HCL IN WATER/PF 6 MG/30 ML
0.4 PATIENT CONTROLLED ANALGESIA SYRINGE INTRAVENOUS
Status: DISCONTINUED | OUTPATIENT
Start: 2025-06-03 | End: 2025-06-03

## 2025-06-03 RX ORDER — HYDROMORPHONE HCL IN WATER/PF 6 MG/30 ML
PATIENT CONTROLLED ANALGESIA SYRINGE INTRAVENOUS
Status: COMPLETED
Start: 2025-06-03 | End: 2025-06-03

## 2025-06-03 RX ORDER — FENTANYL CITRATE 50 UG/ML
25 INJECTION, SOLUTION INTRAMUSCULAR; INTRAVENOUS EVERY 5 MIN PRN
Status: DISCONTINUED | OUTPATIENT
Start: 2025-06-03 | End: 2025-06-03 | Stop reason: HOSPADM

## 2025-06-03 RX ORDER — FENTANYL CITRATE 50 UG/ML
INJECTION, SOLUTION INTRAMUSCULAR; INTRAVENOUS PRN
Status: DISCONTINUED | OUTPATIENT
Start: 2025-06-03 | End: 2025-06-03

## 2025-06-03 RX ORDER — LIDOCAINE HYDROCHLORIDE AND EPINEPHRINE 10; 10 MG/ML; UG/ML
5 INJECTION, SOLUTION INFILTRATION; PERINEURAL ONCE
Status: DISCONTINUED | OUTPATIENT
Start: 2025-06-03 | End: 2025-06-03

## 2025-06-03 RX ORDER — OXYCODONE HYDROCHLORIDE 5 MG/1
5 TABLET ORAL EVERY 4 HOURS PRN
Status: DISCONTINUED | OUTPATIENT
Start: 2025-06-03 | End: 2025-06-03

## 2025-06-03 RX ORDER — GABAPENTIN 250 MG/5ML
300 SOLUTION ORAL EVERY 8 HOURS SCHEDULED
Status: DISCONTINUED | OUTPATIENT
Start: 2025-06-03 | End: 2025-06-07 | Stop reason: HOSPADM

## 2025-06-03 RX ORDER — MULTIPLE VITAMINS W/ MINERALS TAB 9MG-400MCG
1 TAB ORAL DAILY
Status: DISCONTINUED | OUTPATIENT
Start: 2025-06-04 | End: 2025-06-06

## 2025-06-03 RX ORDER — SODIUM CHLORIDE, SODIUM LACTATE, POTASSIUM CHLORIDE, CALCIUM CHLORIDE 600; 310; 30; 20 MG/100ML; MG/100ML; MG/100ML; MG/100ML
INJECTION, SOLUTION INTRAVENOUS CONTINUOUS PRN
Status: DISCONTINUED | OUTPATIENT
Start: 2025-06-03 | End: 2025-06-03

## 2025-06-03 RX ORDER — NALOXONE HYDROCHLORIDE 0.4 MG/ML
0.4 INJECTION, SOLUTION INTRAMUSCULAR; INTRAVENOUS; SUBCUTANEOUS
Status: DISCONTINUED | OUTPATIENT
Start: 2025-06-03 | End: 2025-06-07 | Stop reason: HOSPADM

## 2025-06-03 RX ORDER — POLYETHYLENE GLYCOL 3350 17 G/17G
17 POWDER, FOR SOLUTION ORAL DAILY
Status: DISCONTINUED | OUTPATIENT
Start: 2025-06-04 | End: 2025-06-03

## 2025-06-03 RX ORDER — SODIUM CHLORIDE 9 MG/ML
INJECTION, SOLUTION INTRAVENOUS CONTINUOUS
Status: DISCONTINUED | OUTPATIENT
Start: 2025-06-03 | End: 2025-06-03

## 2025-06-03 RX ORDER — POLYETHYLENE GLYCOL 3350 17 G/17G
17 POWDER, FOR SOLUTION ORAL 2 TIMES DAILY
Status: DISCONTINUED | OUTPATIENT
Start: 2025-06-03 | End: 2025-06-07 | Stop reason: HOSPADM

## 2025-06-03 RX ORDER — ENOXAPARIN SODIUM 100 MG/ML
40 INJECTION SUBCUTANEOUS EVERY 24 HOURS
Status: DISCONTINUED | OUTPATIENT
Start: 2025-06-04 | End: 2025-06-07 | Stop reason: HOSPADM

## 2025-06-03 RX ORDER — HYDRALAZINE HYDROCHLORIDE 20 MG/ML
10 INJECTION INTRAMUSCULAR; INTRAVENOUS EVERY 30 MIN PRN
Status: DISCONTINUED | OUTPATIENT
Start: 2025-06-03 | End: 2025-06-07 | Stop reason: HOSPADM

## 2025-06-03 RX ORDER — BISACODYL 10 MG
10 SUPPOSITORY, RECTAL RECTAL DAILY PRN
Status: DISCONTINUED | OUTPATIENT
Start: 2025-06-06 | End: 2025-06-07 | Stop reason: HOSPADM

## 2025-06-03 RX ORDER — PROPOFOL 10 MG/ML
INJECTION, EMULSION INTRAVENOUS PRN
Status: DISCONTINUED | OUTPATIENT
Start: 2025-06-03 | End: 2025-06-03

## 2025-06-03 RX ORDER — LABETALOL HYDROCHLORIDE 5 MG/ML
10-40 INJECTION, SOLUTION INTRAVENOUS EVERY 10 MIN PRN
Status: DISCONTINUED | OUTPATIENT
Start: 2025-06-03 | End: 2025-06-07 | Stop reason: HOSPADM

## 2025-06-03 RX ORDER — LIDOCAINE 4 G/G
1 PATCH TOPICAL
Status: DISCONTINUED | OUTPATIENT
Start: 2025-06-03 | End: 2025-06-04

## 2025-06-03 RX ORDER — HYDROMORPHONE HCL IN WATER/PF 6 MG/30 ML
0.2 PATIENT CONTROLLED ANALGESIA SYRINGE INTRAVENOUS ONCE
Status: COMPLETED | OUTPATIENT
Start: 2025-06-03 | End: 2025-06-03

## 2025-06-03 RX ORDER — HYDROMORPHONE HCL IN WATER/PF 6 MG/30 ML
0.2 PATIENT CONTROLLED ANALGESIA SYRINGE INTRAVENOUS EVERY 5 MIN PRN
Status: DISCONTINUED | OUTPATIENT
Start: 2025-06-03 | End: 2025-06-03 | Stop reason: HOSPADM

## 2025-06-03 RX ADMIN — Medication 10 MG: at 10:05

## 2025-06-03 RX ADMIN — FENTANYL CITRATE 100 MCG: 50 INJECTION INTRAMUSCULAR; INTRAVENOUS at 08:39

## 2025-06-03 RX ADMIN — EPHEDRINE SULFATE 5 MG: 5 INJECTION INTRAVENOUS at 10:58

## 2025-06-03 RX ADMIN — SODIUM CHLORIDE, SODIUM LACTATE, POTASSIUM CHLORIDE, CALCIUM CHLORIDE: 600; 310; 30; 20 INJECTION, SOLUTION INTRAVENOUS at 09:04

## 2025-06-03 RX ADMIN — Medication 100 MG: at 11:25

## 2025-06-03 RX ADMIN — DEXAMETHASONE SODIUM PHOSPHATE 8 MG: 4 INJECTION, SOLUTION INTRAMUSCULAR; INTRAVENOUS at 09:09

## 2025-06-03 RX ADMIN — HYDROMORPHONE HYDROCHLORIDE 0.5 MG: 1 INJECTION, SOLUTION INTRAMUSCULAR; INTRAVENOUS; SUBCUTANEOUS at 20:01

## 2025-06-03 RX ADMIN — FENTANYL CITRATE 25 MCG: 50 INJECTION INTRAMUSCULAR; INTRAVENOUS at 10:23

## 2025-06-03 RX ADMIN — SODIUM CHLORIDE, SODIUM LACTATE, POTASSIUM CHLORIDE, AND CALCIUM CHLORIDE: .6; .31; .03; .02 INJECTION, SOLUTION INTRAVENOUS at 07:58

## 2025-06-03 RX ADMIN — FENTANYL CITRATE 50 MCG: 50 INJECTION INTRAMUSCULAR; INTRAVENOUS at 11:32

## 2025-06-03 RX ADMIN — Medication 10 MG: at 10:39

## 2025-06-03 RX ADMIN — LIDOCAINE 4% 1 PATCH: 40 PATCH TOPICAL at 21:33

## 2025-06-03 RX ADMIN — SODIUM CHLORIDE, SODIUM LACTATE, POTASSIUM CHLORIDE, AND CALCIUM CHLORIDE 500 ML: .6; .31; .03; .02 INJECTION, SOLUTION INTRAVENOUS at 17:42

## 2025-06-03 RX ADMIN — Medication 2 G: at 09:05

## 2025-06-03 RX ADMIN — DEXMEDETOMIDINE HYDROCHLORIDE 8 MCG: 100 INJECTION, SOLUTION INTRAVENOUS at 11:36

## 2025-06-03 RX ADMIN — NALOXONE HYDROCHLORIDE 0.2 MG: 0.4 INJECTION, SOLUTION INTRAMUSCULAR; INTRAVENOUS; SUBCUTANEOUS at 19:43

## 2025-06-03 RX ADMIN — Medication: at 12:59

## 2025-06-03 RX ADMIN — PHENYLEPHRINE HYDROCHLORIDE 100 MCG: 10 INJECTION INTRAVENOUS at 10:53

## 2025-06-03 RX ADMIN — FENTANYL CITRATE 50 MCG: 50 INJECTION INTRAMUSCULAR; INTRAVENOUS at 12:32

## 2025-06-03 RX ADMIN — FENTANYL CITRATE 50 MCG: 50 INJECTION INTRAMUSCULAR; INTRAVENOUS at 10:25

## 2025-06-03 RX ADMIN — PROPOFOL 150 MG: 10 INJECTION, EMULSION INTRAVENOUS at 08:40

## 2025-06-03 RX ADMIN — SODIUM CHLORIDE, SODIUM LACTATE, POTASSIUM CHLORIDE, AND CALCIUM CHLORIDE: .6; .31; .03; .02 INJECTION, SOLUTION INTRAVENOUS at 08:28

## 2025-06-03 RX ADMIN — Medication 100 MG: at 11:33

## 2025-06-03 RX ADMIN — CYCLOBENZAPRINE HYDROCHLORIDE 5 MG: 5 TABLET, FILM COATED ORAL at 21:34

## 2025-06-03 RX ADMIN — SODIUM CHLORIDE, SODIUM LACTATE, POTASSIUM CHLORIDE, AND CALCIUM CHLORIDE: .6; .31; .03; .02 INJECTION, SOLUTION INTRAVENOUS at 21:34

## 2025-06-03 RX ADMIN — LIDOCAINE HYDROCHLORIDE 5 MG: 10 INJECTION, SOLUTION EPIDURAL; INFILTRATION; INTRACAUDAL; PERINEURAL at 23:10

## 2025-06-03 RX ADMIN — DEXMEDETOMIDINE HYDROCHLORIDE 8 MCG: 100 INJECTION, SOLUTION INTRAVENOUS at 10:35

## 2025-06-03 RX ADMIN — ONDANSETRON 4 MG: 2 INJECTION INTRAMUSCULAR; INTRAVENOUS at 11:21

## 2025-06-03 RX ADMIN — SODIUM CHLORIDE 195 ML: 9 INJECTION, SOLUTION INTRAVENOUS at 20:37

## 2025-06-03 RX ADMIN — Medication 20 MG: at 09:25

## 2025-06-03 RX ADMIN — PHENYLEPHRINE HYDROCHLORIDE 100 MCG: 10 INJECTION INTRAVENOUS at 09:08

## 2025-06-03 RX ADMIN — HYDROMORPHONE HYDROCHLORIDE 0.2 MG: 0.2 INJECTION, SOLUTION INTRAMUSCULAR; INTRAVENOUS; SUBCUTANEOUS at 20:24

## 2025-06-03 RX ADMIN — DEXMEDETOMIDINE HYDROCHLORIDE 4 MCG: 100 INJECTION, SOLUTION INTRAVENOUS at 10:28

## 2025-06-03 RX ADMIN — PHENYLEPHRINE HYDROCHLORIDE 100 MCG: 10 INJECTION INTRAVENOUS at 10:49

## 2025-06-03 RX ADMIN — IOPAMIDOL 133 ML: 755 INJECTION, SOLUTION INTRAVENOUS at 20:37

## 2025-06-03 RX ADMIN — HYDROMORPHONE HYDROCHLORIDE 0.5 MG: 1 INJECTION, SOLUTION INTRAMUSCULAR; INTRAVENOUS; SUBCUTANEOUS at 09:30

## 2025-06-03 RX ADMIN — PHENYLEPHRINE HYDROCHLORIDE 100 MCG: 10 INJECTION INTRAVENOUS at 09:42

## 2025-06-03 RX ADMIN — PHENYLEPHRINE HYDROCHLORIDE 100 MCG: 10 INJECTION INTRAVENOUS at 08:51

## 2025-06-03 RX ADMIN — Medication 0.2 MG: at 20:24

## 2025-06-03 RX ADMIN — Medication 50 MG: at 08:40

## 2025-06-03 RX ADMIN — FENTANYL CITRATE 25 MCG: 50 INJECTION INTRAMUSCULAR; INTRAVENOUS at 10:17

## 2025-06-03 RX ADMIN — ACETAMINOPHEN 975 MG: 325 TABLET, FILM COATED ORAL at 16:29

## 2025-06-03 RX ADMIN — FAMOTIDINE 20 MG: 20 TABLET, FILM COATED ORAL at 21:34

## 2025-06-03 RX ADMIN — LIDOCAINE HYDROCHLORIDE 100 MG: 20 INJECTION, SOLUTION INFILTRATION; PERINEURAL at 08:40

## 2025-06-03 RX ADMIN — SODIUM CHLORIDE, SODIUM LACTATE, POTASSIUM CHLORIDE, AND CALCIUM CHLORIDE 500 ML: .6; .31; .03; .02 INJECTION, SOLUTION INTRAVENOUS at 20:01

## 2025-06-03 RX ADMIN — PHENYLEPHRINE HYDROCHLORIDE 100 MCG: 10 INJECTION INTRAVENOUS at 09:04

## 2025-06-03 RX ADMIN — SIMVASTATIN 40 MG: 40 TABLET, FILM COATED ORAL at 21:34

## 2025-06-03 RX ADMIN — ESMOLOL HYDROCHLORIDE 10 MG: 10 INJECTION, SOLUTION INTRAVENOUS at 10:43

## 2025-06-03 RX ADMIN — PHENYLEPHRINE HYDROCHLORIDE 0.2 MCG/KG/MIN: 10 INJECTION INTRAVENOUS at 09:04

## 2025-06-03 RX ADMIN — ACETAMINOPHEN 975 MG: 325 TABLET ORAL at 06:45

## 2025-06-03 ASSESSMENT — ACTIVITIES OF DAILY LIVING (ADL)
ADLS_ACUITY_SCORE: 17
ADLS_ACUITY_SCORE: 17
ADLS_ACUITY_SCORE: 29
ADLS_ACUITY_SCORE: 17
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
## 2025-06-03 NOTE — ANESTHESIA CARE TRANSFER NOTE
Patient: Phong Cuevas    Procedure: Procedure(s):  RIGHT PLICATION, DIAPHRAGM, LAPAROSCOPIC, Diagnostic right thoracoscopy       Diagnosis: Diaphragm paralysis [J98.6]  Diagnosis Additional Information: No value filed.    Anesthesia Type:   General     Note:    Oropharynx: oropharynx clear of all foreign objects  Level of Consciousness: awake  Oxygen Supplementation: face mask    Independent Airway: airway patency satisfactory and stable  Dentition: dentition unchanged  Vital Signs Stable: post-procedure vital signs reviewed and stable  Report to RN Given: handoff report given  Patient transferred to: PACU    Handoff Report: Identifed the Patient, Identified the Reponsible Provider, Reviewed the pertinent medical history, Discussed the surgical course, Reviewed Intra-OP anesthesia mangement and issues during anesthesia, Set expectations for post-procedure period and Allowed opportunity for questions and acknowledgement of understanding  Vitals:  Vitals Value Taken Time   /103 06/03/25 11:45   Temp     Pulse 103 06/03/25 11:54   Resp 16 06/03/25 11:54   SpO2 99 % 06/03/25 11:54   Vitals shown include unfiled device data.    Electronically Signed By: CULLEN Miller CRNA  Liz 3, 2025  11:54 AM

## 2025-06-03 NOTE — ANESTHESIA POSTPROCEDURE EVALUATION
Patient: Phong Cuevas    Procedure: Procedure(s):  RIGHT PLICATION, DIAPHRAGM, LAPAROSCOPIC, Diagnostic right thoracoscopy       Anesthesia Type:  General    Note:  Disposition: Inpatient   Postop Pain Control: Uneventful            Sign Out: Well controlled pain   PONV: No   Neuro/Psych: Uneventful            Sign Out: Acceptable/Baseline neuro status   Airway/Respiratory: Uneventful            Sign Out: Acceptable/Baseline resp. status   CV/Hemodynamics: Uneventful            Sign Out: Acceptable CV status; No obvious hypovolemia; No obvious fluid overload   Other NRE: NONE   DID A NON-ROUTINE EVENT OCCUR? No           Last vitals:  Vitals Value Taken Time   /104 06/03/25 14:17   Temp 36.5  C (97.7  F) 06/03/25 12:00   Pulse 117 06/03/25 14:25   Resp 19 06/03/25 14:25   SpO2 96 % 06/03/25 14:25   Vitals shown include unfiled device data.    Electronically Signed By: Caity Hampton MD  Liz 3, 2025  2:26 PM

## 2025-06-03 NOTE — BRIEF OP NOTE
[facility]    Brief Operative Note    Pre-operative diagnosis: Diaphragm paralysis [J98.6]  Post-operative diagnosis Same as pre-operative diagnosis    Procedure: RIGHT PLICATION, DIAPHRAGM, LAPAROSCOPIC, Diagnostic right thoracoscopy, Right - Abdomen    Surgeon: Surgeons and Role:     * Flako Gallegos MD - Primary     * Snow Galeas MD - Fellow - Assisting  Anesthesia: General   Estimated Blood Loss: 30 mL from 6/3/2025  8:29 AM to 6/3/2025 11:45 AM      Drains: 19Fr faith in right pleural space, keep on water seal  Specimens: * No specimens in log *  Findings:   None.  Complications: None.  Implants: * No implants in log *

## 2025-06-03 NOTE — OP NOTE
Preoperative diagnosis:  RIGHT maria [place] carmen diaphragm paralysis or eventration     Postoperative diagnosis:  The same as preoperative diagnosis     Procedure:      Laparoscopic RIGHT diaphragm plication     RIGHT diagnostic thoracoscopy     RIGHT tube thoracostomy     Anesthesia: General , single-lumen ETT     Surgeon: Flako Gallegos (present and participated in the entire procedure)     Resident surgeon: Snow Galeas     EBL: 30 ml     Complications: None immediate     Findings: Straightforward plication     Patient tolerance: Good       Procedure     We secured Phong Cuevas in the supine position, with a foot-board and prepared and draped the area. We used a 4-port approach (subxiphoid, periumbilical, and 2 x RIGHT subcostal) and placed the first port using an open technique with fascial sutures for eventual closure.      We placed a 19 Fr. Tyler drain through the subxiphoid incision retrosternally into the RIGHT pleural cavity and secured it. We vented the chest intermittently as needed to regulate diaphragm tautness during the procedure and facilitate handling of the diaphragm.     Next, we made a small opening in the diaphragm to allow the pressures to equilibrate between abdomen and chest and facilitate handling of the diaphragm. We used #2 pledgeted Tircron U-shaped stitches to plicate the most posterior portion of the diaphragm. Following this, we plicated the anterior portion with 2-3 weaving stitches.     We advanced the subxiphoid port into the pleural space and placed the scope into the chest cavity to verify that the posterior plication was appropriate, and that the Tyler drain was not caught in a stitch.     We asked anesthesia for some Valsalva breaths to recruit the lower lobe well.     We closed the incisions with absorbable sutures.

## 2025-06-03 NOTE — PROGRESS NOTES
"  Thoracic Surgery Progress Note  Surgery Cross-Cover  Post Op Check    06/03/2025    Phong Cuevas is a 70 year old male POD#0 s/p Procedure(s):  RIGHT PLICATION, DIAPHRAGM, LAPAROSCOPIC, Diagnostic right thoracoscopy for Pre-Op Diagnosis Codes:      * Diaphragm paralysis [J98.6]    Pt reports their pain is controlled with current regimen of PCA. Denies nausea, SOB, chest pain, or dizziness. Patient is not passing flatus or having bowel movements and has not yet voided.     BP (!) 150/92   Pulse (!) 122   Temp 97.7  F (36.5  C) (Oral)   Resp 30   Ht 1.778 m (5' 10\")   Wt 89.2 kg (196 lb 10.4 oz)   SpO2 94%   BMI 28.22 kg/m      Gen: A&O x4, NAD   CV: sinus tach 130s  Resp: breathing non-labored on 3L NC  Abdomen: soft, appropriately tender, non-distended, bandages with minimal strike through  CT: tidal appropriately, no air bubbles appreciated, 60cc bloody output  Incision: bandages cdi, surrounding skin presents no sign of acute infection including erythema, underlying induration or fluctuance, abnormal bruising, purulence, or other drainage.     Extremities: warm and well perfused    A/P:   - CBC, CXR, 500cc bolus, bladder scan, EKG  - Otherwise, continue plan of care per primary team. Please call with any questions.    Cm Cain, DO    "

## 2025-06-04 ENCOUNTER — APPOINTMENT (OUTPATIENT)
Dept: GENERAL RADIOLOGY | Facility: CLINIC | Age: 70
DRG: 163 | End: 2025-06-04
Attending: STUDENT IN AN ORGANIZED HEALTH CARE EDUCATION/TRAINING PROGRAM
Payer: COMMERCIAL

## 2025-06-04 ENCOUNTER — RESULTS FOLLOW-UP (OUTPATIENT)
Dept: CARDIOLOGY | Facility: CLINIC | Age: 70
End: 2025-06-04

## 2025-06-04 LAB
ALBUMIN SERPL BCG-MCNC: 4.3 G/DL (ref 3.5–5.2)
ALLEN'S TEST: YES
ALP SERPL-CCNC: 54 U/L (ref 40–150)
ALT SERPL W P-5'-P-CCNC: 214 U/L (ref 0–70)
ANION GAP SERPL CALCULATED.3IONS-SCNC: 11 MMOL/L (ref 7–15)
ANION GAP SERPL CALCULATED.3IONS-SCNC: 15 MMOL/L (ref 7–15)
AST SERPL W P-5'-P-CCNC: 182 U/L (ref 0–45)
ATRIAL RATE - MUSE: 130 BPM
BASE EXCESS BLDA CALC-SCNC: -1.3 MMOL/L (ref -3–3)
BASE EXCESS BLDA CALC-SCNC: -2.6 MMOL/L (ref -3–3)
BASE EXCESS BLDA CALC-SCNC: 0.6 MMOL/L (ref -3–3)
BILIRUB SERPL-MCNC: 0.3 MG/DL
BILIRUBIN DIRECT (ROCHE PRO & PURE): 0.18 MG/DL (ref 0–0.45)
BUN SERPL-MCNC: 16.9 MG/DL (ref 8–23)
BUN SERPL-MCNC: 18.4 MG/DL (ref 8–23)
CALCIUM SERPL-MCNC: 8.7 MG/DL (ref 8.8–10.4)
CALCIUM SERPL-MCNC: 9.2 MG/DL (ref 8.8–10.4)
CHLORIDE SERPL-SCNC: 100 MMOL/L (ref 98–107)
CHLORIDE SERPL-SCNC: 100 MMOL/L (ref 98–107)
CREAT SERPL-MCNC: 0.8 MG/DL (ref 0.67–1.17)
CREAT SERPL-MCNC: 0.83 MG/DL (ref 0.67–1.17)
CRP SERPL-MCNC: 53.7 MG/L
DIASTOLIC BLOOD PRESSURE - MUSE: NORMAL MMHG
EGFRCR SERPLBLD CKD-EPI 2021: >90 ML/MIN/1.73M2
EGFRCR SERPLBLD CKD-EPI 2021: >90 ML/MIN/1.73M2
ERYTHROCYTE [DISTWIDTH] IN BLOOD BY AUTOMATED COUNT: 11.7 % (ref 10–15)
ERYTHROCYTE [DISTWIDTH] IN BLOOD BY AUTOMATED COUNT: 11.7 % (ref 10–15)
FOLATE SERPL-MCNC: 27.9 NG/ML (ref 4.6–34.8)
GLUCOSE BLDC GLUCOMTR-MCNC: 127 MG/DL (ref 70–99)
GLUCOSE SERPL-MCNC: 142 MG/DL (ref 70–99)
GLUCOSE SERPL-MCNC: 161 MG/DL (ref 70–99)
HCO3 BLD-SCNC: 25 MMOL/L (ref 21–28)
HCO3 BLD-SCNC: 27 MMOL/L (ref 21–28)
HCO3 BLD-SCNC: 28 MMOL/L (ref 21–28)
HCO3 SERPL-SCNC: 21 MMOL/L (ref 22–29)
HCO3 SERPL-SCNC: 24 MMOL/L (ref 22–29)
HCT VFR BLD AUTO: 43.8 % (ref 40–53)
HCT VFR BLD AUTO: 47.1 % (ref 40–53)
HGB BLD-MCNC: 14.7 G/DL (ref 13.3–17.7)
HGB BLD-MCNC: 15.5 G/DL (ref 13.3–17.7)
INTERPRETATION ECG - MUSE: NORMAL
MCH RBC QN AUTO: 30.7 PG (ref 26.5–33)
MCH RBC QN AUTO: 31 PG (ref 26.5–33)
MCHC RBC AUTO-ENTMCNC: 32.9 G/DL (ref 31.5–36.5)
MCHC RBC AUTO-ENTMCNC: 33.6 G/DL (ref 31.5–36.5)
MCV RBC AUTO: 92 FL (ref 78–100)
MCV RBC AUTO: 93 FL (ref 78–100)
O2/TOTAL GAS SETTING VFR VENT: 37 %
O2/TOTAL GAS SETTING VFR VENT: 43 %
O2/TOTAL GAS SETTING VFR VENT: 43 %
OXYHGB MFR BLDA: 87 % (ref 92–100)
OXYHGB MFR BLDA: 89 % (ref 92–100)
OXYHGB MFR BLDA: 96 % (ref 92–100)
P AXIS - MUSE: -16 DEGREES
PCO2 BLD: 55 MM HG (ref 35–45)
PCO2 BLD: 55 MM HG (ref 35–45)
PCO2 BLD: 57 MM HG (ref 35–45)
PH BLD: 7.27 [PH] (ref 7.35–7.45)
PH BLD: 7.28 [PH] (ref 7.35–7.45)
PH BLD: 7.32 [PH] (ref 7.35–7.45)
PLATELET # BLD AUTO: 203 10E3/UL (ref 150–450)
PLATELET # BLD AUTO: 210 10E3/UL (ref 150–450)
PO2 BLD: 57 MM HG (ref 80–105)
PO2 BLD: 59 MM HG (ref 80–105)
PO2 BLD: 84 MM HG (ref 80–105)
POTASSIUM SERPL-SCNC: 4.7 MMOL/L (ref 3.4–5.3)
POTASSIUM SERPL-SCNC: 5 MMOL/L (ref 3.4–5.3)
PR INTERVAL - MUSE: 158 MS
PROT SERPL-MCNC: 7.3 G/DL (ref 6.4–8.3)
QRS DURATION - MUSE: 92 MS
QT - MUSE: 310 MS
QTC - MUSE: 456 MS
R AXIS - MUSE: -11 DEGREES
RBC # BLD AUTO: 4.74 10E6/UL (ref 4.4–5.9)
RBC # BLD AUTO: 5.05 10E6/UL (ref 4.4–5.9)
SAO2 % BLDA: 88.9 % (ref 95–96)
SAO2 % BLDA: 90.1 % (ref 95–96)
SAO2 % BLDA: 97.1 % (ref 95–96)
SODIUM SERPL-SCNC: 135 MMOL/L (ref 135–145)
SODIUM SERPL-SCNC: 136 MMOL/L (ref 135–145)
SYSTOLIC BLOOD PRESSURE - MUSE: NORMAL MMHG
T AXIS - MUSE: -12 DEGREES
VENTRICULAR RATE- MUSE: 130 BPM
VIT B12 SERPL-MCNC: 1039 PG/ML (ref 232–1245)
WBC # BLD AUTO: 12.4 10E3/UL (ref 4–11)
WBC # BLD AUTO: 12.5 10E3/UL (ref 4–11)

## 2025-06-04 PROCEDURE — 258N000003 HC RX IP 258 OP 636

## 2025-06-04 PROCEDURE — 250N000013 HC RX MED GY IP 250 OP 250 PS 637

## 2025-06-04 PROCEDURE — 999N000065 XR CHEST PORT 1 VIEW

## 2025-06-04 PROCEDURE — 250N000011 HC RX IP 250 OP 636: Mod: JZ | Performed by: THORACIC SURGERY (CARDIOTHORACIC VASCULAR SURGERY)

## 2025-06-04 PROCEDURE — 250N000011 HC RX IP 250 OP 636: Performed by: STUDENT IN AN ORGANIZED HEALTH CARE EDUCATION/TRAINING PROGRAM

## 2025-06-04 PROCEDURE — 250N000011 HC RX IP 250 OP 636

## 2025-06-04 PROCEDURE — 85014 HEMATOCRIT: CPT

## 2025-06-04 PROCEDURE — 99221 1ST HOSP IP/OBS SF/LOW 40: CPT | Mod: GC | Performed by: STUDENT IN AN ORGANIZED HEALTH CARE EDUCATION/TRAINING PROGRAM

## 2025-06-04 PROCEDURE — 71045 X-RAY EXAM CHEST 1 VIEW: CPT | Mod: 26 | Performed by: RADIOLOGY

## 2025-06-04 PROCEDURE — 80048 BASIC METABOLIC PNL TOTAL CA: CPT

## 2025-06-04 PROCEDURE — 250N000013 HC RX MED GY IP 250 OP 250 PS 637: Performed by: STUDENT IN AN ORGANIZED HEALTH CARE EDUCATION/TRAINING PROGRAM

## 2025-06-04 PROCEDURE — 250N000009 HC RX 250

## 2025-06-04 PROCEDURE — 86140 C-REACTIVE PROTEIN: CPT | Performed by: STUDENT IN AN ORGANIZED HEALTH CARE EDUCATION/TRAINING PROGRAM

## 2025-06-04 PROCEDURE — 82805 BLOOD GASES W/O2 SATURATION: CPT

## 2025-06-04 PROCEDURE — 82805 BLOOD GASES W/O2 SATURATION: CPT | Performed by: STUDENT IN AN ORGANIZED HEALTH CARE EDUCATION/TRAINING PROGRAM

## 2025-06-04 PROCEDURE — 82746 ASSAY OF FOLIC ACID SERUM: CPT | Performed by: STUDENT IN AN ORGANIZED HEALTH CARE EDUCATION/TRAINING PROGRAM

## 2025-06-04 PROCEDURE — 999N000157 HC STATISTIC RCP TIME EA 10 MIN

## 2025-06-04 PROCEDURE — 258N000003 HC RX IP 258 OP 636: Performed by: STUDENT IN AN ORGANIZED HEALTH CARE EDUCATION/TRAINING PROGRAM

## 2025-06-04 PROCEDURE — 36415 COLL VENOUS BLD VENIPUNCTURE: CPT | Performed by: STUDENT IN AN ORGANIZED HEALTH CARE EDUCATION/TRAINING PROGRAM

## 2025-06-04 PROCEDURE — 71045 X-RAY EXAM CHEST 1 VIEW: CPT

## 2025-06-04 PROCEDURE — 120N000003 HC R&B IMCU UMMC

## 2025-06-04 PROCEDURE — 36415 COLL VENOUS BLD VENIPUNCTURE: CPT

## 2025-06-04 RX ORDER — CYCLOBENZAPRINE HCL 5 MG
10 TABLET ORAL 3 TIMES DAILY
Status: DISCONTINUED | OUTPATIENT
Start: 2025-06-04 | End: 2025-06-06

## 2025-06-04 RX ORDER — HYDROMORPHONE HYDROCHLORIDE 1 MG/ML
INJECTION, SOLUTION INTRAMUSCULAR; INTRAVENOUS; SUBCUTANEOUS
Status: COMPLETED
Start: 2025-06-04 | End: 2025-06-04

## 2025-06-04 RX ORDER — KETAMINE HCL IN 0.9 % NACL 20 MG/2 ML
5 SYRINGE (ML) INTRAVENOUS
Status: DISCONTINUED | OUTPATIENT
Start: 2025-06-04 | End: 2025-06-04

## 2025-06-04 RX ORDER — LIDOCAINE HYDROCHLORIDE 10 MG/ML
5 INJECTION, SOLUTION EPIDURAL; INFILTRATION; INTRACAUDAL; PERINEURAL ONCE
Status: COMPLETED | OUTPATIENT
Start: 2025-06-04 | End: 2025-06-04

## 2025-06-04 RX ORDER — KETOROLAC TROMETHAMINE 30 MG/ML
15 INJECTION, SOLUTION INTRAMUSCULAR; INTRAVENOUS EVERY 6 HOURS PRN
Status: DISCONTINUED | OUTPATIENT
Start: 2025-06-04 | End: 2025-06-04

## 2025-06-04 RX ORDER — KETOROLAC TROMETHAMINE 30 MG/ML
15 INJECTION, SOLUTION INTRAMUSCULAR; INTRAVENOUS EVERY 6 HOURS
Status: COMPLETED | OUTPATIENT
Start: 2025-06-04 | End: 2025-06-06

## 2025-06-04 RX ORDER — HYDROMORPHONE HYDROCHLORIDE 1 MG/ML
0.5 INJECTION, SOLUTION INTRAMUSCULAR; INTRAVENOUS; SUBCUTANEOUS ONCE
Status: COMPLETED | OUTPATIENT
Start: 2025-06-04 | End: 2025-06-04

## 2025-06-04 RX ORDER — LIDOCAINE 4 G/G
2 PATCH TOPICAL
Status: DISCONTINUED | OUTPATIENT
Start: 2025-06-04 | End: 2025-06-07 | Stop reason: HOSPADM

## 2025-06-04 RX ORDER — AMOXICILLIN 250 MG
2 CAPSULE ORAL 2 TIMES DAILY
Status: DISCONTINUED | OUTPATIENT
Start: 2025-06-04 | End: 2025-06-07 | Stop reason: HOSPADM

## 2025-06-04 RX ORDER — SODIUM CHLORIDE 9 MG/ML
INJECTION, SOLUTION INTRAVENOUS CONTINUOUS
Status: DISCONTINUED | OUTPATIENT
Start: 2025-06-04 | End: 2025-06-05

## 2025-06-04 RX ORDER — SODIUM CHLORIDE AND POTASSIUM CHLORIDE 150; 900 MG/100ML; MG/100ML
INJECTION, SOLUTION INTRAVENOUS CONTINUOUS
Status: DISCONTINUED | OUTPATIENT
Start: 2025-06-04 | End: 2025-06-04

## 2025-06-04 RX ORDER — HYDROXYZINE HYDROCHLORIDE 10 MG/1
10 TABLET, FILM COATED ORAL EVERY 6 HOURS PRN
Status: DISCONTINUED | OUTPATIENT
Start: 2025-06-04 | End: 2025-06-07 | Stop reason: HOSPADM

## 2025-06-04 RX ORDER — OXYCODONE HYDROCHLORIDE 5 MG/1
5 TABLET ORAL EVERY 4 HOURS PRN
Refills: 0 | Status: DISCONTINUED | OUTPATIENT
Start: 2025-06-04 | End: 2025-06-05

## 2025-06-04 RX ORDER — CARBOXYMETHYLCELLULOSE SODIUM 5 MG/ML
1 SOLUTION/ DROPS OPHTHALMIC
Status: DISCONTINUED | OUTPATIENT
Start: 2025-06-04 | End: 2025-06-07 | Stop reason: HOSPADM

## 2025-06-04 RX ADMIN — KETOROLAC TROMETHAMINE 15 MG: 30 INJECTION, SOLUTION INTRAMUSCULAR at 15:54

## 2025-06-04 RX ADMIN — ENOXAPARIN SODIUM 40 MG: 40 INJECTION SUBCUTANEOUS at 10:32

## 2025-06-04 RX ADMIN — FAMOTIDINE 20 MG: 10 INJECTION, SOLUTION INTRAVENOUS at 09:09

## 2025-06-04 RX ADMIN — PROCHLORPERAZINE EDISYLATE 5 MG: 5 INJECTION INTRAMUSCULAR; INTRAVENOUS at 04:17

## 2025-06-04 RX ADMIN — TAMSULOSIN HYDROCHLORIDE 0.4 MG: 0.4 CAPSULE ORAL at 09:08

## 2025-06-04 RX ADMIN — MAGNESIUM HYDROXIDE 30 ML: 400 SUSPENSION ORAL at 09:09

## 2025-06-04 RX ADMIN — HYDROMORPHONE HYDROCHLORIDE 0.5 MG: 1 INJECTION, SOLUTION INTRAMUSCULAR; INTRAVENOUS; SUBCUTANEOUS at 01:54

## 2025-06-04 RX ADMIN — SENNOSIDES AND DOCUSATE SODIUM 1 TABLET: 50; 8.6 TABLET ORAL at 09:09

## 2025-06-04 RX ADMIN — Medication 1 TABLET: at 09:08

## 2025-06-04 RX ADMIN — SENNOSIDES AND DOCUSATE SODIUM 2 TABLET: 50; 8.6 TABLET ORAL at 20:01

## 2025-06-04 RX ADMIN — ACETAMINOPHEN 975 MG: 325 TABLET, FILM COATED ORAL at 15:54

## 2025-06-04 RX ADMIN — CYCLOBENZAPRINE HYDROCHLORIDE 5 MG: 5 TABLET, FILM COATED ORAL at 09:09

## 2025-06-04 RX ADMIN — LIDOCAINE HYDROCHLORIDE 2 ML: 10 INJECTION, SOLUTION EPIDURAL; INFILTRATION; INTRACAUDAL; PERINEURAL at 01:55

## 2025-06-04 RX ADMIN — MAGNESIUM HYDROXIDE 30 ML: 400 SUSPENSION ORAL at 20:01

## 2025-06-04 RX ADMIN — KETOROLAC TROMETHAMINE 15 MG: 30 INJECTION, SOLUTION INTRAMUSCULAR at 22:22

## 2025-06-04 RX ADMIN — KETOROLAC TROMETHAMINE 15 MG: 30 INJECTION, SOLUTION INTRAMUSCULAR at 04:02

## 2025-06-04 RX ADMIN — ONDANSETRON 4 MG: 2 INJECTION, SOLUTION INTRAMUSCULAR; INTRAVENOUS at 02:42

## 2025-06-04 RX ADMIN — DOXYLAMINE SUCCINATE 25 MG: 25 TABLET ORAL at 22:23

## 2025-06-04 RX ADMIN — SIMVASTATIN 40 MG: 40 TABLET, FILM COATED ORAL at 22:23

## 2025-06-04 RX ADMIN — GABAPENTIN 300 MG: 250 SOLUTION ORAL at 13:48

## 2025-06-04 RX ADMIN — KETAMINE HYDROCHLORIDE 5 MG/HR: 10 INJECTION INTRAMUSCULAR; INTRAVENOUS at 04:07

## 2025-06-04 RX ADMIN — SODIUM CHLORIDE: 9 INJECTION, SOLUTION INTRAVENOUS at 03:24

## 2025-06-04 RX ADMIN — SODIUM CHLORIDE: 9 INJECTION, SOLUTION INTRAVENOUS at 14:42

## 2025-06-04 RX ADMIN — Medication 50 MG: at 09:08

## 2025-06-04 RX ADMIN — CYCLOBENZAPRINE HYDROCHLORIDE 10 MG: 5 TABLET, FILM COATED ORAL at 20:01

## 2025-06-04 RX ADMIN — CYCLOBENZAPRINE HYDROCHLORIDE 10 MG: 5 TABLET, FILM COATED ORAL at 13:48

## 2025-06-04 RX ADMIN — NALOXONE HYDROCHLORIDE 0.2 MG: 0.4 INJECTION, SOLUTION INTRAMUSCULAR; INTRAVENOUS; SUBCUTANEOUS at 02:18

## 2025-06-04 RX ADMIN — FAMOTIDINE 20 MG: 10 INJECTION, SOLUTION INTRAVENOUS at 20:01

## 2025-06-04 RX ADMIN — ACETAMINOPHEN 975 MG: 325 TABLET, FILM COATED ORAL at 09:08

## 2025-06-04 RX ADMIN — KETOROLAC TROMETHAMINE 15 MG: 30 INJECTION, SOLUTION INTRAMUSCULAR at 09:08

## 2025-06-04 RX ADMIN — LIDOCAINE 2 PATCH: 560 PATCH PERCUTANEOUS; TOPICAL; TRANSDERMAL at 04:00

## 2025-06-04 RX ADMIN — POLYETHYLENE GLYCOL 3350 17 G: 17 POWDER, FOR SOLUTION ORAL at 20:01

## 2025-06-04 RX ADMIN — POLYETHYLENE GLYCOL 3350 17 G: 17 POWDER, FOR SOLUTION ORAL at 09:07

## 2025-06-04 RX ADMIN — GABAPENTIN 300 MG: 250 SOLUTION ORAL at 22:22

## 2025-06-04 ASSESSMENT — ACTIVITIES OF DAILY LIVING (ADL)
ADLS_ACUITY_SCORE: 45
ADLS_ACUITY_SCORE: 45
ADLS_ACUITY_SCORE: 29
ADLS_ACUITY_SCORE: 45
ADLS_ACUITY_SCORE: 45
ADLS_ACUITY_SCORE: 35
ADLS_ACUITY_SCORE: 45
ADLS_ACUITY_SCORE: 35
ADLS_ACUITY_SCORE: 45
ADLS_ACUITY_SCORE: 29
ADLS_ACUITY_SCORE: 45
ADLS_ACUITY_SCORE: 35
ADLS_ACUITY_SCORE: 29
ADLS_ACUITY_SCORE: 40
ADLS_ACUITY_SCORE: 45
ADLS_ACUITY_SCORE: 35
ADLS_ACUITY_SCORE: 45
ADLS_ACUITY_SCORE: 30
ADLS_ACUITY_SCORE: 29
ADLS_ACUITY_SCORE: 29
ADLS_ACUITY_SCORE: 40

## 2025-06-04 NOTE — PROVIDER NOTIFICATION
Forwarded to Dr. Alaniz per neurology: Nothing in brain, but concerned about air in mediastinum and subcutaneous air.

## 2025-06-04 NOTE — PLAN OF CARE
Goal Outcome Evaluation:      Plan of Care Reviewed With: patient    Overall Patient Progress: improving    [id number]    Shift changes:   -Discontinued ketamine infusion this AM. On PCA dilaudid 0.2 mg. PRN oral pain meds available.  -CT removed.   -Mentation and respiratory status improved. Weaned off from high flow to NC  -Weaned off 1:1 at 3pm     Neuro: A&O x4  Cardiac: SR/ST (90-110s). Denied chest pain, dizziness, palpitations. VSS, afebrile.   Respiratory: Could be on RA when awake but on 2-4L of NC when sleeping. Sating >92%.   GI/: No BM yet, adequate UOP via larson.   Diet/Appetite: NPO except ice chips and meds.   Skin: Surgical incisions sites, no new deficits noted.   LDA: Left PIV infusing PCA dilaudid and NS, see MAR for details. Right PIV SL.   Activity: haven't been out of bed yet, patient moves well in bed.   Pain: Incisional pain reported. Pain in controlled with current regimen.   Plan: Monitor pain, resp status. Continue with POC. Notify primary team of pertinent changes.

## 2025-06-04 NOTE — CONSULTS
"[facility]     Stroke Code Note          History of Present Illness     Chief Complaint: Small pupils, confusion, blurred vision      Phong Cuevas is a 70 year old presenting for surgical revision of a right elevated hemidiaphragm.  Several hours after surgery, stroke code was called for \"pinpoint pupils\", possible blurred vision, and decreased level of consciousness after waking up from a short nap.    Per primary team who was present at bedside to provide further history, patient finished surgery at approximately 4 PM and was taken up to his room without incident.  He was started on Dilaudid IV PCA for pain control.  He had received approximately 4.5 mg up until approximately 6:11 PM.  It was at that time he stopped pushing the button.  Around 7 30 PM during a nursing check, he was noted to have small pupils that were seemingly not reactive to light, and was confused about where he was and had difficulty answering questions.  He seemed tremulous and agitated.    Upon my arrival to bedside, the patient was awake with eyes open, but did require some frequent reorientation.  He was able to answer questions but had some difficulty answering them quickly.  When asked if he was experiencing any symptoms, he said repeatedly \"I feel really tired\".  He did not know the date.  He did know his name and that he was at the hospital for his lung.  Pupils were small, but via pupillometer NPI's were 4.1 on the right, and 3.7 on the left which was reassuring.  He did not have a gaze deviation, facial weakness, limb weakness or sensory changes.  Visual fields were intact.  NIH stroke scale was 1 2 did not being able to answer 1 question correctly.    Blood pressure was elevated at 163/98.  He was tachycardic up to 125.  Oxygen saturation is 92 he was requiring 4 L via oxymask.  Blood glucose was 174.  Patient was given Narcan, but pupils did not correct and he became more " "agitated and uncomfortable.  It was determined that he may be in a significant amount of pain and had not received enough pain meds per the primary team.  Therefore extra Dilaudid was given and the patient became much more calm with improvement in vitals.  He was at that time he was felt to be medically stable to go down for CT head and CTA head and neck for stroke code protocol.  These images were relatively reassuring did not show any new evidence of hemorrhage, infarct or large vessel occlusion.         Past Medical History     Stroke risk factors: Hyperlipidemia, prediabetes, recent surgery    Preadmission antithrombotic regimen: None    Modified Kenton Score (Pre-morbid)  0 - No symptoms.                 Assessment and Plan       1.  Encephalopathy, likely toxic metabolic    Patient is a 70-year-old with past medical history of hypertension, hyperlipidemia presenting for surgical repair of right hemidiaphragm paralysis.  Several hours after returning from the OR, patient was noted to be confused, had reported visual loss and \"pinpoint pupils.\"  Stroke code called.    Examination and imaging reassuring against acute ischemic stroke.  Pupils were small but briskly reactive via pupillometer.  He was awake and able to answer some questions.  He was following commands, but slowly and did not have any focal weakness which was reassuring.  His presentation at this time is thought to be most likely related to his postop status in the setting of high-dose pain medication (Dilaudid PCA).  CT/CTA was negative for hemorrhage, infarct or LVO.  Not a TNK candidate due to recent surgery and low suspicion for stroke.  Thrombectomy not offered due to absence of LVO.      Some labs have returned since the stroke code was de-escalated showing a respiratory acidosis which could be a main contributor.  Recommend thorough toxic/metabolic workup for his encephalopathy.  No further stroke workup recommended at this time.   "   Intravenous Thrombolysis  Not given due to:   - stroke mimic: Encephalopathy  - surgery/trauma within the past 14 days     Endovascular Treatment  Not initiated due to absence of proximal vessel occlusion     Plan:  -No further stroke workup recommended at this time  -Workup for a toxic/metabolic encephalopathy  -Pain control and monitoring per primary team       Please reach out with any additional concerns or questions.  We will sign off.     ___________________________________________________________________    The patient was discussed with on-call stroke attending Dr. Zuniga.  The day team stroke attending is Dr. Stinson.      Tio Aponte MD  Neurology Resident  ___________________________________________________________________    Day team addendum:    Patient was seen at the bedside this morning, alert and oriented was able to state his name, month and address of the building.  Neuroexam without acute cranial nerve motor or sensory deficits. VBG consistent with respiratory acidosis with persistent elevated CO2 55 which likely explains acute change in mental status last night.  Unclear if underlying etiology was from recovery from anesthesia or inadequate pain control.  Given the nonfocal exam and hypercarbia requiring high flow oxygen possibly explains because of encephalopathy; low concern for acute vascular event as underlying cause.  No further workup indicated from stroke standpoint.  Stroke neurology will sign off. Please reach out with any questions    BEST Tillman  Neurology Resident PGY2           Imaging/Labs   (personally reviewed)    HEAD CT:  1. No evidence of hemorrhage.  2. No acute infarct identified.     HEAD CTA:   1.  No evidence of large vessel occlusion or high-grade stenosis.     NECK CTA:  1.  No evidence of large vessel occlusion or high-grade stenosis.  2.  Soft tissue gas throughout the left superficial and deep neck, bilateral upper chest, left upper back, and mediastinum.  3.   Small right apical pneumothorax.         Physical Examination     BP: (!) 163/98   Pulse: (!) 125   Resp: 20   Temp: 97.6  F (36.4  C)   Temp src: Oral   SpO2: 92 %   O2 Device: Oxymask   Weight: 89.2 kg (196 lb 10.4 oz)    Wt Readings from Last 2 Encounters:   06/03/25 89.2 kg (196 lb 10.4 oz)   05/06/25 94.3 kg (207 lb 14.4 oz)       General Exam  General:  patient lying in bed looks in pain    HEENT:  normocephalic/atraumatic  Cardio: Tachycardic  Pulmonary: In mild to moderate respiratory distress  Abdomen:  non-distended  Extremities:  no edema  Skin:  intact, warm/dry     Neuro Exam  Mental Status: Eyes open, awake, able to participate in exam.  Knows his name, but unable to state the year.  Oriented to place and circumstance.  Naming of objects intact.  Repetition intact, but responds slowly.  Cranial Nerves: Pupils small (1 mm bilaterally), but briskly reactive (NPI 4.1 on the right, 3.7 on the left), extraocular movements intact, conjugate gaze, face symmetric, tongue midline  Motor: Bilateral upper and lower extremities antigravity without drift  Reflexes: Toes downgoing bilaterally  Sensory: Intact to noxious in all 4 extremities  Coordination: Finger-nose-finger intact bilaterally  Station/Gait: LY, deferred      Stroke Scales       NIHSS  1a. Level of Consciousness 0-->Alert, keenly responsive   1b. LOC Questions 1-->Answers one question correctly   1c. LOC Commands 0-->Performs both tasks correctly   2.   Best Gaze 0-->Normal   3.   Visual 0-->No visual loss   4.   Facial Palsy 0-->Normal symmetrical movements   5a. Motor Arm, Left 0-->No drift, limb holds 90 (or 45) degrees for full 10 secs   5b. Motor Arm, Right 0-->No drift, limb holds 90 (or 45) degrees for full 10 secs   6a. Motor Leg, Left 0-->No drift, leg holds 30 degree position for full 5 secs   6b. Motor Leg, right 0-->No drift, leg holds 30 degree position for full 5 secs   7.   Limb Ataxia 0-->Absent   8.   Sensory 0-->Normal, no sensory  "loss   9.   Best Language 0-->No aphasia, normal   10. Dysarthria 0-->Normal   11. Extinction and Inattention  0-->No abnormality   Total 1 (06/03/25 2038)            Labs     CBC  Lab Results   Component Value Date    HGB 16.5 06/03/2025    HGB 16.5 06/03/2025    HCT 50.1 06/03/2025    HCT 50.1 06/03/2025    WBC 14.4 (H) 06/03/2025    WBC 14.4 (H) 06/03/2025     06/03/2025     06/03/2025       BMP  Lab Results   Component Value Date     (L) 06/03/2025    POTASSIUM 5.5 (H) 06/03/2025    CHLORIDE 99 06/03/2025    CO2 22 06/03/2025    BUN 19.7 06/03/2025    CR 0.97 06/03/2025     (H) 06/03/2025    DELANO 9.2 06/03/2025       Data   Stroke Code Data  (for stroke code without tele)  Stroke code activated 06/03/25  1933   First stroke provider response 06/03/25  1938   Last known normal 06/03/25  1800   Time of discovery (or onset of symptoms) 06/03/25 1925   Head CT read by Stroke Neuro Provider 06/03/25 2045   Was stroke code de-escalated? Yes  06/03/25           Clinically Significant Risk Factors Present on Admission        # Hyperkalemia: Highest K = 5.5 mmol/L in last 2 days, will monitor as appropriate  # Hyponatremia: Lowest Na = 134 mmol/L in last 2 days, will monitor as appropriate           # Hypertension: Home medication list includes antihypertensive(s)           # Overweight: Estimated body mass index is 28.22 kg/m  as calculated from the following:    Height as of this encounter: 1.778 m (5' 10\").    Weight as of this encounter: 89.2 kg (196 lb 10.4 oz).              "

## 2025-06-04 NOTE — PROVIDER NOTIFICATION
06/03/25 2200   Call Information   Date of Call 06/03/25   Time of Call 1931   Name of person requesting the team Viky ÁLVAREZ   Title of person requesting team RN   RRT Arrival time 1933   Time RRT ended 2145   Reason for call   Type of RRT Adult   Primary reason for call Neurological   Neurological Acute change in LOC or neuro status;Agitated/restless;Confused;Other (describe)  (initially pinpoint pupils and unable to see RN's finger for exam, resolved prior to arrival)   Was patient transferred from the ED, ICU, or PACU within last 24 hours prior to RRT call? Yes   SBAR   Situation During bedside report, RN noted pt w/ pinpoint pupils, confusion. Converted to stroke code, then stroke code de-escalated later (see other note)   Background Pt here for surgery for R maria [place] carmen diaphragm paralysis/plication of R diaphragm. Arrived to  from PACU around 1500 today. Initial RN assessment pt fully oriented, conversational with family, calm   Notable History/Conditions Recent surgery   Assessment Pt restless/agitated in bed, pulling at gown at times   Interventions Blood glucose;CXR;Fluid bolus;Labs;Other (describe);O2 per N/C or mask  (Pan CT scan, additional pain meds)   Patient Outcome   Patient Outcome Transferred to  (6C)   RRT Team   Attending/Primary/Covering Physician Perla Alaniz MD   Date Attending Physician notified 06/03/25   Time Attending Physician notified 1933   Physician(s) Caitlin Perrin   Lead FACUNDO Bailey   Post RRT Intervention Assessment   Post RRT Assessment Stable/Improved   Date Follow Up Done 06/04/25   Time Follow Up Done 0030

## 2025-06-04 NOTE — PLAN OF CARE
[id number]:    BPs 110s-170s/90s-130s. HR 120s, on continuous cardiac monitoring, sinus tachycardia. O2 sats above 92% on 4 L/min Oxymask. Temp 97.6, R 20, has sleep apnea, periods of apnea 10-15 seconds, provider notified. On capnography, EtC02 50s-60s. At 1930, patient lethargic, difficult to arouse, repeated verbal stimulation to follow commands. Patient stated unable to see, pupils pinpoint, diaphoretic, BG 170s, called stroke code and rapid response. Neurologist assessed patient, CTA, CT chest, CT head without contrast, Narcan 0.2mg x1 administered, LR bolus 500 mL one time administered, LR MIVF 100 mL/hr ordered, troponin, CBC, LFTs, BMP with platelets, gabapentin, ammonia level, TSH, see results. Ordered 0.5mg IV Dilaudid x1 one time, One time 0.2mg IV Dilaudid, indwelling Mitchell. Left CT to water seal with no air leak, output 4cc bloody drainage. Gave report to 6C RN, sent with Flyer RN.

## 2025-06-04 NOTE — PROGRESS NOTES
S: Patient denies pain this am, but per nurse he just had PRN pain medications, complains of mild nausea, able to take deep breaths and cough     O:   Visit Vitals  /66 (BP Location: Left arm, Cuff Size: Adult Regular)   Pulse 98   Temp 98.1  F (36.7  C) (Axillary)   Resp 13       General: Obtunded, but in no acute distress, alert and oriented  ENT: Extraocular muscle movement intact, normal conjunctiva  Respiratory: no cyanosis, non labored breathing, chest tube to water seal, serosanguinous output, no air leak   Cardiovascular: cap refill < 3 sec, extremities warm, normal rate  Abdomen: non distended, soft and appropriately tender, incsions c/d/I   Extremities: warm to touch       Intake/Output Summary (Last 24 hours) at 6/4/2025 0938  Last data filed at 6/4/2025 0900  Gross per 24 hour   Intake 534 ml   Output 2550 ml   Net -2016 ml         Date 06/04/25 0000 - 06/04/25 2359   Shift 0262-5435 6103-0586 [id number] 24 Hour Total   INTAKE   P.O.  100  100   I.V. 7 6  13   Shift Total(mL/kg) 7(0.08) 106(1.2)  113(1.28)   OUTPUT   Urine 600   600   Chest Tube(mL/kg) 170(1.93)   170(1.93)   Shift Total(mL/kg) 770(8.75)   770(8.75)   NET -763 106  -657   Weight (kg) 88 88 88 88           A: Phong Cuevas is a 70 year old male with idiopathic right diaphragm paralysis. Presents for elective diaphragm plication    Major events:   - 6/3 - Dr. Gallegos - laparoscopic right diaphragm hernia repair and chest tube placement  - 6/3 - transferred to [facility] for respiratory acidosis, tachycardia and confusion. Stroke workup negative       Plan:  - Continue NPO with IV fluids, ok for sips and chips, will advance diet when passes gas. No NG tube at the moment  - Chest tube discontinued  - continue HFNC, repeat ABG today and consider removing HFNC if respiratory acidosis is improving. Ok for BiPAP at night time   - Continue PCA, discontinue ketamine infusion, continue with multimodal pain regimen, tylenol, toradol, gabapentin,  "flexeril, lidocaine patch, PRN robaxin, dilaudid. Able to do about 1200 on IS   - bowel regimen with milk of mag, miralax and senna scheduled  - DVT ppx: enoxaparin 40mg daily  - GI ppx: famotidine       Discussed with attending surgeon Dr. Montanez         Clinically Significant Risk Factors        # Hyperkalemia: Highest K = 5.5 mmol/L in last 2 days, will monitor as appropriate  # Hyponatremia: Lowest Na = 134 mmol/L in last 2 days, will monitor as appropriate               # Acute Hypoxic Respiratory Failure: Based on blood gas results. Continue supplemental oxygen as needed  # Acute Hypercapnic Respiratory Failure: based on arterial blood gas results.  Continue supplemental oxygen and ventilatory support as indicated.  # Acute Hypercapnic Respiratory Failure: based on venous blood gas results.  Continue supplemental oxygen and ventilatory support as indicated.         # Overweight: Estimated body mass index is 27.84 kg/m  as calculated from the following:    Height as of this encounter: 1.778 m (5' 10\").    Weight as of this encounter: 88 kg (194 lb 0.1 oz)., PRESENT ON ADMISSION              "

## 2025-06-04 NOTE — PROGRESS NOTES
BRIEF THORACIC NOTE    Pain control continuing to be problematic for pt. He goes for stretches without pain control (poor PCA utilization), becomes restless and agitated, then begins pulling at lines and tubes. He has also been refusing PO meds. With intermediate doses of dilaudid, his restlessness resolves but he cannot relax or breathe deeply and complains of R chest pain. With increased doses of dilaudid he becomes sedated and apneic. Respiratory function limited by shallow tachypnea with insufficient pain control and respiratory depression at higher doses. Reassuringly, his ABG is improving though not WNL.     - non-opioid adjuncts: start ketamine infusion, ketorolac  - more aggressive opioid control: PCA 0.2 mg/h infusion  - cont multimodal adjuncts: cyclobenzaprine, tylenol, gabapentin, lido patches  - NGT with decompression, clamp for meds  - transition high-flow to BiPap as ventilation adjunct       Seen at bedside with guilherme, d/w fellow     Perla Alaniz MD   P: [phone]  Please refer to [facility] for point of contact. Page is preferred.

## 2025-06-04 NOTE — PLAN OF CARE
Goal Outcome Evaluation:      Plan of Care Reviewed With: patient      END of Shift Notes: 2200H  - 0730H    Neuro: AO x 3; disoriented to place / restless    Cardiac: sinus tachycardia; HR = 100s - 120s bpm     Respiratory: on hi flow 40L / min    GI/: Ok to swallow;                with straight cath, adequate urine out    Diet/appetite: nauseous, on NPO except meds and ice chips    Activity: A x 2    Pain: managed by continuous dilaudid and continuous ketamine    Skin: 4 lap sites CDI  Ct site leaking; gauze was reinforced by previous shift; provider aware  erythema on the R forehead, and around right eye, bilateral elbows  mepelex on the buttocks  lidocaine patch on right scapula  (removed at 0300H)      Lines: R PIV SL              L PIV infusing     Drains:  L CT to waterseal ; bright red discharge;     Labs:     2200H VBG critical ph = 7.13 ; provider aware                      Critical Co2 = 86;  provider aware             ABG = critical ph =  7.18 ; provider aware ; RT informed; awaiting order for BIPAP    2310H repeat ABG taken by Dr Duncan    0157H ABG = 7.27; provider aware      Changes this shift:     2353H LR changed rate to 75ml/hr; tolerating well    0005 patient refused oral meds ( gabapentin, doxylamine, tylenol)    0125H patient woke up and started to be restless and pulling his CT tube line and urinary catheter; patient complained of pain in the R chest  / flank area ; provider informed; CT lines intact as per provider    0154H 0.5mg IV dilaudid given by FACUNDO Purcell    0218H IV Narcan 0.2mg given by FACUNDO Purcell    0242H patient continuously feels nauseated , IV Zofran 4mg given    0300H attempted to insert NG tube; patient not tolerating at all. Provider aware    0305H lidocaine patch removed from the R scapular area    0312H LR stopped; changed to Normal saline infusion for compatibility for ketamine and dilaudid.    0400H lidocaine patch applied in the R chest/flanka cahto    0402H patient still  complaining of pain, PRN ketorolac given ; provider aware    0407H continuous Ketamine 5mg/hr started    0415H patient still complaining of nausea,  PRN compazine given    0455H reattempt the insertion of Ng tube; patient still not tolerating, provider aware    0523H ABG = 7.28; provider informed and ordered Hi flow increased to 60L / 40%; Rt informed.    0538H continuous IV dilaudid 0.2mg/ml decreased to 0.1mg/ml    0716H continuous IV dilaudid stop          Plan:continue plan of care; notify provider for any changes

## 2025-06-04 NOTE — PROGRESS NOTES
Stroke Code Nurse-Responder Note    Arrival Time to Stroke Code: 1933    Stroke Code Team interventions:   - De-escalated at 2059 by Tio Aponte MD    ED/Bedside Nurse providing handoff: Regina Garzon RN    Time left for CT: 2045    Time arrived to next location (ED/Unit/IR): 7B @ 2115 then transfer to 6C @ 2145    ED/Bedside Nurse given handoff (name/time): FACUNDO López, RN

## 2025-06-04 NOTE — CODE/RAPID RESPONSE
Patient: Denis Green  Location: [facility] [address]  MRN: [id number]  Today's date: 2/6/2021    History of Present Illness  Denis LOVE is a 69 y.o. male admitted on 2/4/2021 with Pneumonia due to COVID-19 virus. Principal problem is No Principal Problem: There is no principal problem currently on the Problem List. Please update the Problem List and refresh..   Pt admitted to outside hospital on 1/5/2021 for COVID-19 PNA. Worsening resp status intubated on 1/9- extubated 1/16 to OptiFlow. Transition to HFNC on 1/23.Eval by nephrology while in ICU due to ERNIE likely secondary to ATN. Creatinine has been improving with excellent urine output. Received Decadron and Remdesivir. Dobhoff tube was placed due to dysphagia. New fever of 102.8 1/20 and 1/21. ID was consulted. Bld cx's negative. CT scan of the chest abdomen pelvis was done with results showing increasing pulmonary infiltrates/groundglass opacities and consolidations in his bilateral lower lobes. Started empirically on cefepime and vancomycin to cover hospital-acquired pneumonia. Pt had increased lethargy on 1/22. Pt removed DHT 1/25; required higher O2 afterward - possible aspiration during the process. 1/26 patient continued to have intermittent confusion; ?post ICU delirium. Neurology is following, MRI brain was unremarkable. EEG showed mild slowing but no seizure activity. D/c'd abx after 5 days d/t lethargy.       Past Medical History  Denis LOVE has a past medical history of Coronary artery disease, Hypertension, and Lipid disorder.    PT Vitals    Date/Time Pulse HR Source SpO2 Pt Activity O2 Therapy O2 Del Method O2 Flow Rate BP BP Location BP Method Pt Position [facility]   02/06/21 1300 79 Monitor 95 % At rest Supplemental oxygen Nasal cannula 3 L/min 116/57 Right upper arm Automatic Sitting MKW      PT Pain    Date/Time Pain Type Pref Pain Scale Side Location Rating: Rest Rating: Activity [facility]   02/06/21 1300 Pain Assessment word  Rapid Response Team Note    Assessment   A rapid response was called on Phong Cuevas due to acute encephalopathy. This presentation is likely due to multifactorial in setting of recent surgery with anesthesia, possible under treatment of pain, pulmonary process vs other. Patient noted to be restless, w/ rigors, High ETCO2- 50s, Tachycardia to 130s, Hypertensive w/ SBPs in the 160s, complaining of right-sided CP and dyspnea. BG WNL. STROKE CODE called for reports of patient with acute change in mental status, reports that he had vision loss, and pinpoint pupils. Patient received narcan w/o improvement in sx. IVF bolus given and additional IV dilaudid with slight improvement in sx. Patient with intermittent desaturation and escalating O2 requirements to 6-10L via face mask. No wheezing or rhonchi noted on exam.       Plan   -  STAT CT head and CT PE study  - VBG, Ammonia, add hepatic panel  - Repeat LA   - Troponin  - Transfer to [facility]  - Cardiac Tele   -  The General Surgery oncall was at bedside  -  Disposition: The patient will remain on the current unit. We will continue to monitor this patient closely. and will be transferred to [facility].  -  Reassessment and plan follow-up will be performed by the primary team    Caitlin Perrin PA-C  Rapid Response Team ILA  Securely message with Yap     Medical Decision Making       75 MINUTES SPENT BY ME on the date of service doing chart review, history, exam, documentation & further activities per the note.          Hospital Course   Brief Summary of events leading to rapid response:   RRT called for acute change in mental status    Physical Exam   Vital Signs: Temp: 97.6  F (36.4  C) Temp src: Oral BP: (!) 163/98 Pulse: (!) 125   Resp: 20 SpO2: 92 % O2 Device: Oxymask Oxygen Delivery: 4 LPM  Weight: 196 lbs 10.41 oz      Exam:     Physical Exam   Constitutional: Restless male lying in bed. Answers questions, though appears mildly confused at times; intermittent rigors  (verbal rating pain scale) Left wrist 2 - mild pain 2 - mild pain MKW   02/06/21 1355 Pain Reassessment word (verbal rating pain scale) Left wrist 2 - mild pain -- MKW          Prior Living Environment      Most Recent Value   Lives With  spouse, child(mindy), adult [adult son lives in home occasionally]   Living Arrangements  house   Home Accessibility  stairs to enter home (Group), stairs within home (Group)   Living Environment Comment  Lives with wife and intermittently with one son.  [bed and bath on 2nd ,  no powder room on first]   Number of Stairs, Main Entrance  1   Stair Railings, Main Entrance  none   Location, Patient Bedroom  second floor, must negotiate stairs to access   Location, Bathroom  second floor, must negotiate stairs to access   Bathroom Access Comment  Pt. reports has tub shower and stall shower, uses tub shower primarily, has grab bar in shower, has stanard height toilet c wall on both sides   Number of Stairs, Within Home, Primary  12   Surface of Stairs, Within Home, Primary  hardwood   Stair Railings, Within Home, Primary  railings on both sides of stairs          Prior Level of Function      Most Recent Value   Dominant Hand  right   Ambulation  independent   Transferring  independent   Toileting  independent   Bathing  independent   Dressing  independent   Eating  independent   Communication  understands/communicates without difficulty   Swallowing  swallows foods/liquids without difficulty   Baseline Diet/Method of Nutritional Intake  regular solids, thin liquids   Past History of Dysphagia  No previous reports    Prior Level of Function Comment  Pt is a cattle/,  previously IND for all IADLs.    Assistive Device/Animal Currently Used at Home  none          IRF PT Evaluation and Treatment - 02/06/21 1300        Time Calculation    Start Time  1300     Stop Time  1400     Time Calculation (min)  60 min        Session Details    Document Type  daily treatment/progress note      present. Appears in mild distress   HEENT:   Head: Normocephalic and atraumatic.   Eyes: Conjunctivae are normal. Pupils are pinpoint equal, round, and reactive to light.  Pharynx has no erythema or exudate, mucous membranes are moist  Neck:   No adenopathy, no bony tenderness  Cardiovascular: tachycardia murmurs or gallops  Pulmonary/Chest: Diminished bilateral base R> L with no wheezes or retractions. No respiratory distress.  GI: Soft with good bowel sounds.  Non-tender, non-distended, with no guarding, no rebound, no peritoneal signs. Drain in place w/ serosanguinous drainage noted. Small amount of bleeding noted on surgical dressing.    Back:  No bony or CVA tenderness   Musculoskeletal:  No edema or clubbing   Skin: Skin is warm and dry.   Neurological: oriented to person, place.   Psychiatric:  restless             "Mode of Treatment  individual therapy;physical therapy        General Information    General Observations of Patient  received patient seated in wc; agreeable to PT        Bed Mobility    [place], Sit to Supine  minimum assist (75% or more patient effort)     Assistive Device (Bed Mobility)  bed rails     Comment (Bed Mobility)  returned to bed post treatment session with Min A for BLE management         Sit to Stand Transfer    [place], Sit to Stand Transfer  minimum assist (75% or more patient effort);verbal cues     Verbal Cues  hand placement;proper use of assistive device;safety;technique     Assistive Device  walker, front-wheeled     Comment  Min A from mat table to RW for lift        Stand to Sit Transfer    [place], Stand to Sit Transfer  minimum assist (75% or more patient effort)     Verbal Cues  hand placement;safety;technique     Assistive Device  walker, front-wheeled     Comment  Min A to wc from RW for controlled descent        Stand Pivot Transfer    [place], Stand Pivot/Stand Step Transfer  moderate assist (50-74% patient effort);verbal cues     Verbal Cues  hand placement;safety;technique     Assistive Device  none     Comment  Mod A x 1 SPT wc - mat        Gait Training    [place], Gait  dependent (less than 25% patient effort);1 person assist;1 person to manage equipment     Assistive Device  walker, front-wheeled     Distance in Feet  6 feet     Gait Pattern Utilized  step-through     Deviations/Abnormal Patterns (Gait)  bilateral deviations;base of support, narrow;gait speed decreased;step length decreased     Bilateral Gait Deviations  heel strike decreased    decreased hip extension in stance to toe off    Comment  Mod A x 1 ambulating 6' with RW, wc follow and O2 management by another        Balance    Comment, Balance  Standing in front of mat table with RW for support: marches x 10, alt 6\" box taps x 10, alt cone taps x 10 (seated rest breaks between exercise " sets)        Lower Extremity (Therapeutic Exercise)    Exercise Position/Type (LE Therapeutic Exercise)  seated     General Exercise (LE Therapeutic Exercise)  bilateral;ankle pumps;LAQ (long arc quad);marching while seated     Reps and Sets (LE Therapeutic Exercise)  30     Comment (LE Therapeutic Exercise)  Seated TE: AP's x 30; LAQ 10 x 3; marches x 30        Daily Progress Summary (PT)    Symptoms Noted During/After Treatment  fatigue;shortness of breath     Progress Toward Functional Goals (PT)  progressing toward functional goals as expected     Daily Outcome Statement (PT)  Improved tolerance for activity this session, but requires frequent rest breaks due to fatigue; able to perform SPT with Mod A x 1 and sit - stand tx with RW with Min A x 1     Recommendations  Continue to progress functional mobility and balance training as tolerated.                            IRF PT Goals      Most Recent Value   Bed Mobility Goal 1   Activity/Assistive Device  rolling to left, rolling to right, sit to supine/supine to sit at 02/05/2021 1030   [place]  supervision required at 02/05/2021 1030   Time Frame  short-term goal (STG), 1 week at 02/05/2021 1030   Bed Mobility Goal 2   Activity/Assistive Device  rolling to left, rolling to right, sit to supine/supine to sit at 02/05/2021 1030   [place]  modified independence at 02/05/2021 1030   Time Frame  long-term goal (LTG), 21 days or less at 02/05/2021 1030   Transfer Goal 1   Activity/Assistive Device  bed-to-chair/chair-to-bed, sit-to-stand/stand-to-sit, stand pivot, walker, front-wheeled at 02/05/2021 1030   [place]  moderate assist (50-74% patient effort), verbal cues required at 02/05/2021 1030   Time Frame  short-term goal (STG), 1 week at 02/05/2021 1030   Transfer Goal 2   Activity/Assistive Device  sit-to-stand/stand-to-sit, bed-to-chair/chair-to-bed, stand pivot, walker, front-wheeled at 02/05/2021 1030   Lund  supervision required at  02/05/2021 1030   Time Frame  long-term goal (LTG), 21 days or less at 02/05/2021 1030   Gait/Walking Locomotion Goal 1   Activity/Assistive Device  gait (walking locomotion), decrease fall risk, increase endurance/gait distance, walker, front-wheeled at 02/05/2021 1030   Distance  15 feet at 02/05/2021 1030   [place]  moderate assist (50-74% patient effort) [and close S of 2nd person for safety] at 02/05/2021 1030   Time Frame  short-term goal (STG), 1 week at 02/05/2021 1030   Gait/Walking Locomotion Goal 2   Activity/Assistive Device  gait (walking locomotion), decrease asymmetrical patterns, decrease fall risk, diminish gait deviation, improve balance and speed, increase endurance/gait distance, turning, left, turning, right, walker, front-wheeled at 02/05/2021 1030   Distance  100 feet at 02/05/2021 1030   [place]  supervision required at 02/05/2021 1030   Time Frame  long-term goal (LTG), 21 days or less at 02/05/2021 1030

## 2025-06-04 NOTE — PHARMACY-ADMISSION MEDICATION HISTORY
Pharmacy Intern Admission Medication History    Admission medication history is complete. The information provided in this note is only as accurate as the sources available at the time of the update.    Information Source(s): Patient and CareEverywhere/SureScripts via in-person    Pertinent Information: Spoke with patient who was knowledgeable of medications. Stated he has not taken his medications in a few days.     Changes made to PTA medication list:  Added: None  Deleted:   Cyclobenzaprine 10 mg tablet: Take 0.5-1 tablet by mouth nightly as needed for muscle spasms   Patient no longer takes and has not taken in over 2 months.   Changed: None    Allergies reviewed with patient and updates made in EHR: yes    Medication History Completed By: Monet Espinoza 6/4/2025 9:49 AM    PTA Med List   Medication Sig Last Dose/Taking    Ascorbic Acid (VITAMIN C PO) Take 500 mg by mouth daily. Past Week    Cholecalciferol (VITAMIN D-3) 25 MCG (1000 UT) CAPS Take 1 capsule by mouth daily. Past Week    doxylamine (UNISOM) 25 MG TABS Take 25 mg by mouth at bedtime 6/2/2025 Bedtime    ibuprofen (ADVIL/MOTRIN) 200 MG tablet Take 200 mg by mouth every 4 hours as needed for pain. More than a month    lisinopril (ZESTRIL) 10 MG tablet Take 1 tablet (10 mg) by mouth daily. Past Week    meloxicam (MOBIC) 15 MG tablet Take 1 tablet (15 mg) by mouth daily. (Patient taking differently: Take 15 mg by mouth every other day.) Past Week    Multiple Vitamins-Minerals (MULTIVITAMIN ADULT PO) Take 1 tablet by mouth daily. Past Week    simvastatin (ZOCOR) 40 MG tablet Take 1 tablet (40 mg) by mouth at bedtime. Past Week

## 2025-06-04 NOTE — PROGRESS NOTES
Admission  6/3/2025  Diagnosis: Diaphragm paralysis  Admitted from: 7B at 2145H   Via: stretcher   Accompanied by: MARINA   Belongings: Placed in closet;  Admission paperwork: complete   Teaching: Call don't fall, use of console, meal times, visiting hours, orientation to unit, when to call for the RN (angina/sob/dizzyness, etc.), and the importance of safety.   Access: R PIV SL               L PIV  with continuous LR at 100ml/hr ; PCA diluadid  Telemetry:Placed on pt   Ht./Wt.: complete   Two nurse head-to-toe skin assessment performed by writer  and juan RN.  Skin issues noted:     erythema on the right forehead  4 lap sites - CDI   CT site covered with gauze, reinforced by provider   Erythema on both elbows  Mepelex on the buttocks / sacrum area    .  See PCS for assessment and treatment of wounds and surgical sites.  .adm

## 2025-06-04 NOTE — PROGRESS NOTES
THORACIC & FOREGUT SURGERY    S:  ON had rapid code stroke called for rigors, desats, confusion. CT Head, AP, PE all unremarkable with expected post op changes and some air in stomach. Suspected that pain control was an issue and so was given opioids, but desated easily and required narcan x2. Transferred to  and placed on HFNC with initial ABG very acidotic at 7.18 and improving to 7.28. Once placed PCA continuous, ketamine drip, scheduled flexiril and toradol, mentation and respiratory status improved.   Pt remains on sips chips and reports persistent nausea, but no pain. Pt is able to take deep breath without pain (1200 on IS). Denies ROBF. No other complaints at this time, all questions answered and concerns addressed.      O:  Vitals:    06/04/25 0800 06/04/25 0807 06/04/25 1037 06/04/25 1111   BP: 125/66   121/78   BP Location: Left arm   Left arm   Cuff Size: Adult Regular   Adult Regular   Pulse: 97 98 94 102   Resp: 10 13 11 13   Temp:    98  F (36.7  C)   TempSrc:    Axillary   SpO2: 97% 97% 97% 98%   Weight:       Height:         U: 1500 (larson placed after straight cath)  CT: 250    A&O, NAD  Breathing non-labored on 60 LPM 40%  CT: tidal appropriately, no air bubbles appreciated, bloody output  Incision: bandages cdi, surrounding skin presents no sign of acute infection including erythema, underlying induration or fluctuance, abnormal bruising, purulence, or other drainage.  Appears well perfused  Soft, NDNT  Distal extremities warm. No calf tenderness.    A/P: Phong Cuevas is a 70 year old male  s/p lap R diaphragmatic plication on 6/3 with R chest tube placement.     - MMPC (PCA discontinue continuous, schedule toradol, tylenol, flexiril, ketamine gtt 5)   - consider stopping ketamine later in day  - Resp: wean HFNC as able; Aggressive pulmonary toilet, PT/OT, OOB, ambulate as able  - Diet: NPO sips, chips  - IVF: NS to 50 from 75  - AC: LVX  - LTD: L CT remove        Clinically Significant Risk  "Factors        # Hyperkalemia: Highest K = 5.5 mmol/L in last 2 days, will monitor as appropriate  # Hyponatremia: Lowest Na = 134 mmol/L in last 2 days, will monitor as appropriate               # Acute Hypoxic Respiratory Failure: Based on blood gas results. Continue supplemental oxygen as needed  # Acute Hypercapnic Respiratory Failure: based on arterial blood gas results.  Continue supplemental oxygen and ventilatory support as indicated.  # Acute Hypercapnic Respiratory Failure: based on venous blood gas results.  Continue supplemental oxygen and ventilatory support as indicated.         # Overweight: Estimated body mass index is 27.84 kg/m  as calculated from the following:    Height as of this encounter: 1.778 m (5' 10\").    Weight as of this encounter: 88 kg (194 lb 0.1 oz)., PRESENT ON ADMISSION            Pt seen and discussed with fellow and/or chief resident and staff surgeon.     Cm Cain DO MBA  [facility]  PGY-1 Resident, General Surgery     Thoracic and Foregut Surgery  Text page Thoracic Surgery via [facility] Paging/Directory          "

## 2025-06-04 NOTE — PROGRESS NOTES
"BRIEF THORACIC NOTE    Seen d/t stroke code and RRT. He was sleeping and woke up with AMS & pinpoint pupils. Knew his name but otherwise not orientated. Denied pain, dysnpea, concerns, etc. Only complaint was \"I feel really tired.\" Denied substance use. Attempted to call wife x2 but no answer.     VS notable for HTN, hypercarbia, tachycardia. Intermittent desaturations which improve with prompting and adjustment of pulse ox. Able to perform IS to 400 mL  Appears in moderate distress, face grimace, jaw clenched   Orientated x 3/4 (stated year 2024 instead of 2025). Follows commands  Pupils brisk and reactive to pupilometer   CTAB, 4-6 L oxymask  Sinus tachycardia  Abdomen nontender but rigid abd muscles  Moving all ext spontaneously, following commands    Labs notable for mild hyperkalemia, leukocytosis likely reactive 2/2 procedure    He had improved his mental status by the time I arrived but still appeared uncomfortable. Of note, pain control was via tylenol & PCA. The PCA dose was ~4 mg total since PACU until 6pm, then none since, which was approx 90 mins prior to RRT/stroke. .2 mg narcan given d/t pinpoint pupils with increase in restlessness. 0.5 mg dilaudid given with improvement in restlessness. Add'l 0.4 mg given with improvement in apparent comfort, orientation, and communication. CT head, PE, abd/pelvis were acquired without notable findings. The most likely etiology is marked stimulation d/t recent diaphragmatic plication, even without sensation of pain. He was probably quite behind on pain meds. More insidious causes felt to be r/o at this point. Has had persistent tachycardia since OR which is stable compared to earlier today.     - aggressive pain control, encourage use of button when awake  [place] [facility]  - RRT and stroke code provider inputs appreciated    - add'l 500 mL LR, cont /h  - labs, incl lactate, abg    D/w fellow who came to bedside. He d/w attending      Perla Alaniz MD   P: " [phone]  Please refer to [facility] for point of contact. Page is preferred.

## 2025-06-05 ENCOUNTER — APPOINTMENT (OUTPATIENT)
Dept: GENERAL RADIOLOGY | Facility: CLINIC | Age: 70
DRG: 163 | End: 2025-06-05
Attending: STUDENT IN AN ORGANIZED HEALTH CARE EDUCATION/TRAINING PROGRAM
Payer: COMMERCIAL

## 2025-06-05 ENCOUNTER — APPOINTMENT (OUTPATIENT)
Dept: PHYSICAL THERAPY | Facility: CLINIC | Age: 70
DRG: 163 | End: 2025-06-05
Attending: STUDENT IN AN ORGANIZED HEALTH CARE EDUCATION/TRAINING PROGRAM
Payer: COMMERCIAL

## 2025-06-05 VITALS
BODY MASS INDEX: 29.03 KG/M2 | OXYGEN SATURATION: 97 % | TEMPERATURE: 98.5 F | SYSTOLIC BLOOD PRESSURE: 120 MMHG | DIASTOLIC BLOOD PRESSURE: 48 MMHG | HEIGHT: 70 IN | HEART RATE: 113 BPM | RESPIRATION RATE: 22 BRPM | WEIGHT: 202.8 LBS

## 2025-06-05 LAB
ANION GAP SERPL CALCULATED.3IONS-SCNC: 8 MMOL/L (ref 7–15)
BASOPHILS # BLD AUTO: 0 10E3/UL (ref 0–0.2)
BASOPHILS NFR BLD AUTO: 0 %
BUN SERPL-MCNC: 10 MG/DL (ref 8–23)
CALCIUM SERPL-MCNC: 8.5 MG/DL (ref 8.8–10.4)
CHLORIDE SERPL-SCNC: 101 MMOL/L (ref 98–107)
CREAT SERPL-MCNC: 0.65 MG/DL (ref 0.67–1.17)
EGFRCR SERPLBLD CKD-EPI 2021: >90 ML/MIN/1.73M2
EOSINOPHIL # BLD AUTO: 0 10E3/UL (ref 0–0.7)
EOSINOPHIL NFR BLD AUTO: 0 %
ERYTHROCYTE [DISTWIDTH] IN BLOOD BY AUTOMATED COUNT: 11.5 % (ref 10–15)
GLUCOSE SERPL-MCNC: 104 MG/DL (ref 70–99)
HCO3 SERPL-SCNC: 27 MMOL/L (ref 22–29)
HCT VFR BLD AUTO: 43.7 % (ref 40–53)
HGB BLD-MCNC: 14.3 G/DL (ref 13.3–17.7)
IMM GRANULOCYTES # BLD: 0.1 10E3/UL
IMM GRANULOCYTES NFR BLD: 1 %
LYMPHOCYTES # BLD AUTO: 0.8 10E3/UL (ref 0.8–5.3)
LYMPHOCYTES NFR BLD AUTO: 8 %
MAGNESIUM SERPL-MCNC: 2.1 MG/DL (ref 1.7–2.3)
MCH RBC QN AUTO: 31.3 PG (ref 26.5–33)
MCHC RBC AUTO-ENTMCNC: 32.7 G/DL (ref 31.5–36.5)
MCV RBC AUTO: 96 FL (ref 78–100)
MONOCYTES # BLD AUTO: 0.7 10E3/UL (ref 0–1.3)
MONOCYTES NFR BLD AUTO: 7 %
NEUTROPHILS # BLD AUTO: 8.5 10E3/UL (ref 1.6–8.3)
NEUTROPHILS NFR BLD AUTO: 84 %
NRBC # BLD AUTO: 0 10E3/UL
NRBC BLD AUTO-RTO: 0 /100
PHOSPHATE SERPL-MCNC: 2.3 MG/DL (ref 2.5–4.5)
PLATELET # BLD AUTO: 152 10E3/UL (ref 150–450)
POTASSIUM SERPL-SCNC: 4.4 MMOL/L (ref 3.4–5.3)
RBC # BLD AUTO: 4.57 10E6/UL (ref 4.4–5.9)
SODIUM SERPL-SCNC: 136 MMOL/L (ref 135–145)
WBC # BLD AUTO: 10.2 10E3/UL (ref 4–11)

## 2025-06-05 PROCEDURE — 80051 ELECTROLYTE PANEL: CPT | Performed by: STUDENT IN AN ORGANIZED HEALTH CARE EDUCATION/TRAINING PROGRAM

## 2025-06-05 PROCEDURE — 97110 THERAPEUTIC EXERCISES: CPT | Mod: GP | Performed by: PHYSICAL THERAPIST

## 2025-06-05 PROCEDURE — 97116 GAIT TRAINING THERAPY: CPT | Mod: GP | Performed by: PHYSICAL THERAPIST

## 2025-06-05 PROCEDURE — 250N000013 HC RX MED GY IP 250 OP 250 PS 637

## 2025-06-05 PROCEDURE — 250N000011 HC RX IP 250 OP 636: Performed by: STUDENT IN AN ORGANIZED HEALTH CARE EDUCATION/TRAINING PROGRAM

## 2025-06-05 PROCEDURE — 250N000013 HC RX MED GY IP 250 OP 250 PS 637: Performed by: THORACIC SURGERY (CARDIOTHORACIC VASCULAR SURGERY)

## 2025-06-05 PROCEDURE — 36415 COLL VENOUS BLD VENIPUNCTURE: CPT | Performed by: STUDENT IN AN ORGANIZED HEALTH CARE EDUCATION/TRAINING PROGRAM

## 2025-06-05 PROCEDURE — 250N000011 HC RX IP 250 OP 636: Mod: JW

## 2025-06-05 PROCEDURE — 71045 X-RAY EXAM CHEST 1 VIEW: CPT

## 2025-06-05 PROCEDURE — 250N000013 HC RX MED GY IP 250 OP 250 PS 637: Performed by: STUDENT IN AN ORGANIZED HEALTH CARE EDUCATION/TRAINING PROGRAM

## 2025-06-05 PROCEDURE — 97530 THERAPEUTIC ACTIVITIES: CPT | Mod: GP | Performed by: PHYSICAL THERAPIST

## 2025-06-05 PROCEDURE — 97161 PT EVAL LOW COMPLEX 20 MIN: CPT | Mod: GP | Performed by: PHYSICAL THERAPIST

## 2025-06-05 PROCEDURE — 84100 ASSAY OF PHOSPHORUS: CPT | Performed by: STUDENT IN AN ORGANIZED HEALTH CARE EDUCATION/TRAINING PROGRAM

## 2025-06-05 PROCEDURE — 120N000003 HC R&B IMCU UMMC

## 2025-06-05 PROCEDURE — 85041 AUTOMATED RBC COUNT: CPT | Performed by: STUDENT IN AN ORGANIZED HEALTH CARE EDUCATION/TRAINING PROGRAM

## 2025-06-05 PROCEDURE — 83735 ASSAY OF MAGNESIUM: CPT | Performed by: STUDENT IN AN ORGANIZED HEALTH CARE EDUCATION/TRAINING PROGRAM

## 2025-06-05 PROCEDURE — 71045 X-RAY EXAM CHEST 1 VIEW: CPT | Mod: 26 | Performed by: RADIOLOGY

## 2025-06-05 PROCEDURE — 258N000003 HC RX IP 258 OP 636: Performed by: STUDENT IN AN ORGANIZED HEALTH CARE EDUCATION/TRAINING PROGRAM

## 2025-06-05 RX ORDER — HYDROMORPHONE HCL IN WATER/PF 6 MG/30 ML
.2-.4 PATIENT CONTROLLED ANALGESIA SYRINGE INTRAVENOUS
Refills: 0 | Status: DISCONTINUED | OUTPATIENT
Start: 2025-06-05 | End: 2025-06-07 | Stop reason: HOSPADM

## 2025-06-05 RX ORDER — SIMVASTATIN 20 MG
40 TABLET ORAL EVERY EVENING
Status: DISCONTINUED | OUTPATIENT
Start: 2025-06-05 | End: 2025-06-07 | Stop reason: HOSPADM

## 2025-06-05 RX ORDER — OXYCODONE HYDROCHLORIDE 5 MG/1
5-10 TABLET ORAL EVERY 4 HOURS PRN
Refills: 0 | Status: DISCONTINUED | OUTPATIENT
Start: 2025-06-05 | End: 2025-06-07 | Stop reason: HOSPADM

## 2025-06-05 RX ORDER — FUROSEMIDE 20 MG/1
20 TABLET ORAL
Status: DISCONTINUED | OUTPATIENT
Start: 2025-06-05 | End: 2025-06-06

## 2025-06-05 RX ADMIN — FUROSEMIDE 20 MG: 20 TABLET ORAL at 16:31

## 2025-06-05 RX ADMIN — FAMOTIDINE 20 MG: 20 TABLET, FILM COATED ORAL at 08:25

## 2025-06-05 RX ADMIN — SENNOSIDES AND DOCUSATE SODIUM 2 TABLET: 50; 8.6 TABLET ORAL at 08:36

## 2025-06-05 RX ADMIN — POLYETHYLENE GLYCOL 3350 17 G: 17 POWDER, FOR SOLUTION ORAL at 08:26

## 2025-06-05 RX ADMIN — CYCLOBENZAPRINE HYDROCHLORIDE 10 MG: 5 TABLET, FILM COATED ORAL at 08:25

## 2025-06-05 RX ADMIN — ACETAMINOPHEN 975 MG: 325 TABLET, FILM COATED ORAL at 16:31

## 2025-06-05 RX ADMIN — DOXYLAMINE SUCCINATE 25 MG: 25 TABLET ORAL at 22:13

## 2025-06-05 RX ADMIN — KETOROLAC TROMETHAMINE 15 MG: 30 INJECTION, SOLUTION INTRAMUSCULAR at 09:49

## 2025-06-05 RX ADMIN — ACETAMINOPHEN 975 MG: 325 TABLET, FILM COATED ORAL at 00:12

## 2025-06-05 RX ADMIN — SIMVASTATIN 40 MG: 20 TABLET, FILM COATED ORAL at 21:00

## 2025-06-05 RX ADMIN — MAGNESIUM HYDROXIDE 30 ML: 400 SUSPENSION ORAL at 08:26

## 2025-06-05 RX ADMIN — GABAPENTIN 300 MG: 250 SOLUTION ORAL at 22:13

## 2025-06-05 RX ADMIN — TAMSULOSIN HYDROCHLORIDE 0.4 MG: 0.4 CAPSULE ORAL at 08:25

## 2025-06-05 RX ADMIN — ACETAMINOPHEN 975 MG: 325 TABLET, FILM COATED ORAL at 08:26

## 2025-06-05 RX ADMIN — ENOXAPARIN SODIUM 40 MG: 40 INJECTION SUBCUTANEOUS at 09:52

## 2025-06-05 RX ADMIN — KETOROLAC TROMETHAMINE 15 MG: 30 INJECTION, SOLUTION INTRAMUSCULAR at 05:12

## 2025-06-05 RX ADMIN — FAMOTIDINE 20 MG: 20 TABLET, FILM COATED ORAL at 21:00

## 2025-06-05 RX ADMIN — Medication 50 MG: at 08:25

## 2025-06-05 RX ADMIN — Medication 1 TABLET: at 08:26

## 2025-06-05 RX ADMIN — GABAPENTIN 300 MG: 250 SOLUTION ORAL at 05:12

## 2025-06-05 RX ADMIN — OXYCODONE HYDROCHLORIDE 5 MG: 5 TABLET ORAL at 13:45

## 2025-06-05 RX ADMIN — SODIUM CHLORIDE: 9 INJECTION, SOLUTION INTRAVENOUS at 04:08

## 2025-06-05 RX ADMIN — GABAPENTIN 300 MG: 250 SOLUTION ORAL at 13:39

## 2025-06-05 RX ADMIN — KETOROLAC TROMETHAMINE 15 MG: 30 INJECTION, SOLUTION INTRAMUSCULAR at 21:00

## 2025-06-05 RX ADMIN — CYCLOBENZAPRINE HYDROCHLORIDE 10 MG: 5 TABLET, FILM COATED ORAL at 21:00

## 2025-06-05 RX ADMIN — CYCLOBENZAPRINE HYDROCHLORIDE 10 MG: 5 TABLET, FILM COATED ORAL at 13:39

## 2025-06-05 RX ADMIN — FUROSEMIDE 20 MG: 20 TABLET ORAL at 08:26

## 2025-06-05 RX ADMIN — KETOROLAC TROMETHAMINE 15 MG: 30 INJECTION, SOLUTION INTRAMUSCULAR at 16:31

## 2025-06-05 RX ADMIN — LIDOCAINE 2 PATCH: 560 PATCH PERCUTANEOUS; TOPICAL; TRANSDERMAL at 21:01

## 2025-06-05 ASSESSMENT — ACTIVITIES OF DAILY LIVING (ADL)
ADLS_ACUITY_SCORE: 40
ADLS_ACUITY_SCORE: 39
ADLS_ACUITY_SCORE: 41
ADLS_ACUITY_SCORE: 39
ADLS_ACUITY_SCORE: 40
ADLS_ACUITY_SCORE: 39
ADLS_ACUITY_SCORE: 39
ADLS_ACUITY_SCORE: 41
ADLS_ACUITY_SCORE: 40
ADLS_ACUITY_SCORE: 40
ADLS_ACUITY_SCORE: 39
ADLS_ACUITY_SCORE: 40
ADLS_ACUITY_SCORE: 39

## 2025-06-05 NOTE — PROGRESS NOTES
THORACIC & FOREGUT SURGERY    S:  NAEO, weaned down to 2L NC. Pain controlled. Sightly worse IS today. Passing some flatus, but no BM.     O:  Vitals:    06/05/25 0010 06/05/25 0012 06/05/25 0508 06/05/25 0709   BP: 121/89  112/84 127/83   BP Location: Left arm  Left arm Right arm   Cuff Size: Adult Regular  Adult Regular Adult Regular   Pulse: 111  109 107   Resp: 11 16 14 17   Temp: 98  F (36.7  C)  98.6  F (37  C) 98.4  F (36.9  C)   TempSrc: Oral  Oral Axillary   SpO2: 96%  100% 97%   Weight:   92 kg (202 lb 12.8 oz)    Height:         U: 1250       A&O, NAD  Breathing non-labored on 2L NC  Incision: bandages cdi, surrounding skin presents no sign of acute infection including erythema, underlying induration or fluctuance, abnormal bruising, purulence, or other drainage.  Appears well perfused  Abd: Soft, ND, NT  Distal extremities warm. No calf tenderness.    A/P: Phong Cuevas is a 70 year old male  s/p lap R diaphragmatic plication on 6/3 with R chest tube placement.     Today: discontinue PCA, IVF, larson; start CLD    - MMPC (including schedule toradol)  - Resp: wean as able  - Diet: CLD  - IVF: none  - AC: LVX  - LTD: none    Clinically Significant Risk Factors        # Hyperkalemia: Highest K = 5.5 mmol/L in last 2 days, will monitor as appropriate  # Hyponatremia: Lowest Na = 134 mmol/L in last 2 days, will monitor as appropriate   # Hypocalcemia: Lowest Ca = 8.5 mg/dL in last 2 days, will monitor and replace as appropriate             # Acute Hypoxic Respiratory Failure: Based on blood gas results. Continue supplemental oxygen as needed  # Acute Hypercapnic Respiratory Failure: based on arterial blood gas results.  Continue supplemental oxygen and ventilatory support as indicated.  # Acute Hypercapnic Respiratory Failure: based on venous blood gas results.  Continue supplemental oxygen and ventilatory support as indicated.         # Overweight: Estimated body mass index is 29.1 kg/m  as calculated from  "the following:    Height as of this encounter: 1.778 m (5' 10\").    Weight as of this encounter: 92 kg (202 lb 12.8 oz)., PRESENT ON ADMISSION            Pt seen and discussed with fellow and/or chief resident and staff surgeon.     Cm Cain DO MBA  [facility]  PGY-1 Resident, General Surgery     Thoracic and Foregut Surgery  Text page Thoracic Surgery via [facility] Paging/Directory          "

## 2025-06-05 NOTE — PLAN OF CARE
[id number]  Neuro: A&Ox4. Able to make needs known properly.   Cardiac: SR/ST. VSS. Denies chest pain.   Respiratory: Sating >95% on 2L NC.  GI/: Adequate urine output to larson. No BM. pt reports passing flatus over night.   Diet: NPO  Activity:  Assist of 1 w/ GB and walker.  Pain: At acceptable level on current regimen.   Skin: No new deficits noted.  LDA's:  PIV to right arm SL, PIV to left arm infusing NS at 50 mL/h w/ Dilaudid PCA pump at 0.2 mg/mL  Plan: Continue with POC. Notify primary team with changes.

## 2025-06-05 NOTE — PROGRESS NOTES
Physical Therapy Initial Evaluation   06/05/25 1500   Appointment Info   Signing Clinician's Name / Credentials (PT) Mega Pino, DELLA   Living Environment   People in Home significant other   Current Living Arrangements house   Home Accessibility stairs to enter home;stairs within home   Number of Stairs, Main Entrance 3   Stair Railings, Main Entrance railings safe and in good condition   Number of Stairs, Within Home, Primary greater than 10 stairs   Stair Railings, Within Home, Primary railings safe and in good condition   Transportation Anticipated family or friend will provide   Living Environment Comments stairs to basement   Self-Care   Usual Activity Tolerance good   Current Activity Tolerance moderate   Equipment Currently Used at Home none   Fall history within last six months no   Activity/Exercise/Self-Care Comment Ind PLOF with no AD, works about 50hours per week for [employer].   General Information   Onset of Illness/Injury or Date of Surgery 06/03/25   Referring Physician nSow Galeas MD   Patient/Family Therapy Goals Statement (PT) return home and return to work   Pertinent History of Current Problem (include personal factors and/or comorbidities that impact the POC) Phong Cuevas is a 70 year old male  s/p lap R diaphragmatic plication on 6/3 with R chest tube placement.   Cognition   Affect/Mental Status (Cognition) WFL;anxious   Orientation Status (Cognition) oriented to;person;situation;place   Cognitive Status Comments not oriented to time, reports feeling foggy and slightly out of it.   Strength (Manual Muscle Testing)   Strength Comments Bilateral UE/LE WFL. Generalized weakness as demonstrated by slow movements and mobility.   Bed Mobility   Comment, (Bed Mobility) SBA   Transfers   Comment, (Transfers) Sit<>stand SBA   Gait/Stairs (Locomotion)   Comment, (Gait/Stairs) Ambulates with walker and SBA x 50 ft. Ascends/descends 3 stairs with railings, close SBA, reciprocal pattern.   Balance    Balance Comments Able to ambulate with no AD and CGA, no LOB   Clinical Impression   Criteria for Skilled Therapeutic Intervention Yes, treatment indicated   PT Diagnosis (PT) impaired functional mobility   Influenced by the following impairments weakness, deconditioning   Functional limitations due to impairments ambulation, self cares   Clinical Presentation (PT Evaluation Complexity) stable   Clinical Presentation Rationale clinical judgment   Clinical Decision Making (Complexity) low complexity   Planned Therapy Interventions (PT) balance training;gait training;home exercise program;stair training;strengthening;transfer training   Risk & Benefits of therapy have been explained care plan/treatment goals reviewed;evaluation/treatment results reviewed;risks/benefits reviewed;current/potential barriers reviewed;participants voiced agreement with care plan;participants included;patient   PT Total Evaluation Time   PT Eval, Low Complexity Minutes (24709) 6   Physical Therapy Goals   PT Frequency 5x/week   PT Predicted Duration/Target Date for Goal Attainment 06/12/25   PT Goals Gait;Aerobic Activity;PT Goal 1   PT: Gait Independent;Greater than 200 feet   PT: Perform aerobic activity with stable cardiovascular response continuous activity;15 minutes;ambulation;NuStep   PT: Goal 1 Patient to be independent with HEP in order to improve strength and endurance.   PT Discharge Planning   PT Plan progress gait independence/endurance, HEP, assess cog (OT needs)   PT Discharge Recommendation (DC Rec) home with assist   PT Rationale for DC Rec Patient mobilizing quite well. Is able to ambulate good distance without walker, perform stairs, spo2 stable, HR tachycardaic up to 120's but asymptomatic. Anticipate he will progress well and be able to DC home with family assist once medically stable.   PT Brief overview of current status SBA-encourage ambulation in the halls.

## 2025-06-05 NOTE — PLAN OF CARE
Goal Outcome Evaluation:      Plan of Care Reviewed With: patient    Overall Patient Progress: improving    [id number]     Shift changes:   -Discontinued PCA dilaudid.   -Removed larson, voided unmeasured x3.  -Advanced to regular diet     Neuro: A&O x4  Cardiac: SR/ST (90-120s). Denied chest pain, dizziness, palpitations. VSS, afebrile.   Respiratory: 2L of NC  GI/: had 2 BM today.   Diet/Appetite: tolerating reg liquid diet,   Skin: Surgical incisions sites, no new deficits noted.   LDA: Left PIV and Right PIV SL.  Activity: Ax1 w.walker and gait belt.   Pain: Incisional pain reported. Pain in controlled with current regimen.   Plan: Monitor pain, resp status. Continue with POC. Notify primary team of pertinent changes.

## 2025-06-06 ENCOUNTER — APPOINTMENT (OUTPATIENT)
Dept: GENERAL RADIOLOGY | Facility: CLINIC | Age: 70
DRG: 163 | End: 2025-06-06
Payer: COMMERCIAL

## 2025-06-06 ENCOUNTER — APPOINTMENT (OUTPATIENT)
Dept: CT IMAGING | Facility: CLINIC | Age: 70
DRG: 163 | End: 2025-06-06
Payer: COMMERCIAL

## 2025-06-06 ENCOUNTER — APPOINTMENT (OUTPATIENT)
Dept: PHYSICAL THERAPY | Facility: CLINIC | Age: 70
DRG: 163 | End: 2025-06-06
Attending: THORACIC SURGERY (CARDIOTHORACIC VASCULAR SURGERY)
Payer: COMMERCIAL

## 2025-06-06 LAB
ANION GAP SERPL CALCULATED.3IONS-SCNC: 10 MMOL/L (ref 7–15)
BASOPHILS # BLD AUTO: 0 10E3/UL (ref 0–0.2)
BASOPHILS NFR BLD AUTO: 1 %
BUN SERPL-MCNC: 8.1 MG/DL (ref 8–23)
CALCIUM SERPL-MCNC: 8.5 MG/DL (ref 8.8–10.4)
CHLORIDE SERPL-SCNC: 98 MMOL/L (ref 98–107)
CREAT SERPL-MCNC: 0.65 MG/DL (ref 0.67–1.17)
CRP SERPL-MCNC: 136 MG/L
EGFRCR SERPLBLD CKD-EPI 2021: >90 ML/MIN/1.73M2
EOSINOPHIL # BLD AUTO: 0.2 10E3/UL (ref 0–0.7)
EOSINOPHIL NFR BLD AUTO: 3 %
ERYTHROCYTE [DISTWIDTH] IN BLOOD BY AUTOMATED COUNT: 11.3 % (ref 10–15)
GLUCOSE SERPL-MCNC: 78 MG/DL (ref 70–99)
HCO3 SERPL-SCNC: 26 MMOL/L (ref 22–29)
HCT VFR BLD AUTO: 39.9 % (ref 40–53)
HGB BLD-MCNC: 13.1 G/DL (ref 13.3–17.7)
IMM GRANULOCYTES # BLD: 0 10E3/UL
IMM GRANULOCYTES NFR BLD: 0 %
LYMPHOCYTES # BLD AUTO: 1.1 10E3/UL (ref 0.8–5.3)
LYMPHOCYTES NFR BLD AUTO: 21 %
MAGNESIUM SERPL-MCNC: 1.8 MG/DL (ref 1.7–2.3)
MCH RBC QN AUTO: 30.6 PG (ref 26.5–33)
MCHC RBC AUTO-ENTMCNC: 32.8 G/DL (ref 31.5–36.5)
MCV RBC AUTO: 93 FL (ref 78–100)
MONOCYTES # BLD AUTO: 0.5 10E3/UL (ref 0–1.3)
MONOCYTES NFR BLD AUTO: 9 %
NEUTROPHILS # BLD AUTO: 3.5 10E3/UL (ref 1.6–8.3)
NEUTROPHILS NFR BLD AUTO: 67 %
NRBC # BLD AUTO: 0 10E3/UL
NRBC BLD AUTO-RTO: 0 /100
PHOSPHATE SERPL-MCNC: 1.8 MG/DL (ref 2.5–4.5)
PHOSPHATE SERPL-MCNC: 3 MG/DL (ref 2.5–4.5)
PLATELET # BLD AUTO: 151 10E3/UL (ref 150–450)
POTASSIUM SERPL-SCNC: 4.2 MMOL/L (ref 3.4–5.3)
PREALB SERPL-MCNC: 9.8 MG/DL (ref 20–40)
RBC # BLD AUTO: 4.28 10E6/UL (ref 4.4–5.9)
SODIUM SERPL-SCNC: 134 MMOL/L (ref 135–145)
WBC # BLD AUTO: 5.3 10E3/UL (ref 4–11)

## 2025-06-06 PROCEDURE — 85004 AUTOMATED DIFF WBC COUNT: CPT | Performed by: STUDENT IN AN ORGANIZED HEALTH CARE EDUCATION/TRAINING PROGRAM

## 2025-06-06 PROCEDURE — 97116 GAIT TRAINING THERAPY: CPT | Mod: GP

## 2025-06-06 PROCEDURE — 71275 CT ANGIOGRAPHY CHEST: CPT

## 2025-06-06 PROCEDURE — 71046 X-RAY EXAM CHEST 2 VIEWS: CPT | Mod: 26 | Performed by: RADIOLOGY

## 2025-06-06 PROCEDURE — 250N000013 HC RX MED GY IP 250 OP 250 PS 637

## 2025-06-06 PROCEDURE — 250N000013 HC RX MED GY IP 250 OP 250 PS 637: Performed by: THORACIC SURGERY (CARDIOTHORACIC VASCULAR SURGERY)

## 2025-06-06 PROCEDURE — 250N000011 HC RX IP 250 OP 636: Mod: JW

## 2025-06-06 PROCEDURE — 36415 COLL VENOUS BLD VENIPUNCTURE: CPT | Performed by: THORACIC SURGERY (CARDIOTHORACIC VASCULAR SURGERY)

## 2025-06-06 PROCEDURE — 80048 BASIC METABOLIC PNL TOTAL CA: CPT | Performed by: STUDENT IN AN ORGANIZED HEALTH CARE EDUCATION/TRAINING PROGRAM

## 2025-06-06 PROCEDURE — 83735 ASSAY OF MAGNESIUM: CPT | Performed by: STUDENT IN AN ORGANIZED HEALTH CARE EDUCATION/TRAINING PROGRAM

## 2025-06-06 PROCEDURE — 258N000003 HC RX IP 258 OP 636: Performed by: THORACIC SURGERY (CARDIOTHORACIC VASCULAR SURGERY)

## 2025-06-06 PROCEDURE — 93010 ELECTROCARDIOGRAM REPORT: CPT | Performed by: INTERNAL MEDICINE

## 2025-06-06 PROCEDURE — 36415 COLL VENOUS BLD VENIPUNCTURE: CPT | Performed by: STUDENT IN AN ORGANIZED HEALTH CARE EDUCATION/TRAINING PROGRAM

## 2025-06-06 PROCEDURE — 97530 THERAPEUTIC ACTIVITIES: CPT | Mod: GP

## 2025-06-06 PROCEDURE — 250N000011 HC RX IP 250 OP 636

## 2025-06-06 PROCEDURE — 250N000013 HC RX MED GY IP 250 OP 250 PS 637: Performed by: STUDENT IN AN ORGANIZED HEALTH CARE EDUCATION/TRAINING PROGRAM

## 2025-06-06 PROCEDURE — 250N000009 HC RX 250: Performed by: THORACIC SURGERY (CARDIOTHORACIC VASCULAR SURGERY)

## 2025-06-06 PROCEDURE — 84100 ASSAY OF PHOSPHORUS: CPT | Performed by: STUDENT IN AN ORGANIZED HEALTH CARE EDUCATION/TRAINING PROGRAM

## 2025-06-06 PROCEDURE — 71046 X-RAY EXAM CHEST 2 VIEWS: CPT

## 2025-06-06 PROCEDURE — 71275 CT ANGIOGRAPHY CHEST: CPT | Mod: 26 | Performed by: RADIOLOGY

## 2025-06-06 PROCEDURE — 86140 C-REACTIVE PROTEIN: CPT | Performed by: STUDENT IN AN ORGANIZED HEALTH CARE EDUCATION/TRAINING PROGRAM

## 2025-06-06 PROCEDURE — 250N000009 HC RX 250

## 2025-06-06 PROCEDURE — 250N000011 HC RX IP 250 OP 636: Performed by: STUDENT IN AN ORGANIZED HEALTH CARE EDUCATION/TRAINING PROGRAM

## 2025-06-06 PROCEDURE — 999N000111 HC STATISTIC OT IP EVAL DEFER

## 2025-06-06 PROCEDURE — 84134 ASSAY OF PREALBUMIN: CPT | Performed by: STUDENT IN AN ORGANIZED HEALTH CARE EDUCATION/TRAINING PROGRAM

## 2025-06-06 PROCEDURE — 84100 ASSAY OF PHOSPHORUS: CPT | Performed by: THORACIC SURGERY (CARDIOTHORACIC VASCULAR SURGERY)

## 2025-06-06 PROCEDURE — 93005 ELECTROCARDIOGRAM TRACING: CPT

## 2025-06-06 PROCEDURE — 120N000003 HC R&B IMCU UMMC

## 2025-06-06 RX ORDER — LIDOCAINE 4 G/G
2 PATCH TOPICAL EVERY 24 HOURS
Qty: 4 PATCH | Refills: 0 | Status: CANCELLED | OUTPATIENT
Start: 2025-06-06

## 2025-06-06 RX ORDER — METHOCARBAMOL 500 MG/1
500 TABLET, FILM COATED ORAL EVERY 6 HOURS PRN
Status: DISCONTINUED | OUTPATIENT
Start: 2025-06-06 | End: 2025-06-07 | Stop reason: HOSPADM

## 2025-06-06 RX ORDER — IOPAMIDOL 755 MG/ML
68 INJECTION, SOLUTION INTRAVASCULAR ONCE
Status: COMPLETED | OUTPATIENT
Start: 2025-06-06 | End: 2025-06-06

## 2025-06-06 RX ORDER — POLYETHYLENE GLYCOL 3350 17 G/17G
17 POWDER, FOR SOLUTION ORAL DAILY
Qty: 510 G | Refills: 0 | OUTPATIENT
Start: 2025-06-06

## 2025-06-06 RX ORDER — AMOXICILLIN 250 MG
2 CAPSULE ORAL 2 TIMES DAILY
Qty: 14 TABLET | Refills: 0 | OUTPATIENT
Start: 2025-06-06

## 2025-06-06 RX ORDER — LIDOCAINE 4 G/G
2 PATCH TOPICAL EVERY 24 HOURS
Qty: 4 PATCH | Refills: 0 | OUTPATIENT
Start: 2025-06-06

## 2025-06-06 RX ORDER — OXYCODONE HYDROCHLORIDE 5 MG/1
5-10 TABLET ORAL EVERY 4 HOURS PRN
Qty: 40 TABLET | Refills: 0 | OUTPATIENT
Start: 2025-06-06

## 2025-06-06 RX ORDER — METHOCARBAMOL 500 MG/1
500 TABLET, FILM COATED ORAL EVERY 6 HOURS PRN
Qty: 20 TABLET | Refills: 0 | OUTPATIENT
Start: 2025-06-06

## 2025-06-06 RX ORDER — GABAPENTIN 250 MG/5ML
300 SOLUTION ORAL EVERY 8 HOURS
Qty: 470 ML | Refills: 0 | OUTPATIENT
Start: 2025-06-06

## 2025-06-06 RX ORDER — TAMSULOSIN HYDROCHLORIDE 0.4 MG/1
0.4 CAPSULE ORAL DAILY
Qty: 14 CAPSULE | Refills: 0 | OUTPATIENT
Start: 2025-06-06

## 2025-06-06 RX ORDER — ACETAMINOPHEN 325 MG/1
975 TABLET ORAL EVERY 6 HOURS
COMMUNITY
Start: 2025-06-06

## 2025-06-06 RX ORDER — ACETAMINOPHEN 325 MG/1
975 TABLET ORAL EVERY 6 HOURS
Status: DISCONTINUED | OUTPATIENT
Start: 2025-06-06 | End: 2025-06-07 | Stop reason: HOSPADM

## 2025-06-06 RX ADMIN — FUROSEMIDE 20 MG: 20 TABLET ORAL at 08:34

## 2025-06-06 RX ADMIN — FAMOTIDINE 20 MG: 20 TABLET, FILM COATED ORAL at 20:47

## 2025-06-06 RX ADMIN — SODIUM PHOSPHATE, MONOBASIC, MONOHYDRATE AND SODIUM PHOSPHATE, DIBASIC ANHYDROUS 15 MMOL: 142; 276 INJECTION, SOLUTION INTRAVENOUS at 10:45

## 2025-06-06 RX ADMIN — IOPAMIDOL 68 ML: 755 INJECTION, SOLUTION INTRAVENOUS at 15:38

## 2025-06-06 RX ADMIN — DOXYLAMINE SUCCINATE 25 MG: 25 TABLET ORAL at 20:47

## 2025-06-06 RX ADMIN — ACETAMINOPHEN 975 MG: 325 TABLET, FILM COATED ORAL at 20:46

## 2025-06-06 RX ADMIN — MAGNESIUM HYDROXIDE 30 ML: 400 SUSPENSION ORAL at 08:33

## 2025-06-06 RX ADMIN — ACETAMINOPHEN 975 MG: 325 TABLET, FILM COATED ORAL at 08:33

## 2025-06-06 RX ADMIN — ACETAMINOPHEN 975 MG: 325 TABLET, FILM COATED ORAL at 00:04

## 2025-06-06 RX ADMIN — GABAPENTIN 300 MG: 250 SOLUTION ORAL at 20:51

## 2025-06-06 RX ADMIN — GABAPENTIN 300 MG: 250 SOLUTION ORAL at 13:10

## 2025-06-06 RX ADMIN — SIMVASTATIN 40 MG: 20 TABLET, FILM COATED ORAL at 20:47

## 2025-06-06 RX ADMIN — ACETAMINOPHEN 975 MG: 325 TABLET, FILM COATED ORAL at 13:10

## 2025-06-06 RX ADMIN — LIDOCAINE 2 PATCH: 560 PATCH PERCUTANEOUS; TOPICAL; TRANSDERMAL at 20:47

## 2025-06-06 RX ADMIN — KETOROLAC TROMETHAMINE 15 MG: 30 INJECTION, SOLUTION INTRAMUSCULAR at 04:15

## 2025-06-06 RX ADMIN — FAMOTIDINE 20 MG: 20 TABLET, FILM COATED ORAL at 08:33

## 2025-06-06 RX ADMIN — SENNOSIDES AND DOCUSATE SODIUM 2 TABLET: 50; 8.6 TABLET ORAL at 08:34

## 2025-06-06 RX ADMIN — SODIUM CHLORIDE 97 ML: 9 INJECTION, SOLUTION INTRAVENOUS at 15:38

## 2025-06-06 RX ADMIN — TAMSULOSIN HYDROCHLORIDE 0.4 MG: 0.4 CAPSULE ORAL at 08:34

## 2025-06-06 RX ADMIN — ENOXAPARIN SODIUM 40 MG: 40 INJECTION SUBCUTANEOUS at 09:45

## 2025-06-06 RX ADMIN — GABAPENTIN 300 MG: 250 SOLUTION ORAL at 06:04

## 2025-06-06 RX ADMIN — SODIUM PHOSPHATE, MONOBASIC, MONOHYDRATE AND SODIUM PHOSPHATE, DIBASIC ANHYDROUS 15 MMOL: 142; 276 INJECTION, SOLUTION INTRAVENOUS at 08:33

## 2025-06-06 ASSESSMENT — ACTIVITIES OF DAILY LIVING (ADL)
ADLS_ACUITY_SCORE: 42
ADLS_ACUITY_SCORE: 45
ADLS_ACUITY_SCORE: 42
ADLS_ACUITY_SCORE: 42
ADLS_ACUITY_SCORE: 40
ADLS_ACUITY_SCORE: 45
ADLS_ACUITY_SCORE: 40
ADLS_ACUITY_SCORE: 42
ADLS_ACUITY_SCORE: 45
ADLS_ACUITY_SCORE: 40
ADLS_ACUITY_SCORE: 42
ADLS_ACUITY_SCORE: 40
ADLS_ACUITY_SCORE: 42
ADLS_ACUITY_SCORE: 45
ADLS_ACUITY_SCORE: 42
ADLS_ACUITY_SCORE: 42
ADLS_ACUITY_SCORE: 41
ADLS_ACUITY_SCORE: 42
ADLS_ACUITY_SCORE: 42

## 2025-06-06 NOTE — PROGRESS NOTES
THORACIC & FOREGUT SURGERY    S:  No overnight events.  Pt seen at bedside resting comfortably. Pain worse with activity. States pain is ok unless he moves. BM this morning (loose). IS challenging secondary to pain. Tolerating diet. Had scrambled eggs. No nausea.     O:  Vitals:    06/06/25 0429 06/06/25 0548 06/06/25 0604 06/06/25 0742   BP: (!) 133/101  (!) 132/94 (!) 128/93   BP Location: Right arm  Left arm Right arm   Cuff Size: Adult Regular   Adult Regular   Pulse: 103   104   Resp: 26   20   Temp: 98.3  F (36.8  C)   98.4  F (36.9  C)   TempSrc: Oral   Oral   SpO2: 93%   98%   Weight:  92.9 kg (204 lb 12.8 oz)     Height:           A&O, NAD   Breathing non-labored, Sats 93% on 2L NC overnight  Appears well perfused   Soft, NDNT   Distal extremities warm. No calf tenderness.     A/P: Phong Cuevas is a 70 year old male s/p thoracoscopic right diaphragm plication on 6/3/25. Continues to improve but still having pain with ambulation and IS.     Still requiring 2L O2 overnight. Sats 93% this morning. Possible discharge today pending O2 needs and 2 view chest xray. Pain not optimally managed.     -Multimodal pain control  -Encourage ambulation and IS  -2 view AXR this morning  -Wean O2  -Discharge possible today pending CXR and O2 requirements.  -Lovenox ppx        Clinically Significant Risk Factors         # Hyponatremia: Lowest Na = 134 mmol/L in last 2 days, will monitor as appropriate               # Acute Hypoxic Respiratory Failure: Based on blood gas results. Continue supplemental oxygen as needed  # Acute Hypercapnic Respiratory Failure: based on arterial blood gas results.  Continue supplemental oxygen and ventilatory support as indicated.  # Acute Hypercapnic Respiratory Failure: based on venous blood gas results.  Continue supplemental oxygen and ventilatory support as indicated.         # Overweight: Estimated body mass index is 29.39 kg/m  as calculated from the following:    Height as of this  "encounter: 1.778 m (5' 10\").    Weight as of this encounter: 92.9 kg (204 lb 12.8 oz)., PRESENT ON ADMISSION            Patient discussed with staff.    Arcelia Jara PA-C     Thoracic and Foregut Surgery  Text page Thoracic Surgery via [facility] Paging/Directory          "

## 2025-06-06 NOTE — PLAN OF CARE
Occupational Therapy: Orders received. Chart reviewed and discussed with care team.? Occupational Therapy not indicated at this time as pt IND w/ ADLs, mobilizing well w/ PT. Pt has supportive family who are able to assist w/ heavy ADL/IADL PRN. PT following, all skilled rehab needs able to be met by one discipline.? Defer discharge recommendations to PT.? Will complete orders. Please re-consult if pt experiences a change in status or if further needs are identified.

## 2025-06-06 NOTE — PLAN OF CARE
Shift [id number].  Neuro: A&O x 4. Impulsive at times. Afebrile. Appeared to sleep well after midnight.  Cardiac: SR 80s-90s. SBPs 130s.  Respiratory: Sating well on 2L NC for sleep.   GI/: Voids appropriately. C/o loose stools.   Diet/Appetite: Regular diet, appetite improving per pt.   Skin: Scattered ecchymosis, abdominal lap sites CDI.   LDA: R & L PIV, SL.  Activity: SBA w/ walker.  Pain: Denies.  Labs: RN managed Ph.

## 2025-06-06 NOTE — PLAN OF CARE
"Neuro: A&O x4, AO1  Cardiac: Tele in place, sinus tachycardia noted. Afebrile  Resp: VSS on oxygen at 2 l/min via NC, weaned to room air saturating >94%  GI/: On regular diet, voided freely, last BM 6/6/25  Skin: Scattered bruising noted. With old CT and Laparoscopic sites. No new deficits noted  LDA's: With 2 PIVs in place, saline lock.   Pain: denies pain    Changes this shift: Phosphorus 1.8, Sodium Phos  IV given per protocol. Chest Xray, EKG and Chest CT done today.    Plan: Continue to monitor and follow POC. For possible discharge tomorrow. Notify Thoracic team with any changes.     Problem: Delirium  Goal: Optimal Coping  Outcome: Progressing  Intervention: Optimize Psychosocial Adjustment to Delirium  Recent Flowsheet Documentation  Taken 6/6/2025 0800 by Pia Duran RN  Family/Support System Care: self-care encouraged  Goal: Improved Behavioral Control  Outcome: Progressing  Intervention: Minimize Safety Risk  Recent Flowsheet Documentation  Taken 6/6/2025 0800 by Pia Duran RN  Trust Relationship/Rapport:   care explained   choices provided   emotional support provided   empathic listening provided   thoughts/feelings acknowledged  Goal: Improved Attention and Thought Clarity  Outcome: Progressing  Goal: Improved Sleep  Outcome: Progressing     Problem: Adult Inpatient Plan of Care  Goal: Plan of Care Review  Description: The Plan of Care Review/Shift note should be completed every shift.  The Outcome Evaluation is a brief statement about your assessment that the patient is improving, declining, or no change.  This information will be displayed automatically on your shift  note.  Outcome: Progressing  Flowsheets (Taken 6/6/2025 1022)  Plan of Care Reviewed With: patient  Overall Patient Progress: improving  Goal: Patient-Specific Goal (Individualized)  Description: You can add care plan individualizations to a care plan. Examples of Individualization might be:  \"Parent requests to be called " "daily at 9am for status\", \"I have a hard time hearing out of my right ear\", or \"Do not touch me to wake me up as it startles  me\".  Outcome: Progressing  Goal: Absence of Hospital-Acquired Illness or Injury  Outcome: Progressing  Goal: Optimal Comfort and Wellbeing  Outcome: Progressing  Intervention: Provide Person-Centered Care  Recent Flowsheet Documentation  Taken 6/6/2025 0800 by Pia Duran RN  Trust Relationship/Rapport:   care explained   choices provided   emotional support provided   empathic listening provided   thoughts/feelings acknowledged  Goal: Readiness for Transition of Care  Outcome: Progressing     Problem: Pain Acute  Goal: Optimal Pain Control and Function  Outcome: Progressing     Problem: Hypertension Acute  Goal: Blood Pressure Within Desired Range  Outcome: Progressing     Problem: Dysrhythmia  Goal: Normalized Cardiac Rhythm  Outcome: Progressing     Goal Outcome Evaluation:      Plan of Care Reviewed With: patient    Overall Patient Progress: improving               "

## 2025-06-07 VITALS
HEIGHT: 70 IN | OXYGEN SATURATION: 93 % | HEART RATE: 101 BPM | BODY MASS INDEX: 28.82 KG/M2 | TEMPERATURE: 98.6 F | DIASTOLIC BLOOD PRESSURE: 89 MMHG | SYSTOLIC BLOOD PRESSURE: 129 MMHG | WEIGHT: 201.3 LBS | RESPIRATION RATE: 25 BRPM

## 2025-06-07 LAB
ANION GAP SERPL CALCULATED.3IONS-SCNC: 10 MMOL/L (ref 7–15)
BASOPHILS # BLD AUTO: 0.1 10E3/UL (ref 0–0.2)
BASOPHILS NFR BLD AUTO: 1 %
BUN SERPL-MCNC: 6.9 MG/DL (ref 8–23)
CALCIUM SERPL-MCNC: 8.7 MG/DL (ref 8.8–10.4)
CHLORIDE SERPL-SCNC: 101 MMOL/L (ref 98–107)
CREAT SERPL-MCNC: 0.61 MG/DL (ref 0.67–1.17)
EGFRCR SERPLBLD CKD-EPI 2021: >90 ML/MIN/1.73M2
EOSINOPHIL # BLD AUTO: 0.2 10E3/UL (ref 0–0.7)
EOSINOPHIL NFR BLD AUTO: 4 %
ERYTHROCYTE [DISTWIDTH] IN BLOOD BY AUTOMATED COUNT: 11.3 % (ref 10–15)
GLUCOSE SERPL-MCNC: 101 MG/DL (ref 70–99)
HCO3 SERPL-SCNC: 27 MMOL/L (ref 22–29)
HCT VFR BLD AUTO: 42.3 % (ref 40–53)
HGB BLD-MCNC: 14 G/DL (ref 13.3–17.7)
IMM GRANULOCYTES # BLD: 0 10E3/UL
IMM GRANULOCYTES NFR BLD: 0 %
LYMPHOCYTES # BLD AUTO: 0.9 10E3/UL (ref 0.8–5.3)
LYMPHOCYTES NFR BLD AUTO: 20 %
MAGNESIUM SERPL-MCNC: 1.7 MG/DL (ref 1.7–2.3)
MCH RBC QN AUTO: 30.6 PG (ref 26.5–33)
MCHC RBC AUTO-ENTMCNC: 33.1 G/DL (ref 31.5–36.5)
MCV RBC AUTO: 92 FL (ref 78–100)
MONOCYTES # BLD AUTO: 0.5 10E3/UL (ref 0–1.3)
MONOCYTES NFR BLD AUTO: 10 %
NEUTROPHILS # BLD AUTO: 2.9 10E3/UL (ref 1.6–8.3)
NEUTROPHILS NFR BLD AUTO: 64 %
NRBC # BLD AUTO: 0 10E3/UL
NRBC BLD AUTO-RTO: 0 /100
PHOSPHATE SERPL-MCNC: 3.3 MG/DL (ref 2.5–4.5)
PLATELET # BLD AUTO: 172 10E3/UL (ref 150–450)
POTASSIUM SERPL-SCNC: 4 MMOL/L (ref 3.4–5.3)
RBC # BLD AUTO: 4.58 10E6/UL (ref 4.4–5.9)
SODIUM SERPL-SCNC: 138 MMOL/L (ref 135–145)
WBC # BLD AUTO: 4.6 10E3/UL (ref 4–11)

## 2025-06-07 PROCEDURE — 85018 HEMOGLOBIN: CPT | Performed by: STUDENT IN AN ORGANIZED HEALTH CARE EDUCATION/TRAINING PROGRAM

## 2025-06-07 PROCEDURE — 82435 ASSAY OF BLOOD CHLORIDE: CPT | Performed by: STUDENT IN AN ORGANIZED HEALTH CARE EDUCATION/TRAINING PROGRAM

## 2025-06-07 PROCEDURE — 83735 ASSAY OF MAGNESIUM: CPT | Performed by: STUDENT IN AN ORGANIZED HEALTH CARE EDUCATION/TRAINING PROGRAM

## 2025-06-07 PROCEDURE — 36415 COLL VENOUS BLD VENIPUNCTURE: CPT | Performed by: STUDENT IN AN ORGANIZED HEALTH CARE EDUCATION/TRAINING PROGRAM

## 2025-06-07 PROCEDURE — 250N000013 HC RX MED GY IP 250 OP 250 PS 637

## 2025-06-07 PROCEDURE — 250N000013 HC RX MED GY IP 250 OP 250 PS 637: Performed by: STUDENT IN AN ORGANIZED HEALTH CARE EDUCATION/TRAINING PROGRAM

## 2025-06-07 PROCEDURE — 84100 ASSAY OF PHOSPHORUS: CPT | Performed by: STUDENT IN AN ORGANIZED HEALTH CARE EDUCATION/TRAINING PROGRAM

## 2025-06-07 RX ORDER — OXYCODONE HYDROCHLORIDE 5 MG/1
5-10 TABLET ORAL EVERY 4 HOURS PRN
Qty: 40 TABLET | Refills: 0 | Status: CANCELLED | OUTPATIENT
Start: 2025-06-07

## 2025-06-07 RX ORDER — ACETAMINOPHEN 325 MG/1
975 TABLET ORAL EVERY 6 HOURS
COMMUNITY
Start: 2025-06-07

## 2025-06-07 RX ORDER — OXYCODONE HYDROCHLORIDE 5 MG/1
5 TABLET ORAL EVERY 6 HOURS PRN
Qty: 15 TABLET | Refills: 0 | Status: CANCELLED | OUTPATIENT
Start: 2025-06-07 | End: 2025-06-10

## 2025-06-07 RX ORDER — OXYCODONE HYDROCHLORIDE 5 MG/1
5 TABLET ORAL EVERY 6 HOURS PRN
Qty: 15 TABLET | Refills: 0 | Status: SHIPPED | OUTPATIENT
Start: 2025-06-07

## 2025-06-07 RX ORDER — AMOXICILLIN 250 MG
2 CAPSULE ORAL 2 TIMES DAILY
Qty: 14 TABLET | Refills: 0 | Status: SHIPPED | OUTPATIENT
Start: 2025-06-07

## 2025-06-07 RX ORDER — METHOCARBAMOL 500 MG/1
500 TABLET, FILM COATED ORAL EVERY 6 HOURS PRN
Qty: 20 TABLET | Refills: 0 | Status: SHIPPED | OUTPATIENT
Start: 2025-06-07

## 2025-06-07 RX ORDER — BISACODYL 10 MG
10 SUPPOSITORY, RECTAL RECTAL DAILY PRN
Qty: 14 SUPPOSITORY | Refills: 0 | Status: SHIPPED | OUTPATIENT
Start: 2025-06-07

## 2025-06-07 RX ORDER — POLYETHYLENE GLYCOL 3350 17 G/17G
17 POWDER, FOR SOLUTION ORAL DAILY
Qty: 510 G | Refills: 0 | Status: SHIPPED | OUTPATIENT
Start: 2025-06-07

## 2025-06-07 RX ADMIN — TAMSULOSIN HYDROCHLORIDE 0.4 MG: 0.4 CAPSULE ORAL at 07:36

## 2025-06-07 RX ADMIN — FAMOTIDINE 20 MG: 20 TABLET, FILM COATED ORAL at 07:37

## 2025-06-07 RX ADMIN — ACETAMINOPHEN 975 MG: 325 TABLET, FILM COATED ORAL at 07:36

## 2025-06-07 RX ADMIN — GABAPENTIN 300 MG: 250 SOLUTION ORAL at 05:57

## 2025-06-07 ASSESSMENT — ACTIVITIES OF DAILY LIVING (ADL)
ADLS_ACUITY_SCORE: 42
ADLS_ACUITY_SCORE: 41
ADLS_ACUITY_SCORE: 42
ADLS_ACUITY_SCORE: 41
ADLS_ACUITY_SCORE: 42
ADLS_ACUITY_SCORE: 41

## 2025-06-07 NOTE — DISCHARGE SUMMARY
Thoracic Surgery Discharge Summary    NAME: Phong Cuevas   MRN: [id number]   : 1955     DATE OF ADMISSION: 6/3/2025     PRE/POSTOPERATIVE DIAGNOSES: R maria del carmen diaphragm paralysis    PROCEDURES PERFORMED: Right diaphragm plication    PATHOLOGY RESULTS: none     CULTURE RESULTS: None     INTRAOPERATIVE COMPLICATIONS: None    POSTOPERATIVE COMPLICATIONS: Acute Hypoxia, resolved    DRAINS/TUBES PRESENT AT DISCHARGE: none    DATE OF DISCHARGE:  25     HOSPITAL COURSE: Phong Cuevas is a 70 year old male who on 6/3/2025  underwent the above-named procedures.  he tolerated the operation well and postoperatively was transferred to the general post-surgical unit.  The remainder of his course was essentially uncomplicated.  Prior to discharge, his pain was controlled well, he was able to perform ADLs and ambulate independently without difficulty, and had full return of bowel and bladder function.  On 25 , he was discharged to home in stable condition with.    DISCHARGE INSTRUCTIONS:  THORACIC SURGERY DISCHARGE INSTRUCTIONS    DIET: Regular diet as tolerated    If your plans upon discharge include prolonged periods of sitting (i.e a lengthy car or plane ride), it is highly beneficial to get up and walk at least once per hour to help prevent swelling and blood clots.     You may remove chest tube dressing 48 hours after tube removal and bandage the site at your own discretion thereafter.  Small amounts of leakage are normal for 2-3 days after removal.  Feel free to call with questions.    You may get incision wet 2 days after operation. Do not submerge, soak, or scrub incision or swim until seen in follow-up.    Take incentive spirometer home for continued frequent use    Activity as tolerated, no strenous activity until seen in follow-up, no lifting greater than 20 pounds for the next 4 weeks.    Stay hydrated. Take over the counter fiber (metamucil or benefiber) and stool softeners (Miralax, docusate or  senna) if becoming constipated.     Call for fever greater than 101.5, chills, increased size of incision, red skin around incision, vision changes, muscle strength changes, sensation changes, shortness of breath, or other concerns.    No driving while taking narcotic pain medication.    Transition to ibuprofen or tylenol/acetaminophen for pain control. Do not take tylenol/acetaminophen and acetaminophen containing narcotic (e.g., percocet or vicodin) at the same time. If you have known ulcer problems, or kidney trouble (elevated creatinine) do not take the ibuprofen.    In emergencies, call 911    For other Questions or Concerns;   A.) During weekday working hours (Monday through Friday 8am to 4:30pm)   call [phone] and ask to speak to a clinical nurse specialist.     B.) At nights (after 4:30pm), on weekends, or if urgent call [phone] and   tell the   I would like to page job code [id number], the thoracic surgery   fellow on call, please.         FOLLOW UP APPOINTMENTS:   6/19/25    DISCHARGE MEDICATIONS:      Review of your medicines         Important    This medication list is not yet final, because your doctor or pharmacist is still double-checking some of the changes. Ask your nurse for an updated version.  Specifically ask about this and similar medications: oxyCODONE (ROXICODONE) 5 MG tablet       START taking        Dose / Directions   acetaminophen 325 MG tablet  Commonly known as: TYLENOL      Dose: 975 mg  Take 3 tablets (975 mg) by mouth every 6 hours.  Refills: 0     bisacodyl 10 MG suppository  Commonly known as: DULCOLAX      Dose: 10 mg  Place 1 suppository (10 mg) rectally daily as needed for constipation (Use if magnesium hydroxide (MILK of MAGNESIA) is not effective after 24 hours. May discontinue if patient having bowel movement.).  Quantity: 14 suppository  Refills: 0     methocarbamol 500 MG tablet  Commonly known as: ROBAXIN      Dose: 500 mg  Take 1 tablet (500 mg) by  mouth every 6 hours as needed for muscle spasms.  Quantity: 20 tablet  Refills: 0     oxyCODONE 5 MG tablet  Commonly known as: ROXICODONE      Dose: 5-10 mg  Take 1-2 tablets (5-10 mg) by mouth every 4 hours as needed.  Quantity: 40 tablet  Refills: 0     polyethylene glycol 17 GM/Dose powder  Commonly known as: MIRALAX      Dose: 17 g  Take 17 g by mouth daily.  Quantity: 510 g  Refills: 0     senna-docusate 8.6-50 MG tablet  Commonly known as: SENOKOT-S/PERICOLACE      Dose: 2 tablet  Take 2 tablets by mouth 2 times daily.  Quantity: 14 tablet  Refills: 0            CONTINUE these medicines which may have CHANGED, or have new prescriptions. If we are uncertain of the size of tablets/capsules you have at home, strength may be listed as something that might have changed.        Dose / Directions   meloxicam 15 MG tablet  Commonly known as: MOBIC  This may have changed: when to take this  Used for: Osteoarthritis of joint of toe of right foot      Dose: 15 mg  Take 1 tablet (15 mg) by mouth daily.  Quantity: 90 tablet  Refills: 3            CONTINUE these medicines which have NOT CHANGED        Dose / Directions   doxylamine 25 MG Tabs tablet  Commonly known as: UNISOM      Dose: 25 mg  Take 25 mg by mouth at bedtime  Refills: 0     ibuprofen 200 MG tablet  Commonly known as: ADVIL/MOTRIN      Dose: 200 mg  Take 200 mg by mouth every 4 hours as needed for pain.  Refills: 0     lisinopril 10 MG tablet  Commonly known as: ZESTRIL  Used for: Benign essential hypertension      Dose: 10 mg  Take 1 tablet (10 mg) by mouth daily.  Quantity: 90 tablet  Refills: 3     MULTIVITAMIN ADULT PO      Dose: 1 tablet  Take 1 tablet by mouth daily.  Refills: 0     simvastatin 40 MG tablet  Commonly known as: ZOCOR  Used for: Hyperlipidemia LDL goal <100      Dose: 40 mg  Take 1 tablet (40 mg) by mouth at bedtime.  Quantity: 90 tablet  Refills: 3     VITAMIN C PO      Dose: 500 mg  Take 500 mg by mouth daily.  Refills: 0     Vitamin  D-3 25 MCG (1000 UT) Caps      Dose: 1 capsule  Take 1 capsule by mouth daily.  Refills: 0               Where to get your medicines        These medications were sent to [facility] - [place], [place] - [address] [address]      Phone: [phone]   bisacodyl 10 MG suppository  methocarbamol 500 MG tablet  polyethylene glycol 17 GM/Dose powder  senna-docusate 8.6-50 MG tablet       Some of these will need a paper prescription and others can be bought over the counter. Ask your nurse if you have questions.    You don't need a prescription for these medications  acetaminophen 325 MG tablet       Information about where to get these medications is not yet available    Ask your nurse or doctor about these medications  oxyCODONE 5 MG tablet           *MEDICATIONS INTENDED TO BE THE SAME AS PRIOR TO ADMISSION     Pt seen and discussed with fellow and/or chief resident and staff surgeon.     Cm Cain DO MBA  [facility]  PGY-1 Resident, General Surgery

## 2025-06-07 NOTE — PLAN OF CARE
Discharged to: Home  Via: Automobile  Accompanied by: Family/Lashonda  Discharge Instructions: diet, activity, medications, follow up appointments, when to call the MD, aftercare instructions, and what to watchout for (i.e. s/s of infection, increasing SOB, palpitations, chest pain,)  Prescriptions: To be filled by discharge pharmacy per pt's request; medication list reviewed & sent with pt  Follow Up Appointments: arranged; information given  Belongings: All sent with pt  IV: out  Telemetry: off  Pt exhibits understanding of above discharge instructions; all questions answered.    Discharge Paperwork: copied, and sent home with patient.     Problem: Delirium  Goal: Optimal Coping  Outcome: Progressing  Intervention: Optimize Psychosocial Adjustment to Delirium  Recent Flowsheet Documentation  Taken 6/7/2025 0800 by Pia Duran RN  Family/Support System Care: self-care encouraged  Goal: Improved Behavioral Control  Outcome: Progressing  Intervention: Prevent and Manage Agitation  Recent Flowsheet Documentation  Taken 6/7/2025 0800 by Pia Duran RN  Environment Familiarity/Consistency: daily routine followed  Intervention: Minimize Safety Risk  Recent Flowsheet Documentation  Taken 6/7/2025 0800 by Pia Duran RN  Communication Support Strategies: active listening utilized  Enhanced Safety Measures:   room near unit station   review medications for side effects with activity   pain management  Trust Relationship/Rapport:   care explained   choices provided   emotional support provided   empathic listening provided   thoughts/feelings acknowledged   questions answered  Goal: Improved Attention and Thought Clarity  Outcome: Progressing  Intervention: Maximize Cognitive Function  Recent Flowsheet Documentation  Taken 6/7/2025 0800 by Pia Duran RN  Sensory Stimulation Regulation: care clustered  Reorientation Measures:   clock in view   calendar in view  Goal: Improved Sleep  Outcome:  "Progressing     Problem: Adult Inpatient Plan of Care  Goal: Plan of Care Review  Description: The Plan of Care Review/Shift note should be completed every shift.  The Outcome Evaluation is a brief statement about your assessment that the patient is improving, declining, or no change.  This information will be displayed automatically on your shift  note.  Outcome: Progressing  Flowsheets (Taken 6/7/2025 0900)  Plan of Care Reviewed With: patient  Overall Patient Progress: improving  Goal: Patient-Specific Goal (Individualized)  Description: You can add care plan individualizations to a care plan. Examples of Individualization might be:  \"Parent requests to be called daily at 9am for status\", \"I have a hard time hearing out of my right ear\", or \"Do not touch me to wake me up as it startles  me\".  Outcome: Progressing  Goal: Absence of Hospital-Acquired Illness or Injury  Outcome: Progressing  Intervention: Identify and Manage Fall Risk  Recent Flowsheet Documentation  Taken 6/7/2025 0800 by Pia Duran RN  Safety Promotion/Fall Prevention:   activity supervised   assistive device/personal items within reach   clutter free environment maintained   increased rounding and observation   room near nurse's station   room organization consistent   supervised activity  Intervention: Prevent Skin Injury  Recent Flowsheet Documentation  Taken 6/7/2025 0800 by Pia Duran RN  Body Position: position changed independently  Intervention: Prevent Infection  Recent Flowsheet Documentation  Taken 6/7/2025 0800 by Pia Duran RN  Infection Prevention:   single patient room provided   rest/sleep promoted   personal protective equipment utilized   hand hygiene promoted   environmental surveillance performed   cohorting utilized  Goal: Optimal Comfort and Wellbeing  Outcome: Progressing  Intervention: Provide Person-Centered Care  Recent Flowsheet Documentation  Taken 6/7/2025 0800 by Pia Duran RN  Trust " Relationship/Rapport:   care explained   choices provided   emotional support provided   empathic listening provided   thoughts/feelings acknowledged   questions answered  Goal: Readiness for Transition of Care  Outcome: Progressing     Problem: Pain Acute  Goal: Optimal Pain Control and Function  Outcome: Progressing  Intervention: Prevent or Manage Pain  Recent Flowsheet Documentation  Taken 6/7/2025 0800 by Pia Duran RN  Sensory Stimulation Regulation: care clustered  Bowel Elimination Promotion:   adequate fluid intake promoted   ambulation promoted  Medication Review/Management: medications reviewed     Problem: Hypertension Acute  Goal: Blood Pressure Within Desired Range  Outcome: Progressing  Intervention: Normalize Blood Pressure  Recent Flowsheet Documentation  Taken 6/7/2025 0800 by Pia Duran RN  Sensory Stimulation Regulation: care clustered  Medication Review/Management: medications reviewed     Problem: Dysrhythmia  Goal: Normalized Cardiac Rhythm  Outcome: Progressing     Goal Outcome Evaluation:      Plan of Care Reviewed With: patient    Overall Patient Progress: improving

## 2025-06-07 NOTE — DISCHARGE INSTRUCTIONS
THORACIC SURGERY DISCHARGE INSTRUCTIONS    DIET: Regular diet as tolerated    If your plans upon discharge include prolonged periods of sitting (i.e a lengthy car or plane ride), it is highly beneficial to get up and walk at least once per hour to help prevent swelling and blood clots.     You may remove chest tube dressing 48 hours after tube removal and bandage the site at your own discretion thereafter.  Small amounts of leakage are normal for 2-3 days after removal.  Feel free to call with questions.    You may get incision wet 2 days after operation. Do not submerge, soak, or scrub incision or swim until seen in follow-up.    Take incentive spirometer home for continued frequent use    Activity as tolerated, no strenous activity until seen in follow-up, no lifting greater than 20 pounds for the next 4 weeks.    Stay hydrated. Take over the counter fiber (metamucil or benefiber) and stool softeners (Miralax, docusate or senna) if becoming constipated.     Call for fever greater than 101.5, chills, increased size of incision, red skin around incision, vision changes, muscle strength changes, sensation changes, shortness of breath, or other concerns.    No driving while taking narcotic pain medication.    Transition to ibuprofen or tylenol/acetaminophen for pain control. Do not take tylenol/acetaminophen and acetaminophen containing narcotic (e.g., percocet or vicodin) at the same time. If you have known ulcer problems, or kidney trouble (elevated creatinine) do not take the ibuprofen.    In emergencies, call 911    For other Questions or Concerns;   A.) During weekday working hours (Monday through Friday 8am to 4:30pm)   call [phone] and ask to speak to a clinical nurse specialist.     B.) At nights (after 4:30pm), on weekends, or if urgent call [phone] and   tell the   I would like to page job code [id number], the thoracic surgery   fellow on call, please.

## 2025-06-07 NOTE — PROGRESS NOTES
Cross-cover note: 6:30 PM 6/7/2025 06/07/2025    General Surgery Cross Cover Note    Called by nursing regarding no script for oxycodone        B/P: 129/89, T: 98.6, P: 101, R: 25      Plan:  - called outpatient pharmacy, no script received.   - personally checked chart no script found  - new paper script provided to the nurse, she faxed to outpatient pharmacy.       KRISHNA Bryant, DO  General Surgery PGY-1

## 2025-06-07 NOTE — PLAN OF CARE
Neuro: A&Ox4. Calls appropriately.   Cardiac: SR/ST -130's. Denies chest pain. SBP goal <160.   Respiratory: RA, satting high 90's.  sats dipped high 80's while asleep. Refused CPAP, hooked to 2L NC. Satting high 90's. Provider aware.   GI/: Voiding spontaneously. No BM this shift.  Diet/appetite: Tolerating regular diet. Denies nausea.  Activity: Up with SBA with walker. Steady gait  Pain: chronic shoulder pain- managed by current pain regimen  Skin: No new deficits noted.  Lines: L PIV- SL  R PIV-SL  Replacements: phos protocol. Ran this morning, result 3.3 no replacement needed.   Plan: Cont with POC. Possible discharge today 6/7/25

## 2025-06-09 ENCOUNTER — PATIENT OUTREACH (OUTPATIENT)
Dept: FAMILY MEDICINE | Facility: CLINIC | Age: 70
End: 2025-06-09
Payer: COMMERCIAL

## 2025-06-09 LAB
ATRIAL RATE - MUSE: 116 BPM
DIASTOLIC BLOOD PRESSURE - MUSE: NORMAL MMHG
INTERPRETATION ECG - MUSE: NORMAL
P AXIS - MUSE: 11 DEGREES
PR INTERVAL - MUSE: 150 MS
QRS DURATION - MUSE: 98 MS
QT - MUSE: 342 MS
QTC - MUSE: 475 MS
R AXIS - MUSE: -12 DEGREES
SYSTOLIC BLOOD PRESSURE - MUSE: NORMAL MMHG
T AXIS - MUSE: 2 DEGREES
VENTRICULAR RATE- MUSE: 116 BPM

## 2025-06-09 NOTE — TELEPHONE ENCOUNTER
Transitions of Care Outreach  Chief Complaint   Patient presents with    Hospital F/U     Admission 6/3/25-6/7/25       Most Recent Admission Date: 6/3/2025   Most Recent Admission Diagnosis: Diaphragm paralysis - J98.6     Most Recent Discharge Date: 6/7/2025   Most Recent Discharge Diagnosis: Diaphragm paralysis - J98.6     Transitions of Care Assessment    Discharge Assessment  How are you doing now that you are home?: Doing well.  How are your symptoms? (Red Flag symptoms escalate to triage hotline per guidelines): Improved  Do you know how to contact your clinic care team if you have future questions or changes to your health status? : Yes  Does the patient have their discharge instructions? : Yes  Does the patient have questions regarding their discharge instructions? : No  Were you started on any new medications or were there changes to any of your previous medications? : No  Does the patient have all of their medications?: Yes  Do you have questions regarding any of your medications? : No  Do you have all of your needed medical supplies or equipment (DME)?  (i.e. oxygen tank, CPAP, cane, etc.): Yes    Patient did not have medications with him at the time; could not complete med rec.     Follow up Plan     Discharge Follow-Up  Discharge follow up appointment scheduled in alignment with recommended follow up timeframe or Transitions of Risk Category? (Low = within 30 days; Moderate= within 14 days; High= within 7 days): Yes  Discharge Follow Up Appointment Date: 06/25/25  Discharge Follow Up Appointment Scheduled with?: Primary Care Provider    Future Appointments   Date Time Provider Department Center   6/19/2025 10:30 AM [id number]   6/19/2025 11:00 AM Kelsey Tom APRN CNS [facility]   6/25/2025  9:30 AM Indigo Alvarado APRN CNP [facility]   8/6/2025  8:30 AM Kwan Cat MD [facility]       Outpatient Plan as outlined on AVS reviewed with  patient.    For any urgent concerns, please contact our 24 hour nurse triage line: [phone] ([phone])       Mayra Cisneros RN

## 2025-06-09 NOTE — PROGRESS NOTES
Physical Therapy Discharge Summary    Reason for therapy discharge:    Discharged to home.    Progress towards therapy goal(s). See goals on Care Plan in [facility] electronic health record for goal details.  Goals not met.  Barriers to achieving goals:   discharge from facility.    Therapy recommendation(s):    Patient mobilizing quite well. Is able to ambulate good distance without walker, perform stairs, spo2 stable, HR tachycardaic up to 120's but asymptomatic. Pt is safe to DC home with family assist.

## 2025-06-09 NOTE — TELEPHONE ENCOUNTER
DATE OF ADMISSION: 6/3/2025      PRE/POSTOPERATIVE DIAGNOSES: R maria del carmen diaphragm paralysis     PROCEDURES PERFORMED: Right diaphragm plication     FOLLOW UP APPOINTMENTS:   6/19/25    Has follow up with specialty scheduled 6/19/25       RN team can follow up with patient to see how he is doing and if he has any concerns or wants follow up with PCP as well.       Janette Joshi RN on 6/9/2025 at 8:56 AM

## 2025-06-18 ENCOUNTER — PATIENT OUTREACH (OUTPATIENT)
Dept: SURGERY | Facility: CLINIC | Age: 70
End: 2025-06-18
Payer: COMMERCIAL

## 2025-06-18 NOTE — TELEPHONE ENCOUNTER
Post Op Discharge Call    Surgery: Right diaphragm plication     Surgery date: 6/03/2025    Discharge Date:  6/07/2025    Post op/follow up plans:   Patient's surgery was postponed by 2 week (originally scheduled for 5/22/2025)  Patient scheduled for CXR and Clinic visit with CULLEN Tom scheduled for tomorrow June 19th, 2025, follow up appointment wasn't rescheduled to accommodate new surgery date.  Needs to be rescheduled for 2 weeks from now to be his 1-month post-op imaging and follow up visit from surgery (was on 6/2).     Future Appointments   Date Time Provider Department Center   6/19/2025 10:30 AM [id number]   6/19/2025 11:00 AM Kelsey Tom APRN CNS [facility]   6/25/2025  9:30 AM Indigo Alvarado APRN CNP [facility]   8/6/2025  8:30 AM Kwan Cat MD [facility]       Called to complete post-discharge call. No answer. Left detailed message on patient's personal voicemail informing him of need to reschedule tomorrow's clinic visits and requested return call. Call back number provided.    Nora Siddiqui RN, BSN  Thoracic Surgery RN Care Coordinator

## 2025-06-18 NOTE — TELEPHONE ENCOUNTER
Post Op Discharge Call    Surgery: Right diaphragm plication     Surgery date: 6/03/2025    Discharge Date:  6/07/2025    Immediate Concerns: None at this time.   States everything is going well. Only complaint is that he is not liking the weight lifting restrictions. Wishes he could lift more. Explained the importance of the lifting restrictions after his particular surgery. Reviewed that he will have a life long no pushing, pulling or lifting more than 40-50 pounds restriction.     Pain:  No concerns with pain management.     Incision:   No concerns, healing well, no redness, drainage or edema reported.     Drains:   No drain.     Diet:   Regular diet     Activity:   No difficulty with ADLs reported.   Patient is up independently at home.   Encourage patient to continue to stay active.     Post op/follow up plans:   Explained to patient that his 1-month post-op CXR and clinic visit weren't rescheduled to reflect the rescheduling of his surgery from 5/22/2025 to 06/02/2025). Explained his appointments scheduled for 6/19 with CULLEN Tom need to be rescheduled so they are 2 weeks from now. Patient verbalized his understanding. Informed patient scheduling will be reaching out to him this afternoon to reschedule.     Future Appointments   Date Time Provider Department Center   6/23/2025  8:30 AM Kamilah Ortiz APRN CNP [facility]   6/25/2025  9:30 AM Indigo Alvarado APRN CNP [facility]   8/6/2025  8:30 AM Kwan Cat MD [facility]       No questions or concerns at this time.   Patient has our direct number for any questions or concerns that may arise.    Nora Siddiqui RN, BSN  Thoracic Surgery RN Care Coordinator

## 2025-06-19 ENCOUNTER — E-VISIT (OUTPATIENT)
Dept: URGENT CARE | Facility: CLINIC | Age: 70
End: 2025-06-19
Payer: COMMERCIAL

## 2025-06-19 DIAGNOSIS — L30.9 DERMATITIS: Primary | ICD-10-CM

## 2025-06-19 RX ORDER — TACROLIMUS 1 MG/G
OINTMENT TOPICAL 2 TIMES DAILY
Qty: 30 G | Refills: 0 | Status: SHIPPED | OUTPATIENT
Start: 2025-06-19

## 2025-06-19 NOTE — PATIENT INSTRUCTIONS
Dear Phong Cuevas    After reviewing your responses, I've been able to diagnose you with dermatitis which causes irritation of the skin. The cause is often unknown.     Based on your responses, I have prescribed Protopic ointment to treat this. Please follow the instructions on the medication. If you experience irritation of your skin, new rash, or any other new symptoms, you should stop using this medication and contact your primary care provider.     If this treatment does not work for you or you will run out of refills, please plan to follow- up with your primary care provider to set refills for a longer period of time or to try other options.     Things you can do to help prevent this:     Do not scratch your rash. Bacteria from your fingernails may enter your open sores during scratching and cause an infection.     Use moisturizes or emollients, such as petroleum jelly.These help relieve itching and help prevent bacteria from getting in your sores. If you have a doctor's order for medicated cream, apply that first. Then apply the moisturizer or emollient on top.    Thanks for choosing us as your health care partner,    Erica Gustafson PA-C

## 2025-06-19 NOTE — TELEPHONE ENCOUNTER
Provider E-Visit time total (minutes): 2 minutes       
Attending Attestation (For Attendings USE Only)...

## 2025-06-23 ENCOUNTER — ANCILLARY PROCEDURE (OUTPATIENT)
Dept: GENERAL RADIOLOGY | Facility: CLINIC | Age: 70
End: 2025-06-23
Attending: THORACIC SURGERY (CARDIOTHORACIC VASCULAR SURGERY)
Payer: COMMERCIAL

## 2025-06-23 DIAGNOSIS — J98.6 ELEVATED HEMIDIAPHRAGM: ICD-10-CM

## 2025-06-23 DIAGNOSIS — J98.6 DIAPHRAGM PARALYSIS: Primary | ICD-10-CM

## 2025-06-23 PROCEDURE — 71046 X-RAY EXAM CHEST 2 VIEWS: CPT | Mod: TC | Performed by: RADIOLOGY

## 2025-06-25 ENCOUNTER — TELEPHONE (OUTPATIENT)
Dept: FAMILY MEDICINE | Facility: CLINIC | Age: 70
End: 2025-06-25

## 2025-06-25 ENCOUNTER — OFFICE VISIT (OUTPATIENT)
Dept: FAMILY MEDICINE | Facility: CLINIC | Age: 70
End: 2025-06-25
Payer: COMMERCIAL

## 2025-06-25 VITALS
HEIGHT: 69 IN | BODY MASS INDEX: 29.21 KG/M2 | SYSTOLIC BLOOD PRESSURE: 134 MMHG | HEART RATE: 96 BPM | WEIGHT: 197.2 LBS | DIASTOLIC BLOOD PRESSURE: 82 MMHG | RESPIRATION RATE: 18 BRPM | TEMPERATURE: 98 F | OXYGEN SATURATION: 97 %

## 2025-06-25 DIAGNOSIS — J98.6 DIAPHRAGM PARALYSIS: Primary | ICD-10-CM

## 2025-06-25 PROCEDURE — 1111F DSCHRG MED/CURRENT MED MERGE: CPT | Performed by: NURSE PRACTITIONER

## 2025-06-25 PROCEDURE — 3075F SYST BP GE 130 - 139MM HG: CPT | Performed by: NURSE PRACTITIONER

## 2025-06-25 PROCEDURE — 1126F AMNT PAIN NOTED NONE PRSNT: CPT | Performed by: NURSE PRACTITIONER

## 2025-06-25 PROCEDURE — 3079F DIAST BP 80-89 MM HG: CPT | Performed by: NURSE PRACTITIONER

## 2025-06-25 PROCEDURE — 99213 OFFICE O/P EST LOW 20 MIN: CPT | Performed by: NURSE PRACTITIONER

## 2025-06-25 ASSESSMENT — PAIN SCALES - GENERAL: PAINLEVEL_OUTOF10: NO PAIN (0)

## 2025-06-25 NOTE — PROGRESS NOTES
"  Assessment & Plan     Diaphragm paralysis  Stable-doing well postoperatively.  Hoping to increase lifting restriction.  Keep follow-up appointments for chest x-ray and follow-up.        MED REC REQUIRED  Post Medication Reconciliation Status: discharge medications reconciled, continue medications without change  BMI  Estimated body mass index is 29.55 kg/m  as calculated from the following:    Height as of this encounter: 1.74 m (5' 8.5\").    Weight as of this encounter: 89.4 kg (197 lb 3.2 oz).       Malathi Ray is a 70 year old, presenting for the following health issues:  Hospital F/U ([facility] 6/3-67)        6/25/2025     9:03 AM   Additional Questions   Roomed by GOSIA Milian   Accompanied by n/a         6/25/2025     9:03 AM   Patient Reported Additional Medications   Patient reports taking the following new medications n/a     [facility]        Hospital Follow-up Visit:    Hospital/Nursing Home/IP Rehab Facility: [facility]  Most Recent Admission Date: 6/3/2025   Most Recent Admission Diagnosis: Diaphragm paralysis - J98.6     Most Recent Discharge Date: 6/7/2025   Most Recent Discharge Diagnosis: Diaphragm paralysis - J98.6   Do you have any other stressors you would like to discuss with your provider? No    Problems taking medications regularly:  None  Medication changes since discharge: None  Problems adhering to non-medication therapy:  None    Summary of hospitalization:  [facility] discharge summary reviewed  Diagnostic Tests/Treatments reviewed.  Follow up needed: none  Other Healthcare Providers Involved in Patient s Care:         Specialist appointment - scheduled   Update since discharge: improved.         Plan of care communicated with patient             Here for hospital follow-up.  Doing well after right diaphragm repair.  No pain.  Activity back to normal.  Appetite is good.  Bowels " "moving well.  Breathing gradually improving.  No fevers, chills, shortness of breath  Lifting restriction-10 pounds, wants to increase to at least 20 pounds to be able to work and shop      Objective    /82   Pulse 96   Temp 98  F (36.7  C) (Temporal)   Resp 18   Ht 1.74 m (5' 8.5\")   Wt 89.4 kg (197 lb 3.2 oz)   SpO2 97%   BMI 29.55 kg/m    Body mass index is 29.55 kg/m .  Physical Exam  Constitutional:       Appearance: He is well-developed.   HENT:      Right Ear: Tympanic membrane and external ear normal.      Left Ear: Tympanic membrane and external ear normal.      Mouth/Throat:      Mouth: Mucous membranes are moist.      Pharynx: Uvula midline.   Neck:      Thyroid: No thyromegaly.      Vascular: No carotid bruit.   Cardiovascular:      Rate and Rhythm: Normal rate and regular rhythm.      Heart sounds: Normal heart sounds.   Pulmonary:      Effort: Pulmonary effort is normal.      Breath sounds: Normal breath sounds.   Abdominal:      General: Bowel sounds are normal.      Palpations: Abdomen is soft.   Musculoskeletal:      Right lower leg: No edema.      Left lower leg: No edema.   Skin:     General: Skin is warm and dry.      Comments: Surgical incisions edges well approximated, no erythema or drainage.   Neurological:      Mental Status: He is alert.   Psychiatric:         Mood and Affect: Mood normal.                Signed Electronically by: CULLEN Talbot CNP    "

## 2025-06-25 NOTE — TELEPHONE ENCOUNTER
Attempted to call patient at home/mobile number, no answer, left message on voicemail; patient was instructed to return call to [facility] at [phone].    María RIVERA RN  [facility] Triage

## 2025-06-25 NOTE — TELEPHONE ENCOUNTER
----- Message from Flako Gallegos sent at 6/25/2025 10:03 AM CDT -----  Regarding: RE: Lifting restriction  Hi, Ms. Alvarado,    20 lbs is OK, but not more until 3 months postop. He knows he should never lift more than 30-50 lbs for life to minimize the chance of recurrence.    Thank you  Flako  ----- Message -----  From: Indigo Alvarado APRN CNP  Sent: 6/25/2025  10:02 AM CDT  To: Flako Gallegos MD  Subject: Lifting restriction                              Good Morning-I saw Cory this morning for his hospital follow-up.  He is wondering if he could increase his lifting restriction to 20 pounds.    Thank you for your time,  Indigo SAUCEDO

## 2025-07-02 NOTE — TELEPHONE ENCOUNTER
I called and spoke to patient, advised him of provider's note regarding lifting restriction.    Patient verbalized understanding of and agreement with plan.    María RIVERA RN  [facility] Triage

## 2025-07-08 NOTE — PROGRESS NOTES
THORACIC SURGERY FOLLOW UP VISIT      I saw Mr. Phong Cuevas in follow-up today. The clinical summary follows:     PREOP DIAGNOSIS   Diaphragm paralysis  PROCEDURE   Laparoscopic right diaphragm plication  DATE OF PROCEDURE  06/03/2025    POST-OPERATIVE COMPLICATIONS  Acute hypoxia-resolved    INTERVAL STUDIES  CXR 07/09/2025: final report pending at time of clinic visit. To my review the right hemidiaphragm appears nearly level with the left hemidiaphragm. I do not see a pneumothorax or suggestion of infection.        Past Medical History:   Diagnosis Date    Arthritis of left knee 3/25/2022      Past Surgical History:   Procedure Laterality Date    ARTHRODESIS FOOT Right 4/30/2024    Procedure: Fusion of the first metatarsal phalangeal joint, right foot;  Surgeon: Gordon Sandoval DPM;  Location: [facility] OR    ARTHROPLASTY KNEE Left 3/24/2022    Procedure: left TOTAL Knee Arthroplasty;  Surgeon: Allen White MD;  Location: [facility] OR    COLONOSCOPY  2/20/2014    Procedure: COLONOSCOPY;  Colonoscopy  ;  Surgeon: Murali Gill MD;  Location: [facility] GI    LAPAROSCOPIC DIAPHRAMIC PLICATION Right 6/3/2025    Procedure: RIGHT PLICATION, DIAPHRAGM, LAPAROSCOPIC, Diagnostic right thoracoscopy;  Surgeon: Flako Gallegos MD;  Location:  OR      Social History     Socioeconomic History    Marital status: Single     Spouse name: Not on file    Number of children: Not on file    Years of education: Not on file    Highest education level: Not on file   Occupational History    Not on file   Tobacco Use    Smoking status: Never     Passive exposure: Never    Smokeless tobacco: Never   Vaping Use    Vaping status: Never Used   Substance and Sexual Activity    Alcohol use: Not Currently    Drug use: No    Sexual activity: Yes     Partners: Female     Birth control/protection: None     Comment: partner is post-menopausal   Other Topics Concern    Parent/sibling w/ CABG, MI or angioplasty before 65F 55M? No    Social History Narrative    Not on file     Social Drivers of Health     Financial Resource Strain: Low Risk  (6/5/2025)    Financial Resource Strain     Within the past 12 months, have you or your family members you live with been unable to get utilities (heat, electricity) when it was really needed?: No   Food Insecurity: Low Risk  (6/5/2025)    Food Insecurity     Within the past 12 months, did you worry that your food would run out before you got money to buy more?: No     Within the past 12 months, did the food you bought just not last and you didn t have money to get more?: No   Transportation Needs: Low Risk  (6/5/2025)    Transportation Needs     Within the past 12 months, has lack of transportation kept you from medical appointments, getting your medicines, non-medical meetings or appointments, work, or from getting things that you need?: No   Physical Activity: Sufficiently Active (4/25/2025)    Exercise Vital Sign     Days of Exercise per Week: 3 days     Minutes of Exercise per Session: 50 min   Stress: No Stress Concern Present (4/25/2025)    [employer] - Occupational Stress Questionnaire     Feeling of Stress : Not at all   Social Connections: Unknown (4/25/2025)    Social Connection and Isolation Panel [NHANES]     Frequency of Communication with Friends and Family: Not on file     Frequency of Social Gatherings with Friends and Family: Three times a week     Attends Yazdanism Services: Not on file     Active Member of Clubs or Organizations: Not on file     Attends Club or Organization Meetings: Not on file     Marital Status: Not on file   Interpersonal Safety: Low Risk  (6/5/2025)    Interpersonal Safety     Do you feel physically and emotionally safe where you currently live?: Yes     Within the past 12 months, have you been hit, slapped, kicked or otherwise physically hurt by someone?: No     Within the past 12 months, have you been humiliated or emotionally abused  "in other ways by your partner or ex-partner?: No   Housing Stability: High Risk (6/5/2025)    Housing Stability     Do you have housing? : Yes     Are you worried about losing your housing?: Yes      SUBJECTIVE  Cory is doing well. He did not have any pain from the surgery and never took any of the oxycodone he was prescribed when he went home from surgery. He is back to riding his bike and is able to take a deep breath and hold it. He does not notice shortness of breath when he bends over.    OBJECTIVE  /87   Pulse 80   Temp 97.2  F (36.2  C) (Tympanic)   Ht 1.74 m (5' 8.5\")   Wt 89.4 kg (197 lb)   SpO2 97%   BMI 29.52 kg/m       His incisions are healed nicely.    From a personal perspective, he is planning a hunting trip with his buddies for the second week of September. They got to [place] and hunt moose.    IMPRESSION   Cory is a 70 year-old male status post laparoscopic right diaphragm plication. He is here for post operative follow up.    PLAN  I spent 25 min on the date of the encounter in chart review, patient visit, review of tests, documentation and/or discussion with other providers about the issues documented above. I reviewed the plan as follows:  Follow up with me in 1 year with a CXR and sitting/supine PFT prior  All questions were answered and the patient and present family were in agreement with the plan.  I appreciate the opportunity to participate in the care of your patient and will keep you updated.  Sincerely,    "

## 2025-07-09 ENCOUNTER — ONCOLOGY VISIT (OUTPATIENT)
Dept: SURGERY | Facility: CLINIC | Age: 70
End: 2025-07-09
Payer: COMMERCIAL

## 2025-07-09 ENCOUNTER — ANCILLARY PROCEDURE (OUTPATIENT)
Dept: GENERAL RADIOLOGY | Facility: CLINIC | Age: 70
End: 2025-07-09
Payer: COMMERCIAL

## 2025-07-09 VITALS
HEIGHT: 69 IN | SYSTOLIC BLOOD PRESSURE: 138 MMHG | OXYGEN SATURATION: 97 % | DIASTOLIC BLOOD PRESSURE: 87 MMHG | BODY MASS INDEX: 29.18 KG/M2 | WEIGHT: 197 LBS | HEART RATE: 80 BPM | TEMPERATURE: 97.2 F

## 2025-07-09 DIAGNOSIS — J98.6 DIAPHRAGM PARALYSIS: ICD-10-CM

## 2025-07-09 DIAGNOSIS — J98.6 DIAPHRAGM PARALYSIS: Primary | ICD-10-CM

## 2025-07-09 PROCEDURE — 99024 POSTOP FOLLOW-UP VISIT: CPT | Performed by: CLINICAL NURSE SPECIALIST

## 2025-07-09 PROCEDURE — 71046 X-RAY EXAM CHEST 2 VIEWS: CPT | Mod: TC | Performed by: RADIOLOGY

## 2025-07-09 ASSESSMENT — PAIN SCALES - GENERAL: PAINLEVEL_OUTOF10: NO PAIN (0)

## (undated) DEVICE — SU MONOCRYL 2-0 CT-1 36" UND Y945H

## (undated) DEVICE — BLADE SAW OSCIL/SAG STRK 11X90X1.19MM 4/2000 4111-119-090

## (undated) DEVICE — BNDG ELASTIC 4"X5YDS UNSTERILE 6611-40

## (undated) DEVICE — NDL 22GA 1.5"

## (undated) DEVICE — GOWN XLG DISP 9545

## (undated) DEVICE — DRSG PRIMAPORE 02X3" 7133

## (undated) DEVICE — GLOVE PROTEXIS W/NEU-THERA 6.5  2D73TE65

## (undated) DEVICE — ENDO SCOPE WARMER SEAL  C3101

## (undated) DEVICE — BONE CLEANING TIP INTERPULSE  0210-010-000

## (undated) DEVICE — SOL NACL 0.9% IRRIG 1000ML BOTTLE 2F7124

## (undated) DEVICE — DRSG XEROFORM 1X8"

## (undated) DEVICE — SUCTION DRY CHEST DRAIN OASIS 3600-100

## (undated) DEVICE — SU PDO 1 STRATAFIX 36X36CM CTX TAPERPOINT SXPD2B405

## (undated) DEVICE — PREP CHLORAPREP 26ML TINTED HI-LITE ORANGE 930815

## (undated) DEVICE — DRAPE SHEET REV FOLD 3/4 9349

## (undated) DEVICE — PACK EXTREMITY LATEX FREE SOP32HFFCS

## (undated) DEVICE — SU PLEDGET SOFT TFE 13MMX7MMX1.5MM D7044

## (undated) DEVICE — SUCTION IRR SYSTEM W/TIP INTERPULSE

## (undated) DEVICE — SUCTION MANIFOLD NEPTUNE 2 SYS 1 PORT 702-025-000

## (undated) DEVICE — ADH LIQUID MASTISOL TOPICAL VIAL 2-3ML 0523-48

## (undated) DEVICE — PREP CHLORAPREP 26ML TINTED ORANGE  260815

## (undated) DEVICE — GLOVE PROTEXIS W/NEU-THERA 8.0  2D73TE80

## (undated) DEVICE — SU VICRYL 3-0 SH 27" UND J416H

## (undated) DEVICE — STOCKING SLEEVE COMPRESSION CALF MED

## (undated) DEVICE — SUCTION MANIFOLD NEPTUNE 2 SYS 4 PORT 0702-020-000

## (undated) DEVICE — Device

## (undated) DEVICE — DRILL BIT ARTHREX BB TAK MTP THREADED AR-13226T

## (undated) DEVICE — SU ETHILON 4-0 PS-2 18" 1667G

## (undated) DEVICE — SU SILK 0 SH 30" K834H

## (undated) DEVICE — TUBING SMOKE EVAC PNEUMOCLEAR HIGH FLOW 0620050250

## (undated) DEVICE — SYR 10ML LL W/O NDL

## (undated) DEVICE — BNDG COBAN 6"X5YDS STERILE

## (undated) DEVICE — ESU ENDO SCISSORS 5MM CVD 5DCS

## (undated) DEVICE — TUBING SUCTION 10'X3/16" N510

## (undated) DEVICE — ENDO TROCAR SLEEVE KII Z-THREADED 12X100MM CTS22

## (undated) DEVICE — SU TICRON 2 GS-21DA 36" 3146-81

## (undated) DEVICE — DRSG GAUZE 4X4" TRAY

## (undated) DEVICE — TOURNIQUET SGL  BLADDER 30" DL PORT BLUE 5921-030-235

## (undated) DEVICE — CUFF TOURN 18IN STRL DISP

## (undated) DEVICE — DRSG STERI STRIP 1/2X4" R1547

## (undated) DEVICE — SU SILK 2-0 SH 30" K833H

## (undated) DEVICE — ENDO TROCAR OPTICAL ACCESS KII Z-THRD 12X60MM C0R83

## (undated) DEVICE — DRAPE IOBAN INCISE 23X17" 6650EZ

## (undated) DEVICE — SOL NACL 0.9% IRRIG 1000ML BOTTLE 07138-09

## (undated) DEVICE — GUIDE WIRE ARTHREX W/TROCAR TIP .062" AR-8941K

## (undated) DEVICE — GOWN XXLG REINFORCED 9071EL

## (undated) DEVICE — REAMER ARTHREX METATARSAL 20MM AR-8944MR-20

## (undated) DEVICE — GOWN IMPERVIOUS SPECIALTY XLG/XLONG 32474

## (undated) DEVICE — REAMER ARTHREX PHALANGEAL 20MM AR-8944PR-20

## (undated) DEVICE — DRAPE EXTREMITY W/ARMBOARD 29405

## (undated) DEVICE — ESU GROUND PAD ADULT W/CORD E7507

## (undated) DEVICE — BLADE SAW SAGITTAL STRK 21X90X1.19MM HD SYS 6 6221-119-090

## (undated) DEVICE — DRAIN JACKSON PRATT ROUND SIL 19FR W/TROCAR LF JP-2232

## (undated) DEVICE — GEL ULTRASOUND AQUASONIC 20GM 01-01

## (undated) DEVICE — BLADE SAW OSCILLATING STRYK MED 9.0X25X0.38MM 2296-003-111

## (undated) DEVICE — LINEN TOWEL PACK X6 WHITE 5487

## (undated) DEVICE — DRAPE STERI FLUOROSCOPE 35X43"1012 LATEX FREE

## (undated) DEVICE — GLOVE BIOGEL PI MICRO SZ 8.0 48580

## (undated) DEVICE — SOL NACL 0.9% INJ 1000ML BAG 2B1324X

## (undated) DEVICE — ENDO APPLICATOR SURGIFLO PLASMA COBLATION MS1995

## (undated) DEVICE — DRAPE C-ARM MINI 110788

## (undated) DEVICE — GLOVE PROTEXIS BLUE W/NEU-THERA 6.5  2D73EB65

## (undated) DEVICE — BONE CEMENT KIT BOWL AND SPATULA STRK 6201-3-410

## (undated) DEVICE — ESU PENCIL SMOKE EVAC W/ROCKER SWITCH 0703-047-000

## (undated) DEVICE — DRAPE STOCKINETTE IMPERVIOUS 08" LATEX 1586

## (undated) DEVICE — CONNECTOR SIMS TUBING FOR CHEST TUBES 361

## (undated) DEVICE — LINEN GOWN XLG 5407

## (undated) DEVICE — SU VICRYL 4-0 PS-2 18" UND J496H

## (undated) DEVICE — DRILL BIT ARTHREX MTP 2.0MM AR-8944-22

## (undated) DEVICE — SU STRATAFIX MONOCRYL 3-0 SPIRAL PS-2 30CM SXMP1B106

## (undated) DEVICE — SU VICRYL 1 CT-1 36" UND J947H

## (undated) DEVICE — DRSG ABDOMINAL 07 1/2X8" 7197D

## (undated) DEVICE — DRSG JUMPSTART ANTIMICROBIAL 4X4" ABS-4004

## (undated) DEVICE — DRSG AQUACEL AG 3.5X9.75" HYDROFIBER 412011

## (undated) DEVICE — GLOVE BIOGEL PI MICRO INDICATOR UNDERGLOVE SZ 8.0 48980

## (undated) DEVICE — LINEN TOWEL PACK X5 5464

## (undated) DEVICE — SOL NACL 0.9% IRRIG 3000ML BAG 07972-08

## (undated) DEVICE — CATH TRAY FOLEY SURESTEP 16FR W/URNE MTR STLK LATEX A303316A

## (undated) DEVICE — SU VICRYL+ 0 27 UR6 VLT VCP603H

## (undated) RX ORDER — ACETAMINOPHEN 325 MG/1
TABLET ORAL
Status: DISPENSED
Start: 2022-03-24

## (undated) RX ORDER — FENTANYL CITRATE 50 UG/ML
INJECTION, SOLUTION INTRAMUSCULAR; INTRAVENOUS
Status: DISPENSED
Start: 2022-03-24

## (undated) RX ORDER — FENTANYL CITRATE 50 UG/ML
INJECTION, SOLUTION INTRAMUSCULAR; INTRAVENOUS
Status: DISPENSED
Start: 2025-06-03

## (undated) RX ORDER — HYDROMORPHONE HYDROCHLORIDE 1 MG/ML
INJECTION, SOLUTION INTRAMUSCULAR; INTRAVENOUS; SUBCUTANEOUS
Status: DISPENSED
Start: 2022-03-24

## (undated) RX ORDER — PROPOFOL 10 MG/ML
INJECTION, EMULSION INTRAVENOUS
Status: DISPENSED
Start: 2024-04-30

## (undated) RX ORDER — DEXAMETHASONE SODIUM PHOSPHATE 4 MG/ML
INJECTION, SOLUTION INTRA-ARTICULAR; INTRALESIONAL; INTRAMUSCULAR; INTRAVENOUS; SOFT TISSUE
Status: DISPENSED
Start: 2022-03-24

## (undated) RX ORDER — ACETAMINOPHEN 325 MG/1
TABLET ORAL
Status: DISPENSED
Start: 2024-04-30

## (undated) RX ORDER — CEFAZOLIN SODIUM/WATER 2 G/20 ML
SYRINGE (ML) INTRAVENOUS
Status: DISPENSED
Start: 2022-03-24

## (undated) RX ORDER — ONDANSETRON 2 MG/ML
INJECTION INTRAMUSCULAR; INTRAVENOUS
Status: DISPENSED
Start: 2024-04-30

## (undated) RX ORDER — PROPOFOL 10 MG/ML
INJECTION, EMULSION INTRAVENOUS
Status: DISPENSED
Start: 2022-03-24

## (undated) RX ORDER — LIDOCAINE HYDROCHLORIDE 10 MG/ML
INJECTION, SOLUTION EPIDURAL; INFILTRATION; INTRACAUDAL; PERINEURAL
Status: DISPENSED
Start: 2024-04-30

## (undated) RX ORDER — DEXAMETHASONE SODIUM PHOSPHATE 4 MG/ML
INJECTION, SOLUTION INTRA-ARTICULAR; INTRALESIONAL; INTRAMUSCULAR; INTRAVENOUS; SOFT TISSUE
Status: DISPENSED
Start: 2024-04-30

## (undated) RX ORDER — EPHEDRINE SULFATE 50 MG/ML
INJECTION, SOLUTION INTRAMUSCULAR; INTRAVENOUS; SUBCUTANEOUS
Status: DISPENSED
Start: 2025-06-03

## (undated) RX ORDER — ROPIVACAINE HYDROCHLORIDE 5 MG/ML
INJECTION, SOLUTION EPIDURAL; INFILTRATION; PERINEURAL
Status: DISPENSED
Start: 2024-04-30

## (undated) RX ORDER — HYDROXYZINE HYDROCHLORIDE 50 MG/1
TABLET, FILM COATED ORAL
Status: DISPENSED
Start: 2022-03-24

## (undated) RX ORDER — GABAPENTIN 100 MG/1
CAPSULE ORAL
Status: DISPENSED
Start: 2022-03-24

## (undated) RX ORDER — FENTANYL CITRATE-0.9 % NACL/PF 10 MCG/ML
PLASTIC BAG, INJECTION (ML) INTRAVENOUS
Status: DISPENSED
Start: 2022-03-24

## (undated) RX ORDER — MEPERIDINE HYDROCHLORIDE 25 MG/ML
INJECTION INTRAMUSCULAR; INTRAVENOUS; SUBCUTANEOUS
Status: DISPENSED
Start: 2022-03-24

## (undated) RX ORDER — ACETAMINOPHEN 325 MG/1
TABLET ORAL
Status: DISPENSED
Start: 2025-06-03

## (undated) RX ORDER — SODIUM CHLORIDE, SODIUM LACTATE, POTASSIUM CHLORIDE, CALCIUM CHLORIDE 600; 310; 30; 20 MG/100ML; MG/100ML; MG/100ML; MG/100ML
INJECTION, SOLUTION INTRAVENOUS
Status: DISPENSED
Start: 2025-06-03

## (undated) RX ORDER — ESMOLOL HYDROCHLORIDE 10 MG/ML
INJECTION INTRAVENOUS
Status: DISPENSED
Start: 2025-06-03

## (undated) RX ORDER — TRANEXAMIC ACID 650 MG/1
TABLET ORAL
Status: DISPENSED
Start: 2022-03-24

## (undated) RX ORDER — ONDANSETRON 2 MG/ML
INJECTION INTRAMUSCULAR; INTRAVENOUS
Status: DISPENSED
Start: 2022-03-24

## (undated) RX ORDER — EPINEPHRINE 1 MG/ML
INJECTION, SOLUTION, CONCENTRATE INTRAVENOUS
Status: DISPENSED
Start: 2022-03-24

## (undated) RX ORDER — KETOROLAC TROMETHAMINE 15 MG/ML
INJECTION, SOLUTION INTRAMUSCULAR; INTRAVENOUS
Status: DISPENSED
Start: 2022-03-24

## (undated) RX ORDER — LIDOCAINE HYDROCHLORIDE 10 MG/ML
INJECTION, SOLUTION EPIDURAL; INFILTRATION; INTRACAUDAL; PERINEURAL
Status: DISPENSED
Start: 2022-03-24

## (undated) RX ORDER — HYDROMORPHONE HYDROCHLORIDE 1 MG/ML
INJECTION, SOLUTION INTRAMUSCULAR; INTRAVENOUS; SUBCUTANEOUS
Status: DISPENSED
Start: 2025-06-03

## (undated) RX ORDER — MAGNESIUM SULFATE HEPTAHYDRATE 40 MG/ML
INJECTION, SOLUTION INTRAVENOUS
Status: DISPENSED
Start: 2022-03-24

## (undated) RX ORDER — CEFAZOLIN SODIUM/WATER 2 G/20 ML
SYRINGE (ML) INTRAVENOUS
Status: DISPENSED
Start: 2025-06-03

## (undated) RX ORDER — HYDROMORPHONE HCL IN WATER/PF 6 MG/30 ML
PATIENT CONTROLLED ANALGESIA SYRINGE INTRAVENOUS
Status: DISPENSED
Start: 2022-03-24

## (undated) RX ORDER — PHENYLEPHRINE HYDROCHLORIDE 10 MG/ML
INJECTION INTRAVENOUS
Status: DISPENSED
Start: 2022-03-24

## (undated) RX ORDER — CEFAZOLIN SODIUM/WATER 2 G/20 ML
SYRINGE (ML) INTRAVENOUS
Status: DISPENSED
Start: 2024-04-30

## (undated) RX ORDER — BUPIVACAINE HYDROCHLORIDE 5 MG/ML
INJECTION, SOLUTION PERINEURAL
Status: DISPENSED
Start: 2022-03-24

## (undated) RX ORDER — KETOROLAC TROMETHAMINE 30 MG/ML
INJECTION, SOLUTION INTRAMUSCULAR; INTRAVENOUS
Status: DISPENSED
Start: 2022-03-24